# Patient Record
Sex: MALE | Race: WHITE | Employment: OTHER | URBAN - METROPOLITAN AREA
[De-identification: names, ages, dates, MRNs, and addresses within clinical notes are randomized per-mention and may not be internally consistent; named-entity substitution may affect disease eponyms.]

---

## 2017-01-06 DIAGNOSIS — I10 ESSENTIAL HYPERTENSION: ICD-10-CM

## 2017-01-06 RX ORDER — HYDRALAZINE HYDROCHLORIDE 25 MG/1
25 TABLET, FILM COATED ORAL 2 TIMES DAILY
Qty: 180 TABLET | Refills: 1 | Status: SHIPPED | OUTPATIENT
Start: 2017-01-06 | End: 2017-07-10 | Stop reason: SDUPTHER

## 2017-01-06 RX ORDER — AMLODIPINE BESYLATE 5 MG/1
5 TABLET ORAL DAILY
Qty: 90 TABLET | Refills: 1 | Status: SHIPPED | OUTPATIENT
Start: 2017-01-06 | End: 2017-07-10 | Stop reason: SDUPTHER

## 2017-03-02 LAB — DIABETIC RETINOPATHY: NORMAL

## 2017-03-06 ENCOUNTER — OFFICE VISIT (OUTPATIENT)
Dept: INTERNAL MEDICINE CLINIC | Age: 67
End: 2017-03-06

## 2017-03-06 VITALS
HEART RATE: 67 BPM | BODY MASS INDEX: 36.57 KG/M2 | OXYGEN SATURATION: 96 % | HEIGHT: 67 IN | SYSTOLIC BLOOD PRESSURE: 128 MMHG | DIASTOLIC BLOOD PRESSURE: 80 MMHG | RESPIRATION RATE: 14 BRPM | WEIGHT: 233 LBS

## 2017-03-06 DIAGNOSIS — K50.919 CROHN'S DISEASE WITH COMPLICATION, UNSPECIFIED GASTROINTESTINAL TRACT LOCATION (HCC): ICD-10-CM

## 2017-03-06 DIAGNOSIS — M54.12 RIGHT CERVICAL RADICULOPATHY: ICD-10-CM

## 2017-03-06 DIAGNOSIS — M54.50 CHRONIC MIDLINE LOW BACK PAIN WITHOUT SCIATICA: ICD-10-CM

## 2017-03-06 DIAGNOSIS — R80.9 CONTROLLED TYPE 2 DIABETES MELLITUS WITH MICROALBUMINURIA, WITHOUT LONG-TERM CURRENT USE OF INSULIN (HCC): Primary | ICD-10-CM

## 2017-03-06 DIAGNOSIS — E11.29 CONTROLLED TYPE 2 DIABETES MELLITUS WITH MICROALBUMINURIA, WITHOUT LONG-TERM CURRENT USE OF INSULIN (HCC): Primary | ICD-10-CM

## 2017-03-06 DIAGNOSIS — Z00.00 PREVENTATIVE HEALTH CARE: ICD-10-CM

## 2017-03-06 DIAGNOSIS — G89.29 CHRONIC MIDLINE LOW BACK PAIN WITHOUT SCIATICA: ICD-10-CM

## 2017-03-06 DIAGNOSIS — I10 ESSENTIAL HYPERTENSION: ICD-10-CM

## 2017-03-06 PROCEDURE — 99214 OFFICE O/P EST MOD 30 MIN: CPT | Performed by: INTERNAL MEDICINE

## 2017-03-06 RX ORDER — TRAMADOL HYDROCHLORIDE 50 MG/1
TABLET ORAL
Qty: 270 TABLET | Refills: 1 | Status: SHIPPED | OUTPATIENT
Start: 2017-03-06 | End: 2017-09-07 | Stop reason: SDUPTHER

## 2017-03-06 RX ORDER — CYCLOBENZAPRINE HCL 10 MG
10 TABLET ORAL 3 TIMES DAILY PRN
Qty: 90 TABLET | Refills: 0 | Status: SHIPPED | OUTPATIENT
Start: 2017-03-06 | End: 2017-06-29 | Stop reason: SDUPTHER

## 2017-03-06 RX ORDER — LISINOPRIL 40 MG/1
TABLET ORAL
Qty: 90 TABLET | Refills: 1 | Status: SHIPPED | OUTPATIENT
Start: 2017-03-06 | End: 2017-08-11 | Stop reason: SDUPTHER

## 2017-03-06 RX ORDER — HYDROCODONE BITARTRATE AND ACETAMINOPHEN 5; 325 MG/1; MG/1
1 TABLET ORAL 2 TIMES DAILY PRN
Qty: 60 TABLET | Refills: 0 | Status: SHIPPED | OUTPATIENT
Start: 2017-03-06 | End: 2017-06-06 | Stop reason: SDUPTHER

## 2017-03-06 RX ORDER — HYDROCODONE BITARTRATE AND ACETAMINOPHEN 5; 325 MG/1; MG/1
1 TABLET ORAL 2 TIMES DAILY
Qty: 60 TABLET | Refills: 0 | Status: SHIPPED | OUTPATIENT
Start: 2017-03-06 | End: 2017-06-06 | Stop reason: SDUPTHER

## 2017-03-16 DIAGNOSIS — E11.29 CONTROLLED TYPE 2 DIABETES MELLITUS WITH MICROALBUMINURIA, WITHOUT LONG-TERM CURRENT USE OF INSULIN (HCC): ICD-10-CM

## 2017-03-16 DIAGNOSIS — R80.9 CONTROLLED TYPE 2 DIABETES MELLITUS WITH MICROALBUMINURIA, WITHOUT LONG-TERM CURRENT USE OF INSULIN (HCC): ICD-10-CM

## 2017-03-16 DIAGNOSIS — K50.919 CROHN'S DISEASE WITH COMPLICATION, UNSPECIFIED GASTROINTESTINAL TRACT LOCATION (HCC): ICD-10-CM

## 2017-03-16 DIAGNOSIS — Z00.00 PREVENTATIVE HEALTH CARE: ICD-10-CM

## 2017-03-16 LAB
A/G RATIO: 1.6 (ref 1.1–2.2)
ALBUMIN SERPL-MCNC: 4.2 G/DL (ref 3.4–5)
ALP BLD-CCNC: 92 U/L (ref 40–129)
ALT SERPL-CCNC: 12 U/L (ref 10–40)
ANION GAP SERPL CALCULATED.3IONS-SCNC: 17 MMOL/L (ref 3–16)
AST SERPL-CCNC: 10 U/L (ref 15–37)
BILIRUB SERPL-MCNC: 0.3 MG/DL (ref 0–1)
BUN BLDV-MCNC: 14 MG/DL (ref 7–20)
CALCIUM SERPL-MCNC: 9.1 MG/DL (ref 8.3–10.6)
CHLORIDE BLD-SCNC: 100 MMOL/L (ref 99–110)
CHOLESTEROL, TOTAL: 227 MG/DL (ref 0–199)
CO2: 26 MMOL/L (ref 21–32)
CREAT SERPL-MCNC: 0.9 MG/DL (ref 0.8–1.3)
GFR AFRICAN AMERICAN: >60
GFR NON-AFRICAN AMERICAN: >60
GLOBULIN: 2.7 G/DL
GLUCOSE BLD-MCNC: 143 MG/DL (ref 70–99)
HDLC SERPL-MCNC: 39 MG/DL (ref 40–60)
HEPATITIS C ANTIBODY INTERPRETATION: NORMAL
LDL CHOLESTEROL CALCULATED: 143 MG/DL
POTASSIUM SERPL-SCNC: 3.8 MMOL/L (ref 3.5–5.1)
SEDIMENTATION RATE, ERYTHROCYTE: 28 MM/HR (ref 0–20)
SODIUM BLD-SCNC: 143 MMOL/L (ref 136–145)
TOTAL PROTEIN: 6.9 G/DL (ref 6.4–8.2)
TRIGL SERPL-MCNC: 224 MG/DL (ref 0–150)
VLDLC SERPL CALC-MCNC: 45 MG/DL

## 2017-03-17 LAB
ESTIMATED AVERAGE GLUCOSE: 157.1 MG/DL
HBA1C MFR BLD: 7.1 %

## 2017-03-31 DIAGNOSIS — I10 ESSENTIAL HYPERTENSION: ICD-10-CM

## 2017-03-31 RX ORDER — LABETALOL 100 MG/1
150 TABLET, FILM COATED ORAL 2 TIMES DAILY
Qty: 270 TABLET | Refills: 1 | Status: SHIPPED | OUTPATIENT
Start: 2017-03-31 | End: 2017-10-11 | Stop reason: SDUPTHER

## 2017-05-10 DIAGNOSIS — F41.9 ANXIETY: ICD-10-CM

## 2017-05-10 RX ORDER — ALPRAZOLAM 0.25 MG/1
TABLET ORAL
Qty: 30 TABLET | Refills: 0 | OUTPATIENT
Start: 2017-05-10

## 2017-05-10 RX ORDER — ALPRAZOLAM 0.25 MG/1
0.25 TABLET ORAL NIGHTLY PRN
Qty: 30 TABLET | Refills: 1 | Status: SHIPPED | OUTPATIENT
Start: 2017-05-10 | End: 2018-05-09 | Stop reason: SDUPTHER

## 2017-06-06 ENCOUNTER — OFFICE VISIT (OUTPATIENT)
Dept: INTERNAL MEDICINE CLINIC | Age: 67
End: 2017-06-06

## 2017-06-06 VITALS
HEIGHT: 67 IN | BODY MASS INDEX: 36.76 KG/M2 | DIASTOLIC BLOOD PRESSURE: 72 MMHG | WEIGHT: 234.2 LBS | SYSTOLIC BLOOD PRESSURE: 112 MMHG | HEART RATE: 63 BPM | OXYGEN SATURATION: 95 %

## 2017-06-06 DIAGNOSIS — M54.50 CHRONIC MIDLINE LOW BACK PAIN WITHOUT SCIATICA: ICD-10-CM

## 2017-06-06 DIAGNOSIS — G89.29 CHRONIC MIDLINE LOW BACK PAIN WITHOUT SCIATICA: ICD-10-CM

## 2017-06-06 DIAGNOSIS — M54.12 RIGHT CERVICAL RADICULOPATHY: ICD-10-CM

## 2017-06-06 DIAGNOSIS — I10 ESSENTIAL HYPERTENSION: ICD-10-CM

## 2017-06-06 DIAGNOSIS — K50.919 CROHN'S DISEASE WITH COMPLICATION, UNSPECIFIED GASTROINTESTINAL TRACT LOCATION (HCC): ICD-10-CM

## 2017-06-06 DIAGNOSIS — R80.9 CONTROLLED TYPE 2 DIABETES MELLITUS WITH MICROALBUMINURIA, WITHOUT LONG-TERM CURRENT USE OF INSULIN (HCC): Primary | ICD-10-CM

## 2017-06-06 DIAGNOSIS — E11.29 CONTROLLED TYPE 2 DIABETES MELLITUS WITH MICROALBUMINURIA, WITHOUT LONG-TERM CURRENT USE OF INSULIN (HCC): Primary | ICD-10-CM

## 2017-06-06 DIAGNOSIS — E34.9 TESTOSTERONE DEFICIENCY: ICD-10-CM

## 2017-06-06 PROCEDURE — G8427 DOCREV CUR MEDS BY ELIG CLIN: HCPCS | Performed by: INTERNAL MEDICINE

## 2017-06-06 PROCEDURE — 3017F COLORECTAL CA SCREEN DOC REV: CPT | Performed by: INTERNAL MEDICINE

## 2017-06-06 PROCEDURE — 1036F TOBACCO NON-USER: CPT | Performed by: INTERNAL MEDICINE

## 2017-06-06 PROCEDURE — G8417 CALC BMI ABV UP PARAM F/U: HCPCS | Performed by: INTERNAL MEDICINE

## 2017-06-06 PROCEDURE — 3045F PR MOST RECENT HEMOGLOBIN A1C LEVEL 7.0-9.0%: CPT | Performed by: INTERNAL MEDICINE

## 2017-06-06 PROCEDURE — 99214 OFFICE O/P EST MOD 30 MIN: CPT | Performed by: INTERNAL MEDICINE

## 2017-06-06 RX ORDER — TESTOSTERONE 16.2 MG/G
2 GEL TRANSDERMAL DAILY
Qty: 1 BOTTLE | Refills: 5 | Status: SHIPPED | OUTPATIENT
Start: 2017-06-06 | End: 2017-07-11 | Stop reason: SDUPTHER

## 2017-06-06 RX ORDER — HYDROCODONE BITARTRATE AND ACETAMINOPHEN 5; 325 MG/1; MG/1
1 TABLET ORAL 2 TIMES DAILY
Qty: 60 TABLET | Refills: 0 | Status: SHIPPED | OUTPATIENT
Start: 2017-06-06 | End: 2017-09-07 | Stop reason: SDUPTHER

## 2017-06-06 RX ORDER — HYDROCODONE BITARTRATE AND ACETAMINOPHEN 5; 325 MG/1; MG/1
1 TABLET ORAL 2 TIMES DAILY PRN
Qty: 60 TABLET | Refills: 0 | Status: SHIPPED | OUTPATIENT
Start: 2017-06-06 | End: 2017-12-07 | Stop reason: SDUPTHER

## 2017-06-08 DIAGNOSIS — E11.29 CONTROLLED TYPE 2 DIABETES MELLITUS WITH MICROALBUMINURIA, WITHOUT LONG-TERM CURRENT USE OF INSULIN (HCC): ICD-10-CM

## 2017-06-08 DIAGNOSIS — R80.9 CONTROLLED TYPE 2 DIABETES MELLITUS WITH MICROALBUMINURIA, WITHOUT LONG-TERM CURRENT USE OF INSULIN (HCC): ICD-10-CM

## 2017-06-08 LAB
A/G RATIO: 1.5 (ref 1.1–2.2)
ALBUMIN SERPL-MCNC: 4.1 G/DL (ref 3.4–5)
ALP BLD-CCNC: 97 U/L (ref 40–129)
ALT SERPL-CCNC: 13 U/L (ref 10–40)
ANION GAP SERPL CALCULATED.3IONS-SCNC: 19 MMOL/L (ref 3–16)
AST SERPL-CCNC: 10 U/L (ref 15–37)
BILIRUB SERPL-MCNC: 0.3 MG/DL (ref 0–1)
BUN BLDV-MCNC: 18 MG/DL (ref 7–20)
CALCIUM SERPL-MCNC: 9 MG/DL (ref 8.3–10.6)
CHLORIDE BLD-SCNC: 103 MMOL/L (ref 99–110)
CHOLESTEROL, TOTAL: 215 MG/DL (ref 0–199)
CO2: 24 MMOL/L (ref 21–32)
CREAT SERPL-MCNC: 0.9 MG/DL (ref 0.8–1.3)
CREATININE URINE: 101.4 MG/DL (ref 39–259)
GFR AFRICAN AMERICAN: >60
GFR NON-AFRICAN AMERICAN: >60
GLOBULIN: 2.7 G/DL
GLUCOSE BLD-MCNC: 145 MG/DL (ref 70–99)
HDLC SERPL-MCNC: 33 MG/DL (ref 40–60)
LDL CHOLESTEROL CALCULATED: 131 MG/DL
MICROALBUMIN UR-MCNC: 12.8 MG/DL
MICROALBUMIN/CREAT UR-RTO: 126.2 MG/G (ref 0–30)
POTASSIUM SERPL-SCNC: 3.8 MMOL/L (ref 3.5–5.1)
SODIUM BLD-SCNC: 146 MMOL/L (ref 136–145)
TOTAL PROTEIN: 6.8 G/DL (ref 6.4–8.2)
TRIGL SERPL-MCNC: 254 MG/DL (ref 0–150)
VLDLC SERPL CALC-MCNC: 51 MG/DL

## 2017-06-09 LAB
ESTIMATED AVERAGE GLUCOSE: 159.9 MG/DL
HBA1C MFR BLD: 7.2 %

## 2017-06-10 LAB
SEX HORMONE BINDING GLOBULIN: 31 NMOL/L (ref 11–80)
TESTOSTERONE FREE PERCENT: 1.8 % (ref 1.6–2.9)
TESTOSTERONE FREE, CALC: 40 PG/ML (ref 47–244)
TESTOSTERONE TOTAL-MALE: 219 NG/DL (ref 300–720)

## 2017-06-13 ENCOUNTER — TELEPHONE (OUTPATIENT)
Dept: INTERNAL MEDICINE CLINIC | Age: 67
End: 2017-06-13

## 2017-06-29 DIAGNOSIS — M54.50 CHRONIC MIDLINE LOW BACK PAIN WITHOUT SCIATICA: ICD-10-CM

## 2017-06-29 DIAGNOSIS — G89.29 CHRONIC MIDLINE LOW BACK PAIN WITHOUT SCIATICA: ICD-10-CM

## 2017-06-29 DIAGNOSIS — M54.12 RIGHT CERVICAL RADICULOPATHY: ICD-10-CM

## 2017-06-29 RX ORDER — CYCLOBENZAPRINE HCL 10 MG
10 TABLET ORAL 3 TIMES DAILY PRN
Qty: 90 TABLET | Refills: 0 | Status: SHIPPED | OUTPATIENT
Start: 2017-06-29 | End: 2017-10-02 | Stop reason: SDUPTHER

## 2017-07-10 DIAGNOSIS — I10 ESSENTIAL HYPERTENSION: ICD-10-CM

## 2017-07-10 RX ORDER — HYDRALAZINE HYDROCHLORIDE 25 MG/1
25 TABLET, FILM COATED ORAL 2 TIMES DAILY
Qty: 180 TABLET | Refills: 1 | Status: SHIPPED | OUTPATIENT
Start: 2017-07-10 | End: 2018-01-08 | Stop reason: SDUPTHER

## 2017-07-10 RX ORDER — AMLODIPINE BESYLATE 5 MG/1
5 TABLET ORAL DAILY
Qty: 90 TABLET | Refills: 1 | Status: SHIPPED | OUTPATIENT
Start: 2017-07-10 | End: 2018-01-08 | Stop reason: SDUPTHER

## 2017-07-11 DIAGNOSIS — E34.9 TESTOSTERONE DEFICIENCY: ICD-10-CM

## 2017-07-11 RX ORDER — TESTOSTERONE 16.2 MG/G
2 GEL TRANSDERMAL DAILY
Qty: 1 BOTTLE | Refills: 5 | Status: SHIPPED | OUTPATIENT
Start: 2017-07-11 | End: 2017-07-26

## 2017-07-26 ENCOUNTER — TELEPHONE (OUTPATIENT)
Dept: INTERNAL MEDICINE CLINIC | Age: 67
End: 2017-07-26

## 2017-07-26 DIAGNOSIS — E34.9 TESTOSTERONE DEFICIENCY: Primary | ICD-10-CM

## 2017-07-26 RX ORDER — TESTOSTERONE GEL, 1% 10 MG/G
50 GEL TRANSDERMAL DAILY
Qty: 30 PACKAGE | Refills: 5 | Status: SHIPPED | OUTPATIENT
Start: 2017-07-26 | End: 2017-11-15

## 2017-08-11 DIAGNOSIS — I10 ESSENTIAL HYPERTENSION: ICD-10-CM

## 2017-08-11 DIAGNOSIS — E11.29 CONTROLLED TYPE 2 DIABETES MELLITUS WITH MICROALBUMINURIA, WITHOUT LONG-TERM CURRENT USE OF INSULIN (HCC): ICD-10-CM

## 2017-08-11 DIAGNOSIS — R80.9 CONTROLLED TYPE 2 DIABETES MELLITUS WITH MICROALBUMINURIA, WITHOUT LONG-TERM CURRENT USE OF INSULIN (HCC): ICD-10-CM

## 2017-08-11 RX ORDER — POTASSIUM CHLORIDE 20 MEQ/1
TABLET, EXTENDED RELEASE ORAL
Qty: 90 TABLET | Refills: 1 | Status: SHIPPED | OUTPATIENT
Start: 2017-08-11 | End: 2019-02-07

## 2017-08-11 RX ORDER — LISINOPRIL 40 MG/1
TABLET ORAL
Qty: 30 TABLET | Refills: 0 | Status: SHIPPED | OUTPATIENT
Start: 2017-08-11 | End: 2017-09-07 | Stop reason: SDUPTHER

## 2017-08-29 ENCOUNTER — CARE COORDINATION (OUTPATIENT)
Dept: CARE COORDINATION | Age: 67
End: 2017-08-29

## 2017-09-07 ENCOUNTER — OFFICE VISIT (OUTPATIENT)
Dept: INTERNAL MEDICINE CLINIC | Age: 67
End: 2017-09-07

## 2017-09-07 VITALS
BODY MASS INDEX: 36.34 KG/M2 | DIASTOLIC BLOOD PRESSURE: 70 MMHG | RESPIRATION RATE: 16 BRPM | OXYGEN SATURATION: 96 % | SYSTOLIC BLOOD PRESSURE: 114 MMHG | HEART RATE: 74 BPM | WEIGHT: 232 LBS

## 2017-09-07 DIAGNOSIS — M54.50 CHRONIC MIDLINE LOW BACK PAIN WITHOUT SCIATICA: ICD-10-CM

## 2017-09-07 DIAGNOSIS — K50.919 CROHN'S DISEASE WITH COMPLICATION, UNSPECIFIED GASTROINTESTINAL TRACT LOCATION (HCC): ICD-10-CM

## 2017-09-07 DIAGNOSIS — E34.9 TESTOSTERONE DEFICIENCY: ICD-10-CM

## 2017-09-07 DIAGNOSIS — R80.9 CONTROLLED TYPE 2 DIABETES MELLITUS WITH MICROALBUMINURIA, WITHOUT LONG-TERM CURRENT USE OF INSULIN (HCC): ICD-10-CM

## 2017-09-07 DIAGNOSIS — E11.29 CONTROLLED TYPE 2 DIABETES MELLITUS WITH MICROALBUMINURIA, WITHOUT LONG-TERM CURRENT USE OF INSULIN (HCC): ICD-10-CM

## 2017-09-07 DIAGNOSIS — M54.12 RIGHT CERVICAL RADICULOPATHY: ICD-10-CM

## 2017-09-07 DIAGNOSIS — G89.29 CHRONIC MIDLINE LOW BACK PAIN WITHOUT SCIATICA: ICD-10-CM

## 2017-09-07 DIAGNOSIS — H35.54 PATTERN DYSTROPHY OF MACULA: ICD-10-CM

## 2017-09-07 DIAGNOSIS — Z00.00 ROUTINE GENERAL MEDICAL EXAMINATION AT A HEALTH CARE FACILITY: Primary | ICD-10-CM

## 2017-09-07 DIAGNOSIS — I10 ESSENTIAL HYPERTENSION: ICD-10-CM

## 2017-09-07 DIAGNOSIS — Z23 NEED FOR INFLUENZA VACCINATION: ICD-10-CM

## 2017-09-07 DIAGNOSIS — M17.0 PRIMARY OSTEOARTHRITIS OF BOTH KNEES: ICD-10-CM

## 2017-09-07 PROCEDURE — 36415 COLL VENOUS BLD VENIPUNCTURE: CPT | Performed by: INTERNAL MEDICINE

## 2017-09-07 PROCEDURE — 90662 IIV NO PRSV INCREASED AG IM: CPT | Performed by: INTERNAL MEDICINE

## 2017-09-07 PROCEDURE — G0402 INITIAL PREVENTIVE EXAM: HCPCS | Performed by: INTERNAL MEDICINE

## 2017-09-07 PROCEDURE — G0008 ADMIN INFLUENZA VIRUS VAC: HCPCS | Performed by: INTERNAL MEDICINE

## 2017-09-07 RX ORDER — TRAMADOL HYDROCHLORIDE 50 MG/1
TABLET ORAL
Qty: 270 TABLET | Refills: 1 | Status: SHIPPED | OUTPATIENT
Start: 2017-09-07 | End: 2017-12-07 | Stop reason: SDUPTHER

## 2017-09-07 RX ORDER — LISINOPRIL 40 MG/1
TABLET ORAL
Qty: 30 TABLET | Refills: 0 | Status: SHIPPED | OUTPATIENT
Start: 2017-09-07 | End: 2017-09-09 | Stop reason: SDUPTHER

## 2017-09-07 RX ORDER — HYDROCODONE BITARTRATE AND ACETAMINOPHEN 5; 325 MG/1; MG/1
1 TABLET ORAL 2 TIMES DAILY
Qty: 60 TABLET | Refills: 0 | Status: SHIPPED | OUTPATIENT
Start: 2017-09-07 | End: 2018-03-05 | Stop reason: SDUPTHER

## 2017-09-07 RX ORDER — HYDROCODONE BITARTRATE AND ACETAMINOPHEN 5; 325 MG/1; MG/1
1 TABLET ORAL 2 TIMES DAILY
Qty: 60 TABLET | Refills: 0 | Status: SHIPPED | OUTPATIENT
Start: 2017-09-07 | End: 2018-02-12 | Stop reason: SDUPTHER

## 2017-09-07 ASSESSMENT — LIFESTYLE VARIABLES: HOW OFTEN DO YOU HAVE A DRINK CONTAINING ALCOHOL: 0

## 2017-09-07 ASSESSMENT — PATIENT HEALTH QUESTIONNAIRE - PHQ9: SUM OF ALL RESPONSES TO PHQ QUESTIONS 1-9: 0

## 2017-09-07 ASSESSMENT — ANXIETY QUESTIONNAIRES: GAD7 TOTAL SCORE: 0

## 2017-09-09 DIAGNOSIS — E11.29 CONTROLLED TYPE 2 DIABETES MELLITUS WITH MICROALBUMINURIA, WITHOUT LONG-TERM CURRENT USE OF INSULIN (HCC): ICD-10-CM

## 2017-09-09 DIAGNOSIS — I10 ESSENTIAL HYPERTENSION: ICD-10-CM

## 2017-09-09 DIAGNOSIS — R80.9 CONTROLLED TYPE 2 DIABETES MELLITUS WITH MICROALBUMINURIA, WITHOUT LONG-TERM CURRENT USE OF INSULIN (HCC): ICD-10-CM

## 2017-09-11 ENCOUNTER — HOSPITAL ENCOUNTER (OUTPATIENT)
Dept: GENERAL RADIOLOGY | Age: 67
Discharge: HOME | End: 2017-09-11
Attending: INTERNAL MEDICINE | Admitting: INTERNAL MEDICINE

## 2017-09-11 RX ORDER — LISINOPRIL 40 MG/1
TABLET ORAL
Qty: 30 TABLET | Refills: 0 | Status: SHIPPED | OUTPATIENT
Start: 2017-09-11 | End: 2017-10-11 | Stop reason: SDUPTHER

## 2017-09-13 ENCOUNTER — OFFICE VISIT (OUTPATIENT)
Dept: ORTHOPEDIC SURGERY | Age: 67
End: 2017-09-13

## 2017-09-13 VITALS — BODY MASS INDEX: 36.41 KG/M2 | WEIGHT: 232 LBS | HEIGHT: 67 IN | RESPIRATION RATE: 16 BRPM

## 2017-09-13 DIAGNOSIS — T84.82XS ARTHROFIBROSIS OF TOTAL KNEE ARTHROPLASTY, SEQUELA: ICD-10-CM

## 2017-09-13 DIAGNOSIS — Z96.651 STATUS POST TOTAL RIGHT KNEE REPLACEMENT: Primary | ICD-10-CM

## 2017-09-13 PROBLEM — T84.82XA ARTHROFIBROSIS OF TOTAL KNEE ARTHROPLASTY (HCC): Status: ACTIVE | Noted: 2017-09-13

## 2017-09-13 PROCEDURE — G8417 CALC BMI ABV UP PARAM F/U: HCPCS | Performed by: ORTHOPAEDIC SURGERY

## 2017-09-13 PROCEDURE — G8428 CUR MEDS NOT DOCUMENT: HCPCS | Performed by: ORTHOPAEDIC SURGERY

## 2017-09-13 PROCEDURE — 3017F COLORECTAL CA SCREEN DOC REV: CPT | Performed by: ORTHOPAEDIC SURGERY

## 2017-09-13 PROCEDURE — 73560 X-RAY EXAM OF KNEE 1 OR 2: CPT | Performed by: ORTHOPAEDIC SURGERY

## 2017-09-13 PROCEDURE — 99204 OFFICE O/P NEW MOD 45 MIN: CPT | Performed by: ORTHOPAEDIC SURGERY

## 2017-09-13 PROCEDURE — 4040F PNEUMOC VAC/ADMIN/RCVD: CPT | Performed by: ORTHOPAEDIC SURGERY

## 2017-09-13 PROCEDURE — 1036F TOBACCO NON-USER: CPT | Performed by: ORTHOPAEDIC SURGERY

## 2017-09-13 ASSESSMENT — ENCOUNTER SYMPTOMS
NAUSEA: 1
DIARRHEA: 1
CONSTIPATION: 1
BLURRED VISION: 1
HEARTBURN: 1
ABDOMINAL PAIN: 1
SHORTNESS OF BREATH: 1

## 2017-09-14 LAB
A/G RATIO: 1.4 (CALC) (ref 0.8–2.6)
ALBUMIN SERPL-MCNC: 4.2 GM/DL (ref 3.5–5.2)
ALP BLD-CCNC: 93 U/L (ref 23–144)
ALT SERPL-CCNC: 14 U/L (ref 0–60)
AST SERPL-CCNC: 9 U/L (ref 0–46)
BASOPHILS ABSOLUTE: 0.1 K/MM3 (ref 0–0.3)
BASOPHILS RELATIVE PERCENT: 1.3 % (ref 0–2)
BILIRUB SERPL-MCNC: 0.3 MG/DL (ref 0–1.2)
BUN / CREAT RATIO: 15 (CALC) (ref 7–25)
BUN BLDV-MCNC: 15 MG/DL (ref 3–29)
CALCIUM SERPL-MCNC: 9.4 MG/DL (ref 8.5–10.5)
CHLORIDE BLD-SCNC: 101 MEQ/L (ref 96–110)
CHOLESTEROL, TOTAL: 212 MG/DL
CO2: 30 MEQ/L (ref 19–32)
COPY(IES) SENT TO:: NORMAL
CREAT SERPL-MCNC: 1 MG/DL
EOSINOPHILS ABSOLUTE: 0.3 K/MM3 (ref 0–0.6)
EOSINOPHILS RELATIVE PERCENT: 3.8 % (ref 0–7)
GFR SERPL CREATININE-BSD FRML MDRD: 79 ML/MIN/1.73M2
GLOBULIN: 3.1 GM/DL (CALC) (ref 1.9–3.6)
GLUCOSE BLD-MCNC: 155 MG/DL
HBA1C MFR BLD: 7 % TOTAL HGB
HCT VFR BLD CALC: 41.1 % (ref 41–50)
HDLC SERPL-MCNC: 35 MG/DL
HEMOGLOBIN: 13.5 G/DL (ref 13.8–17.2)
LDL CHOLESTEROL: 134 MG/DL (CALC)
LEUKOCYTES, UA: 6.8 K/MM3 (ref 3.8–10.8)
LYMPHOCYTES ABSOLUTE: 1.4 K/MM3 (ref 0.9–4.1)
LYMPHOCYTES RELATIVE PERCENT: 21.1 % (ref 18–47)
MCH RBC QN AUTO: 25.7 PG (ref 27–33)
MCHC RBC AUTO-ENTMCNC: 32.8 G/DL (ref 32–36)
MCV RBC AUTO: 78.4 FL (ref 80–100)
MONOCYTES ABSOLUTE: 0.4 K/MM3 (ref 0.2–1.1)
MONOCYTES RELATIVE PERCENT: 5.2 % (ref 0–14)
NEUTROPHILS ABSOLUTE: 4.7 K/MM3 (ref 1.5–7.8)
PDW BLD-RTO: 15.5 % (ref 9–15)
PLATELET # BLD: 243 K/MM3 (ref 130–400)
POTASSIUM SERPL-SCNC: 3.8 MEQ/L (ref 3.4–5.3)
RBC # BLD: 5.24 M/MM3 (ref 4.4–5.8)
SEGMENTED NEUTROPHILS RELATIVE PERCENT: 68.6 % (ref 40–75)
SODIUM BLD-SCNC: 145 MEQ/L (ref 135–148)
TOTAL PROTEIN: 7.3 GM/DL (ref 6–8.3)
TRIGL SERPL-MCNC: 215 MG/DL
VLDLC SERPL CALC-MCNC: 43 MG/DL (CALC) (ref 4–38)

## 2017-10-02 DIAGNOSIS — G89.29 CHRONIC MIDLINE LOW BACK PAIN WITHOUT SCIATICA: ICD-10-CM

## 2017-10-02 DIAGNOSIS — M54.50 CHRONIC MIDLINE LOW BACK PAIN WITHOUT SCIATICA: ICD-10-CM

## 2017-10-02 DIAGNOSIS — M54.12 RIGHT CERVICAL RADICULOPATHY: ICD-10-CM

## 2017-10-02 RX ORDER — CYCLOBENZAPRINE HCL 10 MG
10 TABLET ORAL 3 TIMES DAILY PRN
Qty: 90 TABLET | Refills: 0 | Status: SHIPPED | OUTPATIENT
Start: 2017-10-02 | End: 2018-01-02 | Stop reason: SDUPTHER

## 2017-11-15 ENCOUNTER — HOSPITAL ENCOUNTER (OUTPATIENT)
Dept: ULTRASOUND IMAGING | Age: 67
Discharge: HOME | End: 2017-11-15
Attending: INTERNAL MEDICINE | Admitting: INTERNAL MEDICINE

## 2017-11-15 ENCOUNTER — OFFICE VISIT (OUTPATIENT)
Dept: INTERNAL MEDICINE CLINIC | Age: 67
End: 2017-11-15

## 2017-11-15 VITALS
DIASTOLIC BLOOD PRESSURE: 72 MMHG | RESPIRATION RATE: 16 BRPM | OXYGEN SATURATION: 96 % | HEART RATE: 68 BPM | SYSTOLIC BLOOD PRESSURE: 126 MMHG

## 2017-11-15 DIAGNOSIS — J01.00 ACUTE NON-RECURRENT MAXILLARY SINUSITIS: Primary | ICD-10-CM

## 2017-11-15 DIAGNOSIS — N50.811 RIGHT TESTICULAR PAIN: ICD-10-CM

## 2017-11-15 DIAGNOSIS — I10 ESSENTIAL HYPERTENSION: ICD-10-CM

## 2017-11-15 PROCEDURE — 1123F ACP DISCUSS/DSCN MKR DOCD: CPT | Performed by: INTERNAL MEDICINE

## 2017-11-15 PROCEDURE — 3017F COLORECTAL CA SCREEN DOC REV: CPT | Performed by: INTERNAL MEDICINE

## 2017-11-15 PROCEDURE — G8427 DOCREV CUR MEDS BY ELIG CLIN: HCPCS | Performed by: INTERNAL MEDICINE

## 2017-11-15 PROCEDURE — G8417 CALC BMI ABV UP PARAM F/U: HCPCS | Performed by: INTERNAL MEDICINE

## 2017-11-15 PROCEDURE — G8484 FLU IMMUNIZE NO ADMIN: HCPCS | Performed by: INTERNAL MEDICINE

## 2017-11-15 PROCEDURE — 4040F PNEUMOC VAC/ADMIN/RCVD: CPT | Performed by: INTERNAL MEDICINE

## 2017-11-15 PROCEDURE — 1036F TOBACCO NON-USER: CPT | Performed by: INTERNAL MEDICINE

## 2017-11-15 PROCEDURE — 99213 OFFICE O/P EST LOW 20 MIN: CPT | Performed by: INTERNAL MEDICINE

## 2017-11-15 RX ORDER — CODEINE PHOSPHATE AND GUAIFENESIN 10; 100 MG/5ML; MG/5ML
5 SOLUTION ORAL 3 TIMES DAILY PRN
Qty: 150 ML | Refills: 0 | Status: SHIPPED | OUTPATIENT
Start: 2017-11-15 | End: 2017-12-07 | Stop reason: ALTCHOICE

## 2017-11-15 RX ORDER — PREDNISONE 1 MG/1
TABLET ORAL
Qty: 36 TABLET | Refills: 0 | Status: SHIPPED | OUTPATIENT
Start: 2017-11-15 | End: 2017-12-07 | Stop reason: ALTCHOICE

## 2017-11-15 RX ORDER — BENZONATATE 100 MG/1
100 CAPSULE ORAL 3 TIMES DAILY PRN
Qty: 30 CAPSULE | Refills: 0 | Status: SHIPPED | OUTPATIENT
Start: 2017-11-15 | End: 2017-11-25

## 2017-11-15 NOTE — PROGRESS NOTES
Call pt, ultrasound of testicles indicates a hydrocele which is a benign fluid filled sack, but would still keep f/u w urology to further evaluate since area is tender

## 2017-11-15 NOTE — PROGRESS NOTES
MG/5ML liquid; Take 5 mLs by mouth 3 times daily as needed for Cough . -     predniSONE (DELTASONE) 5 MG tablet; Take 8 pills, then 7,6,5,4,3,2,1    Essential hypertension- The current medical regimen is effective;  continue present plan and medications.       Right testicular pain- check u/s and set up for eval urology  -     Ultrasound scrotum and testicles  -     [facility]- Navya Villegas MD ([facility])

## 2017-12-04 DIAGNOSIS — I10 ESSENTIAL HYPERTENSION: ICD-10-CM

## 2017-12-04 RX ORDER — FUROSEMIDE 40 MG/1
40 TABLET ORAL DAILY
Qty: 90 TABLET | Refills: 1 | Status: SHIPPED | OUTPATIENT
Start: 2017-12-04 | End: 2018-07-16 | Stop reason: SDUPTHER

## 2017-12-07 ENCOUNTER — OFFICE VISIT (OUTPATIENT)
Dept: INTERNAL MEDICINE CLINIC | Age: 67
End: 2017-12-07

## 2017-12-07 VITALS
BODY MASS INDEX: 36.49 KG/M2 | SYSTOLIC BLOOD PRESSURE: 130 MMHG | HEART RATE: 71 BPM | WEIGHT: 233 LBS | DIASTOLIC BLOOD PRESSURE: 86 MMHG | RESPIRATION RATE: 16 BRPM | OXYGEN SATURATION: 96 %

## 2017-12-07 DIAGNOSIS — M54.50 CHRONIC MIDLINE LOW BACK PAIN WITHOUT SCIATICA: ICD-10-CM

## 2017-12-07 DIAGNOSIS — E11.29 CONTROLLED TYPE 2 DIABETES MELLITUS WITH MICROALBUMINURIA, WITHOUT LONG-TERM CURRENT USE OF INSULIN (HCC): ICD-10-CM

## 2017-12-07 DIAGNOSIS — R80.9 CONTROLLED TYPE 2 DIABETES MELLITUS WITH MICROALBUMINURIA, WITHOUT LONG-TERM CURRENT USE OF INSULIN (HCC): ICD-10-CM

## 2017-12-07 DIAGNOSIS — M54.12 RIGHT CERVICAL RADICULOPATHY: ICD-10-CM

## 2017-12-07 DIAGNOSIS — K65.4 MESENTERIC PANNICULITIS (HCC): Primary | ICD-10-CM

## 2017-12-07 DIAGNOSIS — G89.29 CHRONIC MIDLINE LOW BACK PAIN WITHOUT SCIATICA: ICD-10-CM

## 2017-12-07 PROCEDURE — 99214 OFFICE O/P EST MOD 30 MIN: CPT | Performed by: INTERNAL MEDICINE

## 2017-12-07 PROCEDURE — 1036F TOBACCO NON-USER: CPT | Performed by: INTERNAL MEDICINE

## 2017-12-07 PROCEDURE — G8484 FLU IMMUNIZE NO ADMIN: HCPCS | Performed by: INTERNAL MEDICINE

## 2017-12-07 PROCEDURE — 3045F PR MOST RECENT HEMOGLOBIN A1C LEVEL 7.0-9.0%: CPT | Performed by: INTERNAL MEDICINE

## 2017-12-07 PROCEDURE — 1123F ACP DISCUSS/DSCN MKR DOCD: CPT | Performed by: INTERNAL MEDICINE

## 2017-12-07 PROCEDURE — 4040F PNEUMOC VAC/ADMIN/RCVD: CPT | Performed by: INTERNAL MEDICINE

## 2017-12-07 PROCEDURE — G8417 CALC BMI ABV UP PARAM F/U: HCPCS | Performed by: INTERNAL MEDICINE

## 2017-12-07 PROCEDURE — 3017F COLORECTAL CA SCREEN DOC REV: CPT | Performed by: INTERNAL MEDICINE

## 2017-12-07 PROCEDURE — G8427 DOCREV CUR MEDS BY ELIG CLIN: HCPCS | Performed by: INTERNAL MEDICINE

## 2017-12-07 RX ORDER — CIPROFLOXACIN 500 MG/1
500 TABLET, FILM COATED ORAL 2 TIMES DAILY
Qty: 20 TABLET | Refills: 0 | Status: SHIPPED | OUTPATIENT
Start: 2017-12-07 | End: 2017-12-17

## 2017-12-07 RX ORDER — METRONIDAZOLE 500 MG/1
500 TABLET ORAL 3 TIMES DAILY
Qty: 30 TABLET | Refills: 0 | Status: SHIPPED | OUTPATIENT
Start: 2017-12-07 | End: 2017-12-17

## 2017-12-07 RX ORDER — PREDNISONE 1 MG/1
TABLET ORAL
Qty: 36 TABLET | Refills: 0 | Status: SHIPPED | OUTPATIENT
Start: 2017-12-07 | End: 2017-12-19 | Stop reason: CLARIF

## 2017-12-07 RX ORDER — TRAMADOL HYDROCHLORIDE 50 MG/1
TABLET ORAL
Qty: 270 TABLET | Refills: 1 | Status: SHIPPED | OUTPATIENT
Start: 2017-12-07 | End: 2018-03-05 | Stop reason: SDUPTHER

## 2017-12-07 RX ORDER — HYDROCODONE BITARTRATE AND ACETAMINOPHEN 5; 325 MG/1; MG/1
1 TABLET ORAL 2 TIMES DAILY PRN
Qty: 60 TABLET | Refills: 0 | Status: SHIPPED | OUTPATIENT
Start: 2017-12-07 | End: 2018-01-08 | Stop reason: SDUPTHER

## 2017-12-07 NOTE — PROGRESS NOTES
Jesse Medina  1950 12/07/17    SUBJECTIVE:    Onset the past 5d of diffuse abd pain and some rad to back, in umbilical region. Some nausea noted, no fever, chills. Bms nl. Denies emesis. STATES SX VERY SIMILAR TO PRIOR EPISODE OF MESENTERIC PANNICULITIS FR 3/2015, NOTED ON CT ABD. CHR LBP STABLE ON PAIN MEDS, Controlled substances monitoring: possible medication side effects, risk of tolerance and/or dependence, and alternative treatments discussed, no signs of potential drug abuse or diversion identified and OARRS report reviewed today- activity consistent with treatment plan. ALSO MEDS HELP CONTROL HIS CERVICAL RADICULOPATHY. Dm- DUE FOR F/U LAB,   Lab Results   Component Value Date    LABA1C 7.0 (H) 09/14/2017    LABA1C 7.2 06/08/2017    LABA1C 7.1 03/16/2017     Lab Results   Component Value Date    GLUF 142 (H) 03/18/2015    LABMICR 12.80 (H) 06/08/2017    LDLCALC 131 (H) 06/08/2017    CREATININE 1.0 09/14/2017         OBJECTIVE:    /86   Pulse 71   Resp 16   Wt 233 lb (105.7 kg)   SpO2 96%   BMI 36.49 kg/m²     Physical Exam   Constitutional: He appears well-developed and well-nourished. No distress. HENT:   Head: Normocephalic and atraumatic. Nose: Nose normal.   Mouth/Throat: Oropharynx is clear and moist. No oropharyngeal exudate. Eyes: Conjunctivae and EOM are normal. Pupils are equal, round, and reactive to light. Right eye exhibits no discharge. Left eye exhibits no discharge. No scleral icterus. Neck: Normal range of motion. Neck supple. No JVD present. No tracheal deviation present. No thyromegaly present. Cardiovascular: Normal rate, regular rhythm, normal heart sounds and intact distal pulses. Exam reveals no gallop and no friction rub. No murmur heard. Pulmonary/Chest: Effort normal and breath sounds normal. No respiratory distress. He has no wheezes. He has no rales. Abdominal: Soft. Bowel sounds are normal. He exhibits no distension and no mass.  There

## 2017-12-08 LAB
A/G RATIO: 1.6 (CALC) (ref 0.8–2.6)
ALBUMIN SERPL-MCNC: 4.2 GM/DL (ref 3.5–5.2)
ALP BLD-CCNC: 101 U/L (ref 23–144)
ALT SERPL-CCNC: 17 U/L (ref 0–60)
AST SERPL-CCNC: 12 U/L (ref 0–46)
BASOPHILS ABSOLUTE: 0.1 K/MM3 (ref 0–0.3)
BASOPHILS RELATIVE PERCENT: 1 % (ref 0–2)
BILIRUB SERPL-MCNC: 0.2 MG/DL (ref 0–1.2)
BUN / CREAT RATIO: 15 (CALC) (ref 7–25)
BUN BLDV-MCNC: 17 MG/DL (ref 3–29)
CALCIUM SERPL-MCNC: 9.3 MG/DL (ref 8.5–10.5)
CHLORIDE BLD-SCNC: 101 MEQ/L (ref 96–110)
CHOLESTEROL, TOTAL: 203 MG/DL
CO2: 28 MEQ/L (ref 19–32)
COPY(IES) SENT TO:: NORMAL
CREAT SERPL-MCNC: 1.1 MG/DL
EOSINOPHILS ABSOLUTE: 0.3 K/MM3 (ref 0–0.6)
EOSINOPHILS RELATIVE PERCENT: 3.6 % (ref 0–7)
GFR SERPL CREATININE-BSD FRML MDRD: 70 ML/MIN/1.73M2
GLOBULIN: 2.6 GM/DL (CALC) (ref 1.9–3.6)
GLUCOSE BLD-MCNC: 176 MG/DL
HBA1C MFR BLD: 6.8 % TOTAL HGB
HCT VFR BLD CALC: 42.1 % (ref 41–50)
HDLC SERPL-MCNC: 34 MG/DL
HEMOGLOBIN: 13.9 G/DL (ref 13.8–17.2)
LDL CHOLESTEROL: 123 MG/DL (CALC)
LEUKOCYTES, UA: 7.1 K/MM3 (ref 3.8–10.8)
LYMPHOCYTES ABSOLUTE: 1.3 K/MM3 (ref 0.9–4.1)
LYMPHOCYTES RELATIVE PERCENT: 17.9 % (ref 18–47)
MCH RBC QN AUTO: 25.6 PG (ref 27–33)
MCHC RBC AUTO-ENTMCNC: 33.1 G/DL (ref 32–36)
MCV RBC AUTO: 77.4 FL (ref 80–100)
MONOCYTES ABSOLUTE: 0.3 K/MM3 (ref 0.2–1.1)
MONOCYTES RELATIVE PERCENT: 4.3 % (ref 0–14)
NEUTROPHILS ABSOLUTE: 5.2 K/MM3 (ref 1.5–7.8)
PDW BLD-RTO: 15.2 % (ref 9–15)
PLATELET # BLD: 239 K/MM3 (ref 130–400)
POTASSIUM SERPL-SCNC: 3.8 MEQ/L (ref 3.4–5.3)
RBC # BLD: 5.44 M/MM3 (ref 4.4–5.8)
SEDIMENTATION RATE, ERYTHROCYTE: 26 MM/HR (ref 0–20)
SEGMENTED NEUTROPHILS RELATIVE PERCENT: 73.2 % (ref 40–75)
SODIUM BLD-SCNC: 141 MEQ/L (ref 135–148)
TOTAL PROTEIN: 6.8 GM/DL (ref 6–8.3)
TRIGL SERPL-MCNC: 228 MG/DL
VLDLC SERPL CALC-MCNC: 46 MG/DL (CALC) (ref 4–38)

## 2017-12-09 DIAGNOSIS — I10 ESSENTIAL HYPERTENSION: ICD-10-CM

## 2017-12-11 RX ORDER — LABETALOL 100 MG/1
TABLET, FILM COATED ORAL
Qty: 90 TABLET | Refills: 0 | Status: SHIPPED | OUTPATIENT
Start: 2017-12-11 | End: 2018-01-08 | Stop reason: SDUPTHER

## 2017-12-19 ENCOUNTER — OFFICE VISIT (OUTPATIENT)
Dept: INTERNAL MEDICINE CLINIC | Age: 67
End: 2017-12-19

## 2017-12-19 VITALS
HEART RATE: 73 BPM | RESPIRATION RATE: 16 BRPM | DIASTOLIC BLOOD PRESSURE: 80 MMHG | OXYGEN SATURATION: 96 % | SYSTOLIC BLOOD PRESSURE: 139 MMHG

## 2017-12-19 DIAGNOSIS — K65.4 MESENTERIC PANNICULITIS (HCC): Primary | ICD-10-CM

## 2017-12-19 PROCEDURE — G8417 CALC BMI ABV UP PARAM F/U: HCPCS | Performed by: INTERNAL MEDICINE

## 2017-12-19 PROCEDURE — G8484 FLU IMMUNIZE NO ADMIN: HCPCS | Performed by: INTERNAL MEDICINE

## 2017-12-19 PROCEDURE — 1036F TOBACCO NON-USER: CPT | Performed by: INTERNAL MEDICINE

## 2017-12-19 PROCEDURE — 4040F PNEUMOC VAC/ADMIN/RCVD: CPT | Performed by: INTERNAL MEDICINE

## 2017-12-19 PROCEDURE — 3017F COLORECTAL CA SCREEN DOC REV: CPT | Performed by: INTERNAL MEDICINE

## 2017-12-19 PROCEDURE — 99213 OFFICE O/P EST LOW 20 MIN: CPT | Performed by: INTERNAL MEDICINE

## 2017-12-19 PROCEDURE — 1123F ACP DISCUSS/DSCN MKR DOCD: CPT | Performed by: INTERNAL MEDICINE

## 2017-12-19 PROCEDURE — G8427 DOCREV CUR MEDS BY ELIG CLIN: HCPCS | Performed by: INTERNAL MEDICINE

## 2017-12-19 RX ORDER — AMOXICILLIN AND CLAVULANATE POTASSIUM 875; 125 MG/1; MG/1
1 TABLET, FILM COATED ORAL 2 TIMES DAILY WITH MEALS
Qty: 20 TABLET | Refills: 0 | Status: SHIPPED | OUTPATIENT
Start: 2017-12-19 | End: 2017-12-29

## 2017-12-26 ENCOUNTER — HOSPITAL ENCOUNTER (OUTPATIENT)
Dept: CT IMAGING | Age: 67
Discharge: HOME | End: 2017-12-26
Attending: INTERNAL MEDICINE | Admitting: INTERNAL MEDICINE

## 2017-12-26 DIAGNOSIS — K65.4 MESENTERIC PANNICULITIS (HCC): ICD-10-CM

## 2017-12-26 DIAGNOSIS — K65.4 SCLEROSING MESENTERITIS (HCC): ICD-10-CM

## 2017-12-26 RX ORDER — SODIUM CHLORIDE 0.9 % (FLUSH) 0.9 %
10 SYRINGE (ML) INJECTION
Status: COMPLETED | OUTPATIENT
Start: 2017-12-26 | End: 2017-12-26

## 2017-12-26 RX ADMIN — Medication 10 ML: at 11:57

## 2017-12-26 NOTE — PROGRESS NOTES
Call pt, CT APPEARS BENIGN, NO DEFINITE MESENTERIC PANNICULITIS NOTED, OR DIVERTICULITIS. IF PAIN IS IMPROVING, WE WILL CONT TO MONITOR. IF PAIN IS NO BETTER REC WE NEXT REFER TO GI, PREVIOUSLY W DR BURNETT.   MAY NEED TO REFER TO ALTERNATIVE GI IF STILL HAVING SX W/O IMPROVEMENT

## 2018-01-02 ENCOUNTER — OFFICE VISIT (OUTPATIENT)
Dept: INTERNAL MEDICINE CLINIC | Age: 68
End: 2018-01-02

## 2018-01-02 VITALS
DIASTOLIC BLOOD PRESSURE: 80 MMHG | OXYGEN SATURATION: 96 % | HEART RATE: 71 BPM | SYSTOLIC BLOOD PRESSURE: 122 MMHG | TEMPERATURE: 98 F | RESPIRATION RATE: 17 BRPM

## 2018-01-02 DIAGNOSIS — G89.29 CHRONIC MIDLINE LOW BACK PAIN WITHOUT SCIATICA: ICD-10-CM

## 2018-01-02 DIAGNOSIS — M54.12 RIGHT CERVICAL RADICULOPATHY: ICD-10-CM

## 2018-01-02 DIAGNOSIS — M54.50 CHRONIC MIDLINE LOW BACK PAIN WITHOUT SCIATICA: ICD-10-CM

## 2018-01-02 DIAGNOSIS — J11.1 INFLUENZA: Primary | ICD-10-CM

## 2018-01-02 LAB
INFLUENZA A ANTIBODY: NORMAL
INFLUENZA B ANTIBODY: NORMAL

## 2018-01-02 PROCEDURE — 99213 OFFICE O/P EST LOW 20 MIN: CPT | Performed by: INTERNAL MEDICINE

## 2018-01-02 PROCEDURE — 87804 INFLUENZA ASSAY W/OPTIC: CPT | Performed by: INTERNAL MEDICINE

## 2018-01-02 RX ORDER — AZITHROMYCIN 250 MG/1
TABLET, FILM COATED ORAL
Qty: 6 TABLET | Refills: 0 | Status: SHIPPED | OUTPATIENT
Start: 2018-01-02 | End: 2018-03-05 | Stop reason: ALTCHOICE

## 2018-01-02 RX ORDER — CYCLOBENZAPRINE HCL 10 MG
10 TABLET ORAL 3 TIMES DAILY PRN
Qty: 90 TABLET | Refills: 0 | Status: SHIPPED | OUTPATIENT
Start: 2018-01-02 | End: 2018-04-05 | Stop reason: SDUPTHER

## 2018-01-02 RX ORDER — PREDNISONE 1 MG/1
TABLET ORAL
Qty: 21 TABLET | Refills: 0 | Status: SHIPPED | OUTPATIENT
Start: 2018-01-02 | End: 2018-03-05 | Stop reason: ALTCHOICE

## 2018-01-02 NOTE — PROGRESS NOTES
Serena Rdz  1950 01/02/18    SUBJECTIVE:  3d hx of sudden onset ST and myalgias, headache, low gr fever, chills. Some pleurisy noted. Did have flu shot. Denies n/v/diarrhea. Chr LBP and neck pain stable w prn flexeril and rf due. abd pain is resolving, CT was neg. OBJECTIVE:    /80   Pulse 71   Temp 98 °F (36.7 °C)   Resp 17   SpO2 96%     Physical Exam   Constitutional: He appears well-developed and well-nourished. No distress. HENT:   Head: Normocephalic and atraumatic. Nose: Mucosal edema and rhinorrhea present. No nasal deformity. No epistaxis. Mouth/Throat: Oropharynx is clear and moist. No oropharyngeal exudate. Bilateral nasal congestion with clear discharge noted, no bilateral maxillary sinus tenderness   Eyes: Conjunctivae are normal. No scleral icterus. Neck: Neck supple. Cardiovascular: Normal rate, regular rhythm and normal heart sounds. No murmur heard. Pulmonary/Chest: Effort normal and breath sounds normal. No respiratory distress. He has no wheezes. He has no rales. Abdominal: Soft. Bowel sounds are normal. He exhibits no distension. There is no tenderness. Lymphadenopathy:     He has no cervical adenopathy. Neurological: He is alert. Psychiatric: He has a normal mood and affect. Vitals reviewed. ASSESSMENT:    1. Influenza    2. Right cervical radiculopathy    3. Chronic midline low back pain without sciatica        PLAN:    Radha Britton was seen today for cough and congestion. Diagnoses and all orders for this visit:    Influenza- rapid agn test neg but clinically suspected. rx empirically as below and fluids, rest.  Pt to call back one week if not improving.      -     azithromycin (ZITHROMAX Z-COLIN) 250 MG tablet; Take two tabs once then one tab daily till completed  -     predniSONE (DELTASONE) 5 MG tablet;  Take 6 tablets by mouth on day 1, 5 on day 2, 4 on day 3, 3 on day 4, 2 on day 5, 1 on day 6.  -     POCT Influenza A/B    Right

## 2018-01-08 DIAGNOSIS — I10 ESSENTIAL HYPERTENSION: ICD-10-CM

## 2018-01-08 DIAGNOSIS — M54.50 CHRONIC MIDLINE LOW BACK PAIN WITHOUT SCIATICA: ICD-10-CM

## 2018-01-08 DIAGNOSIS — M54.12 RIGHT CERVICAL RADICULOPATHY: ICD-10-CM

## 2018-01-08 DIAGNOSIS — G89.29 CHRONIC MIDLINE LOW BACK PAIN WITHOUT SCIATICA: ICD-10-CM

## 2018-01-08 RX ORDER — HYDROCODONE BITARTRATE AND ACETAMINOPHEN 5; 325 MG/1; MG/1
1 TABLET ORAL 2 TIMES DAILY PRN
Qty: 60 TABLET | Refills: 0 | Status: SHIPPED | OUTPATIENT
Start: 2018-01-08 | End: 2018-03-26 | Stop reason: SDUPTHER

## 2018-01-08 RX ORDER — AMLODIPINE BESYLATE 5 MG/1
5 TABLET ORAL DAILY
Qty: 90 TABLET | Refills: 1 | Status: SHIPPED | OUTPATIENT
Start: 2018-01-08 | End: 2018-07-16 | Stop reason: SDUPTHER

## 2018-01-08 RX ORDER — LABETALOL 100 MG/1
TABLET, FILM COATED ORAL
Qty: 270 TABLET | Refills: 1 | Status: SHIPPED | OUTPATIENT
Start: 2018-01-08 | End: 2018-07-20 | Stop reason: SDUPTHER

## 2018-01-08 RX ORDER — HYDRALAZINE HYDROCHLORIDE 25 MG/1
25 TABLET, FILM COATED ORAL 2 TIMES DAILY
Qty: 180 TABLET | Refills: 1 | Status: ON HOLD | OUTPATIENT
Start: 2018-01-08 | End: 2018-04-03

## 2018-02-12 DIAGNOSIS — G89.29 CHRONIC MIDLINE LOW BACK PAIN WITHOUT SCIATICA: ICD-10-CM

## 2018-02-12 DIAGNOSIS — M54.50 CHRONIC MIDLINE LOW BACK PAIN WITHOUT SCIATICA: ICD-10-CM

## 2018-02-12 DIAGNOSIS — M54.12 RIGHT CERVICAL RADICULOPATHY: ICD-10-CM

## 2018-02-12 RX ORDER — HYDROCODONE BITARTRATE AND ACETAMINOPHEN 5; 325 MG/1; MG/1
1 TABLET ORAL 2 TIMES DAILY
Qty: 60 TABLET | Refills: 0 | Status: SHIPPED | OUTPATIENT
Start: 2018-02-12 | End: 2018-03-26 | Stop reason: CLARIF

## 2018-03-05 ENCOUNTER — OFFICE VISIT (OUTPATIENT)
Dept: INTERNAL MEDICINE CLINIC | Age: 68
End: 2018-03-05

## 2018-03-05 VITALS
RESPIRATION RATE: 16 BRPM | DIASTOLIC BLOOD PRESSURE: 78 MMHG | SYSTOLIC BLOOD PRESSURE: 128 MMHG | WEIGHT: 234 LBS | OXYGEN SATURATION: 94 % | BODY MASS INDEX: 36.65 KG/M2 | HEART RATE: 61 BPM

## 2018-03-05 DIAGNOSIS — I10 ESSENTIAL HYPERTENSION: ICD-10-CM

## 2018-03-05 DIAGNOSIS — E11.29 CONTROLLED TYPE 2 DIABETES MELLITUS WITH MICROALBUMINURIA, WITHOUT LONG-TERM CURRENT USE OF INSULIN (HCC): Primary | ICD-10-CM

## 2018-03-05 DIAGNOSIS — R80.9 CONTROLLED TYPE 2 DIABETES MELLITUS WITH MICROALBUMINURIA, WITHOUT LONG-TERM CURRENT USE OF INSULIN (HCC): Primary | ICD-10-CM

## 2018-03-05 DIAGNOSIS — M54.12 RIGHT CERVICAL RADICULOPATHY: ICD-10-CM

## 2018-03-05 DIAGNOSIS — K50.919 CROHN'S DISEASE WITH COMPLICATION, UNSPECIFIED GASTROINTESTINAL TRACT LOCATION (HCC): ICD-10-CM

## 2018-03-05 DIAGNOSIS — G89.29 CHRONIC MIDLINE LOW BACK PAIN WITHOUT SCIATICA: ICD-10-CM

## 2018-03-05 DIAGNOSIS — M54.50 CHRONIC MIDLINE LOW BACK PAIN WITHOUT SCIATICA: ICD-10-CM

## 2018-03-05 PROCEDURE — 3046F HEMOGLOBIN A1C LEVEL >9.0%: CPT | Performed by: INTERNAL MEDICINE

## 2018-03-05 PROCEDURE — G8427 DOCREV CUR MEDS BY ELIG CLIN: HCPCS | Performed by: INTERNAL MEDICINE

## 2018-03-05 PROCEDURE — 1123F ACP DISCUSS/DSCN MKR DOCD: CPT | Performed by: INTERNAL MEDICINE

## 2018-03-05 PROCEDURE — G8484 FLU IMMUNIZE NO ADMIN: HCPCS | Performed by: INTERNAL MEDICINE

## 2018-03-05 PROCEDURE — 3017F COLORECTAL CA SCREEN DOC REV: CPT | Performed by: INTERNAL MEDICINE

## 2018-03-05 PROCEDURE — 99214 OFFICE O/P EST MOD 30 MIN: CPT | Performed by: INTERNAL MEDICINE

## 2018-03-05 PROCEDURE — 1036F TOBACCO NON-USER: CPT | Performed by: INTERNAL MEDICINE

## 2018-03-05 PROCEDURE — G8417 CALC BMI ABV UP PARAM F/U: HCPCS | Performed by: INTERNAL MEDICINE

## 2018-03-05 PROCEDURE — 4040F PNEUMOC VAC/ADMIN/RCVD: CPT | Performed by: INTERNAL MEDICINE

## 2018-03-05 RX ORDER — HYDROCODONE BITARTRATE AND ACETAMINOPHEN 5; 325 MG/1; MG/1
1 TABLET ORAL 2 TIMES DAILY
Qty: 60 TABLET | Refills: 0 | Status: CANCELLED | OUTPATIENT
Start: 2018-03-05

## 2018-03-05 RX ORDER — TRAMADOL HYDROCHLORIDE 50 MG/1
TABLET ORAL
Qty: 90 TABLET | Refills: 2 | Status: SHIPPED | OUTPATIENT
Start: 2018-03-05 | End: 2018-04-05

## 2018-03-05 RX ORDER — HYDROCODONE BITARTRATE AND ACETAMINOPHEN 5; 325 MG/1; MG/1
1 TABLET ORAL 2 TIMES DAILY
Qty: 60 TABLET | Refills: 0 | Status: SHIPPED | OUTPATIENT
Start: 2018-03-05 | End: 2018-04-05 | Stop reason: SDUPTHER

## 2018-03-05 NOTE — PROGRESS NOTES
Gastroenterology- Roxie Valencia MD  -     CBC Auto Differential; Future    Other orders  -     Cancel: HYDROcodone-acetaminophen (NORCO) 5-325 MG per tablet; Take 1 tablet by mouth 2 times daily.

## 2018-03-07 ENCOUNTER — TELEPHONE (OUTPATIENT)
Dept: GASTROENTEROLOGY | Age: 68
End: 2018-03-07

## 2018-03-15 ENCOUNTER — OFFICE VISIT (OUTPATIENT)
Dept: GASTROENTEROLOGY | Age: 68
End: 2018-03-15

## 2018-03-15 VITALS
HEART RATE: 71 BPM | OXYGEN SATURATION: 97 % | DIASTOLIC BLOOD PRESSURE: 64 MMHG | BODY MASS INDEX: 35.77 KG/M2 | SYSTOLIC BLOOD PRESSURE: 130 MMHG | WEIGHT: 236 LBS | HEIGHT: 68 IN

## 2018-03-15 DIAGNOSIS — Z13.820 SCREENING FOR OSTEOPOROSIS: Primary | ICD-10-CM

## 2018-03-15 DIAGNOSIS — K50.919 CROHN'S DISEASE WITH COMPLICATION, UNSPECIFIED GASTROINTESTINAL TRACT LOCATION (HCC): ICD-10-CM

## 2018-03-15 PROCEDURE — 99215 OFFICE O/P EST HI 40 MIN: CPT | Performed by: INTERNAL MEDICINE

## 2018-03-15 NOTE — PROGRESS NOTES
in the normal:. The CT scan of the 2017 was normal.         PMH:    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    BPH (benign prostatic hypertrophy)     Crohn's regional enteritis (HCC)     Dr Rachelle Arreguin DM II (diabetes mellitus, type II), controlled ([facility] [address]     diet controlled    Eye pain     GERD (gastroesophageal reflux disease)     HTN (hypertension)    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([facility] [address]     March 2015- responded to pred taper and cipro/flagyl    Neck pain     Dr Tee Rowell has evaluated    Osteoarthritis     Osteoarthritis of hands, bilateral     Pattern dystrophy of macula     Dr Juarez Mo    Proteinuria     Rash     Right cervical radiculopathy     Right knee pain     Dr Agnes Hunt- tib plateau fx.  S/P colonoscopy 06/23/2016    Dr Kerry Ugarte, polyp, when recheck-- 2 yrs    Testosterone deficiency     on andrgel    Vertigo        PSH:    Past Surgical History:   Procedure Laterality Date    CATARACT REMOVAL WITH IMPLANT Left 03/02/2017    Dr Felix Moseley Right 03/16/2017    Dr Martha Crews  2005    Dr Rosa Jackson  8/2011    Dr Jailyn Christie Right 03/16/2017    cataract    KNEE ARTHROSCOPY  89    right    KNEE SURGERY Right 1/16/2013    Dr Agnes STREETER knee tib plateau fx and medial meniscectomy    LAMINECTOMY  75 and 83    TONSILLECTOMY  70    TOTAL KNEE ARTHROPLASTY Right 6/12/13    Dr Agnes STREETER        Medications:    Current Outpatient Prescriptions   Medication Sig Dispense Refill    traMADol (ULTRAM) 50 MG tablet Two in the am and 1 in the pm as needed for pain. . 90 tablet 2    HYDROcodone-acetaminophen (NORCO) 5-325 MG per tablet Take 1 tablet by mouth 2 times daily for 30 days. Diarrhea at 500mg daily    Nsaids      W PROTEINURIA       Social History:    Social History     Social History    Marital status:      Spouse name: N/A    Number of children: N/A    Years of education: N/A     Occupational History    PA      [employer]     disabled      2015     Social History Main Topics    Smoking status: Former Smoker     Packs/day: 3.00     Years: 30.00     Types: Cigarettes     Quit date: 1/1/1995    Smokeless tobacco: Never Used    Alcohol use No    Drug use: No    Sexual activity: Not on file     Other Topics Concern    Not on file     Social History Narrative    No narrative on file       Family History:        Problem Relation Age of Onset    Elevated Lipids Mother     High Blood Pressure Mother     Other Father      blind         Review of Systems:  Constitutional: No weight loss, no fevers. Eyes: No problems with vision. ENT: No nose or sinus problems, no oral problems, no throat problems or hoarseness. Cardiovascular: No chest pain, no leg pain with walking, no palpitations, no ankle swelling. Respiratory: No shortness of breath, no persistent cough, no wheezing. Endocrine: No increased thirst, no increased urination. Gastrointestinal: No heartburn, no dysphagia, no abdominal pain, no loss of appetite, no nausea or vomiting, no diarrhea, no constipation, no melena, no hematochezia, no sceleral icterus or jaundice. Skin: No rashes. Musculoskeletal: No trouble walking or standing, no joint pain, no muscle pain. Allergy/Immune System: No allergies, no frequent infections. Neurological: No memory difficulties, no temporary blindness, no difficulty speaking, no headaches, no numbness. Psychiatric: No depression, no suicidal ideation, no auditory hallucinations. Hematological/Lymphatic: No lymphadenopathy, no frequent nose bleeds, no easy bruising.   Genitourinary: No penile/vaginal discharge, no pain with urination, no trouble starting urinary stream, no a[address]sey [address] [facility] [address]  0ffice: [phone]  Fax: [phone]

## 2018-03-15 NOTE — LETTER
? Start to drink the prep medicine mixture. Drink one, 8-ounce cup of the mixture every 10 to 15 minutes until you finish half the mixture. It is better to drink each cup quickly rather than taking small sips  ? Drink half the mixture this evening. Place the other half of the mixture in the refrigerator. You will need to drink the rest of the mixture in the morning. ? Continue to drink other clear liquids through the evening  Morning of the procedure    ? Six hours before your procedure, drink the rest of the Miralax mixture as before. You may need to set your alarm to get up to finish the prep medication if you have an early appointment time  ? Drink two 8-ounce cups of clear liquids after you finish the prep medication  ? You can drink clear liquids up to 4 hours prior to the procedure  ? Take your medicines for blood pressure, heart issues, seizures or pain with a sip of water up to 2 hours before the procedure      If you are vomiting up your prep medicine, have not had a bowel movement, or your bowels are not clear, please call our office at [phone]. If you call after normal business hours, please leave us a detailed voicemail so we can call you as soon as we get back into the office.

## 2018-03-20 ENCOUNTER — TELEPHONE (OUTPATIENT)
Dept: GASTROENTEROLOGY | Age: 68
End: 2018-03-20

## 2018-03-20 ENCOUNTER — HOSPITAL ENCOUNTER (OUTPATIENT)
Dept: WOMENS IMAGING | Age: 68
Discharge: HOME | End: 2018-03-20
Attending: INTERNAL MEDICINE | Admitting: INTERNAL MEDICINE

## 2018-03-20 DIAGNOSIS — Z13.820 SCREENING FOR OSTEOPOROSIS: ICD-10-CM

## 2018-03-20 DIAGNOSIS — K50.919 CROHN'S DISEASE WITH COMPLICATION, UNSPECIFIED GASTROINTESTINAL TRACT LOCATION (HCC): Primary | ICD-10-CM

## 2018-03-20 DIAGNOSIS — K50.919 CROHN'S DISEASE WITH COMPLICATION, UNSPECIFIED GASTROINTESTINAL TRACT LOCATION (HCC): ICD-10-CM

## 2018-03-20 LAB
ALBUMIN SERPL-MCNC: 4.2 GM/DL (ref 3.4–5)
ALBUMIN SERPL-MCNC: 4.2 GM/DL (ref 3.4–5)
ALP BLD-CCNC: 102 IU/L (ref 40–129)
ALP BLD-CCNC: 102 IU/L (ref 40–129)
ALT SERPL-CCNC: 13 U/L (ref 10–40)
ALT SERPL-CCNC: 13 U/L (ref 10–40)
ANION GAP SERPL CALCULATED.3IONS-SCNC: 14 MMOL/L (ref 4–16)
AST SERPL-CCNC: 11 IU/L (ref 15–37)
AST SERPL-CCNC: 11 IU/L (ref 15–37)
BASOPHILS ABSOLUTE: 0.1 K/CU MM
BASOPHILS RELATIVE PERCENT: 1 % (ref 0–1)
BILIRUB SERPL-MCNC: 0.3 MG/DL (ref 0–1)
BILIRUB SERPL-MCNC: 0.3 MG/DL (ref 0–1)
BILIRUBIN DIRECT: 0.2 MG/DL (ref 0–0.3)
BILIRUBIN, INDIRECT: 0.1 MG/DL (ref 0–0.7)
BUN BLDV-MCNC: 14 MG/DL (ref 6–23)
CALCIUM SERPL-MCNC: 9.3 MG/DL (ref 8.3–10.6)
CHLORIDE BLD-SCNC: 100 MMOL/L (ref 99–110)
CO2: 29 MMOL/L (ref 21–32)
CREAT SERPL-MCNC: 1.2 MG/DL (ref 0.9–1.3)
DIFFERENTIAL TYPE: ABNORMAL
EOSINOPHILS ABSOLUTE: 0.3 K/CU MM
EOSINOPHILS RELATIVE PERCENT: 3.7 % (ref 0–3)
FERRITIN: 100 NG/ML (ref 30–400)
FOLATE: >20 NG/ML (ref 3.1–17.5)
GFR AFRICAN AMERICAN: >60 ML/MIN/1.73M2
GFR NON-AFRICAN AMERICAN: >60 ML/MIN/1.73M2
GLUCOSE FASTING: 195 MG/DL (ref 70–99)
HAV IGM SER IA-ACNC: NON REACTIVE
HBV SURFACE AB TITR SER: <3.5 {TITER}
HCT VFR BLD CALC: 41.4 % (ref 42–52)
HEMOGLOBIN: 13.6 GM/DL (ref 13.5–18)
HEPATITIS B SURFACE ANTIGEN: NON REACTIVE
HEPATITIS C ANTIBODY: NON REACTIVE
HIGH SENSITIVE C-REACTIVE PROTEIN: 17.1 MG/L
IGA: 276 MG/DL (ref 69–382)
IGG,SERUM: 881 MG/DL (ref 723–1685)
IGM,SERUM: 98 MG/DL (ref 62–277)
IMMATURE NEUTROPHIL %: 0.9 % (ref 0–0.43)
IRON: 50 UG/DL (ref 59–158)
LYMPHOCYTES ABSOLUTE: 1.3 K/CU MM
LYMPHOCYTES RELATIVE PERCENT: 19.3 % (ref 24–44)
MCH RBC QN AUTO: 25.8 PG (ref 27–31)
MCHC RBC AUTO-ENTMCNC: 32.9 % (ref 32–36)
MCV RBC AUTO: 78.6 FL (ref 78–100)
MONOCYTES ABSOLUTE: 0.3 K/CU MM
MONOCYTES RELATIVE PERCENT: 4.1 % (ref 0–4)
NUCLEATED RBC %: 0 %
PCT TRANSFERRIN: 17 % (ref 10–44)
PDW BLD-RTO: 14.5 % (ref 11.7–14.9)
PLATELET # BLD: 240 K/CU MM (ref 140–440)
PMV BLD AUTO: 9.5 FL (ref 7.5–11.1)
POTASSIUM SERPL-SCNC: 3.5 MMOL/L (ref 3.5–5.1)
RBC # BLD: 5.27 M/CU MM (ref 4.6–6.2)
SEGMENTED NEUTROPHILS ABSOLUTE COUNT: 4.8 K/CU MM
SEGMENTED NEUTROPHILS RELATIVE PERCENT: 71 % (ref 36–66)
SODIUM BLD-SCNC: 143 MMOL/L (ref 135–145)
TOTAL IMMATURE NEUTOROPHIL: 0.06 K/CU MM
TOTAL IRON BINDING CAPACITY: 302 UG/DL (ref 250–450)
TOTAL NUCLEATED RBC: 0 K/CU MM
TOTAL PROTEIN: 7 GM/DL (ref 6.4–8.2)
TOTAL PROTEIN: 7 GM/DL (ref 6.4–8.2)
UNSATURATED IRON BINDING CAPACITY: 252 UG/DL (ref 110–370)
VITAMIN B-12: 369.8 PG/ML (ref 211–911)
VITAMIN D 25-HYDROXY: 15.51 NG/ML
WBC # BLD: 6.8 K/CU MM (ref 4–10.5)

## 2018-03-21 LAB
HAV AB SERPL IA-ACNC: NEGATIVE
HEPATITIS B CORE TOTAL ANTIBODY: NEGATIVE

## 2018-03-22 LAB
HISTOPLASMA ANTIGEN URINE INTERP: NOT DETECTED
HISTOPLASMA ANTIGEN URINE: NOT DETECTED
TRANSGLUTAMINASE IGA: 0

## 2018-03-23 LAB
NIL (NEGATIVE) SPOT CONTROL: NORMAL
PANEL A SPOT COUNT: 0
PANEL B SPOT COUNT: 0
POSITIVE CONTROL SPOT COUNT: NORMAL
TB CELL IMMUNE MEASURE: NEGATIVE

## 2018-03-29 ENCOUNTER — TELEPHONE (OUTPATIENT)
Dept: GASTROENTEROLOGY | Age: 68
End: 2018-03-29

## 2018-04-02 ENCOUNTER — HOSPITAL ENCOUNTER (OUTPATIENT)
Dept: SURGERY | Age: 68
Discharge: HOME | End: 2018-04-02
Attending: INTERNAL MEDICINE | Admitting: INTERNAL MEDICINE

## 2018-04-02 VITALS
WEIGHT: 228 LBS | HEIGHT: 68 IN | OXYGEN SATURATION: 95 % | BODY MASS INDEX: 34.56 KG/M2 | DIASTOLIC BLOOD PRESSURE: 78 MMHG | TEMPERATURE: 97 F | SYSTOLIC BLOOD PRESSURE: 176 MMHG | HEART RATE: 80 BPM | RESPIRATION RATE: 16 BRPM

## 2018-04-02 PROBLEM — R07.89 ATYPICAL CHEST PAIN: Status: ACTIVE | Noted: 2018-04-02

## 2018-04-02 PROBLEM — I16.0 HYPERTENSIVE URGENCY: Status: ACTIVE | Noted: 2018-04-02

## 2018-04-02 LAB — GLUCOSE BLD-MCNC: 133 MG/DL (ref 70–99)

## 2018-04-02 RX ORDER — SODIUM CHLORIDE, SODIUM LACTATE, POTASSIUM CHLORIDE, CALCIUM CHLORIDE 600; 310; 30; 20 MG/100ML; MG/100ML; MG/100ML; MG/100ML
INJECTION, SOLUTION INTRAVENOUS CONTINUOUS
Status: DISCONTINUED | OUTPATIENT
Start: 2018-04-02 | End: 2018-04-03 | Stop reason: HOSPADM

## 2018-04-02 RX ORDER — HYDRALAZINE HYDROCHLORIDE 20 MG/ML
5 INJECTION INTRAMUSCULAR; INTRAVENOUS EVERY 5 MIN PRN
Status: DISCONTINUED | OUTPATIENT
Start: 2018-04-02 | End: 2018-04-03 | Stop reason: HOSPADM

## 2018-04-02 RX ORDER — LABETALOL HYDROCHLORIDE 5 MG/ML
5 INJECTION, SOLUTION INTRAVENOUS EVERY 5 MIN PRN
Status: DISCONTINUED | OUTPATIENT
Start: 2018-04-02 | End: 2018-04-03 | Stop reason: HOSPADM

## 2018-04-02 RX ADMIN — HYDRALAZINE HYDROCHLORIDE 10 MG: 20 INJECTION INTRAMUSCULAR; INTRAVENOUS at 12:07

## 2018-04-02 RX ADMIN — HYDRALAZINE HYDROCHLORIDE 10 MG: 20 INJECTION INTRAMUSCULAR; INTRAVENOUS at 12:02

## 2018-04-02 ASSESSMENT — PAIN - FUNCTIONAL ASSESSMENT: PAIN_FUNCTIONAL_ASSESSMENT: 0-10

## 2018-04-04 ENCOUNTER — TELEPHONE (OUTPATIENT)
Dept: GASTROENTEROLOGY | Age: 68
End: 2018-04-04

## 2018-04-04 ENCOUNTER — CARE COORDINATION (OUTPATIENT)
Dept: CASE MANAGEMENT | Age: 68
End: 2018-04-04

## 2018-04-04 DIAGNOSIS — R07.89 ATYPICAL CHEST PAIN: Primary | ICD-10-CM

## 2018-04-05 ENCOUNTER — OFFICE VISIT (OUTPATIENT)
Dept: INTERNAL MEDICINE CLINIC | Age: 68
End: 2018-04-05

## 2018-04-05 VITALS
OXYGEN SATURATION: 97 % | SYSTOLIC BLOOD PRESSURE: 138 MMHG | HEART RATE: 64 BPM | RESPIRATION RATE: 16 BRPM | BODY MASS INDEX: 36.85 KG/M2 | HEIGHT: 67 IN | WEIGHT: 234.8 LBS | DIASTOLIC BLOOD PRESSURE: 80 MMHG

## 2018-04-05 DIAGNOSIS — M54.50 CHRONIC MIDLINE LOW BACK PAIN WITHOUT SCIATICA: ICD-10-CM

## 2018-04-05 DIAGNOSIS — I16.0 HYPERTENSIVE URGENCY: ICD-10-CM

## 2018-04-05 DIAGNOSIS — M54.12 RIGHT CERVICAL RADICULOPATHY: ICD-10-CM

## 2018-04-05 DIAGNOSIS — R07.89 ATYPICAL CHEST PAIN: Primary | ICD-10-CM

## 2018-04-05 DIAGNOSIS — G89.29 CHRONIC MIDLINE LOW BACK PAIN WITHOUT SCIATICA: ICD-10-CM

## 2018-04-05 DIAGNOSIS — K50.919 CROHN'S DISEASE WITH COMPLICATION, UNSPECIFIED GASTROINTESTINAL TRACT LOCATION (HCC): ICD-10-CM

## 2018-04-05 PROCEDURE — 99495 TRANSJ CARE MGMT MOD F2F 14D: CPT | Performed by: INTERNAL MEDICINE

## 2018-04-05 RX ORDER — HYDROCODONE BITARTRATE AND ACETAMINOPHEN 5; 325 MG/1; MG/1
1 TABLET ORAL 2 TIMES DAILY
Qty: 60 TABLET | Refills: 0 | Status: SHIPPED | OUTPATIENT
Start: 2018-04-05 | End: 2018-05-09 | Stop reason: SDUPTHER

## 2018-04-05 RX ORDER — CYCLOBENZAPRINE HCL 10 MG
10 TABLET ORAL 3 TIMES DAILY PRN
Qty: 90 TABLET | Refills: 0 | Status: SHIPPED | OUTPATIENT
Start: 2018-04-05 | End: 2018-05-22

## 2018-04-10 ENCOUNTER — CARE COORDINATION (OUTPATIENT)
Dept: CASE MANAGEMENT | Age: 68
End: 2018-04-10

## 2018-04-16 ENCOUNTER — CARE COORDINATION (OUTPATIENT)
Dept: CASE MANAGEMENT | Age: 68
End: 2018-04-16

## 2018-04-17 ENCOUNTER — OFFICE VISIT (OUTPATIENT)
Dept: INTERNAL MEDICINE CLINIC | Age: 68
End: 2018-04-17

## 2018-04-17 VITALS
SYSTOLIC BLOOD PRESSURE: 143 MMHG | HEART RATE: 83 BPM | RESPIRATION RATE: 17 BRPM | OXYGEN SATURATION: 95 % | DIASTOLIC BLOOD PRESSURE: 80 MMHG

## 2018-04-17 DIAGNOSIS — I16.0 HYPERTENSIVE URGENCY: Primary | ICD-10-CM

## 2018-04-17 DIAGNOSIS — R80.9 CONTROLLED TYPE 2 DIABETES MELLITUS WITH MICROALBUMINURIA, WITHOUT LONG-TERM CURRENT USE OF INSULIN (HCC): ICD-10-CM

## 2018-04-17 DIAGNOSIS — E11.29 CONTROLLED TYPE 2 DIABETES MELLITUS WITH MICROALBUMINURIA, WITHOUT LONG-TERM CURRENT USE OF INSULIN (HCC): ICD-10-CM

## 2018-04-17 DIAGNOSIS — H35.62 RETINAL HEMORRHAGE, LEFT: ICD-10-CM

## 2018-04-17 DIAGNOSIS — I10 ESSENTIAL HYPERTENSION: ICD-10-CM

## 2018-04-17 LAB
CREATININE URINE: 129.8 MG/DL (ref 39–259)
MICROALBUMIN UR-MCNC: 6.3 MG/DL
MICROALBUMIN/CREAT UR-RTO: 48.5 MG/G (ref 0–30)

## 2018-04-17 PROCEDURE — 1123F ACP DISCUSS/DSCN MKR DOCD: CPT | Performed by: INTERNAL MEDICINE

## 2018-04-17 PROCEDURE — 1036F TOBACCO NON-USER: CPT | Performed by: INTERNAL MEDICINE

## 2018-04-17 PROCEDURE — 99214 OFFICE O/P EST MOD 30 MIN: CPT | Performed by: INTERNAL MEDICINE

## 2018-04-17 PROCEDURE — G8417 CALC BMI ABV UP PARAM F/U: HCPCS | Performed by: INTERNAL MEDICINE

## 2018-04-17 PROCEDURE — 3017F COLORECTAL CA SCREEN DOC REV: CPT | Performed by: INTERNAL MEDICINE

## 2018-04-17 PROCEDURE — 3046F HEMOGLOBIN A1C LEVEL >9.0%: CPT | Performed by: INTERNAL MEDICINE

## 2018-04-17 PROCEDURE — 4040F PNEUMOC VAC/ADMIN/RCVD: CPT | Performed by: INTERNAL MEDICINE

## 2018-04-17 PROCEDURE — 2022F DILAT RTA XM EVC RTNOPTHY: CPT | Performed by: INTERNAL MEDICINE

## 2018-04-17 PROCEDURE — G8427 DOCREV CUR MEDS BY ELIG CLIN: HCPCS | Performed by: INTERNAL MEDICINE

## 2018-04-17 RX ORDER — HYDRALAZINE HYDROCHLORIDE 50 MG/1
50 TABLET, FILM COATED ORAL 3 TIMES DAILY
Qty: 270 TABLET | Refills: 1
Start: 2018-04-17 | End: 2018-05-09 | Stop reason: SDUPTHER

## 2018-04-19 ENCOUNTER — CARE COORDINATION (OUTPATIENT)
Dept: CASE MANAGEMENT | Age: 68
End: 2018-04-19

## 2018-04-23 DIAGNOSIS — I10 ESSENTIAL HYPERTENSION: Primary | ICD-10-CM

## 2018-04-23 RX ORDER — SPIRONOLACTONE 50 MG/1
50 TABLET, FILM COATED ORAL DAILY
Qty: 30 TABLET | Refills: 3
Start: 2018-04-23 | End: 2018-07-20 | Stop reason: SDUPTHER

## 2018-04-30 ENCOUNTER — HOSPITAL ENCOUNTER (OUTPATIENT)
Dept: ULTRASOUND IMAGING | Age: 68
Discharge: HOME | End: 2018-04-30
Attending: INTERNAL MEDICINE | Admitting: INTERNAL MEDICINE

## 2018-04-30 DIAGNOSIS — I10 ESSENTIAL HYPERTENSION, MALIGNANT: ICD-10-CM

## 2018-05-09 ENCOUNTER — OFFICE VISIT (OUTPATIENT)
Dept: INTERNAL MEDICINE CLINIC | Age: 68
End: 2018-05-09

## 2018-05-09 VITALS
OXYGEN SATURATION: 96 % | SYSTOLIC BLOOD PRESSURE: 132 MMHG | BODY MASS INDEX: 36.18 KG/M2 | WEIGHT: 231 LBS | DIASTOLIC BLOOD PRESSURE: 74 MMHG | RESPIRATION RATE: 16 BRPM | HEART RATE: 76 BPM

## 2018-05-09 DIAGNOSIS — R51.9 ACUTE NONINTRACTABLE HEADACHE, UNSPECIFIED HEADACHE TYPE: Primary | ICD-10-CM

## 2018-05-09 DIAGNOSIS — F41.9 ANXIETY: ICD-10-CM

## 2018-05-09 DIAGNOSIS — I10 ESSENTIAL HYPERTENSION: ICD-10-CM

## 2018-05-09 DIAGNOSIS — M54.50 CHRONIC MIDLINE LOW BACK PAIN WITHOUT SCIATICA: ICD-10-CM

## 2018-05-09 DIAGNOSIS — G89.29 CHRONIC MIDLINE LOW BACK PAIN WITHOUT SCIATICA: ICD-10-CM

## 2018-05-09 DIAGNOSIS — M54.12 RIGHT CERVICAL RADICULOPATHY: ICD-10-CM

## 2018-05-09 PROBLEM — R07.89 ATYPICAL CHEST PAIN: Status: RESOLVED | Noted: 2018-04-02 | Resolved: 2018-05-09

## 2018-05-09 PROCEDURE — 1123F ACP DISCUSS/DSCN MKR DOCD: CPT | Performed by: INTERNAL MEDICINE

## 2018-05-09 PROCEDURE — 3017F COLORECTAL CA SCREEN DOC REV: CPT | Performed by: INTERNAL MEDICINE

## 2018-05-09 PROCEDURE — 1036F TOBACCO NON-USER: CPT | Performed by: INTERNAL MEDICINE

## 2018-05-09 PROCEDURE — G8417 CALC BMI ABV UP PARAM F/U: HCPCS | Performed by: INTERNAL MEDICINE

## 2018-05-09 PROCEDURE — 99213 OFFICE O/P EST LOW 20 MIN: CPT | Performed by: INTERNAL MEDICINE

## 2018-05-09 PROCEDURE — G8427 DOCREV CUR MEDS BY ELIG CLIN: HCPCS | Performed by: INTERNAL MEDICINE

## 2018-05-09 PROCEDURE — 4040F PNEUMOC VAC/ADMIN/RCVD: CPT | Performed by: INTERNAL MEDICINE

## 2018-05-09 RX ORDER — HYDROCODONE BITARTRATE AND ACETAMINOPHEN 5; 325 MG/1; MG/1
1 TABLET ORAL 2 TIMES DAILY
Qty: 60 TABLET | Refills: 0 | Status: SHIPPED | OUTPATIENT
Start: 2018-05-13 | End: 2018-05-22 | Stop reason: SDUPTHER

## 2018-05-09 RX ORDER — BUTALBITAL, ACETAMINOPHEN AND CAFFEINE 50; 325; 40 MG/1; MG/1; MG/1
1 TABLET ORAL 3 TIMES DAILY PRN
Qty: 30 TABLET | Refills: 1 | Status: SHIPPED | OUTPATIENT
Start: 2018-05-09 | End: 2018-07-05 | Stop reason: SDUPTHER

## 2018-05-09 RX ORDER — CYCLOBENZAPRINE HCL 10 MG
10 TABLET ORAL 3 TIMES DAILY PRN
Qty: 90 TABLET | Refills: 0 | Status: CANCELLED | OUTPATIENT
Start: 2018-05-09

## 2018-05-09 RX ORDER — HYDRALAZINE HYDROCHLORIDE 25 MG/1
75 TABLET, FILM COATED ORAL 3 TIMES DAILY
Qty: 270 TABLET | Refills: 1
Start: 2018-05-09 | End: 2018-05-22 | Stop reason: SDUPTHER

## 2018-05-09 RX ORDER — HYDROCODONE BITARTRATE AND ACETAMINOPHEN 5; 325 MG/1; MG/1
1 TABLET ORAL 2 TIMES DAILY
Qty: 60 TABLET | Refills: 0 | Status: CANCELLED | OUTPATIENT
Start: 2018-05-09 | End: 2018-06-08

## 2018-05-09 RX ORDER — ALPRAZOLAM 0.25 MG/1
0.25 TABLET ORAL NIGHTLY PRN
Qty: 30 TABLET | Refills: 1 | Status: SHIPPED | OUTPATIENT
Start: 2018-05-09 | End: 2018-07-20 | Stop reason: SDUPTHER

## 2018-05-11 LAB — DIABETIC RETINOPATHY: NORMAL

## 2018-05-14 DIAGNOSIS — E11.29 CONTROLLED TYPE 2 DIABETES MELLITUS WITH MICROALBUMINURIA, WITHOUT LONG-TERM CURRENT USE OF INSULIN (HCC): ICD-10-CM

## 2018-05-14 DIAGNOSIS — R80.9 CONTROLLED TYPE 2 DIABETES MELLITUS WITH MICROALBUMINURIA, WITHOUT LONG-TERM CURRENT USE OF INSULIN (HCC): ICD-10-CM

## 2018-05-14 DIAGNOSIS — I10 ESSENTIAL HYPERTENSION: ICD-10-CM

## 2018-05-14 RX ORDER — LISINOPRIL 40 MG/1
TABLET ORAL
Qty: 30 TABLET | Refills: 2 | Status: SHIPPED | OUTPATIENT
Start: 2018-05-14 | End: 2018-08-10 | Stop reason: SDUPTHER

## 2018-05-22 ENCOUNTER — OFFICE VISIT (OUTPATIENT)
Dept: INTERNAL MEDICINE CLINIC | Age: 68
End: 2018-05-22

## 2018-05-22 VITALS
WEIGHT: 232 LBS | BODY MASS INDEX: 36.34 KG/M2 | OXYGEN SATURATION: 97 % | SYSTOLIC BLOOD PRESSURE: 138 MMHG | DIASTOLIC BLOOD PRESSURE: 60 MMHG | RESPIRATION RATE: 16 BRPM | HEART RATE: 76 BPM

## 2018-05-22 DIAGNOSIS — R80.9 CONTROLLED TYPE 2 DIABETES MELLITUS WITH MICROALBUMINURIA, WITHOUT LONG-TERM CURRENT USE OF INSULIN (HCC): ICD-10-CM

## 2018-05-22 DIAGNOSIS — M54.12 RIGHT CERVICAL RADICULOPATHY: ICD-10-CM

## 2018-05-22 DIAGNOSIS — E11.29 CONTROLLED TYPE 2 DIABETES MELLITUS WITH MICROALBUMINURIA, WITHOUT LONG-TERM CURRENT USE OF INSULIN (HCC): ICD-10-CM

## 2018-05-22 DIAGNOSIS — M54.50 CHRONIC MIDLINE LOW BACK PAIN WITHOUT SCIATICA: ICD-10-CM

## 2018-05-22 DIAGNOSIS — M17.0 PRIMARY OSTEOARTHRITIS OF BOTH KNEES: ICD-10-CM

## 2018-05-22 DIAGNOSIS — G89.29 CHRONIC MIDLINE LOW BACK PAIN WITHOUT SCIATICA: ICD-10-CM

## 2018-05-22 DIAGNOSIS — I10 ESSENTIAL HYPERTENSION: Primary | ICD-10-CM

## 2018-05-22 PROBLEM — I16.0 HYPERTENSIVE URGENCY: Status: RESOLVED | Noted: 2018-04-02 | Resolved: 2018-05-22

## 2018-05-22 PROCEDURE — 1123F ACP DISCUSS/DSCN MKR DOCD: CPT | Performed by: INTERNAL MEDICINE

## 2018-05-22 PROCEDURE — G8417 CALC BMI ABV UP PARAM F/U: HCPCS | Performed by: INTERNAL MEDICINE

## 2018-05-22 PROCEDURE — G8427 DOCREV CUR MEDS BY ELIG CLIN: HCPCS | Performed by: INTERNAL MEDICINE

## 2018-05-22 PROCEDURE — 2022F DILAT RTA XM EVC RTNOPTHY: CPT | Performed by: INTERNAL MEDICINE

## 2018-05-22 PROCEDURE — 4040F PNEUMOC VAC/ADMIN/RCVD: CPT | Performed by: INTERNAL MEDICINE

## 2018-05-22 PROCEDURE — 99213 OFFICE O/P EST LOW 20 MIN: CPT | Performed by: INTERNAL MEDICINE

## 2018-05-22 PROCEDURE — 1036F TOBACCO NON-USER: CPT | Performed by: INTERNAL MEDICINE

## 2018-05-22 PROCEDURE — 3046F HEMOGLOBIN A1C LEVEL >9.0%: CPT | Performed by: INTERNAL MEDICINE

## 2018-05-22 PROCEDURE — 3017F COLORECTAL CA SCREEN DOC REV: CPT | Performed by: INTERNAL MEDICINE

## 2018-05-22 RX ORDER — TIZANIDINE 4 MG/1
4 TABLET ORAL 2 TIMES DAILY
Qty: 60 TABLET | Refills: 2 | Status: SHIPPED | OUTPATIENT
Start: 2018-05-22 | End: 2019-02-07

## 2018-05-22 RX ORDER — HYDROCODONE BITARTRATE AND ACETAMINOPHEN 5; 325 MG/1; MG/1
1 TABLET ORAL 2 TIMES DAILY
Qty: 60 TABLET | Refills: 0 | Status: SHIPPED | OUTPATIENT
Start: 2018-06-13 | End: 2018-07-16 | Stop reason: SDUPTHER

## 2018-05-22 RX ORDER — HYDRALAZINE HYDROCHLORIDE 100 MG/1
100 TABLET, FILM COATED ORAL 3 TIMES DAILY
Qty: 90 TABLET | Refills: 3 | Status: SHIPPED | OUTPATIENT
Start: 2018-05-22 | End: 2018-06-01 | Stop reason: SDUPTHER

## 2018-05-22 RX ORDER — ISOSORBIDE MONONITRATE 30 MG/1
30 TABLET, EXTENDED RELEASE ORAL DAILY
COMMUNITY
Start: 2018-05-19 | End: 2019-02-07

## 2018-06-01 DIAGNOSIS — I10 ESSENTIAL HYPERTENSION: ICD-10-CM

## 2018-06-01 RX ORDER — HYDRALAZINE HYDROCHLORIDE 50 MG/1
50 TABLET, FILM COATED ORAL 3 TIMES DAILY
Qty: 90 TABLET | Refills: 3
Start: 2018-06-01 | End: 2019-01-08

## 2018-06-12 DIAGNOSIS — G89.29 CHRONIC MIDLINE LOW BACK PAIN WITHOUT SCIATICA: Primary | ICD-10-CM

## 2018-06-12 DIAGNOSIS — M54.50 CHRONIC MIDLINE LOW BACK PAIN WITHOUT SCIATICA: Primary | ICD-10-CM

## 2018-06-12 RX ORDER — TRAMADOL HYDROCHLORIDE 50 MG/1
TABLET ORAL
Qty: 90 TABLET | Refills: 0 | Status: SHIPPED | OUTPATIENT
Start: 2018-06-12 | End: 2018-07-16 | Stop reason: SDUPTHER

## 2018-06-19 ENCOUNTER — OFFICE VISIT (OUTPATIENT)
Dept: INTERNAL MEDICINE CLINIC | Age: 68
End: 2018-06-19

## 2018-06-19 VITALS
WEIGHT: 230 LBS | RESPIRATION RATE: 15 BRPM | BODY MASS INDEX: 36.02 KG/M2 | DIASTOLIC BLOOD PRESSURE: 64 MMHG | HEART RATE: 61 BPM | OXYGEN SATURATION: 96 % | SYSTOLIC BLOOD PRESSURE: 110 MMHG

## 2018-06-19 DIAGNOSIS — E11.21 CONTROLLED TYPE 2 DIABETES MELLITUS WITH DIABETIC NEPHROPATHY, WITHOUT LONG-TERM CURRENT USE OF INSULIN (HCC): Primary | ICD-10-CM

## 2018-06-19 DIAGNOSIS — G89.29 CHRONIC MIDLINE LOW BACK PAIN WITHOUT SCIATICA: ICD-10-CM

## 2018-06-19 DIAGNOSIS — I10 ESSENTIAL HYPERTENSION: ICD-10-CM

## 2018-06-19 DIAGNOSIS — M54.50 CHRONIC MIDLINE LOW BACK PAIN WITHOUT SCIATICA: ICD-10-CM

## 2018-06-19 DIAGNOSIS — E78.2 MIXED HYPERLIPIDEMIA: ICD-10-CM

## 2018-06-19 PROCEDURE — 3046F HEMOGLOBIN A1C LEVEL >9.0%: CPT | Performed by: INTERNAL MEDICINE

## 2018-06-19 PROCEDURE — G8417 CALC BMI ABV UP PARAM F/U: HCPCS | Performed by: INTERNAL MEDICINE

## 2018-06-19 PROCEDURE — 2022F DILAT RTA XM EVC RTNOPTHY: CPT | Performed by: INTERNAL MEDICINE

## 2018-06-19 PROCEDURE — 1123F ACP DISCUSS/DSCN MKR DOCD: CPT | Performed by: INTERNAL MEDICINE

## 2018-06-19 PROCEDURE — 99214 OFFICE O/P EST MOD 30 MIN: CPT | Performed by: INTERNAL MEDICINE

## 2018-06-19 PROCEDURE — 1036F TOBACCO NON-USER: CPT | Performed by: INTERNAL MEDICINE

## 2018-06-19 PROCEDURE — 4040F PNEUMOC VAC/ADMIN/RCVD: CPT | Performed by: INTERNAL MEDICINE

## 2018-06-19 PROCEDURE — G8427 DOCREV CUR MEDS BY ELIG CLIN: HCPCS | Performed by: INTERNAL MEDICINE

## 2018-06-19 PROCEDURE — 3017F COLORECTAL CA SCREEN DOC REV: CPT | Performed by: INTERNAL MEDICINE

## 2018-06-20 LAB
A/G RATIO: 1.6 (CALC) (ref 0.8–2.6)
ALBUMIN SERPL-MCNC: 4.1 GM/DL (ref 3.5–5.2)
ALP BLD-CCNC: 88 U/L (ref 23–144)
ALT SERPL-CCNC: 10 U/L (ref 0–60)
AST SERPL-CCNC: 9 U/L (ref 0–46)
BILIRUB SERPL-MCNC: 0.3 MG/DL (ref 0–1.2)
BUN / CREAT RATIO: 14 (CALC) (ref 7–25)
BUN BLDV-MCNC: 19 MG/DL (ref 3–29)
CALCIUM SERPL-MCNC: 9.7 MG/DL (ref 8.5–10.5)
CHLORIDE BLD-SCNC: 97 MEQ/L (ref 96–110)
CHOLESTEROL, TOTAL: 224 MG/DL
CO2: 29 MEQ/L (ref 19–32)
COPY(IES) SENT TO:: NORMAL
CREAT SERPL-MCNC: 1.4 MG/DL
GFR SERPL CREATININE-BSD FRML MDRD: 52 ML/MIN/1.73M2
GLOBULIN: 2.6 GM/DL (CALC) (ref 1.9–3.6)
GLUCOSE BLD-MCNC: 200 MG/DL
HBA1C MFR BLD: 7.2 % TOTAL HGB
HDLC SERPL-MCNC: 32 MG/DL
LDL CHOLESTEROL: 136 MG/DL (CALC)
POTASSIUM SERPL-SCNC: 4.2 MEQ/L (ref 3.4–5.3)
SODIUM BLD-SCNC: 140 MEQ/L (ref 135–148)
TOTAL PROTEIN: 6.7 GM/DL (ref 6–8.3)
TRIGL SERPL-MCNC: 279 MG/DL
VLDLC SERPL CALC-MCNC: 56 MG/DL (CALC) (ref 4–38)

## 2018-06-21 ENCOUNTER — HOSPITAL ENCOUNTER (OUTPATIENT)
Dept: GENERAL RADIOLOGY | Age: 68
Discharge: HOME | End: 2018-06-21
Attending: INTERNAL MEDICINE | Admitting: INTERNAL MEDICINE

## 2018-06-21 DIAGNOSIS — M54.6 THORACIC BACK PAIN, UNSPECIFIED BACK PAIN LATERALITY, UNSPECIFIED CHRONICITY: ICD-10-CM

## 2018-06-21 DIAGNOSIS — M54.50 CHRONIC MIDLINE LOW BACK PAIN WITHOUT SCIATICA: ICD-10-CM

## 2018-06-21 DIAGNOSIS — M54.2 NECK PAIN: ICD-10-CM

## 2018-06-21 DIAGNOSIS — M54.2 NECK PAIN: Primary | ICD-10-CM

## 2018-06-21 DIAGNOSIS — G89.29 CHRONIC MIDLINE LOW BACK PAIN WITHOUT SCIATICA: ICD-10-CM

## 2018-06-27 ENCOUNTER — HOSPITAL ENCOUNTER (OUTPATIENT)
Dept: PHYSICAL THERAPY | Age: 68
Discharge: HOME | End: 2018-06-30
Attending: INTERNAL MEDICINE | Admitting: INTERNAL MEDICINE

## 2018-06-27 ASSESSMENT — PAIN DESCRIPTION - ORIENTATION: ORIENTATION: RIGHT;LEFT

## 2018-06-27 ASSESSMENT — PAIN DESCRIPTION - FREQUENCY: FREQUENCY: CONTINUOUS

## 2018-06-27 ASSESSMENT — PAIN DESCRIPTION - PAIN TYPE: TYPE: CHRONIC PAIN

## 2018-06-27 ASSESSMENT — PAIN DESCRIPTION - LOCATION: LOCATION: BACK;LEG

## 2018-06-27 ASSESSMENT — PAIN SCALES - GENERAL: PAINLEVEL_OUTOF10: 7

## 2018-06-27 ASSESSMENT — PAIN DESCRIPTION - PROGRESSION: CLINICAL_PROGRESSION: GRADUALLY WORSENING

## 2018-06-27 NOTE — FLOWSHEET NOTE
Physical Therapy Daily Treatment Note  Date:  2018    Patient Name:  Lila Purdy    :  1950  MRN: [id number]   Restrictions/Precautions: HTN  Diagnosis: chronic midline LBP w/o sciatica  Treatment Diagnosis: chronic LBP; generalized weakness  PT Insurance Information: [employer]  Referring Practitioner: Dr Rhina Sun  Next Physician Visit:     POC Signed: pending  POC Date Range: 18 - 18   Progress Note Due:  10 Rxs  Visit# / total visits:  1/10                    Summary of Eval:   Pt reports initial injury goes back to  when he slipped and fell on ice. Lumbar lami L4-L5 '73; '83 second procedure same level from re-rupture. Pt reporting aggravation of progressive B LBP and muscle spasms past 9 months. States moran have always been more bothersome, but pain did not ease this spring. Intermittent burning pain down R leg to foot. Pain limits walking to short distances, yard work to 10 min, standing to 10', and sitting less than 30 min. States he has a weight machine at home but has not used consistently for > 6 months. X-rays at [facility]. 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 Initial Pain level:  7/10   Subjective: Any changes to Ambulatory Summary Sheet? Any major status changes? Goals:  Short term goals  Time Frame for Short term goals: 5 weeks  Short term goal 1: Pt will be independent with a written HEP and continue at discharge to improve functional activity level  Short term goal 2: Pt independently able to demonstrate improved sitting and standing posture and ambulation pattern  Short term goal 3: Oswestry score improved to <20/50 indicating functional improvement        Skilled PT activities: Date  18 Date Date Date   Outcome measure  Oswestry   29/50        Nu-Step       Proper posture in sitting, standing, and ambulating   Explained, demonstrated and return demonstrated with verbal and tactile cues. Will review. scap squeezes  10x10\"       posterior shoulder rolls  x10        Hook-lying trunk rotation 5x10\" ea        ppt 10x10                 Progress core, scapular, B hip strength, flexibility and endurance                                                       HEP: Patient instructed and issued in HEP. Any questions answered and clarified, patient demonstrated understanding. Supervision/Cues:  Pt received education on their current pathology and how their condition affects functional activities. Pt understood discussion and questions were answered. Pt understands their activity limitations and understands rational for treatment progression. Objective   findings: Per eval      Response to intervention: Mod fatigue without aggravation of pain symptoms      Overall change since Evaluation:       Plan for Next Session: Progress as outlined above        Intervention/modality used today:  [x] Therapeutic Exercise  [] Modalities:  [] Therapeutic Activity     [] Ultrasound  [] Elec  Stim  [] Gait Training      [] Cervical Traction [] Lumbar Traction  [x] Neuromuscular Re-education    [] Cold/hotpack [] Iontophoresis   [x] Instruction in HEP      [] Vasopneumatic     [] Manual Therapy               [] Self care home management                    (    ) Dry needling    Post Tx Pain Ratin/10    Plan:  (Fequency/duration/# of visits)  2x/week x 4-5 weeks for up to 10 Rxs  [] Continue per plan of care [] Alter current plan   [x] Plan of care initiated [] Hold pending MD visit [] Discharge    Time In: 10:10  Time Out: 11:13  Timed Code Treatment Minutes:  30'  Total Treatment Minutes:  61'    Electronically signed by:  Lacie Felder.  [address]   2018, 11:49 AM

## 2018-06-27 NOTE — PROGRESS NOTES
Outpatient Physical Therapy        [x] Phone: [phone]   Fax: [phone]   Pediatric Therapy          [] Phone: [phone]   Fax: [phone]  Pediatric [facility]          [] Phone: [phone]   Fax: [phone]      To: Referring Practitioner: Dr Evelia Resendiz    From: Shanna Hooper, PT     Patient: Sonali Amaya       : 1950  Diagnosis: chronic midline LBP w/o sciatica   Treatment Diagnosis: chronic LBP; generalized weakness   Date: 2018    Physical Therapy Certification/Re-Certification Form  Dear Dr. Finesse Bonner,   The following patient has been evaluated for physical therapy services and for therapy to continue, Please review the attached evaluation and/or summary of the patient's plan of care, and verify that you agree therapy should continue by signing the attached document and sending it back to our office. Assessment:  Pt is a 73 yo male who presents with chronic, progressive LBP which impacts on all ADL's and functional activity level;patient's goal is to manage his pain and improve his strength and endurance to increase his functional activity level ;patient reports that back and intermittent R leg pain limits activities including standing, walking, yard work, exercising, community activities, and sleep; PT to address patient's goals, impairments and activity limitations with skilled interventions checked in plan of care;patient's level of function prior to onset of 9 months was intermittently painful but able to walk community distances; did not observe any barriers to learning during PT eval; learning preferences include demonstration, practice, and handouts; patient expressed understanding of HEP; patient appears to be motivated to participate in an active PT program and to be compliant with HEP expectations;patient assisted in developing treatment plan and goals; no DME is currently being used.           Planned Services:  [x] Therapeutic Exercise    [] Aquatics:  [x]
1: Refer to the Daily Flowsheet                      Assessment   Conditions Requiring Skilled Therapeutic Intervention  Body structures, Functions, Activity limitations: Decreased functional mobility ; Decreased ROM; Decreased strength;Decreased endurance  Assessment: Pt is a 75 yo male who presents with chronic, progressive LBP which impacts on all ADL's and functional activity level;patient's goal is to manage his pain and improve his strength and endurance to increase his functional activity level ;patient reports that back and intermittent R leg pain limits activities including standing, walking, yard work, exercising, community activities, and sleep; PT to address patient's goals, impairments and activity limitations with skilled interventions checked in plan of care;patient's level of function prior to onset of 9 months was intermittently painful but able to walk community distances; did not observe any barriers to learning during PT eval; learning preferences include demonstration, practice, and handouts; patient expressed understanding of HEP; patient appears to be motivated to participate in an active PT program and to be compliant with HEP expectations;patient assisted in developing treatment plan and goals; no DME is currently being used.       Current functional level (based on )   :  Treatment Diagnosis: chronic LBP; generalized weakness  Prognosis: Fair  Decision Making: Medium Complexity  History: HTN, Crohn's, DM II, OA, GERD, R TKA, lumbar and cervical fusions  Exam: as above  Clinical Presentation: evolving  Patient Education: Role of PT; anatomy; importance of posture; importance of compliance with appointments and written HEP  Barriers to Learning: none noted  REQUIRES PT FOLLOW UP: Yes  Activity Tolerance  Activity Tolerance: Patient limited by fatigue;Patient limited by pain         Plan   Plan  Times per week: 2  Plan weeks: 5  Current Treatment Recommendations: Strengthening, ROM, Endurance

## 2018-07-01 ENCOUNTER — HOSPITAL ENCOUNTER (OUTPATIENT)
Dept: OTHER | Age: 68
Discharge: HOME | End: 2018-07-31
Attending: INTERNAL MEDICINE | Admitting: INTERNAL MEDICINE

## 2018-07-05 DIAGNOSIS — R51.9 ACUTE NONINTRACTABLE HEADACHE, UNSPECIFIED HEADACHE TYPE: ICD-10-CM

## 2018-07-05 RX ORDER — BUTALBITAL, ACETAMINOPHEN AND CAFFEINE 50; 325; 40 MG/1; MG/1; MG/1
1 TABLET ORAL 3 TIMES DAILY PRN
Qty: 30 TABLET | Refills: 1 | Status: SHIPPED | OUTPATIENT
Start: 2018-07-05 | End: 2018-09-13 | Stop reason: SDUPTHER

## 2018-07-16 DIAGNOSIS — G89.29 CHRONIC MIDLINE LOW BACK PAIN WITHOUT SCIATICA: ICD-10-CM

## 2018-07-16 DIAGNOSIS — M54.50 CHRONIC MIDLINE LOW BACK PAIN WITHOUT SCIATICA: ICD-10-CM

## 2018-07-16 DIAGNOSIS — I10 ESSENTIAL HYPERTENSION: ICD-10-CM

## 2018-07-16 DIAGNOSIS — M54.12 RIGHT CERVICAL RADICULOPATHY: ICD-10-CM

## 2018-07-16 RX ORDER — TRAMADOL HYDROCHLORIDE 50 MG/1
TABLET ORAL
Qty: 90 TABLET | Refills: 0 | Status: SHIPPED | OUTPATIENT
Start: 2018-07-16 | End: 2018-08-15 | Stop reason: SDUPTHER

## 2018-07-16 RX ORDER — AMLODIPINE BESYLATE 5 MG/1
5 TABLET ORAL DAILY
Qty: 90 TABLET | Refills: 1 | Status: SHIPPED | OUTPATIENT
Start: 2018-07-16 | End: 2018-07-16 | Stop reason: SDUPTHER

## 2018-07-16 RX ORDER — FUROSEMIDE 40 MG/1
40 TABLET ORAL DAILY
Qty: 90 TABLET | Refills: 1 | Status: SHIPPED | OUTPATIENT
Start: 2018-07-16 | End: 2018-07-16 | Stop reason: SDUPTHER

## 2018-07-16 RX ORDER — HYDROCODONE BITARTRATE AND ACETAMINOPHEN 5; 325 MG/1; MG/1
1 TABLET ORAL 2 TIMES DAILY
Qty: 60 TABLET | Refills: 0 | Status: SHIPPED | OUTPATIENT
Start: 2018-07-16 | End: 2018-08-15 | Stop reason: SDUPTHER

## 2018-07-16 RX ORDER — FUROSEMIDE 40 MG/1
40 TABLET ORAL DAILY
Qty: 90 TABLET | Refills: 1 | Status: SHIPPED | OUTPATIENT
Start: 2018-07-16 | End: 2019-08-07 | Stop reason: SDUPTHER

## 2018-07-16 RX ORDER — AMLODIPINE BESYLATE 5 MG/1
5 TABLET ORAL DAILY
Qty: 90 TABLET | Refills: 1 | Status: SHIPPED | OUTPATIENT
Start: 2018-07-16 | End: 2019-01-08 | Stop reason: SDUPTHER

## 2018-07-20 ENCOUNTER — OFFICE VISIT (OUTPATIENT)
Dept: INTERNAL MEDICINE CLINIC | Age: 68
End: 2018-07-20

## 2018-07-20 VITALS
DIASTOLIC BLOOD PRESSURE: 78 MMHG | SYSTOLIC BLOOD PRESSURE: 134 MMHG | BODY MASS INDEX: 36.34 KG/M2 | RESPIRATION RATE: 18 BRPM | OXYGEN SATURATION: 98 % | HEART RATE: 54 BPM | WEIGHT: 232 LBS

## 2018-07-20 DIAGNOSIS — K50.919 CROHN'S DISEASE WITH COMPLICATION, UNSPECIFIED GASTROINTESTINAL TRACT LOCATION (HCC): ICD-10-CM

## 2018-07-20 DIAGNOSIS — F41.9 ANXIETY: ICD-10-CM

## 2018-07-20 DIAGNOSIS — I10 ESSENTIAL HYPERTENSION: Primary | ICD-10-CM

## 2018-07-20 PROCEDURE — 1036F TOBACCO NON-USER: CPT | Performed by: INTERNAL MEDICINE

## 2018-07-20 PROCEDURE — 1123F ACP DISCUSS/DSCN MKR DOCD: CPT | Performed by: INTERNAL MEDICINE

## 2018-07-20 PROCEDURE — 99213 OFFICE O/P EST LOW 20 MIN: CPT | Performed by: INTERNAL MEDICINE

## 2018-07-20 PROCEDURE — G8417 CALC BMI ABV UP PARAM F/U: HCPCS | Performed by: INTERNAL MEDICINE

## 2018-07-20 PROCEDURE — 3017F COLORECTAL CA SCREEN DOC REV: CPT | Performed by: INTERNAL MEDICINE

## 2018-07-20 PROCEDURE — 1101F PT FALLS ASSESS-DOCD LE1/YR: CPT | Performed by: INTERNAL MEDICINE

## 2018-07-20 PROCEDURE — 4040F PNEUMOC VAC/ADMIN/RCVD: CPT | Performed by: INTERNAL MEDICINE

## 2018-07-20 PROCEDURE — G8427 DOCREV CUR MEDS BY ELIG CLIN: HCPCS | Performed by: INTERNAL MEDICINE

## 2018-07-20 RX ORDER — SPIRONOLACTONE 50 MG/1
50 TABLET, FILM COATED ORAL 2 TIMES DAILY
Qty: 180 TABLET | Refills: 1
Start: 2018-07-20 | End: 2019-01-08 | Stop reason: ALTCHOICE

## 2018-07-20 RX ORDER — ALPRAZOLAM 0.25 MG/1
0.25 TABLET ORAL NIGHTLY PRN
Qty: 30 TABLET | Refills: 1 | Status: SHIPPED | OUTPATIENT
Start: 2018-07-20 | End: 2018-08-19

## 2018-07-20 RX ORDER — ONDANSETRON 4 MG/1
4 TABLET, FILM COATED ORAL EVERY 8 HOURS PRN
Qty: 40 TABLET | Refills: 1 | Status: SHIPPED | OUTPATIENT
Start: 2018-07-20 | End: 2021-02-15 | Stop reason: SDUPTHER

## 2018-07-20 RX ORDER — LABETALOL 100 MG/1
100 TABLET, FILM COATED ORAL 2 TIMES DAILY
Qty: 180 TABLET | Refills: 1 | Status: SHIPPED | OUTPATIENT
Start: 2018-07-20 | End: 2019-01-24 | Stop reason: SDUPTHER

## 2018-07-20 NOTE — PROGRESS NOTES
Subjective:      Patient ID: Anne Marie Colon is a 79 y.o. male. HPI    HTN- bp still labile and can be high to 200 in am, but later in the day 130s and HR on labetalol now ~50-60 on 100mg bid. Denies sx headache or cp. Crohn's stable w periodic nausea, using zofran prn. DM- sugars stable and a1c last mo 7.2. Lab Results   Component Value Date    LABA1C 7.2 (H) 06/20/2018    LABA1C 6.8 (H) 12/07/2017    LABA1C 7.0 (H) 09/14/2017     Lab Results   Component Value Date    GLUF 195 (H) 03/20/2018    LABMICR 6.30 (H) 04/17/2018    LDLCALC 131 (H) 06/08/2017    CREATININE 1.4 06/20/2018     Anxiety stable w xanax prn, Controlled substances monitoring: possible medication side effects, risk of tolerance and/or dependence, and alternative treatments discussed, no signs of potential drug abuse or diversion identified and OARRS report reviewed today- activity consistent with treatment plan. Review of Systems    Objective:   Physical Exam   Constitutional: He appears well-developed and well-nourished. Neck: Neck supple. Cardiovascular: Normal rate, regular rhythm and normal heart sounds. Exam reveals no gallop and no friction rub. No murmur heard. Pulmonary/Chest: Effort normal and breath sounds normal. No respiratory distress. He has no wheezes. He has no rales. Abdominal: Soft. Bowel sounds are normal. He exhibits no distension. There is no tenderness. Musculoskeletal: He exhibits no edema. Neurological: He is alert. Psychiatric: He has a normal mood and affect. Vitals reviewed. Assessment:      1. Essential hypertension    2. Crohn's disease with complication, unspecified gastrointestinal tract location ([address]    3. Anxiety            Plan:      Carly Parsons was seen today for 1 month follow-up. Diagnoses and all orders for this visit:    Essential hypertension - SL LABILE BUT OVERALL BP CONTROL IMPROVED, CONT FU DR RAMOS. -     labetalol (NORMODYNE) 100 MG tablet;  Take 1 tablet by mouth 2 times daily  -     spironolactone (ALDACTONE) 50 MG tablet; Take 1 tablet by mouth 2 times daily    Crohn's disease with complication, unspecified gastrointestinal tract location ([facility] [address]- CURRENTLY CLINICALLY IN REMISSION, CONT PRN ZOFRAN  -     ondansetron (ZOFRAN) 4 MG tablet; Take 1 tablet by mouth every 8 hours as needed for Nausea    Anxiety - Mood stable on current regimen w/o any significant manifestations of severe depression or anxiety noted at this time. Cont current meds. -     ALPRAZolam (XANAX) 0.25 MG tablet; Take 1 tablet by mouth nightly as needed for Anxiety for up to 30 days. Ranjana Rodrigues

## 2018-08-01 ENCOUNTER — HOSPITAL ENCOUNTER (OUTPATIENT)
Dept: OTHER | Age: 68
Discharge: HOME | End: 2018-08-31
Attending: INTERNAL MEDICINE | Admitting: INTERNAL MEDICINE

## 2018-08-10 DIAGNOSIS — I10 ESSENTIAL HYPERTENSION: ICD-10-CM

## 2018-08-10 DIAGNOSIS — E11.29 CONTROLLED TYPE 2 DIABETES MELLITUS WITH MICROALBUMINURIA, WITHOUT LONG-TERM CURRENT USE OF INSULIN (HCC): ICD-10-CM

## 2018-08-10 DIAGNOSIS — R80.9 CONTROLLED TYPE 2 DIABETES MELLITUS WITH MICROALBUMINURIA, WITHOUT LONG-TERM CURRENT USE OF INSULIN (HCC): ICD-10-CM

## 2018-08-10 RX ORDER — LISINOPRIL 40 MG/1
TABLET ORAL
Qty: 30 TABLET | Refills: 1 | Status: SHIPPED | OUTPATIENT
Start: 2018-08-10 | End: 2018-10-08 | Stop reason: SDUPTHER

## 2018-08-15 DIAGNOSIS — G89.29 CHRONIC MIDLINE LOW BACK PAIN WITHOUT SCIATICA: ICD-10-CM

## 2018-08-15 DIAGNOSIS — M54.12 RIGHT CERVICAL RADICULOPATHY: ICD-10-CM

## 2018-08-15 DIAGNOSIS — M54.50 CHRONIC MIDLINE LOW BACK PAIN WITHOUT SCIATICA: ICD-10-CM

## 2018-08-15 RX ORDER — HYDROCODONE BITARTRATE AND ACETAMINOPHEN 5; 325 MG/1; MG/1
1 TABLET ORAL 2 TIMES DAILY
Qty: 60 TABLET | Refills: 0 | Status: SHIPPED | OUTPATIENT
Start: 2018-08-15 | End: 2018-10-08 | Stop reason: SDUPTHER

## 2018-08-15 RX ORDER — TRAMADOL HYDROCHLORIDE 50 MG/1
TABLET ORAL
Qty: 90 TABLET | Refills: 0 | Status: SHIPPED | OUTPATIENT
Start: 2018-08-15 | End: 2019-01-08 | Stop reason: SDUPTHER

## 2018-09-11 ENCOUNTER — OFFICE VISIT (OUTPATIENT)
Dept: INTERNAL MEDICINE CLINIC | Age: 68
End: 2018-09-11

## 2018-09-11 ENCOUNTER — HOSPITAL ENCOUNTER (OUTPATIENT)
Dept: GENERAL RADIOLOGY | Age: 68
Discharge: HOME | End: 2018-09-11
Attending: INTERNAL MEDICINE | Admitting: INTERNAL MEDICINE

## 2018-09-11 VITALS
RESPIRATION RATE: 18 BRPM | OXYGEN SATURATION: 98 % | DIASTOLIC BLOOD PRESSURE: 76 MMHG | HEART RATE: 72 BPM | SYSTOLIC BLOOD PRESSURE: 132 MMHG

## 2018-09-11 DIAGNOSIS — M79.671 INFLAMMATORY PAIN OF RIGHT HEEL: ICD-10-CM

## 2018-09-11 DIAGNOSIS — M79.671 INFLAMMATORY PAIN OF RIGHT HEEL: Primary | ICD-10-CM

## 2018-09-11 DIAGNOSIS — Z23 NEED FOR IMMUNIZATION AGAINST INFLUENZA: ICD-10-CM

## 2018-09-11 PROCEDURE — G8417 CALC BMI ABV UP PARAM F/U: HCPCS | Performed by: INTERNAL MEDICINE

## 2018-09-11 PROCEDURE — 4040F PNEUMOC VAC/ADMIN/RCVD: CPT | Performed by: INTERNAL MEDICINE

## 2018-09-11 PROCEDURE — 1123F ACP DISCUSS/DSCN MKR DOCD: CPT | Performed by: INTERNAL MEDICINE

## 2018-09-11 PROCEDURE — 90662 IIV NO PRSV INCREASED AG IM: CPT | Performed by: INTERNAL MEDICINE

## 2018-09-11 PROCEDURE — 3288F FALL RISK ASSESSMENT DOCD: CPT | Performed by: INTERNAL MEDICINE

## 2018-09-11 PROCEDURE — 99213 OFFICE O/P EST LOW 20 MIN: CPT | Performed by: INTERNAL MEDICINE

## 2018-09-11 PROCEDURE — 1036F TOBACCO NON-USER: CPT | Performed by: INTERNAL MEDICINE

## 2018-09-11 PROCEDURE — 3017F COLORECTAL CA SCREEN DOC REV: CPT | Performed by: INTERNAL MEDICINE

## 2018-09-11 PROCEDURE — G8510 SCR DEP NEG, NO PLAN REQD: HCPCS | Performed by: INTERNAL MEDICINE

## 2018-09-11 PROCEDURE — G0008 ADMIN INFLUENZA VIRUS VAC: HCPCS | Performed by: INTERNAL MEDICINE

## 2018-09-11 PROCEDURE — 1101F PT FALLS ASSESS-DOCD LE1/YR: CPT | Performed by: INTERNAL MEDICINE

## 2018-09-11 PROCEDURE — G8427 DOCREV CUR MEDS BY ELIG CLIN: HCPCS | Performed by: INTERNAL MEDICINE

## 2018-09-11 ASSESSMENT — PATIENT HEALTH QUESTIONNAIRE - PHQ9
SUM OF ALL RESPONSES TO PHQ QUESTIONS 1-9: 0
2. FEELING DOWN, DEPRESSED OR HOPELESS: 0
1. LITTLE INTEREST OR PLEASURE IN DOING THINGS: 0
SUM OF ALL RESPONSES TO PHQ9 QUESTIONS 1 & 2: 0
SUM OF ALL RESPONSES TO PHQ QUESTIONS 1-9: 0

## 2018-09-13 DIAGNOSIS — G89.29 CHRONIC MIDLINE LOW BACK PAIN WITHOUT SCIATICA: ICD-10-CM

## 2018-09-13 DIAGNOSIS — M54.12 RIGHT CERVICAL RADICULOPATHY: ICD-10-CM

## 2018-09-13 DIAGNOSIS — R51.9 ACUTE NONINTRACTABLE HEADACHE, UNSPECIFIED HEADACHE TYPE: ICD-10-CM

## 2018-09-13 DIAGNOSIS — M54.50 CHRONIC MIDLINE LOW BACK PAIN WITHOUT SCIATICA: ICD-10-CM

## 2018-09-13 RX ORDER — TRAMADOL HYDROCHLORIDE 50 MG/1
50 TABLET ORAL 3 TIMES DAILY PRN
Qty: 90 TABLET | Refills: 0 | Status: SHIPPED | OUTPATIENT
Start: 2018-09-13 | End: 2018-09-13 | Stop reason: SDUPTHER

## 2018-09-13 RX ORDER — TRAMADOL HYDROCHLORIDE 50 MG/1
TABLET ORAL
Qty: 90 TABLET | Refills: 0 | OUTPATIENT
Start: 2018-09-13 | End: 2018-10-13

## 2018-09-13 RX ORDER — HYDROCODONE BITARTRATE AND ACETAMINOPHEN 5; 325 MG/1; MG/1
1 TABLET ORAL 2 TIMES DAILY
Qty: 60 TABLET | Refills: 0 | OUTPATIENT
Start: 2018-09-13 | End: 2018-10-13

## 2018-09-13 RX ORDER — BUTALBITAL, ACETAMINOPHEN AND CAFFEINE 50; 325; 40 MG/1; MG/1; MG/1
1 TABLET ORAL 3 TIMES DAILY PRN
Qty: 30 TABLET | Refills: 1 | Status: SHIPPED | OUTPATIENT
Start: 2018-09-13 | End: 2019-04-18

## 2018-09-13 RX ORDER — HYDROCODONE BITARTRATE AND ACETAMINOPHEN 5; 325 MG/1; MG/1
1 TABLET ORAL 2 TIMES DAILY
Qty: 60 TABLET | Refills: 0 | Status: SHIPPED | OUTPATIENT
Start: 2018-09-13 | End: 2018-10-08 | Stop reason: SDUPTHER

## 2018-09-13 RX ORDER — TRAMADOL HYDROCHLORIDE 50 MG/1
50 TABLET ORAL 3 TIMES DAILY PRN
Qty: 90 TABLET | Refills: 0 | Status: SHIPPED | OUTPATIENT
Start: 2018-09-13 | End: 2018-10-08 | Stop reason: SDUPTHER

## 2018-09-14 ENCOUNTER — TELEPHONE (OUTPATIENT)
Dept: INTERNAL MEDICINE CLINIC | Age: 68
End: 2018-09-14

## 2018-09-14 NOTE — TELEPHONE ENCOUNTER
I checked with Dr. Louann Brown. They do not have any availability today. I called Dr. Barbra Rios office they do not have any openings today at any locations. I called Dr. Anca Ng office and their office closed today at noon. Please advise.

## 2018-09-14 NOTE — TELEPHONE ENCOUNTER
THIS IS LIKELY FLARE OF A CHRONIC TENDINITIS AND WE CANNOT GET IN TO PODIATRY TODAY. FOR NOW ALSO REC OTC TOPICAL LIDOCAINE 4% UP TO 4X/DAY TO GET HIM THR THE WEEKEND. PLS SEE IF DR GARCIA OR SYDNEE CAN GET HIM IN NEXT WEEK.

## 2018-09-14 NOTE — TELEPHONE ENCOUNTER
I spoke with patient. At this time he declines podiatry eval. He states he has been able to move around more this afternoon. He does have a cushion insert that he placed in his shoe. If pain does not subside over the weekend patient will call Monday morning for us to get him in to see podiatry.

## 2018-10-08 ENCOUNTER — OFFICE VISIT (OUTPATIENT)
Dept: INTERNAL MEDICINE CLINIC | Age: 68
End: 2018-10-08
Payer: MEDICARE

## 2018-10-08 VITALS
SYSTOLIC BLOOD PRESSURE: 130 MMHG | HEART RATE: 72 BPM | OXYGEN SATURATION: 97 % | DIASTOLIC BLOOD PRESSURE: 76 MMHG | BODY MASS INDEX: 35.4 KG/M2 | WEIGHT: 226 LBS | RESPIRATION RATE: 16 BRPM

## 2018-10-08 DIAGNOSIS — M54.12 RIGHT CERVICAL RADICULOPATHY: ICD-10-CM

## 2018-10-08 DIAGNOSIS — M54.50 CHRONIC MIDLINE LOW BACK PAIN WITHOUT SCIATICA: ICD-10-CM

## 2018-10-08 DIAGNOSIS — I10 ESSENTIAL HYPERTENSION: ICD-10-CM

## 2018-10-08 DIAGNOSIS — Z23 NEED FOR SHINGLES VACCINE: ICD-10-CM

## 2018-10-08 DIAGNOSIS — E11.29 CONTROLLED TYPE 2 DIABETES MELLITUS WITH MICROALBUMINURIA, WITHOUT LONG-TERM CURRENT USE OF INSULIN (HCC): Primary | ICD-10-CM

## 2018-10-08 DIAGNOSIS — R80.9 CONTROLLED TYPE 2 DIABETES MELLITUS WITH MICROALBUMINURIA, WITHOUT LONG-TERM CURRENT USE OF INSULIN (HCC): Primary | ICD-10-CM

## 2018-10-08 DIAGNOSIS — G89.29 CHRONIC MIDLINE LOW BACK PAIN WITHOUT SCIATICA: ICD-10-CM

## 2018-10-08 PROCEDURE — G8482 FLU IMMUNIZE ORDER/ADMIN: HCPCS | Performed by: INTERNAL MEDICINE

## 2018-10-08 PROCEDURE — 1101F PT FALLS ASSESS-DOCD LE1/YR: CPT | Performed by: INTERNAL MEDICINE

## 2018-10-08 PROCEDURE — 4040F PNEUMOC VAC/ADMIN/RCVD: CPT | Performed by: INTERNAL MEDICINE

## 2018-10-08 PROCEDURE — 3045F PR MOST RECENT HEMOGLOBIN A1C LEVEL 7.0-9.0%: CPT | Performed by: INTERNAL MEDICINE

## 2018-10-08 PROCEDURE — G8417 CALC BMI ABV UP PARAM F/U: HCPCS | Performed by: INTERNAL MEDICINE

## 2018-10-08 PROCEDURE — 1036F TOBACCO NON-USER: CPT | Performed by: INTERNAL MEDICINE

## 2018-10-08 PROCEDURE — G8427 DOCREV CUR MEDS BY ELIG CLIN: HCPCS | Performed by: INTERNAL MEDICINE

## 2018-10-08 PROCEDURE — 1123F ACP DISCUSS/DSCN MKR DOCD: CPT | Performed by: INTERNAL MEDICINE

## 2018-10-08 PROCEDURE — 99214 OFFICE O/P EST MOD 30 MIN: CPT | Performed by: INTERNAL MEDICINE

## 2018-10-08 PROCEDURE — 2022F DILAT RTA XM EVC RTNOPTHY: CPT | Performed by: INTERNAL MEDICINE

## 2018-10-08 PROCEDURE — 3017F COLORECTAL CA SCREEN DOC REV: CPT | Performed by: INTERNAL MEDICINE

## 2018-10-08 RX ORDER — HYDROCODONE BITARTRATE AND ACETAMINOPHEN 5; 325 MG/1; MG/1
1 TABLET ORAL 2 TIMES DAILY
Qty: 60 TABLET | Refills: 0 | Status: SHIPPED | OUTPATIENT
Start: 2018-10-15 | End: 2019-01-08 | Stop reason: SDUPTHER

## 2018-10-08 RX ORDER — TRAMADOL HYDROCHLORIDE 50 MG/1
50 TABLET ORAL 3 TIMES DAILY PRN
Qty: 90 TABLET | Refills: 2 | Status: SHIPPED | OUTPATIENT
Start: 2018-10-08 | End: 2019-03-15 | Stop reason: SDUPTHER

## 2018-10-08 RX ORDER — LISINOPRIL 40 MG/1
TABLET ORAL
Qty: 90 TABLET | Refills: 1 | Status: SHIPPED | OUTPATIENT
Start: 2018-10-08 | End: 2019-03-26 | Stop reason: SDUPTHER

## 2018-10-08 RX ORDER — HYDROCODONE BITARTRATE AND ACETAMINOPHEN 5; 325 MG/1; MG/1
1 TABLET ORAL 2 TIMES DAILY
Qty: 60 TABLET | Refills: 0 | Status: SHIPPED | OUTPATIENT
Start: 2018-12-14 | End: 2019-01-08 | Stop reason: SDUPTHER

## 2018-10-08 RX ORDER — TRAMADOL HYDROCHLORIDE 50 MG/1
50 TABLET ORAL 3 TIMES DAILY PRN
Qty: 90 TABLET | Refills: 0 | Status: CANCELLED | OUTPATIENT
Start: 2018-10-08 | End: 2018-11-07

## 2018-10-08 RX ORDER — HYDROCODONE BITARTRATE AND ACETAMINOPHEN 5; 325 MG/1; MG/1
1 TABLET ORAL 2 TIMES DAILY
Qty: 60 TABLET | Refills: 0 | Status: CANCELLED | OUTPATIENT
Start: 2018-10-08 | End: 2018-11-07

## 2018-10-08 RX ORDER — HYDROCODONE BITARTRATE AND ACETAMINOPHEN 5; 325 MG/1; MG/1
1 TABLET ORAL 2 TIMES DAILY
Qty: 60 TABLET | Refills: 0 | Status: SHIPPED | OUTPATIENT
Start: 2018-11-14 | End: 2019-02-07

## 2018-10-08 NOTE — PROGRESS NOTES
Lana Farias  1950  10/08/18    SUBJECTIVE:  Got a motorcycle on anniversary end of July, been riding. Did take a class. DM- sl higher a1c last time but been working on diet. Sugars at home ~130-150. Lab Results   Component Value Date    LABA1C 7.2 (H) 06/20/2018    LABA1C 6.8 (H) 12/07/2017    LABA1C 7.0 (H) 09/14/2017     Lab Results   Component Value Date    GLUF 195 (H) 03/20/2018    LABMICR 6.30 (H) 04/17/2018    LDLCALC 131 (H) 06/08/2017    CREATININE 1.4 06/20/2018     Hypertension: Stable. Denies CP, SOB, cough, visual changes, dizziness, palpitations or HA. SEEMS THAT STARTING SPIRONOLACTONE HAS ALSO HELPED W BP, STARTED BY DR RACHEL CUNNINGHAM, on norco, Controlled substances monitoring: possible medication side effects, risk of tolerance and/or dependence, and alternative treatments discussed, no signs of potential drug abuse or diversion identified and OARRS report reviewed today- activity consistent with treatment plan. Last fill was 9/15. OBJECTIVE:    /76   Pulse 72   Resp 16   Wt 226 lb (102.5 kg)   SpO2 97%   BMI 35.40 kg/m²     Physical Exam   Constitutional: He appears well-developed and well-nourished. No distress. HENT:   Head: Normocephalic and atraumatic. Nose: Nose normal.   Mouth/Throat: Oropharynx is clear and moist. No oropharyngeal exudate. Eyes: Pupils are equal, round, and reactive to light. Conjunctivae and EOM are normal. Right eye exhibits no discharge. Left eye exhibits no discharge. No scleral icterus. Neck: Normal range of motion. Neck supple. No tracheal deviation present. No thyromegaly present. Cardiovascular: Normal rate, regular rhythm, normal heart sounds and intact distal pulses. Exam reveals no gallop and no friction rub. No murmur heard. Pulmonary/Chest: Effort normal and breath sounds normal. No respiratory distress. He has no wheezes. He has no rales. Abdominal: Soft.  Bowel sounds are normal. He exhibits no distension and no

## 2018-10-09 DIAGNOSIS — I10 ESSENTIAL HYPERTENSION: ICD-10-CM

## 2018-10-09 DIAGNOSIS — E11.29 CONTROLLED TYPE 2 DIABETES MELLITUS WITH MICROALBUMINURIA, WITHOUT LONG-TERM CURRENT USE OF INSULIN (HCC): ICD-10-CM

## 2018-10-09 DIAGNOSIS — R80.9 CONTROLLED TYPE 2 DIABETES MELLITUS WITH MICROALBUMINURIA, WITHOUT LONG-TERM CURRENT USE OF INSULIN (HCC): ICD-10-CM

## 2018-10-09 LAB
A/G RATIO: 1.6 (ref 1.1–2.2)
ALBUMIN SERPL-MCNC: 4.2 G/DL (ref 3.4–5)
ALP BLD-CCNC: 86 U/L (ref 40–129)
ALT SERPL-CCNC: 10 U/L (ref 10–40)
ANION GAP SERPL CALCULATED.3IONS-SCNC: 14 MMOL/L (ref 3–16)
AST SERPL-CCNC: 8 U/L (ref 15–37)
BASOPHILS ABSOLUTE: 0.1 K/UL (ref 0–0.2)
BASOPHILS RELATIVE PERCENT: 1.5 %
BILIRUB SERPL-MCNC: 0.3 MG/DL (ref 0–1)
BUN BLDV-MCNC: 16 MG/DL (ref 7–20)
CALCIUM SERPL-MCNC: 9.5 MG/DL (ref 8.3–10.6)
CHLORIDE BLD-SCNC: 102 MMOL/L (ref 99–110)
CHOLESTEROL, TOTAL: 199 MG/DL (ref 0–199)
CO2: 25 MMOL/L (ref 21–32)
CREAT SERPL-MCNC: 1.3 MG/DL (ref 0.8–1.3)
EOSINOPHILS ABSOLUTE: 0.3 K/UL (ref 0–0.6)
EOSINOPHILS RELATIVE PERCENT: 4.1 %
GFR AFRICAN AMERICAN: >60
GFR NON-AFRICAN AMERICAN: 55
GLOBULIN: 2.6 G/DL
GLUCOSE BLD-MCNC: 135 MG/DL (ref 70–99)
HCT VFR BLD CALC: 38.4 % (ref 40.5–52.5)
HDLC SERPL-MCNC: 33 MG/DL (ref 40–60)
HEMOGLOBIN: 12.9 G/DL (ref 13.5–17.5)
LDL CHOLESTEROL CALCULATED: 125 MG/DL
LYMPHOCYTES ABSOLUTE: 1.2 K/UL (ref 1–5.1)
LYMPHOCYTES RELATIVE PERCENT: 18.2 %
MCH RBC QN AUTO: 27.9 PG (ref 26–34)
MCHC RBC AUTO-ENTMCNC: 33.5 G/DL (ref 31–36)
MCV RBC AUTO: 83.4 FL (ref 80–100)
MONOCYTES ABSOLUTE: 0.3 K/UL (ref 0–1.3)
MONOCYTES RELATIVE PERCENT: 4.7 %
NEUTROPHILS ABSOLUTE: 4.8 K/UL (ref 1.7–7.7)
NEUTROPHILS RELATIVE PERCENT: 71.5 %
PDW BLD-RTO: 13.7 % (ref 12.4–15.4)
PLATELET # BLD: 249 K/UL (ref 135–450)
PMV BLD AUTO: 7.6 FL (ref 5–10.5)
POTASSIUM SERPL-SCNC: 4.6 MMOL/L (ref 3.5–5.1)
RBC # BLD: 4.6 M/UL (ref 4.2–5.9)
SODIUM BLD-SCNC: 141 MMOL/L (ref 136–145)
TOTAL PROTEIN: 6.8 G/DL (ref 6.4–8.2)
TRIGL SERPL-MCNC: 203 MG/DL (ref 0–150)
VLDLC SERPL CALC-MCNC: 41 MG/DL
WBC # BLD: 6.7 K/UL (ref 4–11)

## 2018-10-10 LAB
ESTIMATED AVERAGE GLUCOSE: 148.5 MG/DL
HBA1C MFR BLD: 6.8 %

## 2018-10-25 DIAGNOSIS — M54.50 CHRONIC MIDLINE LOW BACK PAIN WITHOUT SCIATICA: ICD-10-CM

## 2018-10-25 DIAGNOSIS — G89.29 CHRONIC MIDLINE LOW BACK PAIN WITHOUT SCIATICA: ICD-10-CM

## 2018-10-25 DIAGNOSIS — M54.12 RIGHT CERVICAL RADICULOPATHY: ICD-10-CM

## 2018-10-25 RX ORDER — CYCLOBENZAPRINE HCL 10 MG
TABLET ORAL
Qty: 90 TABLET | Refills: 0 | Status: SHIPPED | OUTPATIENT
Start: 2018-10-25 | End: 2019-01-08 | Stop reason: SDUPTHER

## 2018-12-14 ENCOUNTER — OFFICE VISIT (OUTPATIENT)
Dept: INTERNAL MEDICINE CLINIC | Age: 68
End: 2018-12-14
Payer: MEDICARE

## 2018-12-14 VITALS
OXYGEN SATURATION: 97 % | BODY MASS INDEX: 34.77 KG/M2 | HEART RATE: 64 BPM | RESPIRATION RATE: 16 BRPM | DIASTOLIC BLOOD PRESSURE: 72 MMHG | SYSTOLIC BLOOD PRESSURE: 136 MMHG | WEIGHT: 222 LBS

## 2018-12-14 DIAGNOSIS — N62 GYNECOMASTIA: ICD-10-CM

## 2018-12-14 DIAGNOSIS — K50.919 CROHN'S DISEASE WITH COMPLICATION, UNSPECIFIED GASTROINTESTINAL TRACT LOCATION (HCC): Primary | ICD-10-CM

## 2018-12-14 DIAGNOSIS — K50.919 CROHN'S DISEASE WITH COMPLICATION, UNSPECIFIED GASTROINTESTINAL TRACT LOCATION (HCC): ICD-10-CM

## 2018-12-14 DIAGNOSIS — N40.1 BENIGN PROSTATIC HYPERPLASIA WITH LOWER URINARY TRACT SYMPTOMS, SYMPTOM DETAILS UNSPECIFIED: ICD-10-CM

## 2018-12-14 LAB
HCT VFR BLD CALC: 39.5 % (ref 40.5–52.5)
HEMOGLOBIN: 13.3 G/DL (ref 13.5–17.5)
MCH RBC QN AUTO: 27.2 PG (ref 26–34)
MCHC RBC AUTO-ENTMCNC: 33.6 G/DL (ref 31–36)
MCV RBC AUTO: 81 FL (ref 80–100)
PDW BLD-RTO: 14.4 % (ref 12.4–15.4)
PLATELET # BLD: 255 K/UL (ref 135–450)
PMV BLD AUTO: 8 FL (ref 5–10.5)
RBC # BLD: 4.87 M/UL (ref 4.2–5.9)
SEDIMENTATION RATE, ERYTHROCYTE: 44 MM/HR (ref 0–20)
WBC # BLD: 7.5 K/UL (ref 4–11)

## 2018-12-14 PROCEDURE — 1101F PT FALLS ASSESS-DOCD LE1/YR: CPT | Performed by: INTERNAL MEDICINE

## 2018-12-14 PROCEDURE — 1036F TOBACCO NON-USER: CPT | Performed by: INTERNAL MEDICINE

## 2018-12-14 PROCEDURE — G8417 CALC BMI ABV UP PARAM F/U: HCPCS | Performed by: INTERNAL MEDICINE

## 2018-12-14 PROCEDURE — 4040F PNEUMOC VAC/ADMIN/RCVD: CPT | Performed by: INTERNAL MEDICINE

## 2018-12-14 PROCEDURE — G8482 FLU IMMUNIZE ORDER/ADMIN: HCPCS | Performed by: INTERNAL MEDICINE

## 2018-12-14 PROCEDURE — 99214 OFFICE O/P EST MOD 30 MIN: CPT | Performed by: INTERNAL MEDICINE

## 2018-12-14 PROCEDURE — 3017F COLORECTAL CA SCREEN DOC REV: CPT | Performed by: INTERNAL MEDICINE

## 2018-12-14 PROCEDURE — G8427 DOCREV CUR MEDS BY ELIG CLIN: HCPCS | Performed by: INTERNAL MEDICINE

## 2018-12-14 PROCEDURE — 1123F ACP DISCUSS/DSCN MKR DOCD: CPT | Performed by: INTERNAL MEDICINE

## 2018-12-14 RX ORDER — PREDNISONE 1 MG/1
TABLET ORAL
Qty: 36 TABLET | Refills: 0 | Status: SHIPPED | OUTPATIENT
Start: 2018-12-14 | End: 2019-01-08 | Stop reason: ALTCHOICE

## 2018-12-15 LAB
A/G RATIO: 1.6 (ref 1.1–2.2)
ALBUMIN SERPL-MCNC: 4.3 G/DL (ref 3.4–5)
ALP BLD-CCNC: 85 U/L (ref 40–129)
ALT SERPL-CCNC: 9 U/L (ref 10–40)
ANION GAP SERPL CALCULATED.3IONS-SCNC: 14 MMOL/L (ref 3–16)
AST SERPL-CCNC: 7 U/L (ref 15–37)
BILIRUB SERPL-MCNC: <0.2 MG/DL (ref 0–1)
BUN BLDV-MCNC: 19 MG/DL (ref 7–20)
C-REACTIVE PROTEIN: 14.5 MG/L (ref 0–5.1)
CALCIUM SERPL-MCNC: 9.6 MG/DL (ref 8.3–10.6)
CHLORIDE BLD-SCNC: 100 MMOL/L (ref 99–110)
CO2: 26 MMOL/L (ref 21–32)
CREAT SERPL-MCNC: 1.5 MG/DL (ref 0.8–1.3)
GFR AFRICAN AMERICAN: 57
GFR NON-AFRICAN AMERICAN: 47
GLOBULIN: 2.7 G/DL
GLUCOSE BLD-MCNC: 89 MG/DL (ref 70–99)
POTASSIUM SERPL-SCNC: 4.7 MMOL/L (ref 3.5–5.1)
PROSTATE SPECIFIC ANTIGEN: 0.47 NG/ML (ref 0–4)
SODIUM BLD-SCNC: 140 MMOL/L (ref 136–145)
TOTAL PROTEIN: 7 G/DL (ref 6.4–8.2)

## 2018-12-20 ENCOUNTER — OFFICE VISIT (OUTPATIENT)
Dept: SURGERY | Age: 68
End: 2018-12-20
Payer: MEDICARE

## 2018-12-20 ENCOUNTER — TELEPHONE (OUTPATIENT)
Dept: SURGERY | Age: 68
End: 2018-12-20

## 2018-12-20 VITALS
DIASTOLIC BLOOD PRESSURE: 78 MMHG | OXYGEN SATURATION: 99 % | WEIGHT: 221 LBS | SYSTOLIC BLOOD PRESSURE: 140 MMHG | BODY MASS INDEX: 33.49 KG/M2 | HEIGHT: 68 IN | HEART RATE: 82 BPM

## 2018-12-20 DIAGNOSIS — N63.20 MASSES OF BOTH BREASTS: Primary | ICD-10-CM

## 2018-12-20 DIAGNOSIS — N63.10 MASSES OF BOTH BREASTS: Primary | ICD-10-CM

## 2018-12-20 DIAGNOSIS — N62 HYPERTROPHY OF BREAST: ICD-10-CM

## 2018-12-20 PROCEDURE — 4040F PNEUMOC VAC/ADMIN/RCVD: CPT | Performed by: SURGERY

## 2018-12-20 PROCEDURE — 1123F ACP DISCUSS/DSCN MKR DOCD: CPT | Performed by: SURGERY

## 2018-12-20 PROCEDURE — 3017F COLORECTAL CA SCREEN DOC REV: CPT | Performed by: SURGERY

## 2018-12-20 PROCEDURE — 1101F PT FALLS ASSESS-DOCD LE1/YR: CPT | Performed by: SURGERY

## 2018-12-20 PROCEDURE — 1036F TOBACCO NON-USER: CPT | Performed by: SURGERY

## 2018-12-20 PROCEDURE — G8482 FLU IMMUNIZE ORDER/ADMIN: HCPCS | Performed by: SURGERY

## 2018-12-20 PROCEDURE — G8417 CALC BMI ABV UP PARAM F/U: HCPCS | Performed by: SURGERY

## 2018-12-20 PROCEDURE — 99203 OFFICE O/P NEW LOW 30 MIN: CPT | Performed by: SURGERY

## 2018-12-20 PROCEDURE — G8427 DOCREV CUR MEDS BY ELIG CLIN: HCPCS | Performed by: SURGERY

## 2018-12-20 RX ORDER — TRAMADOL HYDROCHLORIDE 50 MG/1
TABLET ORAL
COMMUNITY
Start: 2018-12-14 | End: 2019-04-05

## 2018-12-27 ENCOUNTER — HOSPITAL ENCOUNTER (OUTPATIENT)
Dept: ULTRASOUND IMAGING | Age: 68
Discharge: HOME | End: 2018-12-27
Payer: MEDICARE

## 2018-12-27 ENCOUNTER — HOSPITAL ENCOUNTER (OUTPATIENT)
Dept: WOMENS IMAGING | Age: 68
Discharge: HOME | End: 2018-12-27
Payer: MEDICARE

## 2018-12-27 DIAGNOSIS — N62 HYPERTROPHY OF BREAST: ICD-10-CM

## 2018-12-27 DIAGNOSIS — R92.8 ABNORMAL MAMMOGRAM: ICD-10-CM

## 2018-12-27 DIAGNOSIS — N63.20 MASSES OF BOTH BREASTS: ICD-10-CM

## 2018-12-27 DIAGNOSIS — N63.10 MASSES OF BOTH BREASTS: ICD-10-CM

## 2018-12-27 PROCEDURE — 77066 DX MAMMO INCL CAD BI: CPT

## 2018-12-27 PROCEDURE — 76642 ULTRASOUND BREAST LIMITED: CPT

## 2019-01-07 ENCOUNTER — HOSPITAL ENCOUNTER (OUTPATIENT)
Dept: GENERAL RADIOLOGY | Age: 69
Discharge: HOME | End: 2019-01-07
Payer: MEDICARE

## 2019-01-07 DIAGNOSIS — R10.12 ABDOMINAL PAIN, LEFT UPPER QUADRANT: ICD-10-CM

## 2019-01-07 DIAGNOSIS — R11.0 NAUSEA: ICD-10-CM

## 2019-01-07 PROCEDURE — 74245 FL UGI W SMALL BOWEL: CPT

## 2019-01-08 ENCOUNTER — OFFICE VISIT (OUTPATIENT)
Dept: INTERNAL MEDICINE CLINIC | Age: 69
End: 2019-01-08
Payer: MEDICARE

## 2019-01-08 VITALS
SYSTOLIC BLOOD PRESSURE: 124 MMHG | WEIGHT: 221 LBS | DIASTOLIC BLOOD PRESSURE: 68 MMHG | RESPIRATION RATE: 16 BRPM | BODY MASS INDEX: 33.6 KG/M2 | HEART RATE: 60 BPM | OXYGEN SATURATION: 97 %

## 2019-01-08 DIAGNOSIS — K50.919 CROHN'S DISEASE WITH COMPLICATION, UNSPECIFIED GASTROINTESTINAL TRACT LOCATION (HCC): ICD-10-CM

## 2019-01-08 DIAGNOSIS — I10 ESSENTIAL HYPERTENSION: Primary | ICD-10-CM

## 2019-01-08 DIAGNOSIS — M54.12 RIGHT CERVICAL RADICULOPATHY: ICD-10-CM

## 2019-01-08 DIAGNOSIS — N62 GYNECOMASTIA, MALE: ICD-10-CM

## 2019-01-08 DIAGNOSIS — M54.50 CHRONIC MIDLINE LOW BACK PAIN WITHOUT SCIATICA: ICD-10-CM

## 2019-01-08 DIAGNOSIS — G89.29 CHRONIC MIDLINE LOW BACK PAIN WITHOUT SCIATICA: ICD-10-CM

## 2019-01-08 PROCEDURE — 4040F PNEUMOC VAC/ADMIN/RCVD: CPT | Performed by: INTERNAL MEDICINE

## 2019-01-08 PROCEDURE — G8417 CALC BMI ABV UP PARAM F/U: HCPCS | Performed by: INTERNAL MEDICINE

## 2019-01-08 PROCEDURE — G8427 DOCREV CUR MEDS BY ELIG CLIN: HCPCS | Performed by: INTERNAL MEDICINE

## 2019-01-08 PROCEDURE — 3017F COLORECTAL CA SCREEN DOC REV: CPT | Performed by: INTERNAL MEDICINE

## 2019-01-08 PROCEDURE — 1036F TOBACCO NON-USER: CPT | Performed by: INTERNAL MEDICINE

## 2019-01-08 PROCEDURE — 1123F ACP DISCUSS/DSCN MKR DOCD: CPT | Performed by: INTERNAL MEDICINE

## 2019-01-08 PROCEDURE — 1101F PT FALLS ASSESS-DOCD LE1/YR: CPT | Performed by: INTERNAL MEDICINE

## 2019-01-08 PROCEDURE — 99214 OFFICE O/P EST MOD 30 MIN: CPT | Performed by: INTERNAL MEDICINE

## 2019-01-08 PROCEDURE — G8482 FLU IMMUNIZE ORDER/ADMIN: HCPCS | Performed by: INTERNAL MEDICINE

## 2019-01-08 RX ORDER — HYDROCODONE BITARTRATE AND ACETAMINOPHEN 5; 325 MG/1; MG/1
1 TABLET ORAL 2 TIMES DAILY PRN
Qty: 60 TABLET | Refills: 0 | Status: SHIPPED | OUTPATIENT
Start: 2019-03-09 | End: 2019-04-05 | Stop reason: SDUPTHER

## 2019-01-08 RX ORDER — AMLODIPINE BESYLATE 5 MG/1
5 TABLET ORAL DAILY
Qty: 90 TABLET | Refills: 1 | Status: SHIPPED | OUTPATIENT
Start: 2019-01-08 | End: 2019-07-23 | Stop reason: SDUPTHER

## 2019-01-08 RX ORDER — HYDROCODONE BITARTRATE AND ACETAMINOPHEN 5; 325 MG/1; MG/1
1 TABLET ORAL 2 TIMES DAILY
Qty: 60 TABLET | Refills: 0 | Status: SHIPPED | OUTPATIENT
Start: 2019-01-08 | End: 2019-02-07

## 2019-01-08 RX ORDER — CYCLOBENZAPRINE HCL 10 MG
TABLET ORAL
Qty: 90 TABLET | Refills: 1 | Status: SHIPPED | OUTPATIENT
Start: 2019-01-08 | End: 2019-08-07 | Stop reason: SDUPTHER

## 2019-01-08 RX ORDER — SPIRONOLACTONE 50 MG/1
50 TABLET, FILM COATED ORAL 2 TIMES DAILY
COMMUNITY
End: 2019-04-04

## 2019-01-08 RX ORDER — TRAMADOL HYDROCHLORIDE 50 MG/1
50 TABLET ORAL 3 TIMES DAILY
Qty: 90 TABLET | Refills: 1 | Status: SHIPPED | OUTPATIENT
Start: 2019-01-08 | End: 2019-02-07

## 2019-01-08 RX ORDER — HYDROCODONE BITARTRATE AND ACETAMINOPHEN 5; 325 MG/1; MG/1
1 TABLET ORAL 2 TIMES DAILY
Qty: 60 TABLET | Refills: 0 | Status: SHIPPED | OUTPATIENT
Start: 2019-02-07 | End: 2019-02-07

## 2019-01-10 ENCOUNTER — OFFICE VISIT (OUTPATIENT)
Dept: SURGERY | Age: 69
End: 2019-01-10
Payer: MEDICARE

## 2019-01-10 ENCOUNTER — TELEPHONE (OUTPATIENT)
Dept: SURGERY | Age: 69
End: 2019-01-10

## 2019-01-10 VITALS — HEART RATE: 62 BPM | SYSTOLIC BLOOD PRESSURE: 125 MMHG | DIASTOLIC BLOOD PRESSURE: 58 MMHG | RESPIRATION RATE: 14 BRPM

## 2019-01-10 DIAGNOSIS — N63.10 MASSES OF BOTH BREASTS: Primary | ICD-10-CM

## 2019-01-10 DIAGNOSIS — N63.10 BREAST MASS, RIGHT: Primary | ICD-10-CM

## 2019-01-10 DIAGNOSIS — R92.2 INCONCLUSIVE MAMMOGRAM: ICD-10-CM

## 2019-01-10 DIAGNOSIS — N63.20 MASSES OF BOTH BREASTS: Primary | ICD-10-CM

## 2019-01-10 PROCEDURE — G8482 FLU IMMUNIZE ORDER/ADMIN: HCPCS | Performed by: SURGERY

## 2019-01-10 PROCEDURE — 4040F PNEUMOC VAC/ADMIN/RCVD: CPT | Performed by: SURGERY

## 2019-01-10 PROCEDURE — 1036F TOBACCO NON-USER: CPT | Performed by: SURGERY

## 2019-01-10 PROCEDURE — 99212 OFFICE O/P EST SF 10 MIN: CPT | Performed by: SURGERY

## 2019-01-10 PROCEDURE — G8427 DOCREV CUR MEDS BY ELIG CLIN: HCPCS | Performed by: SURGERY

## 2019-01-10 PROCEDURE — G8417 CALC BMI ABV UP PARAM F/U: HCPCS | Performed by: SURGERY

## 2019-01-10 PROCEDURE — 3017F COLORECTAL CA SCREEN DOC REV: CPT | Performed by: SURGERY

## 2019-01-10 PROCEDURE — 1101F PT FALLS ASSESS-DOCD LE1/YR: CPT | Performed by: SURGERY

## 2019-01-10 PROCEDURE — 1123F ACP DISCUSS/DSCN MKR DOCD: CPT | Performed by: SURGERY

## 2019-01-14 ENCOUNTER — HOSPITAL ENCOUNTER (OUTPATIENT)
Dept: CT IMAGING | Age: 69
Discharge: HOME | End: 2019-01-14
Payer: MEDICARE

## 2019-01-14 DIAGNOSIS — R10.12 ABDOMINAL PAIN, LEFT UPPER QUADRANT: ICD-10-CM

## 2019-01-14 DIAGNOSIS — R11.0 NAUSEA: ICD-10-CM

## 2019-01-14 PROCEDURE — 6360000004 HC RX CONTRAST MEDICATION: Performed by: SPECIALIST

## 2019-01-14 PROCEDURE — 74176 CT ABD & PELVIS W/O CONTRAST: CPT

## 2019-01-14 RX ADMIN — IOHEXOL 50 ML: 240 INJECTION, SOLUTION INTRATHECAL; INTRAVASCULAR; INTRAVENOUS; ORAL at 10:39

## 2019-01-24 DIAGNOSIS — I10 ESSENTIAL HYPERTENSION: ICD-10-CM

## 2019-01-24 RX ORDER — LABETALOL 100 MG/1
100 TABLET, FILM COATED ORAL 2 TIMES DAILY
Qty: 180 TABLET | Refills: 1 | Status: SHIPPED | OUTPATIENT
Start: 2019-01-24 | End: 2019-04-05

## 2019-02-07 ENCOUNTER — OFFICE VISIT (OUTPATIENT)
Dept: INTERNAL MEDICINE CLINIC | Age: 69
End: 2019-02-07
Payer: MEDICARE

## 2019-02-07 VITALS
DIASTOLIC BLOOD PRESSURE: 80 MMHG | SYSTOLIC BLOOD PRESSURE: 130 MMHG | BODY MASS INDEX: 33.24 KG/M2 | RESPIRATION RATE: 16 BRPM | HEART RATE: 67 BPM | WEIGHT: 218.6 LBS | OXYGEN SATURATION: 97 %

## 2019-02-07 DIAGNOSIS — I10 ESSENTIAL HYPERTENSION: ICD-10-CM

## 2019-02-07 DIAGNOSIS — K50.919 CROHN'S DISEASE WITH COMPLICATION, UNSPECIFIED GASTROINTESTINAL TRACT LOCATION (HCC): ICD-10-CM

## 2019-02-07 DIAGNOSIS — R51.9 TEMPORAL HEADACHE: Primary | ICD-10-CM

## 2019-02-07 DIAGNOSIS — E11.21 CONTROLLED TYPE 2 DIABETES MELLITUS WITH DIABETIC NEPHROPATHY, WITHOUT LONG-TERM CURRENT USE OF INSULIN (HCC): ICD-10-CM

## 2019-02-07 DIAGNOSIS — R51.9 TEMPORAL HEADACHE: ICD-10-CM

## 2019-02-07 LAB
A/G RATIO: 1.7 (ref 1.1–2.2)
ALBUMIN SERPL-MCNC: 4.5 G/DL (ref 3.4–5)
ALP BLD-CCNC: 91 U/L (ref 40–129)
ALT SERPL-CCNC: 8 U/L (ref 10–40)
ANION GAP SERPL CALCULATED.3IONS-SCNC: 13 MMOL/L (ref 3–16)
AST SERPL-CCNC: 8 U/L (ref 15–37)
BASOPHILS ABSOLUTE: 0.1 K/UL (ref 0–0.2)
BASOPHILS RELATIVE PERCENT: 1.3 %
BILIRUB SERPL-MCNC: <0.2 MG/DL (ref 0–1)
BUN BLDV-MCNC: 20 MG/DL (ref 7–20)
CALCIUM SERPL-MCNC: 9.7 MG/DL (ref 8.3–10.6)
CHLORIDE BLD-SCNC: 102 MMOL/L (ref 99–110)
CHOLESTEROL, TOTAL: 207 MG/DL (ref 0–199)
CO2: 25 MMOL/L (ref 21–32)
CREAT SERPL-MCNC: 1.6 MG/DL (ref 0.8–1.3)
EOSINOPHILS ABSOLUTE: 0.2 K/UL (ref 0–0.6)
EOSINOPHILS RELATIVE PERCENT: 2.6 %
GFR AFRICAN AMERICAN: 52
GFR NON-AFRICAN AMERICAN: 43
GLOBULIN: 2.6 G/DL
GLUCOSE BLD-MCNC: 102 MG/DL (ref 70–99)
HCT VFR BLD CALC: 40 % (ref 40.5–52.5)
HDLC SERPL-MCNC: 31 MG/DL (ref 40–60)
HEMOGLOBIN: 13.8 G/DL (ref 13.5–17.5)
LDL CHOLESTEROL CALCULATED: 122 MG/DL
LYMPHOCYTES ABSOLUTE: 1.9 K/UL (ref 1–5.1)
LYMPHOCYTES RELATIVE PERCENT: 22 %
MCH RBC QN AUTO: 27.4 PG (ref 26–34)
MCHC RBC AUTO-ENTMCNC: 34.4 G/DL (ref 31–36)
MCV RBC AUTO: 79.8 FL (ref 80–100)
MONOCYTES ABSOLUTE: 0.4 K/UL (ref 0–1.3)
MONOCYTES RELATIVE PERCENT: 4.8 %
NEUTROPHILS ABSOLUTE: 6 K/UL (ref 1.7–7.7)
NEUTROPHILS RELATIVE PERCENT: 69.3 %
PDW BLD-RTO: 15 % (ref 12.4–15.4)
PLATELET # BLD: 260 K/UL (ref 135–450)
PMV BLD AUTO: 8 FL (ref 5–10.5)
POTASSIUM SERPL-SCNC: 5.1 MMOL/L (ref 3.5–5.1)
RBC # BLD: 5.02 M/UL (ref 4.2–5.9)
SODIUM BLD-SCNC: 140 MMOL/L (ref 136–145)
TOTAL PROTEIN: 7.1 G/DL (ref 6.4–8.2)
TRIGL SERPL-MCNC: 271 MG/DL (ref 0–150)
VLDLC SERPL CALC-MCNC: 54 MG/DL
WBC # BLD: 8.6 K/UL (ref 4–11)

## 2019-02-07 PROCEDURE — 1123F ACP DISCUSS/DSCN MKR DOCD: CPT | Performed by: INTERNAL MEDICINE

## 2019-02-07 PROCEDURE — 2022F DILAT RTA XM EVC RTNOPTHY: CPT | Performed by: INTERNAL MEDICINE

## 2019-02-07 PROCEDURE — 99214 OFFICE O/P EST MOD 30 MIN: CPT | Performed by: INTERNAL MEDICINE

## 2019-02-07 PROCEDURE — G8427 DOCREV CUR MEDS BY ELIG CLIN: HCPCS | Performed by: INTERNAL MEDICINE

## 2019-02-07 PROCEDURE — 4040F PNEUMOC VAC/ADMIN/RCVD: CPT | Performed by: INTERNAL MEDICINE

## 2019-02-07 PROCEDURE — 1101F PT FALLS ASSESS-DOCD LE1/YR: CPT | Performed by: INTERNAL MEDICINE

## 2019-02-07 PROCEDURE — G8510 SCR DEP NEG, NO PLAN REQD: HCPCS | Performed by: INTERNAL MEDICINE

## 2019-02-07 PROCEDURE — G8417 CALC BMI ABV UP PARAM F/U: HCPCS | Performed by: INTERNAL MEDICINE

## 2019-02-07 PROCEDURE — G8482 FLU IMMUNIZE ORDER/ADMIN: HCPCS | Performed by: INTERNAL MEDICINE

## 2019-02-07 PROCEDURE — 3046F HEMOGLOBIN A1C LEVEL >9.0%: CPT | Performed by: INTERNAL MEDICINE

## 2019-02-07 PROCEDURE — 1036F TOBACCO NON-USER: CPT | Performed by: INTERNAL MEDICINE

## 2019-02-07 PROCEDURE — 3017F COLORECTAL CA SCREEN DOC REV: CPT | Performed by: INTERNAL MEDICINE

## 2019-02-07 RX ORDER — CARBAMAZEPINE 200 MG/1
200 TABLET ORAL 2 TIMES DAILY
Qty: 60 TABLET | Refills: 3 | Status: SHIPPED | OUTPATIENT
Start: 2019-02-07 | End: 2019-02-14

## 2019-02-07 RX ORDER — PREDNISONE 1 MG/1
TABLET ORAL
Qty: 36 TABLET | Refills: 0 | Status: SHIPPED | OUTPATIENT
Start: 2019-02-07 | End: 2019-02-14

## 2019-02-07 ASSESSMENT — PATIENT HEALTH QUESTIONNAIRE - PHQ9
1. LITTLE INTEREST OR PLEASURE IN DOING THINGS: 0
SUM OF ALL RESPONSES TO PHQ QUESTIONS 1-9: 0
SUM OF ALL RESPONSES TO PHQ9 QUESTIONS 1 & 2: 0
2. FEELING DOWN, DEPRESSED OR HOPELESS: 0
SUM OF ALL RESPONSES TO PHQ QUESTIONS 1-9: 0

## 2019-02-08 LAB
ESTIMATED AVERAGE GLUCOSE: 145.6 MG/DL
HBA1C MFR BLD: 6.7 %
SEDIMENTATION RATE, ERYTHROCYTE: 47 MM/HR (ref 0–20)

## 2019-02-14 ENCOUNTER — OFFICE VISIT (OUTPATIENT)
Dept: INTERNAL MEDICINE CLINIC | Age: 69
End: 2019-02-14
Payer: MEDICARE

## 2019-02-14 VITALS
BODY MASS INDEX: 33.91 KG/M2 | HEART RATE: 65 BPM | OXYGEN SATURATION: 97 % | RESPIRATION RATE: 16 BRPM | WEIGHT: 223 LBS | SYSTOLIC BLOOD PRESSURE: 136 MMHG | DIASTOLIC BLOOD PRESSURE: 72 MMHG

## 2019-02-14 DIAGNOSIS — R51.9 ACUTE NONINTRACTABLE HEADACHE, UNSPECIFIED HEADACHE TYPE: Primary | ICD-10-CM

## 2019-02-14 DIAGNOSIS — K50.919 CROHN'S DISEASE WITH COMPLICATION, UNSPECIFIED GASTROINTESTINAL TRACT LOCATION (HCC): ICD-10-CM

## 2019-02-14 DIAGNOSIS — R70.0 ELEVATED SED RATE: ICD-10-CM

## 2019-02-14 PROCEDURE — G8427 DOCREV CUR MEDS BY ELIG CLIN: HCPCS | Performed by: INTERNAL MEDICINE

## 2019-02-14 PROCEDURE — 3017F COLORECTAL CA SCREEN DOC REV: CPT | Performed by: INTERNAL MEDICINE

## 2019-02-14 PROCEDURE — 1036F TOBACCO NON-USER: CPT | Performed by: INTERNAL MEDICINE

## 2019-02-14 PROCEDURE — G8417 CALC BMI ABV UP PARAM F/U: HCPCS | Performed by: INTERNAL MEDICINE

## 2019-02-14 PROCEDURE — 1123F ACP DISCUSS/DSCN MKR DOCD: CPT | Performed by: INTERNAL MEDICINE

## 2019-02-14 PROCEDURE — 4040F PNEUMOC VAC/ADMIN/RCVD: CPT | Performed by: INTERNAL MEDICINE

## 2019-02-14 PROCEDURE — 1101F PT FALLS ASSESS-DOCD LE1/YR: CPT | Performed by: INTERNAL MEDICINE

## 2019-02-14 PROCEDURE — 99213 OFFICE O/P EST LOW 20 MIN: CPT | Performed by: INTERNAL MEDICINE

## 2019-02-14 PROCEDURE — G8482 FLU IMMUNIZE ORDER/ADMIN: HCPCS | Performed by: INTERNAL MEDICINE

## 2019-03-15 DIAGNOSIS — G89.29 CHRONIC MIDLINE LOW BACK PAIN WITHOUT SCIATICA: ICD-10-CM

## 2019-03-15 DIAGNOSIS — M54.50 CHRONIC MIDLINE LOW BACK PAIN WITHOUT SCIATICA: ICD-10-CM

## 2019-03-15 RX ORDER — TRAMADOL HYDROCHLORIDE 50 MG/1
50 TABLET ORAL 3 TIMES DAILY PRN
Qty: 90 TABLET | Refills: 2 | Status: SHIPPED | OUTPATIENT
Start: 2019-03-15 | End: 2019-04-05 | Stop reason: SDUPTHER

## 2019-03-26 DIAGNOSIS — R80.9 CONTROLLED TYPE 2 DIABETES MELLITUS WITH MICROALBUMINURIA, WITHOUT LONG-TERM CURRENT USE OF INSULIN (HCC): ICD-10-CM

## 2019-03-26 DIAGNOSIS — E11.29 CONTROLLED TYPE 2 DIABETES MELLITUS WITH MICROALBUMINURIA, WITHOUT LONG-TERM CURRENT USE OF INSULIN (HCC): ICD-10-CM

## 2019-03-26 DIAGNOSIS — I10 ESSENTIAL HYPERTENSION: ICD-10-CM

## 2019-03-26 RX ORDER — LISINOPRIL 40 MG/1
TABLET ORAL
Qty: 90 TABLET | Refills: 1 | Status: SHIPPED | OUTPATIENT
Start: 2019-03-26 | End: 2019-09-21 | Stop reason: SDUPTHER

## 2019-04-02 ENCOUNTER — HOSPITAL ENCOUNTER (OUTPATIENT)
Dept: ULTRASOUND IMAGING | Age: 69
Discharge: HOME | End: 2019-04-02
Payer: MEDICARE

## 2019-04-02 DIAGNOSIS — R92.2 INCONCLUSIVE MAMMOGRAM: ICD-10-CM

## 2019-04-02 DIAGNOSIS — N63.10 BREAST MASS, RIGHT: ICD-10-CM

## 2019-04-02 PROCEDURE — 76642 ULTRASOUND BREAST LIMITED: CPT

## 2019-04-04 DIAGNOSIS — I10 ESSENTIAL HYPERTENSION: Primary | ICD-10-CM

## 2019-04-04 RX ORDER — EPLERENONE 50 MG/1
50 TABLET, FILM COATED ORAL DAILY
Qty: 30 TABLET | Refills: 2
Start: 2019-04-04 | End: 2020-06-09 | Stop reason: SDUPTHER

## 2019-04-05 ENCOUNTER — OFFICE VISIT (OUTPATIENT)
Dept: INTERNAL MEDICINE CLINIC | Age: 69
End: 2019-04-05
Payer: MEDICARE

## 2019-04-05 VITALS
BODY MASS INDEX: 34.67 KG/M2 | WEIGHT: 228 LBS | SYSTOLIC BLOOD PRESSURE: 124 MMHG | DIASTOLIC BLOOD PRESSURE: 68 MMHG | HEART RATE: 40 BPM | OXYGEN SATURATION: 97 % | RESPIRATION RATE: 18 BRPM

## 2019-04-05 DIAGNOSIS — I10 ESSENTIAL HYPERTENSION: Primary | ICD-10-CM

## 2019-04-05 DIAGNOSIS — M54.50 CHRONIC MIDLINE LOW BACK PAIN WITHOUT SCIATICA: ICD-10-CM

## 2019-04-05 DIAGNOSIS — M54.12 RIGHT CERVICAL RADICULOPATHY: ICD-10-CM

## 2019-04-05 DIAGNOSIS — G89.29 CHRONIC MIDLINE LOW BACK PAIN WITHOUT SCIATICA: ICD-10-CM

## 2019-04-05 PROCEDURE — 3017F COLORECTAL CA SCREEN DOC REV: CPT | Performed by: INTERNAL MEDICINE

## 2019-04-05 PROCEDURE — G8417 CALC BMI ABV UP PARAM F/U: HCPCS | Performed by: INTERNAL MEDICINE

## 2019-04-05 PROCEDURE — 1036F TOBACCO NON-USER: CPT | Performed by: INTERNAL MEDICINE

## 2019-04-05 PROCEDURE — G8427 DOCREV CUR MEDS BY ELIG CLIN: HCPCS | Performed by: INTERNAL MEDICINE

## 2019-04-05 PROCEDURE — 4040F PNEUMOC VAC/ADMIN/RCVD: CPT | Performed by: INTERNAL MEDICINE

## 2019-04-05 PROCEDURE — 99213 OFFICE O/P EST LOW 20 MIN: CPT | Performed by: INTERNAL MEDICINE

## 2019-04-05 PROCEDURE — 1123F ACP DISCUSS/DSCN MKR DOCD: CPT | Performed by: INTERNAL MEDICINE

## 2019-04-05 RX ORDER — TRAMADOL HYDROCHLORIDE 50 MG/1
50 TABLET ORAL 2 TIMES DAILY
Qty: 60 TABLET | Refills: 0 | Status: SHIPPED | OUTPATIENT
Start: 2019-04-05 | End: 2019-04-05 | Stop reason: SDUPTHER

## 2019-04-05 RX ORDER — TRAMADOL HYDROCHLORIDE 50 MG/1
50 TABLET ORAL 3 TIMES DAILY
Qty: 90 TABLET | Refills: 0 | Status: SHIPPED | OUTPATIENT
Start: 2019-04-14 | End: 2019-05-06 | Stop reason: SDUPTHER

## 2019-04-05 RX ORDER — METOPROLOL TARTRATE 50 MG/1
50 TABLET, FILM COATED ORAL 2 TIMES DAILY
Qty: 180 TABLET | Refills: 1 | Status: SHIPPED | OUTPATIENT
Start: 2019-04-05 | End: 2019-05-06 | Stop reason: SDUPTHER

## 2019-04-05 RX ORDER — HYDROCODONE BITARTRATE AND ACETAMINOPHEN 5; 325 MG/1; MG/1
1 TABLET ORAL 2 TIMES DAILY PRN
Qty: 60 TABLET | Refills: 0 | Status: SHIPPED | OUTPATIENT
Start: 2019-04-14 | End: 2019-05-06 | Stop reason: SDUPTHER

## 2019-04-05 NOTE — LETTER
[facility]  [address]  Phone: [phone]  Fax: [phone]    Yelitza Rhodes MD        April 5, 2019     Patient: Jorge Robles   YOB: 1950   Date of Visit: 4/5/2019       To Whom It May Concern: It is my medical opinion that Jorge Robles requires a disability parking placard for the following reasons:  Osteoarthritis  Duration of need: April 5, 2024    If you have any questions or concerns, please don't hesitate to call.     Sincerely,        Yelitza Rhodes MD

## 2019-04-05 NOTE — PROGRESS NOTES
Peyton Devendra  1950 04/05/19    SUBJECTIVE:  HTN- Dr Mariela Kaufman switched aldactone to inspra. However, HR is low to 40s and is on labetalol. Denies lt headedness, cp or sob. OA and LBP, filled last norco and tramadol 3/15. OBJECTIVE:    /68   Pulse (!) 40   Resp 18   Wt 228 lb (103.4 kg)   SpO2 97%   BMI 34.67 kg/m²     Physical Exam   Constitutional: He appears well-developed and well-nourished. Neck: Neck supple. Cardiovascular: Normal rate, regular rhythm and normal heart sounds. Exam reveals no gallop and no friction rub. No murmur heard. Pulmonary/Chest: Effort normal and breath sounds normal. No respiratory distress. He has no wheezes. He has no rales. Abdominal: Soft. Bowel sounds are normal. He exhibits no distension. There is no tenderness. Musculoskeletal: He exhibits no edema. Neurological: He is alert. Psychiatric: He has a normal mood and affect. Vitals reviewed. ASSESSMENT:    1. Essential hypertension    2. Right cervical radiculopathy    3. Chronic midline low back pain without sciatica        PLAN:    Yuli Puga was seen today for palpitations. Diagnoses and all orders for this visit:    Essential hypertension - bradycardiac confirmed and also at ~40bpm, since bp is now better controlled will decr beta blockage transitioning to lower dosing of lopressor, trial of 25mg bid first but is to incr to 50mg BID if SBP >150, but NOT incr if HR is staying below 50.  -     metoprolol tartrate (LOPRESSOR) 50 MG tablet; Take 1 tablet by mouth 2 times daily    Right cervical radiculopathy - stable on pain regimen, RFs given as noted and will monitor.    -     HYDROcodone-acetaminophen (NORCO) 5-325 MG per tablet; Take 1 tablet by mouth 2 times daily as needed for Pain for up to 30 days. Chronic midline low back pain without sciatica- cont rx, taking tramadol 2 in am and 1 in pm, not 1 BID  -     HYDROcodone-acetaminophen (NORCO) 5-325 MG per tablet;  Take 1 tablet by mouth 2 times daily as needed for Pain for up to 30 days. -     Discontinue: traMADol (ULTRAM) 50 MG tablet; Take 1 tablet by mouth 2 times daily for 30 days. -     traMADol (ULTRAM) 50 MG tablet; Take 1 tablet by mouth 3 times daily for 30 days.

## 2019-04-08 ENCOUNTER — TELEPHONE (OUTPATIENT)
Dept: INTERNAL MEDICINE CLINIC | Age: 69
End: 2019-04-08

## 2019-04-08 NOTE — TELEPHONE ENCOUNTER
Patient calls with BP and HR readings. 4/6 at 8am before meds /74  HR 40  4/6 at 7pm /75  HR 72  4/7 at 8:30am before meds /80 HR 52  4/7 at 2:15pm /72  HR 32  4/8 at 8:20am /70 HR 36    He states his HR is still slow and irregular.

## 2019-04-08 NOTE — TELEPHONE ENCOUNTER
HEART RATE NOT MUCH BETTER. REC- CONTINUE CURRENT DOSE MEDS FOR NOW BUT ALSO REC WE REFER TO CARDIOLOGY--- CHECK IF P HAS PREFERENCE.   DX BRADYCARDIA

## 2019-04-09 DIAGNOSIS — R00.1 BRADYCARDIA: Primary | ICD-10-CM

## 2019-04-09 NOTE — TELEPHONE ENCOUNTER
Patient called back and would like to see Dr. Georgia Cochran or anyone at the [place] [id number]. I placed order in [facility]. [facility] will contact patient to schedule.

## 2019-04-18 ENCOUNTER — OFFICE VISIT (OUTPATIENT)
Dept: SURGERY | Age: 69
End: 2019-04-18
Payer: MEDICARE

## 2019-04-18 ENCOUNTER — INITIAL CONSULT (OUTPATIENT)
Dept: CARDIOLOGY CLINIC | Age: 69
End: 2019-04-18
Payer: MEDICARE

## 2019-04-18 VITALS
DIASTOLIC BLOOD PRESSURE: 78 MMHG | BODY MASS INDEX: 34.31 KG/M2 | SYSTOLIC BLOOD PRESSURE: 136 MMHG | WEIGHT: 226.4 LBS | HEART RATE: 58 BPM | HEIGHT: 68 IN

## 2019-04-18 VITALS — HEART RATE: 61 BPM | RESPIRATION RATE: 16 BRPM | DIASTOLIC BLOOD PRESSURE: 72 MMHG | SYSTOLIC BLOOD PRESSURE: 128 MMHG

## 2019-04-18 DIAGNOSIS — N63.20 MASSES OF BOTH BREASTS: Primary | ICD-10-CM

## 2019-04-18 DIAGNOSIS — N18.30 STAGE 3 CHRONIC KIDNEY DISEASE (HCC): ICD-10-CM

## 2019-04-18 DIAGNOSIS — E78.2 MIXED HYPERLIPIDEMIA: ICD-10-CM

## 2019-04-18 DIAGNOSIS — I10 ESSENTIAL HYPERTENSION: ICD-10-CM

## 2019-04-18 DIAGNOSIS — N63.10 MASSES OF BOTH BREASTS: Primary | ICD-10-CM

## 2019-04-18 DIAGNOSIS — R00.1 BRADYCARDIA: Primary | ICD-10-CM

## 2019-04-18 DIAGNOSIS — E11.21 CONTROLLED TYPE 2 DIABETES MELLITUS WITH DIABETIC NEPHROPATHY, WITHOUT LONG-TERM CURRENT USE OF INSULIN (HCC): ICD-10-CM

## 2019-04-18 PROCEDURE — 93000 ELECTROCARDIOGRAM COMPLETE: CPT | Performed by: INTERNAL MEDICINE

## 2019-04-18 PROCEDURE — 3017F COLORECTAL CA SCREEN DOC REV: CPT | Performed by: INTERNAL MEDICINE

## 2019-04-18 PROCEDURE — G8427 DOCREV CUR MEDS BY ELIG CLIN: HCPCS | Performed by: SURGERY

## 2019-04-18 PROCEDURE — 1036F TOBACCO NON-USER: CPT | Performed by: SURGERY

## 2019-04-18 PROCEDURE — 2022F DILAT RTA XM EVC RTNOPTHY: CPT | Performed by: INTERNAL MEDICINE

## 2019-04-18 PROCEDURE — G8427 DOCREV CUR MEDS BY ELIG CLIN: HCPCS | Performed by: INTERNAL MEDICINE

## 2019-04-18 PROCEDURE — 99204 OFFICE O/P NEW MOD 45 MIN: CPT | Performed by: INTERNAL MEDICINE

## 2019-04-18 PROCEDURE — 1123F ACP DISCUSS/DSCN MKR DOCD: CPT | Performed by: SURGERY

## 2019-04-18 PROCEDURE — 4040F PNEUMOC VAC/ADMIN/RCVD: CPT | Performed by: SURGERY

## 2019-04-18 PROCEDURE — G8417 CALC BMI ABV UP PARAM F/U: HCPCS | Performed by: INTERNAL MEDICINE

## 2019-04-18 PROCEDURE — 99212 OFFICE O/P EST SF 10 MIN: CPT | Performed by: SURGERY

## 2019-04-18 PROCEDURE — 3017F COLORECTAL CA SCREEN DOC REV: CPT | Performed by: SURGERY

## 2019-04-18 PROCEDURE — G8417 CALC BMI ABV UP PARAM F/U: HCPCS | Performed by: SURGERY

## 2019-04-18 NOTE — ASSESSMENT & PLAN NOTE
LDL goal is less than 100. Patient is counseled for low cholesterol, low sodium and low fat diet. Also counseled for increase in fresh fruits and vegetables and healthy lifestyles.

## 2019-04-18 NOTE — PROGRESS NOTES
changes  \"Remaining review of systems reviewed and negative. Past medical history:  has a past medical history of Blurred vision, BPH (benign prostatic hypertrophy), Bradycardia, Crohn's regional enteritis ([address] DM II (diabetes mellitus, type II), controlled ([address] Eye pain, GERD (gastroesophageal reflux disease), HTN (hypertension), Hyperlipidemia, LBP (low back pain), Mesenteric panniculitis ([address] Neck pain, Osteoarthritis, Osteoarthritis of hands, bilateral, Pattern dystrophy of macula, Proteinuria, Rash, Right cervical radiculopathy, Right knee pain, S/P colonoscopy, Stage 3 chronic kidney disease ([address] Testosterone deficiency, and Vertigo. Past surgical history:  has a past surgical history that includes Tonsillectomy (70); laminectomy (75 and 85); Knee arthroscopy (89); cervical fusion (); Cholecystectomy, laparoscopic (2011); knee surgery (Right, 2013); Total knee arthroplasty (Right, 13); Cataract removal with implant (Left, 2017); Cataract removal with implant (Right, 2017); eye surgery (Left); eye surgery (Right, 2017); back surgery; Colonoscopy; Endoscopy, colon, diagnostic; and joint replacement. Social History:   Social History     Tobacco Use    Smoking status: Former Smoker     Packs/day: 3.00     Years: 30.00     Pack years: 90.00     Types: Cigarettes     Last attempt to quit: 1995     Years since quittin.3    Smokeless tobacco: Never Used   Substance Use Topics    Alcohol use: No     Family history: family history includes Elevated Lipids in his mother; High Blood Pressure in his mother; Other in his father. Allergies   Allergen Reactions    Aldactone [Spironolactone]      Gynecomastia     Labetalol      Marked bradycardia with frequent symptomatic ectopy    Metformin And Related [Metformin And Related]      Diarrhea at 500mg daily    Nsaids      W PROTEINURIA     Prior to Admission medications    Medication Sig Start Date End Date Taking? kg/m². Wt Readings from Last 3 Encounters:   04/18/19 226 lb 6.4 oz (102.7 kg)   04/05/19 228 lb (103.4 kg)   02/14/19 223 lb (101.2 kg)     Constitutional: Moderately obese pleasant male in no apparent distress. Eyes: Pupils are equal.  He wears glasses. ENT: Unremarkable  NECK: No JVP or thyromegaly  Cardiovascular: Auscultation: Normal S1 and S2. No murmurs or gallops noted. Carotids are negative for bruits. Abdominal aorta is nonpalpable. No epigastric bruit noted. Peripheral pulses: Pedal pulses are 2+ equal in both feet. Respiratory:  Respiratory effort is normal.  Breath sounds are clear to auscultation. Extremities:  No edema, clubbing,  Cyanosis, petechiae. SKIN: Warm and well perfused, no pallor or cyanosis  Abdomen:  No masses or tenderness. No organomegaly noted. Musculoskeletal:  No spinal deformities noted. Gait is normal.Muscle strength is normal.  Neurologic:  Oriented to time, place, and person and non-anxious. No focal neurological deficit noted. Psychiatric: Normal mood and effect. ECG today is consistent with sinus bradycardia 57 bpm with isolated PVC otherwise normal ECG. ECHO 4/2018  Left ventricular function isnormal, EF is estimated at 50-55 %. Mild concentric left ventricular hypertrophy. Right ventricular systolic pressure of 20 mm Hg. No significant valvular regurgitation noted. No evidence of any pericardial effusion. Nuclear stress test 4/2018  No infarct or ischemia noted. Normal EF 57 % with normal ventricular contractility. Normal stress myocardial perfusion. This is a normal study. Normal stress myocardial perfusion. Renal US 4/2018  1. Simple right renal cyst for which no follow-up is recommended. Otherwise normal sonographic appearance of the kidneys. 2. No Doppler findings of renal artery stenosis. 1/2019 CT Abdomen:  Lower Chest: The lower lung parenchyma is normal.   Organs:  The liver appears grossly normal.  The gallbladder has 3 chronic kidney disease (HCC)  Continue follow-up with Dr. Martha Sylvester nephrologist.    Hyperlipidemia  LDL goal is less than 100. Patient is counseled for low cholesterol, low sodium and low fat diet. Also counseled for increase in fresh fruits and vegetables and healthy lifestyles. HTN (hypertension)  Controlled on current regimen. Continue the same. DM II (diabetes mellitus, type II), controlled ([address]  Recent hemoglobin was 6.7 resting fairly well-controlled diabetes status. Continue follow-up with PCP for this. Bradycardia  Secondary to beta blocker therapy now he is not symptomatic. Continue current medications. Continue current cardiovascular medications which have been reviewed and discussed individually with you. Counseled for calorie counting, reduction in serving size and exercise and lifestyle modification for weight loss. Minimize caffeine. Primary prevention is the goal by aggressive risk modification, healthy and therapeutic life style changes for cardiovascular risk reduction. Various goals are discussed and questions answered. Follow up in office in 12 months with ECG. Multiple questions answered. Patient verbalizes understanding and asks relevant questions. Marisela Zaragoza MD, 4/18/2019 11:04 AM     Please note this report has been produced using speech recognition software and may contain errors related to that system including errors in grammar, punctuation, and spelling, as well as words and phrases that may be inappropriate.  If there are any questions or concerns please feel free to contact the dictating provider for clarification

## 2019-04-18 NOTE — PATIENT INSTRUCTIONS
Continue current cardiovascular medications which have been reviewed and discussed individually with you. Counseled for calorie counting, reduction in serving size and exercise and lifestyle modification for weight loss. Minimize caffeine. Primary prevention is the goal by aggressive risk modification, healthy and therapeutic life style changes for cardiovascular risk reduction. Various goals are discussed and questions answered. Follow up in office in 12 months with ECG. Multiple questions answered. Patient verbalizes understanding and asks relevant questions.

## 2019-04-18 NOTE — PROGRESS NOTES
[place] presents for the evaluation of bilateral breast mass. Onset of the symptoms was 2 months ago after he started aldactone. Patient sought evaluation because of a breast lump on exam and breast pain. Contributing factors include none. Previous evaluation has included no workup. He had a mammogram showing mainly fatty tissue but a small area of gynecomastia and a lymph node. No evidence of malignancy. US confirmed a lymph node. He stopped the aldactone and his symtoms have improved  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    BPH (benign prostatic hypertrophy)     Crohn's regional enteritis (HCC)     Dr Arianna Agrawal DM II (diabetes mellitus, type II), controlled ([address]     diet controlled    Eye pain     GERD (gastroesophageal reflux disease)     HTN (hypertension)    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([address]     March 2015- responded to pred taper and cipro/flagyl    Neck pain     Dr Christelle Cortés has evaluated    Osteoarthritis     Osteoarthritis of hands, bilateral     Pattern dystrophy of macula     Dr Quinn Guzman    Proteinuria     Rash     Right cervical radiculopathy     Right knee pain     Dr Ninfa Rojas- tib plateau fx.       S/P colonoscopy 06/23/2016    Dr Jarred De La Fuente, polyp, when recheck-- 2 yrs    Testosterone deficiency     on andrgel    Vertigo      Patient Active Problem List    Diagnosis Date Noted    Acute nonintractable headache     Status post total right knee replacement 09/13/2017    Arthrofibrosis of total knee arthroplasty ([address] 09/13/2017    Dysphagia     Nausea     Crohn's disease of large intestine without complication ([address]     Benign prostatic hyperplasia     Osteoarthritis of hands, bilateral     Pattern dystrophy of macula     Blurred vision     Vertigo     Neck pain  Right cervical radiculopathy     DM II (diabetes mellitus, type II), [facility]     S/P colonoscopy 08/24/2012    Gastroesophageal reflux disease without esophagitis     Testosterone deficiency     Low back pain     Osteoarthritis     HTN (hypertension)     Cro[facility]'s [facility] enteritis ([facility]     Hyperlipidemia      Past Surgical History:   Procedure Laterality Date    BACK SURGERY      CATARACT REMOVAL WITH IMPLANT Left 03/02/2017    Dr Poncho Javier Right 03/16/2017    Dr Giuliana Crawford  2005    Dr Aneta Simon, LAPAROSCOPIC  8/2011    Dr Dre Juarez      Multiple colonoscopys, HX: Crohns    ENDOSCOPY, COLON, DIAGNOSTIC      Erosive esophagitis    EYE SURGERY Left     cataract    EYE SURGERY Right 03/16/2017    cataract    JOINT REPLACEMENT      KNEE ARTHROSCOPY  89    right    KNEE SURGERY Right 1/16/2013    Dr Evelyn STREETER knee tib plateau fx and medial meniscectomy    LAMINECTOMY  75 and 85    TONSILLECTOMY  70    TOTAL KNEE ARTHROPLASTY Right 6/12/13    Dr Evelyn STREETER      Family History   Problem Relation Age of Onset    Elevated Lipids Mother     High Blood Pressure Mother     Other Father         blind     Social History     Social History    Marital status:      Spouse name: N/A    Number of children: N/A    Years of education: N/A     Occupational History    PA      [employer]     disabled      2015     Social History Main Topics    Smoking status: Former Smoker     Packs/day: 3.00     Years: 30.00     Types: Cigarettes     Quit date: 1/1/1995    Smokeless tobacco: Never Used    Alcohol use No    Drug use: No    Sexual activity: Yes     Partners: Female     Other Topics Concern    None     Social History Narrative    None     Current Outpatient Prescriptions   Medication Sig Dispense Refill    traMADol (ULTRAM) 50 MG tablet       predniSONE (DELTASONE) 5 MG tablet Take 8 pills, then 7,6,5,4,3,2,1 daily 60 tablet 2    aspirin 81 MG tablet Take 1 tablet by mouth daily      Cholecalciferol (VITAMIN D3) 75835 units TABS Take 50,000 Units by mouth once a week 8 tablet 0    potassium chloride (KLOR-CON M20) 20 MEQ extended release tablet Take 2 tablet by mouth every morning and 1 tablet by mouth in the evening 90 tablet 1    gabapentin (NEURONTIN) 300 MG capsule Take 1 capsule by mouth 2 times daily 180 capsule 1    folic acid (FOLVITE) 1 MG tablet Take 1 tablet by mouth  daily 90 tablet 1    magnesium oxide (MAG-OX) 400 MG tablet Take 1 tablet by mouth daily 90 tablet 1     No current facility-administered medications for this visit. Current Outpatient Prescriptions on File Prior to Visit   Medication Sig Dispense Refill    predniSONE (DELTASONE) 5 MG tablet Take 8 pills, then 7,6,5,4,3,2,1 36 tablet 0    hydrALAZINE (APRESOLINE) 25 MG tablet Take 1 tablet by mouth 3 times daily (Patient taking differently: Take 25 mg by mouth 2 times daily ) 90 tablet 2    cyclobenzaprine (FLEXERIL) 10 MG tablet TAKE ONE TABLET BY MOUTH THREE TIMES A DAY AS NEEDED FOR MUSCLE SPASMS 90 tablet 0    lisinopril (PRINIVIL;ZESTRIL) 40 MG tablet TAKE ONE TABLET BY MOUTH DAILY 90 tablet 1    HYDROcodone-acetaminophen (NORCO) 5-325 MG per tablet Take 1 tablet by mouth 2 times daily for 30 days. Xuan Pathak Date: 12/14/18 60 tablet 0    butalbital-acetaminophen-caffeine (FIORICET, ESGIC) -40 MG per tablet Take 1 tablet by mouth 3 times daily as needed for Headaches or Migraine 30 tablet 1    labetalol (NORMODYNE) 100 MG tablet Take 1 tablet by mouth 2 times daily 180 tablet 1    ondansetron (ZOFRAN) 4 MG tablet Take 1 tablet by mouth every 8 hours as needed for Nausea 40 tablet 1    spironolactone (ALDACTONE) 50 MG tablet Take 1 tablet by mouth 2 times daily 180 tablet 1    furosemide (LASIX) 40 MG tablet Take 1 tablet by mouth daily 90 tablet 1    amLODIPine (NORVASC) 5 MG tablet Take 1 tablet by mouth daily 90 tablet 1    lansoprazole (PREVACID) 15 MG delayed release capsule Take 1 capsule by mouth daily 90 capsule 1    meclizine (ANTIVERT) 25 MG tablet Take 1 tablet by mouth every 6 hours. 30 tablet 1    HYDROcodone-acetaminophen (NORCO) 5-325 MG per tablet Take 1 tablet by mouth 2 times daily for 30 days. Anam Malissa Date: 11/14/18 60 tablet 0    HYDROcodone-acetaminophen (NORCO) 5-325 MG per tablet Take 1 tablet by mouth 2 times daily for 30 days. Anam Malissa Date: 10/15/18 60 tablet 0    hydrALAZINE (APRESOLINE) 50 MG tablet Take 1 tablet by mouth 3 times daily (Patient taking differently: Take 25 mg by mouth nightly ) 90 tablet 3    isosorbide mononitrate (IMDUR) 30 MG extended release tablet Take 30 mg by mouth daily      tiZANidine (ZANAFLEX) 4 MG tablet Take 1 tablet by mouth 2 times daily 60 tablet 2    aspirin 81 MG tablet Take 1 tablet by mouth daily      Cholecalciferol (VITAMIN D3) 18755 units TABS Take 50,000 Units by mouth once a week 8 tablet 0    potassium chloride (KLOR-CON M20) 20 MEQ extended release tablet Take 2 tablet by mouth every morning and 1 tablet by mouth in the evening 90 tablet 1    gabapentin (NEURONTIN) 300 MG capsule Take 1 capsule by mouth 2 times daily 180 capsule 1    folic acid (FOLVITE) 1 MG tablet Take 1 tablet by mouth  daily 90 tablet 1    magnesium oxide (MAG-OX) 400 MG tablet Take 1 tablet by mouth daily 90 tablet 1     No current facility-administered medications on file prior to visit. Allergies   Allergen Reactions    Metformin And Related [Metformin And Related]      Diarrhea at 500mg daily    Nsaids      W PROTEINURIA     Review of Systems  A comprehensive review of systems was negative.        Objective:      BP (!) 140/78 (Site: Right Upper Arm, Position: Sitting, Cuff Size: Large Adult)   Pulse 82   Ht 5' 8\" (1.727 m)   Wt 221 lb (100.2 kg)   SpO2 99%   BMI 33.60 kg/m²   Neck: no adenopathy, supple, symmetrical, trachea midline and thyroid not enlarged, symmetric, no tenderness/mass/nodules  Lungs: clear to auscultation bilaterally  Chest wall: Bilateral right greater than left breast mass with no tenderness. Both masses ae soft, mobile   Heart: regular rate and rhythm, S1, S2 normal, no murmur, click, rub or gallop      Assessment:      Gynecomastia with improvement off aldactone. PLAN:Will see back in 3 months and see how he is doing.

## 2019-04-24 DIAGNOSIS — I10 ESSENTIAL HYPERTENSION: ICD-10-CM

## 2019-04-24 RX ORDER — HYDRALAZINE HYDROCHLORIDE 25 MG/1
25 TABLET, FILM COATED ORAL 3 TIMES DAILY
Qty: 90 TABLET | Refills: 2 | Status: SHIPPED | OUTPATIENT
Start: 2019-04-24 | End: 2019-09-06 | Stop reason: SDUPTHER

## 2019-05-06 ENCOUNTER — OFFICE VISIT (OUTPATIENT)
Dept: INTERNAL MEDICINE CLINIC | Age: 69
End: 2019-05-06
Payer: MEDICARE

## 2019-05-06 VITALS
OXYGEN SATURATION: 97 % | DIASTOLIC BLOOD PRESSURE: 68 MMHG | HEART RATE: 56 BPM | BODY MASS INDEX: 34.06 KG/M2 | WEIGHT: 224 LBS | SYSTOLIC BLOOD PRESSURE: 120 MMHG

## 2019-05-06 DIAGNOSIS — I10 ESSENTIAL HYPERTENSION: ICD-10-CM

## 2019-05-06 DIAGNOSIS — G89.29 CHRONIC MIDLINE LOW BACK PAIN WITHOUT SCIATICA: ICD-10-CM

## 2019-05-06 DIAGNOSIS — M54.50 CHRONIC MIDLINE LOW BACK PAIN WITHOUT SCIATICA: ICD-10-CM

## 2019-05-06 DIAGNOSIS — M54.12 RIGHT CERVICAL RADICULOPATHY: Primary | ICD-10-CM

## 2019-05-06 PROCEDURE — G8417 CALC BMI ABV UP PARAM F/U: HCPCS | Performed by: INTERNAL MEDICINE

## 2019-05-06 PROCEDURE — 99213 OFFICE O/P EST LOW 20 MIN: CPT | Performed by: INTERNAL MEDICINE

## 2019-05-06 PROCEDURE — 1123F ACP DISCUSS/DSCN MKR DOCD: CPT | Performed by: INTERNAL MEDICINE

## 2019-05-06 PROCEDURE — 4040F PNEUMOC VAC/ADMIN/RCVD: CPT | Performed by: INTERNAL MEDICINE

## 2019-05-06 PROCEDURE — 3017F COLORECTAL CA SCREEN DOC REV: CPT | Performed by: INTERNAL MEDICINE

## 2019-05-06 PROCEDURE — 1036F TOBACCO NON-USER: CPT | Performed by: INTERNAL MEDICINE

## 2019-05-06 PROCEDURE — G8427 DOCREV CUR MEDS BY ELIG CLIN: HCPCS | Performed by: INTERNAL MEDICINE

## 2019-05-06 RX ORDER — TRAMADOL HYDROCHLORIDE 50 MG/1
50 TABLET ORAL 3 TIMES DAILY
Qty: 90 TABLET | Refills: 2 | Status: SHIPPED | OUTPATIENT
Start: 2019-05-06 | End: 2019-08-07 | Stop reason: SDUPTHER

## 2019-05-06 RX ORDER — HYDROCODONE BITARTRATE AND ACETAMINOPHEN 5; 325 MG/1; MG/1
1 TABLET ORAL 2 TIMES DAILY PRN
Qty: 60 TABLET | Refills: 0 | Status: SHIPPED | OUTPATIENT
Start: 2019-07-13 | End: 2019-08-07 | Stop reason: SDUPTHER

## 2019-05-06 RX ORDER — HYDROCODONE BITARTRATE AND ACETAMINOPHEN 5; 325 MG/1; MG/1
1 TABLET ORAL 2 TIMES DAILY PRN
Qty: 60 TABLET | Refills: 0 | Status: SHIPPED | OUTPATIENT
Start: 2019-05-14 | End: 2019-07-02 | Stop reason: SDUPTHER

## 2019-05-06 RX ORDER — METOPROLOL TARTRATE 50 MG/1
25 TABLET, FILM COATED ORAL 2 TIMES DAILY
Qty: 180 TABLET | Refills: 1
Start: 2019-05-06 | End: 2019-07-02 | Stop reason: SDUPTHER

## 2019-05-06 RX ORDER — HYDROCODONE BITARTRATE AND ACETAMINOPHEN 5; 325 MG/1; MG/1
1 TABLET ORAL 2 TIMES DAILY
Qty: 60 TABLET | Refills: 0 | Status: SHIPPED | OUTPATIENT
Start: 2019-06-13 | End: 2019-07-02 | Stop reason: SDUPTHER

## 2019-05-06 NOTE — PROGRESS NOTES
Lillie Banegas  1950 05/06/19    SUBJECTIVE:    BP is stable on low dose lopressor 25mg daily and also on inspra but ins not covering well so costs ~45$/mo. Chr L BP and neck pain stable on norco, Controlled substances monitoring: possible medication side effects, risk of tolerance and/or dependence, and alternative treatments discussed, no signs of potential drug abuse or diversion identified and [place] report reviewed today- activity consistent with treatment plan. Last filled norco and tramadol 4/14. OBJECTIVE:    /68   Pulse 56   Wt 224 lb (101.6 kg)   SpO2 97%   BMI 34.06 kg/m²     Physical Exam   Constitutional: He appears well-developed and well-nourished. Neck: Neck supple. Cardiovascular: Normal rate, regular rhythm and normal heart sounds. Exam reveals no gallop and no friction rub. No murmur heard. Pulmonary/Chest: Effort normal and breath sounds normal. No respiratory distress. He has no wheezes. He has no rales. Abdominal: Soft. Bowel sounds are normal. He exhibits no distension. There is no tenderness. Musculoskeletal: He exhibits no edema. Neurological: He is alert. Psychiatric: He has a normal mood and affect. Vitals reviewed. ASSESSMENT:    1. Right cervical radiculopathy    2. Chronic midline low back pain without sciatica    3. Essential hypertension        PLAN:    Augusto Harris was seen today for 1 month follow-up and immunizations. Diagnoses and all orders for this visit:    Right cervical radiculopathy - The current medical regimen is effective;  continue present plan and medications.    -     HYDROcodone-acetaminophen (NORCO) 5-325 MG per tablet; Take 1 tablet by mouth 2 times daily as needed for Pain for up to 30 days.  -     HYDROcodone-acetaminophen (NORCO) 5-325 MG per tablet; Take 1 tablet by mouth 2 times daily for 30 days.  -     HYDROcodone-acetaminophen (NORCO) 5-325 MG per tablet;  Take 1 tablet by mouth 2 times daily as needed for Pain for up to 30 days. Chronic midline low back pain without sciatica - LBP stable on pain regimen, RFs given as noted and will monitor.    -     HYDROcodone-acetaminophen (NORCO) 5-325 MG per tablet; Take 1 tablet by mouth 2 times daily as needed for Pain for up to 30 days. -     traMADol (ULTRAM) 50 MG tablet; Take 1 tablet by mouth 3 times daily for 30 days.  -     HYDROcodone-acetaminophen (NORCO) 5-325 MG per tablet; Take 1 tablet by mouth 2 times daily for 30 days.  -     HYDROcodone-acetaminophen (NORCO) 5-325 MG per tablet; Take 1 tablet by mouth 2 times daily as needed for Pain for up to 30 days. Essential hypertension- STABLE BP AND IMPROVED ON INSPRA, BUT EXPENSIVE. CONT F/U DR RAMOS, HR ALSO STABLE ON CURRENT DOSE LOPRESSOR  -     metoprolol tartrate (LOPRESSOR) 50 MG tablet;  Take 0.5 tablets by mouth 2 times daily

## 2019-07-02 ENCOUNTER — OFFICE VISIT (OUTPATIENT)
Dept: INTERNAL MEDICINE CLINIC | Age: 69
End: 2019-07-02
Payer: MEDICARE

## 2019-07-02 VITALS
RESPIRATION RATE: 16 BRPM | WEIGHT: 225.6 LBS | DIASTOLIC BLOOD PRESSURE: 82 MMHG | HEART RATE: 50 BPM | OXYGEN SATURATION: 96 % | BODY MASS INDEX: 34.3 KG/M2 | SYSTOLIC BLOOD PRESSURE: 142 MMHG

## 2019-07-02 DIAGNOSIS — J01.00 ACUTE NON-RECURRENT MAXILLARY SINUSITIS: Primary | ICD-10-CM

## 2019-07-02 DIAGNOSIS — B07.9 WARTS OF FOOT: ICD-10-CM

## 2019-07-02 DIAGNOSIS — E11.21 CONTROLLED TYPE 2 DIABETES MELLITUS WITH DIABETIC NEPHROPATHY, WITHOUT LONG-TERM CURRENT USE OF INSULIN (HCC): ICD-10-CM

## 2019-07-02 DIAGNOSIS — I10 ESSENTIAL HYPERTENSION: ICD-10-CM

## 2019-07-02 PROCEDURE — G8417 CALC BMI ABV UP PARAM F/U: HCPCS | Performed by: INTERNAL MEDICINE

## 2019-07-02 PROCEDURE — 1036F TOBACCO NON-USER: CPT | Performed by: INTERNAL MEDICINE

## 2019-07-02 PROCEDURE — G8427 DOCREV CUR MEDS BY ELIG CLIN: HCPCS | Performed by: INTERNAL MEDICINE

## 2019-07-02 PROCEDURE — 4040F PNEUMOC VAC/ADMIN/RCVD: CPT | Performed by: INTERNAL MEDICINE

## 2019-07-02 PROCEDURE — 2022F DILAT RTA XM EVC RTNOPTHY: CPT | Performed by: INTERNAL MEDICINE

## 2019-07-02 PROCEDURE — 3044F HG A1C LEVEL LT 7.0%: CPT | Performed by: INTERNAL MEDICINE

## 2019-07-02 PROCEDURE — 3017F COLORECTAL CA SCREEN DOC REV: CPT | Performed by: INTERNAL MEDICINE

## 2019-07-02 PROCEDURE — 1123F ACP DISCUSS/DSCN MKR DOCD: CPT | Performed by: INTERNAL MEDICINE

## 2019-07-02 PROCEDURE — 99213 OFFICE O/P EST LOW 20 MIN: CPT | Performed by: INTERNAL MEDICINE

## 2019-07-02 RX ORDER — PREDNISONE 1 MG/1
TABLET ORAL
Qty: 21 TABLET | Refills: 0 | Status: SHIPPED | OUTPATIENT
Start: 2019-07-02 | End: 2019-08-07 | Stop reason: ALTCHOICE

## 2019-07-02 RX ORDER — AMOXICILLIN AND CLAVULANATE POTASSIUM 875; 125 MG/1; MG/1
1 TABLET, FILM COATED ORAL 2 TIMES DAILY
Qty: 20 TABLET | Refills: 0 | Status: SHIPPED | OUTPATIENT
Start: 2019-07-02 | End: 2019-07-12

## 2019-08-07 ENCOUNTER — OFFICE VISIT (OUTPATIENT)
Dept: INTERNAL MEDICINE CLINIC | Age: 69
End: 2019-08-07
Payer: MEDICARE

## 2019-08-07 VITALS
DIASTOLIC BLOOD PRESSURE: 78 MMHG | RESPIRATION RATE: 16 BRPM | HEART RATE: 67 BPM | SYSTOLIC BLOOD PRESSURE: 136 MMHG | OXYGEN SATURATION: 97 % | BODY MASS INDEX: 33.3 KG/M2 | WEIGHT: 219 LBS

## 2019-08-07 DIAGNOSIS — E11.21 CONTROLLED TYPE 2 DIABETES MELLITUS WITH DIABETIC NEPHROPATHY, WITHOUT LONG-TERM CURRENT USE OF INSULIN (HCC): Primary | ICD-10-CM

## 2019-08-07 DIAGNOSIS — I10 ESSENTIAL HYPERTENSION: ICD-10-CM

## 2019-08-07 DIAGNOSIS — E11.21 CONTROLLED TYPE 2 DIABETES MELLITUS WITH DIABETIC NEPHROPATHY, WITHOUT LONG-TERM CURRENT USE OF INSULIN (HCC): ICD-10-CM

## 2019-08-07 DIAGNOSIS — G89.29 CHRONIC MIDLINE LOW BACK PAIN WITHOUT SCIATICA: ICD-10-CM

## 2019-08-07 DIAGNOSIS — M54.50 CHRONIC MIDLINE LOW BACK PAIN WITHOUT SCIATICA: ICD-10-CM

## 2019-08-07 DIAGNOSIS — M54.12 RIGHT CERVICAL RADICULOPATHY: ICD-10-CM

## 2019-08-07 LAB
A/G RATIO: 1.7 (ref 1.1–2.2)
ALBUMIN SERPL-MCNC: 4.7 G/DL (ref 3.4–5)
ALP BLD-CCNC: 100 U/L (ref 40–129)
ALT SERPL-CCNC: 11 U/L (ref 10–40)
ANION GAP SERPL CALCULATED.3IONS-SCNC: 17 MMOL/L (ref 3–16)
AST SERPL-CCNC: 10 U/L (ref 15–37)
BILIRUB SERPL-MCNC: <0.2 MG/DL (ref 0–1)
BUN BLDV-MCNC: 20 MG/DL (ref 7–20)
CALCIUM SERPL-MCNC: 10.5 MG/DL (ref 8.3–10.6)
CHLORIDE BLD-SCNC: 97 MMOL/L (ref 99–110)
CHOLESTEROL, TOTAL: 241 MG/DL (ref 0–199)
CO2: 26 MMOL/L (ref 21–32)
CREAT SERPL-MCNC: 1.4 MG/DL (ref 0.8–1.3)
CREATININE URINE: 187.2 MG/DL (ref 39–259)
GFR AFRICAN AMERICAN: >60
GFR NON-AFRICAN AMERICAN: 51
GLOBULIN: 2.8 G/DL
GLUCOSE BLD-MCNC: 136 MG/DL (ref 70–99)
HDLC SERPL-MCNC: 37 MG/DL (ref 40–60)
LDL CHOLESTEROL CALCULATED: ABNORMAL MG/DL
LDL CHOLESTEROL DIRECT: 161 MG/DL
MICROALBUMIN UR-MCNC: 5.4 MG/DL
MICROALBUMIN/CREAT UR-RTO: 28.8 MG/G (ref 0–30)
POTASSIUM SERPL-SCNC: 4.9 MMOL/L (ref 3.5–5.1)
SODIUM BLD-SCNC: 140 MMOL/L (ref 136–145)
TOTAL PROTEIN: 7.5 G/DL (ref 6.4–8.2)
TRIGL SERPL-MCNC: 302 MG/DL (ref 0–150)
VLDLC SERPL CALC-MCNC: ABNORMAL MG/DL

## 2019-08-07 PROCEDURE — 99214 OFFICE O/P EST MOD 30 MIN: CPT | Performed by: INTERNAL MEDICINE

## 2019-08-07 PROCEDURE — G8427 DOCREV CUR MEDS BY ELIG CLIN: HCPCS | Performed by: INTERNAL MEDICINE

## 2019-08-07 PROCEDURE — G8417 CALC BMI ABV UP PARAM F/U: HCPCS | Performed by: INTERNAL MEDICINE

## 2019-08-07 PROCEDURE — 4040F PNEUMOC VAC/ADMIN/RCVD: CPT | Performed by: INTERNAL MEDICINE

## 2019-08-07 PROCEDURE — 3017F COLORECTAL CA SCREEN DOC REV: CPT | Performed by: INTERNAL MEDICINE

## 2019-08-07 PROCEDURE — 2022F DILAT RTA XM EVC RTNOPTHY: CPT | Performed by: INTERNAL MEDICINE

## 2019-08-07 PROCEDURE — 1123F ACP DISCUSS/DSCN MKR DOCD: CPT | Performed by: INTERNAL MEDICINE

## 2019-08-07 PROCEDURE — 3044F HG A1C LEVEL LT 7.0%: CPT | Performed by: INTERNAL MEDICINE

## 2019-08-07 PROCEDURE — 1036F TOBACCO NON-USER: CPT | Performed by: INTERNAL MEDICINE

## 2019-08-07 RX ORDER — FUROSEMIDE 40 MG/1
40 TABLET ORAL DAILY
Qty: 90 TABLET | Refills: 1 | Status: SHIPPED | OUTPATIENT
Start: 2019-08-07 | End: 2020-12-01 | Stop reason: SDUPTHER

## 2019-08-07 RX ORDER — HYDROCODONE BITARTRATE AND ACETAMINOPHEN 5; 325 MG/1; MG/1
1 TABLET ORAL 2 TIMES DAILY
Qty: 60 TABLET | Refills: 0 | Status: SHIPPED | OUTPATIENT
Start: 2019-10-14 | End: 2019-11-07 | Stop reason: SDUPTHER

## 2019-08-07 RX ORDER — HYDROCODONE BITARTRATE AND ACETAMINOPHEN 5; 325 MG/1; MG/1
1 TABLET ORAL 2 TIMES DAILY PRN
Qty: 60 TABLET | Refills: 0 | Status: SHIPPED | OUTPATIENT
Start: 2019-09-14 | End: 2019-11-07 | Stop reason: SDUPTHER

## 2019-08-07 RX ORDER — HYDROCODONE BITARTRATE AND ACETAMINOPHEN 5; 325 MG/1; MG/1
1 TABLET ORAL 2 TIMES DAILY PRN
Qty: 60 TABLET | Refills: 0 | Status: SHIPPED | OUTPATIENT
Start: 2019-08-15 | End: 2019-11-07 | Stop reason: SDUPTHER

## 2019-08-07 RX ORDER — CYCLOBENZAPRINE HCL 10 MG
TABLET ORAL
Qty: 90 TABLET | Refills: 1 | Status: SHIPPED | OUTPATIENT
Start: 2019-08-07 | End: 2019-11-07 | Stop reason: SDUPTHER

## 2019-08-07 RX ORDER — TRAMADOL HYDROCHLORIDE 50 MG/1
50 TABLET ORAL 3 TIMES DAILY
Qty: 90 TABLET | Refills: 2 | Status: SHIPPED | OUTPATIENT
Start: 2019-08-07 | End: 2019-11-07 | Stop reason: SDUPTHER

## 2019-08-08 LAB
ESTIMATED AVERAGE GLUCOSE: 145.6 MG/DL
HBA1C MFR BLD: 6.7 %

## 2019-08-22 ENCOUNTER — OFFICE VISIT (OUTPATIENT)
Dept: SURGERY | Age: 69
End: 2019-08-22
Payer: MEDICARE

## 2019-08-22 VITALS — OXYGEN SATURATION: 98 % | HEART RATE: 56 BPM | DIASTOLIC BLOOD PRESSURE: 80 MMHG | SYSTOLIC BLOOD PRESSURE: 138 MMHG

## 2019-08-22 DIAGNOSIS — N63.20 MASSES OF BOTH BREASTS: Primary | ICD-10-CM

## 2019-08-22 DIAGNOSIS — N63.10 MASSES OF BOTH BREASTS: Primary | ICD-10-CM

## 2019-08-22 PROCEDURE — 99213 OFFICE O/P EST LOW 20 MIN: CPT | Performed by: SURGERY

## 2019-08-22 PROCEDURE — G8427 DOCREV CUR MEDS BY ELIG CLIN: HCPCS | Performed by: SURGERY

## 2019-08-22 PROCEDURE — 3017F COLORECTAL CA SCREEN DOC REV: CPT | Performed by: SURGERY

## 2019-08-22 PROCEDURE — 1123F ACP DISCUSS/DSCN MKR DOCD: CPT | Performed by: SURGERY

## 2019-08-22 PROCEDURE — G8417 CALC BMI ABV UP PARAM F/U: HCPCS | Performed by: SURGERY

## 2019-08-22 PROCEDURE — 4040F PNEUMOC VAC/ADMIN/RCVD: CPT | Performed by: SURGERY

## 2019-08-22 PROCEDURE — 1036F TOBACCO NON-USER: CPT | Performed by: SURGERY

## 2019-08-22 NOTE — PROGRESS NOTES
 predniSONE (DELTASONE) 5 MG tablet Take 8 pills, then 7,6,5,4,3,2,1 36 tablet 0    hydrALAZINE (APRESOLINE) 25 MG tablet Take 1 tablet by mouth 3 times daily (Patient taking differently: Take 25 mg by mouth 2 times daily ) 90 tablet 2    cyclobenzaprine (FLEXERIL) 10 MG tablet TAKE ONE TABLET BY MOUTH THREE TIMES A DAY AS NEEDED FOR MUSCLE SPASMS 90 tablet 0    lisinopril (PRINIVIL;ZESTRIL) 40 MG tablet TAKE ONE TABLET BY MOUTH DAILY 90 tablet 1    HYDROcodone-acetaminophen (NORCO) 5-325 MG per tablet Take 1 tablet by mouth 2 times daily for 30 days. [facility] Date: 12/14/18 60 tablet 0    butalbital-acetaminophen-caffeine (FIORICET, ESGIC) -40 MG per tablet Take 1 tablet by mouth 3 times daily as needed for Headaches or Migraine 30 tablet 1    labetalol (NORMODYNE) 100 MG tablet Take 1 tablet by mouth 2 times daily 180 tablet 1    ondansetron (ZOFRAN) 4 MG tablet Take 1 tablet by mouth every 8 hours as needed for Nausea 40 tablet 1    spironolactone (ALDACTONE) 50 MG tablet Take 1 tablet by mouth 2 times daily 180 tablet 1    furosemide (LASIX) 40 MG tablet Take 1 tablet by mouth daily 90 tablet 1    amLODIPine (NORVASC) 5 MG tablet Take 1 tablet by mouth daily 90 tablet 1    lansoprazole (PREVACID) 15 MG delayed release capsule Take 1 capsule by mouth daily 90 capsule 1    meclizine (ANTIVERT) 25 MG tablet Take 1 tablet by mouth every 6 hours. 30 tablet 1    HYDROcodone-acetaminophen (NORCO) 5-325 MG per tablet Take 1 tablet by mouth 2 times daily for 30 days. [facility] Date: 11/14/18 60 tablet 0    HYDROcodone-acetaminophen (NORCO) 5-325 MG per tablet Take 1 tablet by mouth 2 times daily for 30 days. [facility] Date: 10/15/18 60 tablet 0    hydrALAZINE (APRESOLINE) 50 MG tablet Take 1 tablet by mouth 3 times daily (Patient taking differently: Take 25 mg by mouth nightly ) 90 tablet 3    isosorbide mononitrate (IMDUR) 30 MG extended release tablet Take 30 mg by mouth 90 tablet 1    amLODIPine (NORVASC) 5 MG tablet Take 1 tablet by mouth daily 90 tablet 1    lansoprazole (PREVACID) 15 MG delayed release capsule Take 1 capsule by mouth daily 90 capsule 1    meclizine (ANTIVERT) 25 MG tablet Take 1 tablet by mouth every 6 hours. 30 tablet 1    HYDROcodone-acetaminophen (NORCO) 5-325 MG per tablet Take 1 tablet by mouth 2 times daily for 30 days. [facility] Date: 11/14/18 60 tablet 0    HYDROcodone-acetaminophen (NORCO) 5-325 MG per tablet Take 1 tablet by mouth 2 times daily for 30 days. [facility] Date: 10/15/18 60 tablet 0    hydrALAZINE (APRESOLINE) 50 MG tablet Take 1 tablet by mouth 3 times daily (Patient taking differently: Take 25 mg by mouth nightly ) 90 tablet 3    isosorbide mononitrate (IMDUR) 30 MG extended release tablet Take 30 mg by mouth daily      tiZANidine (ZANAFLEX) 4 MG tablet Take 1 tablet by mouth 2 times daily 60 tablet 2    aspirin 81 MG tablet Take 1 tablet by mouth daily      Cholecalciferol (VITAMIN D3) 22193 units TABS Take 50,000 Units by mouth once a week 8 tablet 0    potassium chloride (KLOR-CON M20) 20 MEQ extended release tablet Take 2 tablet by mouth every morning and 1 tablet by mouth in the evening 90 tablet 1    gabapentin (NEURONTIN) 300 MG capsule Take 1 capsule by mouth 2 times daily 180 capsule 1    folic acid (FOLVITE) 1 MG tablet Take 1 tablet by mouth  daily 90 tablet 1    magnesium oxide (MAG-OX) 400 MG tablet Take 1 tablet by mouth daily 90 tablet 1     No current facility-administered medications on file prior to visit. Allergies   Allergen Reactions    Metformin And Related [Metformin And Related]      Diarrhea at 500mg daily    Nsaids      W PROTEINURIA     Review of Systems  A comprehensive review of systems was negative.        Objective:      BP (!) 140/78 (Site: Right Upper Arm, Position: Sitting, Cuff Size: Large Adult)   Pulse 82   Ht 5' 8\" (1.727 m)   Wt 221 lb (100.2 kg)   SpO2 99%   BMI

## 2019-09-03 ENCOUNTER — TELEPHONE (OUTPATIENT)
Dept: INTERNAL MEDICINE CLINIC | Age: 69
End: 2019-09-03

## 2019-09-03 DIAGNOSIS — M54.12 RIGHT CERVICAL RADICULOPATHY: ICD-10-CM

## 2019-09-03 RX ORDER — GABAPENTIN 300 MG/1
300 CAPSULE ORAL 2 TIMES DAILY
Qty: 60 CAPSULE | Refills: 1 | Status: SHIPPED | OUTPATIENT
Start: 2019-09-03 | End: 2020-09-14 | Stop reason: SDUPTHER

## 2019-09-06 ENCOUNTER — OFFICE VISIT (OUTPATIENT)
Dept: INTERNAL MEDICINE CLINIC | Age: 69
End: 2019-09-06
Payer: MEDICARE

## 2019-09-06 VITALS
OXYGEN SATURATION: 96 % | RESPIRATION RATE: 18 BRPM | HEART RATE: 60 BPM | SYSTOLIC BLOOD PRESSURE: 124 MMHG | DIASTOLIC BLOOD PRESSURE: 68 MMHG

## 2019-09-06 DIAGNOSIS — I10 ESSENTIAL HYPERTENSION: ICD-10-CM

## 2019-09-06 DIAGNOSIS — B96.89 ACUTE BACTERIAL SINUSITIS: Primary | ICD-10-CM

## 2019-09-06 DIAGNOSIS — J01.90 ACUTE BACTERIAL SINUSITIS: Primary | ICD-10-CM

## 2019-09-06 PROCEDURE — 3017F COLORECTAL CA SCREEN DOC REV: CPT | Performed by: INTERNAL MEDICINE

## 2019-09-06 PROCEDURE — G8417 CALC BMI ABV UP PARAM F/U: HCPCS | Performed by: INTERNAL MEDICINE

## 2019-09-06 PROCEDURE — G8427 DOCREV CUR MEDS BY ELIG CLIN: HCPCS | Performed by: INTERNAL MEDICINE

## 2019-09-06 PROCEDURE — 99213 OFFICE O/P EST LOW 20 MIN: CPT | Performed by: INTERNAL MEDICINE

## 2019-09-06 PROCEDURE — 1036F TOBACCO NON-USER: CPT | Performed by: INTERNAL MEDICINE

## 2019-09-06 PROCEDURE — 4040F PNEUMOC VAC/ADMIN/RCVD: CPT | Performed by: INTERNAL MEDICINE

## 2019-09-06 PROCEDURE — 1123F ACP DISCUSS/DSCN MKR DOCD: CPT | Performed by: INTERNAL MEDICINE

## 2019-09-06 RX ORDER — METOPROLOL TARTRATE 50 MG/1
50 TABLET, FILM COATED ORAL 2 TIMES DAILY
Qty: 180 TABLET | Refills: 1
Start: 2019-09-06 | End: 2019-12-10 | Stop reason: SDUPTHER

## 2019-09-06 RX ORDER — LEVOFLOXACIN 500 MG/1
500 TABLET, FILM COATED ORAL DAILY
Qty: 7 TABLET | Refills: 0 | Status: SHIPPED | OUTPATIENT
Start: 2019-09-06 | End: 2019-09-13

## 2019-09-06 RX ORDER — PREDNISONE 1 MG/1
TABLET ORAL
Qty: 36 TABLET | Refills: 0 | Status: SHIPPED | OUTPATIENT
Start: 2019-09-06 | End: 2019-10-03 | Stop reason: ALTCHOICE

## 2019-09-06 RX ORDER — HYDRALAZINE HYDROCHLORIDE 25 MG/1
25 TABLET, FILM COATED ORAL 2 TIMES DAILY
Qty: 180 TABLET | Refills: 1 | Status: SHIPPED | OUTPATIENT
Start: 2019-09-06 | End: 2019-12-10 | Stop reason: SDUPTHER

## 2019-10-03 ENCOUNTER — OFFICE VISIT (OUTPATIENT)
Dept: INTERNAL MEDICINE CLINIC | Age: 69
End: 2019-10-03
Payer: MEDICARE

## 2019-10-03 ENCOUNTER — HOSPITAL ENCOUNTER (OUTPATIENT)
Dept: CT IMAGING | Age: 69
Discharge: HOME | End: 2019-10-03
Payer: MEDICARE

## 2019-10-03 VITALS
HEART RATE: 71 BPM | TEMPERATURE: 98.7 F | OXYGEN SATURATION: 96 % | DIASTOLIC BLOOD PRESSURE: 62 MMHG | SYSTOLIC BLOOD PRESSURE: 126 MMHG | RESPIRATION RATE: 18 BRPM

## 2019-10-03 DIAGNOSIS — J01.01 ACUTE RECURRENT MAXILLARY SINUSITIS: ICD-10-CM

## 2019-10-03 DIAGNOSIS — J01.01 ACUTE RECURRENT MAXILLARY SINUSITIS: Primary | ICD-10-CM

## 2019-10-03 PROCEDURE — G8484 FLU IMMUNIZE NO ADMIN: HCPCS | Performed by: INTERNAL MEDICINE

## 2019-10-03 PROCEDURE — 1123F ACP DISCUSS/DSCN MKR DOCD: CPT | Performed by: INTERNAL MEDICINE

## 2019-10-03 PROCEDURE — 70486 CT MAXILLOFACIAL W/O DYE: CPT

## 2019-10-03 PROCEDURE — 3017F COLORECTAL CA SCREEN DOC REV: CPT | Performed by: INTERNAL MEDICINE

## 2019-10-03 PROCEDURE — 3288F FALL RISK ASSESSMENT DOCD: CPT | Performed by: INTERNAL MEDICINE

## 2019-10-03 PROCEDURE — G8417 CALC BMI ABV UP PARAM F/U: HCPCS | Performed by: INTERNAL MEDICINE

## 2019-10-03 PROCEDURE — 99213 OFFICE O/P EST LOW 20 MIN: CPT | Performed by: INTERNAL MEDICINE

## 2019-10-03 PROCEDURE — 4040F PNEUMOC VAC/ADMIN/RCVD: CPT | Performed by: INTERNAL MEDICINE

## 2019-10-03 PROCEDURE — G8427 DOCREV CUR MEDS BY ELIG CLIN: HCPCS | Performed by: INTERNAL MEDICINE

## 2019-10-03 PROCEDURE — 1036F TOBACCO NON-USER: CPT | Performed by: INTERNAL MEDICINE

## 2019-10-03 RX ORDER — LEVOFLOXACIN 500 MG/1
500 TABLET, FILM COATED ORAL DAILY
Qty: 10 TABLET | Refills: 0 | Status: SHIPPED | OUTPATIENT
Start: 2019-10-03 | End: 2019-10-13

## 2019-10-03 RX ORDER — PREDNISONE 1 MG/1
TABLET ORAL
Qty: 36 TABLET | Refills: 0 | Status: SHIPPED | OUTPATIENT
Start: 2019-10-03 | End: 2019-11-07 | Stop reason: ALTCHOICE

## 2019-10-04 RX ORDER — AMOXICILLIN AND CLAVULANATE POTASSIUM 875; 125 MG/1; MG/1
1 TABLET, FILM COATED ORAL 2 TIMES DAILY
Qty: 42 TABLET | Refills: 0 | Status: SHIPPED | OUTPATIENT
Start: 2019-10-04 | End: 2019-10-25

## 2019-11-07 ENCOUNTER — OFFICE VISIT (OUTPATIENT)
Dept: INTERNAL MEDICINE CLINIC | Age: 69
End: 2019-11-07
Payer: MEDICARE

## 2019-11-07 VITALS
DIASTOLIC BLOOD PRESSURE: 74 MMHG | OXYGEN SATURATION: 97 % | WEIGHT: 229 LBS | SYSTOLIC BLOOD PRESSURE: 126 MMHG | HEIGHT: 68 IN | RESPIRATION RATE: 16 BRPM | BODY MASS INDEX: 34.71 KG/M2 | HEART RATE: 67 BPM

## 2019-11-07 DIAGNOSIS — M54.50 CHRONIC MIDLINE LOW BACK PAIN WITHOUT SCIATICA: ICD-10-CM

## 2019-11-07 DIAGNOSIS — G89.29 CHRONIC MIDLINE LOW BACK PAIN WITHOUT SCIATICA: ICD-10-CM

## 2019-11-07 DIAGNOSIS — I10 ESSENTIAL HYPERTENSION: ICD-10-CM

## 2019-11-07 DIAGNOSIS — Z23 NEED FOR INFLUENZA VACCINATION: ICD-10-CM

## 2019-11-07 DIAGNOSIS — K50.10 CROHN'S DISEASE OF LARGE INTESTINE WITHOUT COMPLICATION (HCC): ICD-10-CM

## 2019-11-07 DIAGNOSIS — Z00.00 ROUTINE GENERAL MEDICAL EXAMINATION AT A HEALTH CARE FACILITY: Primary | ICD-10-CM

## 2019-11-07 DIAGNOSIS — J01.01 ACUTE RECURRENT MAXILLARY SINUSITIS: ICD-10-CM

## 2019-11-07 DIAGNOSIS — M54.12 RIGHT CERVICAL RADICULOPATHY: ICD-10-CM

## 2019-11-07 DIAGNOSIS — E11.21 CONTROLLED TYPE 2 DIABETES MELLITUS WITH DIABETIC NEPHROPATHY, WITHOUT LONG-TERM CURRENT USE OF INSULIN (HCC): ICD-10-CM

## 2019-11-07 PROCEDURE — 3017F COLORECTAL CA SCREEN DOC REV: CPT | Performed by: INTERNAL MEDICINE

## 2019-11-07 PROCEDURE — G8482 FLU IMMUNIZE ORDER/ADMIN: HCPCS | Performed by: INTERNAL MEDICINE

## 2019-11-07 PROCEDURE — 3044F HG A1C LEVEL LT 7.0%: CPT | Performed by: INTERNAL MEDICINE

## 2019-11-07 PROCEDURE — G0439 PPPS, SUBSEQ VISIT: HCPCS | Performed by: INTERNAL MEDICINE

## 2019-11-07 PROCEDURE — 4040F PNEUMOC VAC/ADMIN/RCVD: CPT | Performed by: INTERNAL MEDICINE

## 2019-11-07 PROCEDURE — G0008 ADMIN INFLUENZA VIRUS VAC: HCPCS | Performed by: INTERNAL MEDICINE

## 2019-11-07 PROCEDURE — 90662 IIV NO PRSV INCREASED AG IM: CPT | Performed by: INTERNAL MEDICINE

## 2019-11-07 PROCEDURE — 1123F ACP DISCUSS/DSCN MKR DOCD: CPT | Performed by: INTERNAL MEDICINE

## 2019-11-07 RX ORDER — HYDROCODONE BITARTRATE AND ACETAMINOPHEN 5; 325 MG/1; MG/1
1 TABLET ORAL 2 TIMES DAILY PRN
Qty: 60 TABLET | Refills: 0 | Status: SHIPPED | OUTPATIENT
Start: 2019-11-13 | End: 2020-02-04 | Stop reason: SDUPTHER

## 2019-11-07 RX ORDER — TRAMADOL HYDROCHLORIDE 50 MG/1
50 TABLET ORAL 3 TIMES DAILY
Qty: 90 TABLET | Refills: 2 | Status: SHIPPED | OUTPATIENT
Start: 2019-11-07 | End: 2020-02-04 | Stop reason: SDUPTHER

## 2019-11-07 RX ORDER — CYCLOBENZAPRINE HCL 10 MG
TABLET ORAL
Qty: 90 TABLET | Refills: 1 | Status: SHIPPED | OUTPATIENT
Start: 2019-11-07 | End: 2020-05-06 | Stop reason: SDUPTHER

## 2019-11-07 RX ORDER — HYDROCODONE BITARTRATE AND ACETAMINOPHEN 5; 325 MG/1; MG/1
1 TABLET ORAL 2 TIMES DAILY PRN
Qty: 60 TABLET | Refills: 0 | Status: SHIPPED | OUTPATIENT
Start: 2020-01-12 | End: 2020-02-04 | Stop reason: SDUPTHER

## 2019-11-07 RX ORDER — HYDROCODONE BITARTRATE AND ACETAMINOPHEN 5; 325 MG/1; MG/1
1 TABLET ORAL 2 TIMES DAILY
Qty: 60 TABLET | Refills: 0 | Status: SHIPPED | OUTPATIENT
Start: 2019-12-13 | End: 2020-02-04 | Stop reason: SDUPTHER

## 2019-11-07 ASSESSMENT — PATIENT HEALTH QUESTIONNAIRE - PHQ9
SUM OF ALL RESPONSES TO PHQ QUESTIONS 1-9: 0
SUM OF ALL RESPONSES TO PHQ QUESTIONS 1-9: 0

## 2019-11-07 ASSESSMENT — LIFESTYLE VARIABLES: HOW OFTEN DO YOU HAVE A DRINK CONTAINING ALCOHOL: 0

## 2019-11-12 DIAGNOSIS — I10 ESSENTIAL HYPERTENSION: ICD-10-CM

## 2019-11-12 DIAGNOSIS — E11.21 CONTROLLED TYPE 2 DIABETES MELLITUS WITH DIABETIC NEPHROPATHY, WITHOUT LONG-TERM CURRENT USE OF INSULIN (HCC): ICD-10-CM

## 2019-11-12 LAB
A/G RATIO: 2.4 (ref 1.1–2.2)
ALBUMIN SERPL-MCNC: 4.7 G/DL (ref 3.4–5)
ALP BLD-CCNC: 88 U/L (ref 40–129)
ALT SERPL-CCNC: 9 U/L (ref 10–40)
ANION GAP SERPL CALCULATED.3IONS-SCNC: 15 MMOL/L (ref 3–16)
AST SERPL-CCNC: 9 U/L (ref 15–37)
BASOPHILS ABSOLUTE: 0 K/UL (ref 0–0.2)
BASOPHILS RELATIVE PERCENT: 0.4 %
BILIRUB SERPL-MCNC: <0.2 MG/DL (ref 0–1)
BUN BLDV-MCNC: 26 MG/DL (ref 7–20)
CALCIUM SERPL-MCNC: 9.6 MG/DL (ref 8.3–10.6)
CHLORIDE BLD-SCNC: 102 MMOL/L (ref 99–110)
CHOLESTEROL, TOTAL: 214 MG/DL (ref 0–199)
CO2: 25 MMOL/L (ref 21–32)
CREAT SERPL-MCNC: 1.4 MG/DL (ref 0.8–1.3)
EOSINOPHILS ABSOLUTE: 0.3 K/UL (ref 0–0.6)
EOSINOPHILS RELATIVE PERCENT: 3.3 %
GFR AFRICAN AMERICAN: >60
GFR NON-AFRICAN AMERICAN: 50
GLOBULIN: 2 G/DL
GLUCOSE BLD-MCNC: 155 MG/DL (ref 70–99)
HCT VFR BLD CALC: 41.3 % (ref 40.5–52.5)
HDLC SERPL-MCNC: 32 MG/DL (ref 40–60)
HEMOGLOBIN: 13.7 G/DL (ref 13.5–17.5)
LDL CHOLESTEROL CALCULATED: 124 MG/DL
LYMPHOCYTES ABSOLUTE: 2.5 K/UL (ref 1–5.1)
LYMPHOCYTES RELATIVE PERCENT: 27.7 %
MCH RBC QN AUTO: 27.3 PG (ref 26–34)
MCHC RBC AUTO-ENTMCNC: 33.2 G/DL (ref 31–36)
MCV RBC AUTO: 82.2 FL (ref 80–100)
MONOCYTES ABSOLUTE: 0.5 K/UL (ref 0–1.3)
MONOCYTES RELATIVE PERCENT: 5.8 %
NEUTROPHILS ABSOLUTE: 5.6 K/UL (ref 1.7–7.7)
NEUTROPHILS RELATIVE PERCENT: 62.8 %
PDW BLD-RTO: 14.6 % (ref 12.4–15.4)
PLATELET # BLD: 274 K/UL (ref 135–450)
PMV BLD AUTO: 8.2 FL (ref 5–10.5)
POTASSIUM SERPL-SCNC: 5.2 MMOL/L (ref 3.5–5.1)
RBC # BLD: 5.03 M/UL (ref 4.2–5.9)
SODIUM BLD-SCNC: 142 MMOL/L (ref 136–145)
TOTAL PROTEIN: 6.7 G/DL (ref 6.4–8.2)
TRIGL SERPL-MCNC: 290 MG/DL (ref 0–150)
VLDLC SERPL CALC-MCNC: 58 MG/DL
WBC # BLD: 8.9 K/UL (ref 4–11)

## 2019-11-12 PROCEDURE — 36415 COLL VENOUS BLD VENIPUNCTURE: CPT | Performed by: INTERNAL MEDICINE

## 2019-11-13 ENCOUNTER — HOSPITAL ENCOUNTER (OUTPATIENT)
Age: 69
Setting detail: SPECIMEN
Discharge: HOME | End: 2019-11-13
Payer: MEDICARE

## 2019-11-13 LAB
ESTIMATED AVERAGE GLUCOSE: 162.8 MG/DL
HBA1C MFR BLD: 7.3 %

## 2019-11-13 PROCEDURE — 87186 SC STD MICRODIL/AGAR DIL: CPT

## 2019-11-13 PROCEDURE — 87071 CULTURE AEROBIC QUANT OTHER: CPT

## 2019-11-13 PROCEDURE — 87205 SMEAR GRAM STAIN: CPT

## 2019-11-13 PROCEDURE — 87073 CULTURE BACTERIA ANAEROBIC: CPT

## 2019-11-16 LAB
CULTURE: ABNORMAL
CULTURE: ABNORMAL
Lab: ABNORMAL
SPECIMEN: ABNORMAL

## 2019-12-10 DIAGNOSIS — I10 ESSENTIAL HYPERTENSION: ICD-10-CM

## 2019-12-10 RX ORDER — HYDRALAZINE HYDROCHLORIDE 25 MG/1
25 TABLET, FILM COATED ORAL 2 TIMES DAILY
Qty: 180 TABLET | Refills: 1 | Status: SHIPPED | OUTPATIENT
Start: 2019-12-10 | End: 2020-06-09 | Stop reason: SDUPTHER

## 2019-12-10 RX ORDER — METOPROLOL TARTRATE 50 MG/1
50 TABLET, FILM COATED ORAL 2 TIMES DAILY
Qty: 180 TABLET | Refills: 1 | Status: SHIPPED | OUTPATIENT
Start: 2019-12-10 | End: 2020-06-09 | Stop reason: SDUPTHER

## 2020-01-13 LAB
CREATININE, URINE: 318.3
MICROALBUMIN/CREAT 24H UR: 5480 MG/G{CREAT}
MICROALBUMIN/CREAT UR-RTO: 17

## 2020-01-30 RX ORDER — AMLODIPINE BESYLATE 5 MG/1
TABLET ORAL
Qty: 90 TABLET | Refills: 0 | Status: SHIPPED | OUTPATIENT
Start: 2020-01-30 | End: 2020-04-29 | Stop reason: SDUPTHER

## 2020-02-04 ENCOUNTER — OFFICE VISIT (OUTPATIENT)
Dept: INTERNAL MEDICINE CLINIC | Age: 70
End: 2020-02-04
Payer: MEDICARE

## 2020-02-04 VITALS
HEIGHT: 68 IN | BODY MASS INDEX: 33.98 KG/M2 | SYSTOLIC BLOOD PRESSURE: 132 MMHG | WEIGHT: 224.2 LBS | OXYGEN SATURATION: 98 % | DIASTOLIC BLOOD PRESSURE: 64 MMHG | HEART RATE: 79 BPM

## 2020-02-04 PROCEDURE — 1036F TOBACCO NON-USER: CPT | Performed by: INTERNAL MEDICINE

## 2020-02-04 PROCEDURE — G8482 FLU IMMUNIZE ORDER/ADMIN: HCPCS | Performed by: INTERNAL MEDICINE

## 2020-02-04 PROCEDURE — 99214 OFFICE O/P EST MOD 30 MIN: CPT | Performed by: INTERNAL MEDICINE

## 2020-02-04 PROCEDURE — 3017F COLORECTAL CA SCREEN DOC REV: CPT | Performed by: INTERNAL MEDICINE

## 2020-02-04 PROCEDURE — 1123F ACP DISCUSS/DSCN MKR DOCD: CPT | Performed by: INTERNAL MEDICINE

## 2020-02-04 PROCEDURE — 3051F HG A1C>EQUAL 7.0%<8.0%: CPT | Performed by: INTERNAL MEDICINE

## 2020-02-04 PROCEDURE — 3046F HEMOGLOBIN A1C LEVEL >9.0%: CPT | Performed by: INTERNAL MEDICINE

## 2020-02-04 PROCEDURE — G8427 DOCREV CUR MEDS BY ELIG CLIN: HCPCS | Performed by: INTERNAL MEDICINE

## 2020-02-04 PROCEDURE — 4040F PNEUMOC VAC/ADMIN/RCVD: CPT | Performed by: INTERNAL MEDICINE

## 2020-02-04 PROCEDURE — G8417 CALC BMI ABV UP PARAM F/U: HCPCS | Performed by: INTERNAL MEDICINE

## 2020-02-04 PROCEDURE — G8510 SCR DEP NEG, NO PLAN REQD: HCPCS | Performed by: INTERNAL MEDICINE

## 2020-02-04 PROCEDURE — 2022F DILAT RTA XM EVC RTNOPTHY: CPT | Performed by: INTERNAL MEDICINE

## 2020-02-04 RX ORDER — CHOLECALCIFEROL (VITAMIN D3) 1250 MCG
CAPSULE ORAL
COMMUNITY
End: 2020-06-03

## 2020-02-04 RX ORDER — HYDROCODONE BITARTRATE AND ACETAMINOPHEN 5; 325 MG/1; MG/1
1 TABLET ORAL 2 TIMES DAILY
Qty: 60 TABLET | Refills: 0 | Status: SHIPPED | OUTPATIENT
Start: 2020-04-11 | End: 2020-05-06 | Stop reason: SDUPTHER

## 2020-02-04 RX ORDER — TRAMADOL HYDROCHLORIDE 50 MG/1
50 TABLET ORAL 3 TIMES DAILY
Qty: 90 TABLET | Refills: 2 | Status: SHIPPED | OUTPATIENT
Start: 2020-02-11 | End: 2020-05-06 | Stop reason: SDUPTHER

## 2020-02-04 RX ORDER — HYDROCODONE BITARTRATE AND ACETAMINOPHEN 5; 325 MG/1; MG/1
1 TABLET ORAL 2 TIMES DAILY PRN
Qty: 60 TABLET | Refills: 0 | Status: SHIPPED | OUTPATIENT
Start: 2020-03-12 | End: 2020-05-06 | Stop reason: SDUPTHER

## 2020-02-04 RX ORDER — HYDROCODONE BITARTRATE AND ACETAMINOPHEN 5; 325 MG/1; MG/1
1 TABLET ORAL 2 TIMES DAILY PRN
Qty: 60 TABLET | Refills: 0 | Status: SHIPPED | OUTPATIENT
Start: 2020-02-11 | End: 2020-05-06 | Stop reason: SDUPTHER

## 2020-02-04 ASSESSMENT — PATIENT HEALTH QUESTIONNAIRE - PHQ9
SUM OF ALL RESPONSES TO PHQ QUESTIONS 1-9: 0
SUM OF ALL RESPONSES TO PHQ QUESTIONS 1-9: 0
2. FEELING DOWN, DEPRESSED OR HOPELESS: 0
SUM OF ALL RESPONSES TO PHQ9 QUESTIONS 1 & 2: 0
1. LITTLE INTEREST OR PLEASURE IN DOING THINGS: 0

## 2020-02-04 NOTE — PROGRESS NOTES
Tina Bass  1950 02/04/20    SUBJECTIVE:  Has had bilat sinus dz dx'd by Dr Orestes Tellez and surgery is planned end of March    CKD and HTN- bp stable and seeing Dr Azam Le, also on Vit D inj for replacement then f/u lab. Recent creat 1.5, stable    Hypertension: Stable. Denies CP, SOB, cough, visual changes, dizziness, palpitations or HA. DM- sugars stable on diet. Lab Results   Component Value Date    LABA1C 7.3 11/12/2019    LABA1C 6.7 08/07/2019    LABA1C 6.7 02/07/2019     Lab Results   Component Value Date    GLUF 195 (H) 03/20/2018    LABMICR 5.40 (H) 08/07/2019    LDLCALC 124 (H) 11/12/2019    CREATININE 1.4 (H) 11/12/2019     Crohns, seeing Dr Corazon Buckner and no recent flares. OA and chr LBP, due for rf pain meds, Controlled substances monitoring: possible medication side effects, risk of tolerance and/or dependence, and alternative treatments discussed, no signs of potential drug abuse or diversion identified and OARRS report reviewed today- activity consistent with treatment plan. OBJECTIVE:    /64 (Site: Left Upper Arm, Position: Sitting, Cuff Size: Medium Adult)   Pulse 79   Ht 5' 7.99\" (1.727 m)   Wt 224 lb 3.2 oz (101.7 kg)   SpO2 98%   BMI 34.10 kg/m²     Physical Exam  Vitals signs reviewed. Constitutional:       General: He is not in acute distress. Appearance: He is well-developed. HENT:      Head: Normocephalic and atraumatic. Nose: Nose normal.      Mouth/Throat:      Mouth: Mucous membranes are moist.      Pharynx: Oropharynx is clear. No oropharyngeal exudate. Eyes:      General: No scleral icterus. Right eye: No discharge. Left eye: No discharge. Conjunctiva/sclera: Conjunctivae normal.      Pupils: Pupils are equal, round, and reactive to light. Neck:      Musculoskeletal: Normal range of motion and neck supple. Thyroid: No thyromegaly. Vascular: No JVD. Trachea: No tracheal deviation.    Cardiovascular:      Rate and

## 2020-02-20 ENCOUNTER — OFFICE VISIT (OUTPATIENT)
Dept: SURGERY | Age: 70
End: 2020-02-20
Payer: MEDICARE

## 2020-02-20 VITALS
WEIGHT: 222 LBS | BODY MASS INDEX: 33.76 KG/M2 | SYSTOLIC BLOOD PRESSURE: 149 MMHG | OXYGEN SATURATION: 99 % | DIASTOLIC BLOOD PRESSURE: 73 MMHG | HEART RATE: 59 BPM

## 2020-02-20 PROCEDURE — 1036F TOBACCO NON-USER: CPT | Performed by: NURSE PRACTITIONER

## 2020-02-20 PROCEDURE — G8427 DOCREV CUR MEDS BY ELIG CLIN: HCPCS | Performed by: NURSE PRACTITIONER

## 2020-02-20 PROCEDURE — G8482 FLU IMMUNIZE ORDER/ADMIN: HCPCS | Performed by: NURSE PRACTITIONER

## 2020-02-20 PROCEDURE — 99213 OFFICE O/P EST LOW 20 MIN: CPT | Performed by: NURSE PRACTITIONER

## 2020-02-20 PROCEDURE — G8417 CALC BMI ABV UP PARAM F/U: HCPCS | Performed by: NURSE PRACTITIONER

## 2020-02-20 PROCEDURE — 3017F COLORECTAL CA SCREEN DOC REV: CPT | Performed by: NURSE PRACTITIONER

## 2020-02-20 PROCEDURE — 1123F ACP DISCUSS/DSCN MKR DOCD: CPT | Performed by: NURSE PRACTITIONER

## 2020-02-20 PROCEDURE — 4040F PNEUMOC VAC/ADMIN/RCVD: CPT | Performed by: NURSE PRACTITIONER

## 2020-03-06 ENCOUNTER — TELEPHONE (OUTPATIENT)
Dept: CARDIOLOGY CLINIC | Age: 70
End: 2020-03-06

## 2020-03-30 ENCOUNTER — TELEPHONE (OUTPATIENT)
Dept: INTERNAL MEDICINE CLINIC | Age: 70
End: 2020-03-30

## 2020-03-30 RX ORDER — BUTALBITAL, ACETAMINOPHEN AND CAFFEINE 50; 325; 40 MG/1; MG/1; MG/1
1 TABLET ORAL 3 TIMES DAILY PRN
Qty: 30 TABLET | Refills: 0 | Status: SHIPPED | OUTPATIENT
Start: 2020-03-30 | End: 2020-04-29 | Stop reason: SDUPTHER

## 2020-04-29 RX ORDER — AMLODIPINE BESYLATE 5 MG/1
5 TABLET ORAL DAILY
Qty: 90 TABLET | Refills: 1 | Status: SHIPPED | OUTPATIENT
Start: 2020-04-29 | End: 2020-10-28 | Stop reason: SDUPTHER

## 2020-04-29 RX ORDER — BUTALBITAL, ACETAMINOPHEN AND CAFFEINE 50; 325; 40 MG/1; MG/1; MG/1
1 TABLET ORAL 3 TIMES DAILY PRN
Qty: 30 TABLET | Refills: 1 | Status: SHIPPED | OUTPATIENT
Start: 2020-04-29

## 2020-05-06 ENCOUNTER — OFFICE VISIT (OUTPATIENT)
Dept: INTERNAL MEDICINE CLINIC | Age: 70
End: 2020-05-06
Payer: MEDICARE

## 2020-05-06 VITALS
SYSTOLIC BLOOD PRESSURE: 136 MMHG | OXYGEN SATURATION: 97 % | DIASTOLIC BLOOD PRESSURE: 78 MMHG | BODY MASS INDEX: 35.13 KG/M2 | WEIGHT: 231 LBS | HEART RATE: 64 BPM | RESPIRATION RATE: 18 BRPM

## 2020-05-06 PROCEDURE — 4040F PNEUMOC VAC/ADMIN/RCVD: CPT | Performed by: INTERNAL MEDICINE

## 2020-05-06 PROCEDURE — G8427 DOCREV CUR MEDS BY ELIG CLIN: HCPCS | Performed by: INTERNAL MEDICINE

## 2020-05-06 PROCEDURE — G8417 CALC BMI ABV UP PARAM F/U: HCPCS | Performed by: INTERNAL MEDICINE

## 2020-05-06 PROCEDURE — 1123F ACP DISCUSS/DSCN MKR DOCD: CPT | Performed by: INTERNAL MEDICINE

## 2020-05-06 PROCEDURE — 3017F COLORECTAL CA SCREEN DOC REV: CPT | Performed by: INTERNAL MEDICINE

## 2020-05-06 PROCEDURE — 2022F DILAT RTA XM EVC RTNOPTHY: CPT | Performed by: INTERNAL MEDICINE

## 2020-05-06 PROCEDURE — 99214 OFFICE O/P EST MOD 30 MIN: CPT | Performed by: INTERNAL MEDICINE

## 2020-05-06 PROCEDURE — 3046F HEMOGLOBIN A1C LEVEL >9.0%: CPT | Performed by: INTERNAL MEDICINE

## 2020-05-06 PROCEDURE — 1036F TOBACCO NON-USER: CPT | Performed by: INTERNAL MEDICINE

## 2020-05-06 RX ORDER — HYDROCODONE BITARTRATE AND ACETAMINOPHEN 5; 325 MG/1; MG/1
1 TABLET ORAL 2 TIMES DAILY PRN
Qty: 60 TABLET | Refills: 0 | Status: SHIPPED | OUTPATIENT
Start: 2020-06-10 | End: 2020-06-03

## 2020-05-06 RX ORDER — TRAMADOL HYDROCHLORIDE 50 MG/1
50 TABLET ORAL 3 TIMES DAILY
Qty: 90 TABLET | Refills: 2 | Status: SHIPPED | OUTPATIENT
Start: 2020-05-11 | End: 2020-08-04 | Stop reason: SDUPTHER

## 2020-05-06 RX ORDER — GLUCOSAMINE HCL/CHONDROITIN SU 500-400 MG
CAPSULE ORAL
Qty: 200 STRIP | Refills: 3 | Status: SHIPPED | OUTPATIENT
Start: 2020-05-06

## 2020-05-06 RX ORDER — HYDROCODONE BITARTRATE AND ACETAMINOPHEN 5; 325 MG/1; MG/1
1 TABLET ORAL 2 TIMES DAILY PRN
Qty: 60 TABLET | Refills: 0 | Status: SHIPPED | OUTPATIENT
Start: 2020-07-10 | End: 2020-06-03

## 2020-05-06 RX ORDER — PHENOL 1.4 %
AEROSOL, SPRAY (ML) MUCOUS MEMBRANE NIGHTLY
COMMUNITY
End: 2021-02-26

## 2020-05-06 RX ORDER — HYDROCODONE BITARTRATE AND ACETAMINOPHEN 5; 325 MG/1; MG/1
1 TABLET ORAL 2 TIMES DAILY
Qty: 60 TABLET | Refills: 0 | Status: SHIPPED | OUTPATIENT
Start: 2020-05-11 | End: 2020-08-04 | Stop reason: SDUPTHER

## 2020-05-06 RX ORDER — CYCLOBENZAPRINE HCL 10 MG
TABLET ORAL
Qty: 90 TABLET | Refills: 3 | Status: SHIPPED | OUTPATIENT
Start: 2020-05-06 | End: 2021-02-18 | Stop reason: SDUPTHER

## 2020-05-06 RX ORDER — LANCETS 30 GAUGE
1 EACH MISCELLANEOUS 2 TIMES DAILY
Qty: 200 EACH | Refills: 1 | Status: SHIPPED | OUTPATIENT
Start: 2020-05-06

## 2020-05-06 NOTE — PROGRESS NOTES
Breath sounds: Normal breath sounds. No wheezing or rales. Abdominal:      General: Bowel sounds are normal. There is no distension. Palpations: Abdomen is soft. There is no mass. Tenderness: There is no abdominal tenderness. There is no guarding or rebound. Musculoskeletal:         General: No tenderness. Lymphadenopathy:      Cervical: No cervical adenopathy. Skin:     General: Skin is warm and dry. Comments: FOOT EXAM  Visual inspection:  Deformity/amputation: absent  Skin lesions/pre-ulcerative calluses: absent  Edema: right- negative, left- negative    Sensory exam:  Monofilament sensation: normal  (minimum of 5 random plantar locations tested, avoiding callused areas - > 1 area with absence of sensation is + for neuropathy)      Pulses: normal       Neurological:      General: No focal deficit present. Mental Status: He is alert and oriented to person, place, and time. Psychiatric:         Mood and Affect: Mood normal.         ASSESSMENT:    1. Controlled type 2 diabetes mellitus with diabetic nephropathy, without long-term current use of insulin ([address]    2. Chronic maxillary sinusitis    3. Chronic midline low back pain without sciatica    4. Right cervical radiculopathy    5. Essential hypertension        PLAN:    Hilda Perez was seen today for hypertension, insomnia and numbness. Diagnoses and all orders for this visit:    Controlled type 2 diabetes mellitus with diabetic nephropathy, without long-term current use of insulin ([address]- DM relatively well controlled and will continue current regimen. Screening reviewed. See orders.  - HE WILL TRY INT FASTING TO SEE IF HELPS FURTHER W DM CONTROL AND WT LOSS  -     Lancets MISC; 1 each by Does not apply route 2 times daily  -     blood glucose monitor strips; Test two times a day & as needed for symptoms of irregular blood glucose. -     Comprehensive Metabolic Panel; Future  -     Lipid Panel;  Future  -      DIABETES FOOT EXAM;

## 2020-06-03 ENCOUNTER — TELEMEDICINE (OUTPATIENT)
Dept: CARDIOLOGY CLINIC | Age: 70
End: 2020-06-03
Payer: MEDICARE

## 2020-06-03 VITALS
SYSTOLIC BLOOD PRESSURE: 136 MMHG | HEIGHT: 68 IN | WEIGHT: 226 LBS | BODY MASS INDEX: 34.25 KG/M2 | DIASTOLIC BLOOD PRESSURE: 70 MMHG | HEART RATE: 60 BPM

## 2020-06-03 PROCEDURE — 1036F TOBACCO NON-USER: CPT | Performed by: INTERNAL MEDICINE

## 2020-06-03 PROCEDURE — 1123F ACP DISCUSS/DSCN MKR DOCD: CPT | Performed by: INTERNAL MEDICINE

## 2020-06-03 PROCEDURE — 3017F COLORECTAL CA SCREEN DOC REV: CPT | Performed by: INTERNAL MEDICINE

## 2020-06-03 PROCEDURE — G8417 CALC BMI ABV UP PARAM F/U: HCPCS | Performed by: INTERNAL MEDICINE

## 2020-06-03 PROCEDURE — 4040F PNEUMOC VAC/ADMIN/RCVD: CPT | Performed by: INTERNAL MEDICINE

## 2020-06-03 PROCEDURE — G8427 DOCREV CUR MEDS BY ELIG CLIN: HCPCS | Performed by: INTERNAL MEDICINE

## 2020-06-03 PROCEDURE — 2022F DILAT RTA XM EVC RTNOPTHY: CPT | Performed by: INTERNAL MEDICINE

## 2020-06-03 PROCEDURE — 3046F HEMOGLOBIN A1C LEVEL >9.0%: CPT | Performed by: INTERNAL MEDICINE

## 2020-06-03 PROCEDURE — 99214 OFFICE O/P EST MOD 30 MIN: CPT | Performed by: INTERNAL MEDICINE

## 2020-06-03 NOTE — PROGRESS NOTES
well-nourished [] No apparent distress      [] Abnormal-   Mental status  [x] Alert and awake  [x] Oriented to person/place/time [x]Able to follow commands      Eyes:  EOM    [x]  Normal  [] Abnormal-  Sclera  []  Normal  [] Abnormal -         Discharge []  None visible  [] Abnormal -    HENT:   [x] Normocephalic, atraumatic. [] Abnormal   [] Mouth/Throat: Mucous membranes are moist.     External Ears [x] Normal  [] Abnormal-     Pulmonary/Chest: [x] Respiratory effort normal.  [x] No visualized signs of difficulty breathing or respiratory distress        [] Abnormal-      Neurological:        [x] No Facial Asymmetry (Cranial nerve 7 motor function) (limited exam to video visit)          [] No gaze palsy        [] Abnormal-         Skin:        [x] No significant exanthematous lesions or discoloration noted on facial skin         [] Abnormal-            Psychiatric:       [x] Normal Affect [] No Hallucinations        [] Abnormal-   Other pertinent observable physical exam findings-     Due to this being a TeleHealth encounter, evaluation of the following organ systems is limited: Vitals/Constitutional/EENT/Resp/CV/GI//MS/Neuro/Skin/Heme-Lymph-Imm. Lab Results   Component Value Date    WBC 8.9 11/12/2019    HGB 13.7 11/12/2019    HCT 41.3 11/12/2019     11/12/2019     Lab Results   Component Value Date    CHOL 214 (H) 11/12/2019    TRIG 290 (H) 11/12/2019    HDL 32 (L) 11/12/2019    LDLCALC 124 (H) 11/12/2019    LDLDIRECT 161 (H) 08/07/2019     Lab Results   Component Value Date    BUN 26 11/12/2019    CREATININE 1.4 11/12/2019     11/12/2019    K 5.2 11/12/2019     No results found for: INR  Lab Results   Component Value Date    LABA1C 7.3 11/12/2019     ASSESSMENT/PLAN:    Stage 3 chronic kidney disease (HCC)  Creatinine has improved compared to one year ago. Follow up with nephrology. HTN (hypertension)  Well-controlled on current medications. Continue the same.     Hyperlipidemia  LDL has

## 2020-06-09 RX ORDER — EPLERENONE 50 MG/1
50 TABLET, FILM COATED ORAL DAILY
Qty: 30 TABLET | Refills: 2 | Status: SHIPPED | OUTPATIENT
Start: 2020-06-09 | End: 2020-12-01 | Stop reason: SDUPTHER

## 2020-06-09 RX ORDER — LISINOPRIL 40 MG/1
40 TABLET ORAL DAILY
Qty: 90 TABLET | Refills: 1 | Status: SHIPPED | OUTPATIENT
Start: 2020-06-09 | End: 2021-03-09

## 2020-06-09 RX ORDER — METOPROLOL TARTRATE 50 MG/1
50 TABLET, FILM COATED ORAL 2 TIMES DAILY
Qty: 180 TABLET | Refills: 1 | Status: SHIPPED | OUTPATIENT
Start: 2020-06-09 | End: 2020-12-03

## 2020-06-09 RX ORDER — HYDRALAZINE HYDROCHLORIDE 25 MG/1
25 TABLET, FILM COATED ORAL 2 TIMES DAILY
Qty: 180 TABLET | Refills: 1 | Status: SHIPPED | OUTPATIENT
Start: 2020-06-09 | End: 2020-12-03

## 2020-06-25 LAB
ALBUMIN SERPL-MCNC: 4.2 G/DL
ALP BLD-CCNC: 93 U/L
ALT SERPL-CCNC: 13 U/L
ANION GAP SERPL CALCULATED.3IONS-SCNC: 1.7 MMOL/L
AST SERPL-CCNC: 11 U/L
BILIRUB SERPL-MCNC: 0.2 MG/DL (ref 0.1–1.4)
BUN BLDV-MCNC: 20 MG/DL
CALCIUM SERPL-MCNC: 9.4 MG/DL
CHLORIDE BLD-SCNC: 105 MMOL/L
CHOLESTEROL, TOTAL: 194 MG/DL
CHOLESTEROL/HDL RATIO: ABNORMAL
CO2: 22 MMOL/L
CREAT SERPL-MCNC: 1.51 MG/DL
GFR CALCULATED: 47
GLUCOSE BLD-MCNC: 133 MG/DL
HDLC SERPL-MCNC: 34 MG/DL (ref 35–70)
HEMOGLOBIN: 14.3 G/DL (ref 13.5–17.5)
LDL CHOLESTEROL CALCULATED: 110 MG/DL (ref 0–160)
MAGNESIUM: 1.9 MG/DL
POTASSIUM SERPL-SCNC: 4.5 MMOL/L
SODIUM BLD-SCNC: 141 MMOL/L
TOTAL PROTEIN: 6.7
TRIGL SERPL-MCNC: 252 MG/DL
VLDLC SERPL CALC-MCNC: 50 MG/DL

## 2020-06-26 ENCOUNTER — TELEPHONE (OUTPATIENT)
Dept: CARDIOLOGY CLINIC | Age: 70
End: 2020-06-26

## 2020-08-04 ENCOUNTER — OFFICE VISIT (OUTPATIENT)
Dept: INTERNAL MEDICINE CLINIC | Age: 70
End: 2020-08-04
Payer: MEDICARE

## 2020-08-04 VITALS
HEART RATE: 60 BPM | SYSTOLIC BLOOD PRESSURE: 130 MMHG | BODY MASS INDEX: 34.36 KG/M2 | DIASTOLIC BLOOD PRESSURE: 76 MMHG | WEIGHT: 226 LBS | RESPIRATION RATE: 16 BRPM | OXYGEN SATURATION: 97 %

## 2020-08-04 PROCEDURE — G8427 DOCREV CUR MEDS BY ELIG CLIN: HCPCS | Performed by: INTERNAL MEDICINE

## 2020-08-04 PROCEDURE — 3017F COLORECTAL CA SCREEN DOC REV: CPT | Performed by: INTERNAL MEDICINE

## 2020-08-04 PROCEDURE — 3046F HEMOGLOBIN A1C LEVEL >9.0%: CPT | Performed by: INTERNAL MEDICINE

## 2020-08-04 PROCEDURE — 2022F DILAT RTA XM EVC RTNOPTHY: CPT | Performed by: INTERNAL MEDICINE

## 2020-08-04 PROCEDURE — G8417 CALC BMI ABV UP PARAM F/U: HCPCS | Performed by: INTERNAL MEDICINE

## 2020-08-04 PROCEDURE — 4040F PNEUMOC VAC/ADMIN/RCVD: CPT | Performed by: INTERNAL MEDICINE

## 2020-08-04 PROCEDURE — 1036F TOBACCO NON-USER: CPT | Performed by: INTERNAL MEDICINE

## 2020-08-04 PROCEDURE — 99214 OFFICE O/P EST MOD 30 MIN: CPT | Performed by: INTERNAL MEDICINE

## 2020-08-04 PROCEDURE — 1123F ACP DISCUSS/DSCN MKR DOCD: CPT | Performed by: INTERNAL MEDICINE

## 2020-08-04 RX ORDER — HYDROCODONE BITARTRATE AND ACETAMINOPHEN 5; 325 MG/1; MG/1
1 TABLET ORAL 2 TIMES DAILY
Qty: 60 TABLET | Refills: 0 | Status: SHIPPED | OUTPATIENT
Start: 2020-10-08 | End: 2020-11-09 | Stop reason: SDUPTHER

## 2020-08-04 RX ORDER — HYDROCODONE BITARTRATE AND ACETAMINOPHEN 5; 325 MG/1; MG/1
1 TABLET ORAL 2 TIMES DAILY PRN
Qty: 60 TABLET | Refills: 0 | Status: SHIPPED | OUTPATIENT
Start: 2020-08-09 | End: 2020-11-09 | Stop reason: SDUPTHER

## 2020-08-04 RX ORDER — HYDROCODONE BITARTRATE AND ACETAMINOPHEN 5; 325 MG/1; MG/1
1 TABLET ORAL 2 TIMES DAILY PRN
Qty: 60 TABLET | Refills: 0 | Status: SHIPPED | OUTPATIENT
Start: 2020-09-08 | End: 2020-10-08

## 2020-08-04 RX ORDER — TRAMADOL HYDROCHLORIDE 50 MG/1
50 TABLET ORAL 3 TIMES DAILY
Qty: 90 TABLET | Refills: 2 | Status: SHIPPED | OUTPATIENT
Start: 2020-08-09 | End: 2020-11-09 | Stop reason: SDUPTHER

## 2020-08-04 NOTE — PROGRESS NOTES
Vannesa Strauss  1950 08/04/20    SUBJECTIVE:    Chr sinusitis, needed surgery w Dr Ferreira Holding apparently for fungal infection too, end of June. Still some bloody clots draining but is improving overall. Chr LBP, stable on pain regimen, Controlled substances monitoring: possible medication side effects, risk of tolerance and/or dependence, and alternative treatments discussed, no signs of potential drug abuse or diversion identified and OARRS report reviewed today- activity consistent with treatment plan. Hypertension: Stable. Denies CP, SOB, cough, visual changes, dizziness, palpitations or HA. DM- sugar ~150, due for f/u lab. Lab Results   Component Value Date    LABA1C 7.3 11/12/2019    LABA1C 6.7 08/07/2019    LABA1C 6.7 02/07/2019     Lab Results   Component Value Date    GLUF 195 (H) 03/20/2018    LABMICR 5.40 (H) 08/07/2019    LDLCALC 110 06/24/2020    CREATININE 1.51 06/24/2020           OBJECTIVE:    /76   Pulse 60   Resp 16   Wt 226 lb (102.5 kg)   SpO2 97%   BMI 34.36 kg/m²     Physical Exam  Vitals signs reviewed. Constitutional:       General: He is not in acute distress. Appearance: He is well-developed. HENT:      Head: Normocephalic and atraumatic. Nose: Nose normal.      Mouth/Throat:      Mouth: Mucous membranes are moist.      Pharynx: Oropharynx is clear. No oropharyngeal exudate. Eyes:      General: No scleral icterus. Right eye: No discharge. Left eye: No discharge. Conjunctiva/sclera: Conjunctivae normal.      Pupils: Pupils are equal, round, and reactive to light. Neck:      Musculoskeletal: Normal range of motion and neck supple. Thyroid: No thyromegaly. Vascular: No JVD. Trachea: No tracheal deviation. Cardiovascular:      Rate and Rhythm: Normal rate and regular rhythm. Heart sounds: Normal heart sounds. No murmur. No friction rub. No gallop.     Pulmonary:      Effort: Pulmonary effort is normal. No respiratory distress. Breath sounds: Normal breath sounds. No wheezing or rales. Abdominal:      General: Bowel sounds are normal. There is no distension. Palpations: Abdomen is soft. There is no mass. Tenderness: There is no abdominal tenderness. There is no guarding or rebound. Musculoskeletal:         General: No tenderness. Lymphadenopathy:      Cervical: No cervical adenopathy. Skin:     General: Skin is warm and dry. Comments: FOOT EXAM  Visual inspection:  Deformity/amputation: absent  Skin lesions/pre-ulcerative calluses: absent  Edema: right- negative, left- negative    Sensory exam:  Monofilament sensation: normal  (minimum of 5 random plantar locations tested, avoiding callused areas - > 1 area with absence of sensation is + for neuropathy)      Pulses: normal       Neurological:      Mental Status: He is alert. Psychiatric:         Mood and Affect: Mood normal.         ASSESSMENT:    1. Controlled type 2 diabetes mellitus with diabetic nephropathy, without long-term current use of insulin ([address]    2. Chronic midline low back pain without sciatica    3. Right cervical radiculopathy    4. Essential hypertension    5. Chronic pansinusitis        PLAN:    Mandeep Pryor was seen today for diabetes, hypertension and fall. Diagnoses and all orders for this visit:    Controlled type 2 diabetes mellitus with diabetic nephropathy, without long-term current use of insulin ([address] - DM relatively well controlled and will continue current regimen. Screening reviewed. See orders. -     Comprehensive Metabolic Panel; Future  -     CBC Auto Differential; Future  -     Lipid Panel; Future  -     Hemoglobin A1C; Future  -      DIABETES FOOT EXAM; Future    Chronic midline low back pain without sciatica - LBP stable on pain regimen, RFs given as noted and will monitor.    -     traMADol (ULTRAM) 50 MG tablet;  Take 1 tablet by mouth 3 times daily for 30 days.  -     HYDROcodone-acetaminophen (NORCO) 5-325 MG per tablet; Take 1 tablet by mouth 2 times daily for 30 days.  -     HYDROcodone-acetaminophen (NORCO) 5-325 MG per tablet; Take 1 tablet by mouth 2 times daily as needed for Pain for up to 30 days.  -     HYDROcodone-acetaminophen (NORCO) 5-325 MG per tablet; Take 1 tablet by mouth 2 times daily as needed for Pain for up to 30 days. Right cervical radiculopathy- The current medical regimen is effective;  continue present plan and medications.    -     HYDROcodone-acetaminophen (NORCO) 5-325 MG per tablet; Take 1 tablet by mouth 2 times daily for 30 days.  -     HYDROcodone-acetaminophen (NORCO) 5-325 MG per tablet; Take 1 tablet by mouth 2 times daily as needed for Pain for up to 30 days.  -     HYDROcodone-acetaminophen (NORCO) 5-325 MG per tablet; Take 1 tablet by mouth 2 times daily as needed for Pain for up to 30 days. Essential hypertension - Blood pressure stable and will continue current regimen. Will plan periodic monitoring of renal function, electrolytes, lipid profile.      -     CBC Auto Differential; Future    Chronic pansinusitis- IMPROVING POSTOP, CONT F/U DR Caren Berrios AND CONT SINUS REGIMEN- IS ON FLONASE AND SINUS RINSE

## 2020-08-20 ENCOUNTER — OFFICE VISIT (OUTPATIENT)
Dept: SURGERY | Age: 70
End: 2020-08-20
Payer: MEDICARE

## 2020-08-20 VITALS
HEIGHT: 68 IN | SYSTOLIC BLOOD PRESSURE: 148 MMHG | OXYGEN SATURATION: 98 % | DIASTOLIC BLOOD PRESSURE: 80 MMHG | WEIGHT: 227.8 LBS | TEMPERATURE: 96.4 F | HEART RATE: 67 BPM | BODY MASS INDEX: 34.53 KG/M2

## 2020-08-20 PROCEDURE — 4040F PNEUMOC VAC/ADMIN/RCVD: CPT | Performed by: SURGERY

## 2020-08-20 PROCEDURE — G8427 DOCREV CUR MEDS BY ELIG CLIN: HCPCS | Performed by: SURGERY

## 2020-08-20 PROCEDURE — 99212 OFFICE O/P EST SF 10 MIN: CPT | Performed by: SURGERY

## 2020-08-20 PROCEDURE — 1123F ACP DISCUSS/DSCN MKR DOCD: CPT | Performed by: SURGERY

## 2020-08-20 PROCEDURE — 1036F TOBACCO NON-USER: CPT | Performed by: SURGERY

## 2020-08-20 PROCEDURE — 3017F COLORECTAL CA SCREEN DOC REV: CPT | Performed by: SURGERY

## 2020-08-20 PROCEDURE — G8417 CALC BMI ABV UP PARAM F/U: HCPCS | Performed by: SURGERY

## 2020-08-20 NOTE — PROGRESS NOTES
Joseph Maher presents for the evaluation of bilateral breast masses. Onset of the symptoms was 18 months ago. Patient sought evaluation because of a breast lump on exam. This started after he was placed on aldactone. They stopped the aldactone and the symptoms have resolved. Previous evaluation has included ultrasound (unremarkable). 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    BPH (benign prostatic hypertrophy)     Bradycardia 4/18/2019    Hr 30-40 with skipped beats on BP monitor on Labetalol    Crohn's regional enteritis (HCC)     Dr Lois Perez DM II (diabetes mellitus, type II), controlled ([address]     diet controlled    Eye pain     GERD (gastroesophageal reflux disease)     HTN (hypertension)     also comanaged w Dr Hunter Hinojosa. 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[facility])     March 2015- responded to pred taper and cipro/flagyl    Neck pain     Dr Vince Art has evaluated    Osteoarthritis     Osteoarthritis of hands, bilateral     Pattern dystrophy of macula     Dr Supa Monroe    Proteinuria     Rash     Right cervical radiculopathy     Right knee pain     Dr Rosa Tam- tib plateau fx.  S/P colonoscopy 01/17/2019    Dr Orville Shetty-- ?recheck when?     Stage 3 chronic kidney disease ([address] 4/18/2019    Dr Flory Chambers Testosterone deficiency     on andrgel    Vertigo      Patient Active Problem List    Diagnosis Date Noted    Bradycardia 04/18/2019    Stage 3 chronic kidney disease ([address] 04/18/2019    Acute nonintractable headache     Status post total right knee replacement 09/13/2017    Arthrofibrosis of total knee arthroplasty ([address] 09/13/2017    Dysphagia     Nausea     Crohn's disease of large intestine without complication ([address]     Benign prostatic hyperplasia     Osteoarthritis of hands, bilateral  Pattern dystrophy of macula     Blurred vision     Vertigo     Neck pain     Right cervical radiculopathy     DM II (diabetes mellitus, type II), [facility]     S/P colonoscopy 08/24/2012    Gastroesophageal reflux disease without esophagitis     Testosterone deficiency     Low back pain     Osteoarthritis     HTN (hypertension)     Crohn's regional enteritis ([facility]     Hyperlipidemia      Past Surgical History:   Procedure Laterality Date    BACK SURGERY      CATARACT REMOVAL WITH IMPLANT Left 03/02/2017    Dr Nora Aguero Right 03/16/2017    Dr Ramesh Osorio  2005    Dr Radha Espino, LAPAROSCOPIC  8/2011    Dr Edith Lai      Multiple colonoscopys, HX: Crohns    ENDOSCOPY, COLON, DIAGNOSTIC      Erosive esophagitis    EYE SURGERY Left     cataract    EYE SURGERY Right 03/16/2017    cataract    JOINT REPLACEMENT      KNEE ARTHROSCOPY  89    right    KNEE SURGERY Right 1/16/2013    Dr Sohail STREETER knee tib plateau fx and medial meniscectomy    LAMINECTOMY  75 and 85    SINUS SURGERY  06/29/2020    Dr Ferreira Holding for chr sinusitis and fungal infection, per pt    TONSILLECTOMY  70    TOTAL KNEE ARTHROPLASTY Right 6/12/13    Dr Sohail STREETER      Family History   Problem Relation Age of Onset    Elevated Lipids Mother     High Blood Pressure Mother     Other Father         blind     Social History     Socioeconomic History    Marital status:      Spouse name: None    Number of children: None    Years of education: None    Highest education level: None   Occupational History    Occupation: PA     Comment: [place]     Occupation: disabled     Comment: 2015   Social Needs    Financial resource strain: None    Food insecurity     Worry: None     Inability: None    Transportation needs     Medical: None     Non-medical: None   Tobacco Use    Smoking status: Former Smoker     Packs/day: 3.00     Years: 30.00 Pack years: 90.00     Types: Cigarettes     Last attempt to quit: 1995     Years since quittin.6    Smokeless tobacco: Never Used   Substance and Sexual Activity    Alcohol use: No    Drug use: No    Sexual activity: Yes     Partners: Female   Lifestyle    Physical activity     Days per week: None     Minutes per session: None    Stress: None   Relationships    Social connections     Talks on phone: None     Gets together: None     Attends [employer] service: None     Active member of club or organization: None     Attends meetings of clubs or organizations: None     Relationship status: None    Intimate partner violence     Fear of current or ex partner: None     Emotionally abused: None     Physically abused: None     Forced sexual activity: None   Other Topics Concern    None   Social History Narrative    None     Current Outpatient Medications   Medication Sig Dispense Refill    traMADol (ULTRAM) 50 MG tablet Take 1 tablet by mouth 3 times daily for 30 days. 90 tablet 2    [START ON 10/8/2020] HYDROcodone-acetaminophen (NORCO) 5-325 MG per tablet Take 1 tablet by mouth 2 times daily for 30 days. 60 tablet 0    [START ON 2020] HYDROcodone-acetaminophen (NORCO) 5-325 MG per tablet Take 1 tablet by mouth 2 times daily as needed for Pain for up to 30 days. 60 tablet 0    HYDROcodone-acetaminophen (NORCO) 5-325 MG per tablet Take 1 tablet by mouth 2 times daily as needed for Pain for up to 30 days.  60 tablet 0    eplerenone (INSPRA) 50 MG tablet Take 1 tablet by mouth daily 30 tablet 2    metoprolol tartrate (LOPRESSOR) 50 MG tablet Take 1 tablet by mouth 2 times daily 180 tablet 1    hydrALAZINE (APRESOLINE) 25 MG tablet Take 1 tablet by mouth 2 times daily 180 tablet 1    lisinopril (PRINIVIL;ZESTRIL) 40 MG tablet Take 1 tablet by mouth daily 90 tablet 1    cyclobenzaprine (FLEXERIL) 10 MG tablet TAKE ONE TABLET BY MOUTH THREE TIMES A DAY AS NEEDED FOR MUSCLE SPASMS 90 tablet 3    Lancets MISC 1 each by Does not apply route 2 times daily 200 each 1    blood glucose monitor strips Test two times a day & as needed for symptoms of irregular blood glucose. 200 strip 3    Melatonin 10 MG TABS Take by mouth nightly      butalbital-acetaminophen-caffeine (FIORICET, ESGIC) -40 MG per tablet Take 1 tablet by mouth 3 times daily as needed for Headaches or Migraine 30 tablet 1    amLODIPine (NORVASC) 5 MG tablet Take 1 tablet by mouth daily 90 tablet 1    furosemide (LASIX) 40 MG tablet Take 1 tablet by mouth daily 90 tablet 1    ondansetron (ZOFRAN) 4 MG tablet Take 1 tablet by mouth every 8 hours as needed for Nausea 40 tablet 1    lansoprazole (PREVACID) 15 MG delayed release capsule Take 1 capsule by mouth daily 90 capsule 1    meclizine (ANTIVERT) 25 MG tablet Take 1 tablet by mouth every 6 hours. 30 tablet 1    gabapentin (NEURONTIN) 300 MG capsule Take 1 capsule by mouth 2 times daily for 30 days. 60 capsule 1     No current facility-administered medications for this visit. Current Outpatient Medications on File Prior to Visit   Medication Sig Dispense Refill    traMADol (ULTRAM) 50 MG tablet Take 1 tablet by mouth 3 times daily for 30 days. 90 tablet 2    [START ON 10/8/2020] HYDROcodone-acetaminophen (NORCO) 5-325 MG per tablet Take 1 tablet by mouth 2 times daily for 30 days. 60 tablet 0    [START ON 9/8/2020] HYDROcodone-acetaminophen (NORCO) 5-325 MG per tablet Take 1 tablet by mouth 2 times daily as needed for Pain for up to 30 days. 60 tablet 0    HYDROcodone-acetaminophen (NORCO) 5-325 MG per tablet Take 1 tablet by mouth 2 times daily as needed for Pain for up to 30 days.  60 tablet 0    eplerenone (INSPRA) 50 MG tablet Take 1 tablet by mouth daily 30 tablet 2    metoprolol tartrate (LOPRESSOR) 50 MG tablet Take 1 tablet by mouth 2 times daily 180 tablet 1    hydrALAZINE (APRESOLINE) 25 MG tablet Take 1 tablet by mouth 2 times daily 180 tablet 1    lisinopril (PRINIVIL;ZESTRIL) 40 MG tablet Take 1 tablet by mouth daily 90 tablet 1    cyclobenzaprine (FLEXERIL) 10 MG tablet TAKE ONE TABLET BY MOUTH THREE TIMES A DAY AS NEEDED FOR MUSCLE SPASMS 90 tablet 3    Lancets MISC 1 each by Does not apply route 2 times daily 200 each 1    blood glucose monitor strips Test two times a day & as needed for symptoms of irregular blood glucose. 200 strip 3    Melatonin 10 MG TABS Take by mouth nightly      butalbital-acetaminophen-caffeine (FIORICET, ESGIC) -40 MG per tablet Take 1 tablet by mouth 3 times daily as needed for Headaches or Migraine 30 tablet 1    amLODIPine (NORVASC) 5 MG tablet Take 1 tablet by mouth daily 90 tablet 1    furosemide (LASIX) 40 MG tablet Take 1 tablet by mouth daily 90 tablet 1    ondansetron (ZOFRAN) 4 MG tablet Take 1 tablet by mouth every 8 hours as needed for Nausea 40 tablet 1    lansoprazole (PREVACID) 15 MG delayed release capsule Take 1 capsule by mouth daily 90 capsule 1    meclizine (ANTIVERT) 25 MG tablet Take 1 tablet by mouth every 6 hours. 30 tablet 1    gabapentin (NEURONTIN) 300 MG capsule Take 1 capsule by mouth 2 times daily for 30 days. 60 capsule 1     No current facility-administered medications on file prior to visit. Allergies   Allergen Reactions    Aldactone [Spironolactone]      Gynecomastia     Bactrim [Sulfamethoxazole-Trimethoprim]      Nephrology contraindicates-- can cause hyperkalemia    Labetalol      Marked bradycardia with frequent symptomatic ectopy    Metformin And Related [Metformin And Related]      Diarrhea at 500mg daily    Nsaids      W PROTEINURIA     Review of Systems  Pertinent items are noted in HPI.        Objective:      BP (!) 148/80 (Site: Left Upper Arm, Position: Sitting, Cuff Size: Large Adult)   Pulse 67   Temp 96.4 °F (35.8 °C) (Infrared)   Ht 5' 8\" (1.727 m)   Wt 227 lb 12.8 oz (103.3 kg)   SpO2 98%   BMI 34.64 kg/m²     General Appearance:    Alert, cooperative, no distress, appears stated age                       Neck:   Supple, symmetrical, trachea midline, no adenopathy;               Lungs:     Clear to auscultation bilaterally, respirations unlabored   Chest wall:    No tenderness or deformity. Male breast masses are resolved. Heart:    Regular rate and rhythm, S1 and S2 normal, no murmur, rub   or gallop   Abdomen:     Soft, non-tender, bowel sounds active all four quadrants,     no masses, no organomegaly           Extremities:   Extremities normal, atraumatic, no cyanosis or edema   Pulses:   2+ and symmetric all extremities   Skin:   Skin color, texture, turgor normal, no rashes or lesions   Lymph nodes:   Cervical, supraclavicular, and axillary nodes normal             Assessment:      Patient is diagnosed with resolved gynecomastia. .      Plan:      Follow-up in prn.

## 2020-09-08 ENCOUNTER — OFFICE VISIT (OUTPATIENT)
Dept: INTERNAL MEDICINE CLINIC | Age: 70
End: 2020-09-08
Payer: MEDICARE

## 2020-09-08 VITALS
WEIGHT: 224 LBS | HEART RATE: 60 BPM | BODY MASS INDEX: 34.06 KG/M2 | RESPIRATION RATE: 18 BRPM | OXYGEN SATURATION: 98 % | DIASTOLIC BLOOD PRESSURE: 84 MMHG | SYSTOLIC BLOOD PRESSURE: 140 MMHG

## 2020-09-08 PROCEDURE — G8427 DOCREV CUR MEDS BY ELIG CLIN: HCPCS | Performed by: INTERNAL MEDICINE

## 2020-09-08 PROCEDURE — 3017F COLORECTAL CA SCREEN DOC REV: CPT | Performed by: INTERNAL MEDICINE

## 2020-09-08 PROCEDURE — 2022F DILAT RTA XM EVC RTNOPTHY: CPT | Performed by: INTERNAL MEDICINE

## 2020-09-08 PROCEDURE — G8417 CALC BMI ABV UP PARAM F/U: HCPCS | Performed by: INTERNAL MEDICINE

## 2020-09-08 PROCEDURE — 1036F TOBACCO NON-USER: CPT | Performed by: INTERNAL MEDICINE

## 2020-09-08 PROCEDURE — 99213 OFFICE O/P EST LOW 20 MIN: CPT | Performed by: INTERNAL MEDICINE

## 2020-09-08 PROCEDURE — 3046F HEMOGLOBIN A1C LEVEL >9.0%: CPT | Performed by: INTERNAL MEDICINE

## 2020-09-08 PROCEDURE — 1123F ACP DISCUSS/DSCN MKR DOCD: CPT | Performed by: INTERNAL MEDICINE

## 2020-09-08 PROCEDURE — 4040F PNEUMOC VAC/ADMIN/RCVD: CPT | Performed by: INTERNAL MEDICINE

## 2020-09-08 RX ORDER — PREDNISONE 1 MG/1
TABLET ORAL
Qty: 21 TABLET | Refills: 0 | Status: SHIPPED | OUTPATIENT
Start: 2020-09-08 | End: 2020-10-06 | Stop reason: ALTCHOICE

## 2020-09-08 NOTE — PROGRESS NOTES
with diabetic nephropathy, without long-term current use of insulin ([address]- last a1c sl high but acceptable, should srinivasa short course of pred ok

## 2020-09-14 RX ORDER — GABAPENTIN 300 MG/1
300 CAPSULE ORAL 2 TIMES DAILY
Qty: 60 CAPSULE | Refills: 1 | Status: SHIPPED | OUTPATIENT
Start: 2020-09-14 | End: 2021-02-04 | Stop reason: SDUPTHER

## 2020-10-06 ENCOUNTER — HOSPITAL ENCOUNTER (OUTPATIENT)
Age: 70
Discharge: HOME | End: 2020-10-06
Payer: MEDICARE

## 2020-10-06 ENCOUNTER — HOSPITAL ENCOUNTER (OUTPATIENT)
Dept: GENERAL RADIOLOGY | Age: 70
Discharge: HOME | End: 2020-10-06
Payer: MEDICARE

## 2020-10-06 ENCOUNTER — OFFICE VISIT (OUTPATIENT)
Dept: INTERNAL MEDICINE CLINIC | Age: 70
End: 2020-10-06
Payer: MEDICARE

## 2020-10-06 VITALS
RESPIRATION RATE: 16 BRPM | SYSTOLIC BLOOD PRESSURE: 136 MMHG | HEART RATE: 70 BPM | DIASTOLIC BLOOD PRESSURE: 84 MMHG | OXYGEN SATURATION: 97 %

## 2020-10-06 PROCEDURE — 3017F COLORECTAL CA SCREEN DOC REV: CPT | Performed by: INTERNAL MEDICINE

## 2020-10-06 PROCEDURE — 73560 X-RAY EXAM OF KNEE 1 OR 2: CPT

## 2020-10-06 PROCEDURE — 99213 OFFICE O/P EST LOW 20 MIN: CPT | Performed by: INTERNAL MEDICINE

## 2020-10-06 PROCEDURE — G8417 CALC BMI ABV UP PARAM F/U: HCPCS | Performed by: INTERNAL MEDICINE

## 2020-10-06 PROCEDURE — 1036F TOBACCO NON-USER: CPT | Performed by: INTERNAL MEDICINE

## 2020-10-06 PROCEDURE — 4040F PNEUMOC VAC/ADMIN/RCVD: CPT | Performed by: INTERNAL MEDICINE

## 2020-10-06 PROCEDURE — 3046F HEMOGLOBIN A1C LEVEL >9.0%: CPT | Performed by: INTERNAL MEDICINE

## 2020-10-06 PROCEDURE — G8427 DOCREV CUR MEDS BY ELIG CLIN: HCPCS | Performed by: INTERNAL MEDICINE

## 2020-10-06 PROCEDURE — 90694 VACC AIIV4 NO PRSRV 0.5ML IM: CPT | Performed by: INTERNAL MEDICINE

## 2020-10-06 PROCEDURE — G0008 ADMIN INFLUENZA VIRUS VAC: HCPCS | Performed by: INTERNAL MEDICINE

## 2020-10-06 PROCEDURE — 72100 X-RAY EXAM L-S SPINE 2/3 VWS: CPT

## 2020-10-06 PROCEDURE — G8484 FLU IMMUNIZE NO ADMIN: HCPCS | Performed by: INTERNAL MEDICINE

## 2020-10-06 PROCEDURE — 1123F ACP DISCUSS/DSCN MKR DOCD: CPT | Performed by: INTERNAL MEDICINE

## 2020-10-06 PROCEDURE — 2022F DILAT RTA XM EVC RTNOPTHY: CPT | Performed by: INTERNAL MEDICINE

## 2020-10-06 NOTE — PROGRESS NOTES
Romulo Sensor  1950  10/06/20    SUBJECTIVE:  Persistent severe L knee pain. Some assoc LBP too, has known lumbar DDD. Only minimal improvement w the pred taper. Hypertension: Stable. Denies CP, SOB, cough, visual changes, dizziness, palpitations or HA. DM- last a1c sl high, due for lab prior to next appt. Lab Results   Component Value Date    LABA1C 7.3 11/12/2019    LABA1C 6.7 08/07/2019    LABA1C 6.7 02/07/2019     Lab Results   Component Value Date    GLUF 195 (H) 03/20/2018    LABMICR 5.40 (H) 08/07/2019    LDLCALC 110 06/24/2020    CREATININE 1.51 06/24/2020             OBJECTIVE:    /84   Pulse 70   Resp 16   SpO2 97%     Physical Exam  Vitals signs reviewed. Constitutional:       Appearance: He is well-developed. Neck:      Musculoskeletal: Neck supple. Cardiovascular:      Rate and Rhythm: Normal rate and regular rhythm. Heart sounds: Normal heart sounds. No murmur. No friction rub. No gallop. Pulmonary:      Effort: Pulmonary effort is normal. No respiratory distress. Breath sounds: Normal breath sounds. No wheezing or rales. Abdominal:      General: Bowel sounds are normal. There is no distension. Palpations: Abdomen is soft. Tenderness: There is no abdominal tenderness. Musculoskeletal:         General: Tenderness (l KNEE, STIFF W ROM AND PAIN W BOTH VARUS/VALGUS STRESS) present. Neurological:      Mental Status: He is alert. ASSESSMENT:    1. Chronic pain of left knee    2. Controlled type 2 diabetes mellitus with diabetic nephropathy, without long-term current use of insulin ([address]    3. Chronic midline low back pain with left-sided sciatica    4. Need for influenza vaccination        PLAN:    Adam Dickson was seen today for leg pain and knee pain. Diagnoses and all orders for this visit:    Chronic pain of left knee- SUSPECT PROGRESSION OF UNDERLYING SEVERE DJD.   Hailey Sims, REFER Sondra Teran MD, Orthopedic Surgery, [place]  -     XR KNEE LEFT (1-2 VIEWS); Future    Controlled type 2 diabetes mellitus with diabetic nephropathy, without long-term current use of insulin (HCC) - The current medical regimen is effective;  continue present plan and medications. Chronic midline low back pain with left-sided sciatica- ALSO RECHECK XRAY MONITORING HIS LUMBAR DDD  -     XR LUMBAR SPINE (2-3 VIEWS);  Future    Need for influenza vaccination  -     INFLUENZA, QUADV, ADJUVANTED, 65 YRS =, IM, PF, PREFILL SYR, 0.5ML (FLUAD)

## 2020-10-06 NOTE — RESULT ENCOUNTER NOTE
Call pt, no fractures noted knee or back, but has lumbar arthritis and also some fluid noted patellar region.   Await input after consultation w Dr Matt Kunz

## 2020-10-08 ENCOUNTER — OFFICE VISIT (OUTPATIENT)
Dept: ORTHOPEDIC SURGERY | Age: 70
End: 2020-10-08
Payer: MEDICARE

## 2020-10-08 VITALS
OXYGEN SATURATION: 95 % | BODY MASS INDEX: 33.95 KG/M2 | RESPIRATION RATE: 15 BRPM | HEIGHT: 68 IN | WEIGHT: 224 LBS | HEART RATE: 79 BPM

## 2020-10-08 PROCEDURE — 99203 OFFICE O/P NEW LOW 30 MIN: CPT | Performed by: ORTHOPAEDIC SURGERY

## 2020-10-08 PROCEDURE — 3017F COLORECTAL CA SCREEN DOC REV: CPT | Performed by: ORTHOPAEDIC SURGERY

## 2020-10-08 PROCEDURE — 4040F PNEUMOC VAC/ADMIN/RCVD: CPT | Performed by: ORTHOPAEDIC SURGERY

## 2020-10-08 PROCEDURE — G8484 FLU IMMUNIZE NO ADMIN: HCPCS | Performed by: ORTHOPAEDIC SURGERY

## 2020-10-08 PROCEDURE — G8427 DOCREV CUR MEDS BY ELIG CLIN: HCPCS | Performed by: ORTHOPAEDIC SURGERY

## 2020-10-08 PROCEDURE — 1123F ACP DISCUSS/DSCN MKR DOCD: CPT | Performed by: ORTHOPAEDIC SURGERY

## 2020-10-08 PROCEDURE — G8417 CALC BMI ABV UP PARAM F/U: HCPCS | Performed by: ORTHOPAEDIC SURGERY

## 2020-10-08 PROCEDURE — 1036F TOBACCO NON-USER: CPT | Performed by: ORTHOPAEDIC SURGERY

## 2020-10-08 ASSESSMENT — ENCOUNTER SYMPTOMS
VOMITING: 0
COLOR CHANGE: 0
BACK PAIN: 1
CHEST TIGHTNESS: 0
EYE PAIN: 0
SHORTNESS OF BREATH: 0
WHEEZING: 0
EYE REDNESS: 0

## 2020-10-08 NOTE — PATIENT INSTRUCTIONS
Continue weight-bearing as tolerated. Continue range of motion exercises as instructed. Ice and elevate as needed. Tylenol or Motrin for pain.   Continue to use cane for comfort   MRI of the left knee ordered today  central scheduling will call you to schedule the [phone]  Call our office once MRI is completed to schedule a follow up visit  Referral to orthopedic for spine

## 2020-10-08 NOTE — PROGRESS NOTES
Pt is here today for his first evaluation on the left knee. Pt states on set on of pain about 3 weeks ago. Pt states that he has no injury to the left knee. He noticed increased pain in the left knee after he sat in a chair for to long. He states he is having pain posterior aspect of the knee. He is taking tramadol and norco for his lower back but it is not helping with the knee pain. Pt states he is also having a pain shooting down into his leg. Pt states he has had the pain shooting down his leg in the past. Pt states that he had to start using his cane because his knee wants to give out on him.       X-ray of the left knee completed on 10/6/20     No evidence for fracture no evidence for malalignment there is a small    suprapatellar bursal effusion

## 2020-10-08 NOTE — PROGRESS NOTES
10/8/2020   Chief Complaint   Patient presents with    Knee Pain     Left knee         History of Present Illness:                             Jenny Maravilla is a 78 y.o. male who presents today for evaluation of his left knee pain and feelings of instability. He had an injury about 3 weeks ago when he tried to get up from a seated position in a chair. He has noticed increasing pain from his back and radicular shooting pain down the posterior aspect of his calf. Additionally has noticed numbness in his great toe. He understands that he is having increased radicular pain in his left lower extremity from degenerative issues in his lumbar spine. He has previously had 2 lumbar spine surgeries many years ago over 20 years. He has had to use a cane for ambulation because of difficulty with walking because he feels that his knee is very painful with weightbearing activities and feels like it may want to buckle and give out on him. The pain is most significant on the medial aspect of his knee. He has a history of previous right total knee replacement for posttraumatic arthritis after tibial plateau fracture. Pt is here today for his first evaluation on the left knee. Pt states on set on of pain about 3 weeks ago. He noticed increased pain in the left knee after he sat in a chair for to long. He states he is having pain posterior aspect of the knee. He is taking tramadol and norco for his lower back but it is not helping with the knee pain. Pt states he is also having a pain shooting down into his leg.  Pt states he has had the pain shooting down his leg in the past. Pt states that he had to start using his cane because his knee wants to give out on him.        X-ray of the left knee completed on 10/6/20      No evidence for fracture no evidence for malalignment there is a small    suprapatellar bursal effusion               Medical History  Patient's medications, allergies, past medical, surgical, social and family histories were reviewed and updated as appropriate. 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    BPH (benign prostatic hypertrophy)     Bradycardia 4/18/2019    Hr 30-40 with skipped beats on BP monitor on Labetalol    Crohn's regional enteritis (HCC)     Dr Lorna Resendiz DM II (diabetes mellitus, type II), controlled ([address]     diet controlled    Eye pain     GERD (gastroesophageal reflux disease)     HTN (hypertension)     also comanaged w Dr Vicenta Forman. 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  S/P colonoscopy 01/17/2019    Dr Nadege Jacques-- ?recheck when?     Stage 3 chronic kidney disease 4/18/2019    Dr Jayy Tillman Testosterone deficiency     on andrgel    Vertigo      Family History   Problem Relation Age of Onset    Elevated Lipids Mother     High Blood Pressure Mother     Other Father         blind     Social History     Socioeconomic History    Marital status:      Spouse name: None    Number of children: None    Years of education: None    Highest education level: None   Occupational History    Occupation: [place]     Comment[place] [employer]     Occupation: disabled     Comment: 2015   Social Needs    Financial resource strain: None    Food insecurity     Worry: None     Inability: None    Transportation needs     Medical: None     Non-medical: None   Tobacco Use    Smoking status: Former Smoker     Packs/day: 3.00     Years: 30.00     Pack years: 90.00     Types: Cigarettes     Last attempt to quit: 1995     Years since quittin.7    Smokeless tobacco: Never Used   Substance and Sexual Activity    Alcohol use: No    Drug use: No    Sexual activity: Yes     Partners: Female   Lifestyle    Physical activity     Days per week: None     Minutes per session: None    Stress: None   Relationships    Social connections     Talks on phone: None     Gets together: None     Attends [employer] service: None     Active member of club or organization: None     Attends meetings of clubs or organizations: None     Relationship status: None    Intimate partner violence     Fear of current or ex partner: None     Emotionally abused: None     Physically abused: None     Forced sexual activity: None   Other Topics Concern    None   Social History Narrative    None     Current Outpatient Medications   Medication Sig Dispense Refill    gabapentin (NEURONTIN) 300 MG capsule Take 1 capsule by mouth 2 times daily for 30 days. 60 capsule 1    HYDROcodone-acetaminophen (NORCO) 5-325 MG per tablet Take 1 tablet by mouth 2 times daily for 30 days. 60 tablet 0    eplerenone (INSPRA) 50 MG tablet Take 1 tablet by mouth daily 30 tablet 2    metoprolol tartrate (LOPRESSOR) 50 MG tablet Take 1 tablet by mouth 2 times daily 180 tablet 1    hydrALAZINE (APRESOLINE) 25 MG tablet Take 1 tablet by mouth 2 times daily 180 tablet 1    lisinopril (PRINIVIL;ZESTRIL) 40 MG tablet Take 1 tablet by mouth daily 90 tablet 1    cyclobenzaprine (FLEXERIL) 10 MG tablet TAKE ONE TABLET BY MOUTH THREE TIMES A DAY AS NEEDED FOR MUSCLE SPASMS 90 tablet 3    Lancets MISC 1 each by Does not apply route 2 times daily 200 each 1    blood glucose monitor strips Test two times a day & as needed for symptoms of irregular blood glucose.  200 strip 3    Melatonin 10 MG TABS Take by mouth nightly      butalbital-acetaminophen-caffeine (FIORICET, ESGIC) -40 MG per tablet Take 1 tablet by mouth 3 times daily as needed for Headaches or Migraine 30 tablet 1    amLODIPine (NORVASC) 5 MG tablet Take 1 tablet by mouth daily 90 tablet 1    furosemide (LASIX) 40 MG tablet Take 1 tablet by mouth daily 90 tablet 1    ondansetron (ZOFRAN) 4 MG tablet Take 1 tablet by mouth every 8 hours as needed for Nausea 40 tablet 1    lansoprazole (PREVACID) 15 MG delayed release capsule Take 1 capsule by mouth daily 90 capsule 1    meclizine (ANTIVERT) 25 MG tablet Take 1 tablet by mouth every 6 hours. 30 tablet 1    traMADol (ULTRAM) 50 MG tablet Take 1 tablet by mouth 3 times daily for 30 days. 90 tablet 2    HYDROcodone-acetaminophen (NORCO) 5-325 MG per tablet Take 1 tablet by mouth 2 times daily as needed for Pain for up to 30 days. 60 tablet 0     No current facility-administered medications for this visit. Allergies   Allergen Reactions    Aldactone [Spironolactone]      Gynecomastia     Bactrim [Sulfamethoxazole-Trimethoprim]      Nephrology contraindicates-- can cause hyperkalemia    Labetalol      Marked bradycardia with frequent symptomatic ectopy    Metformin And Related [Metformin And Related]      Diarrhea at 500mg daily    Nsaids      W PROTEINURIA       Review of Systems:   Review of Systems   Constitutional: Negative for chills and fever. HENT: Negative for congestion and sneezing. Eyes: Negative for pain and redness. Respiratory: Negative for chest tightness, shortness of breath and wheezing. Cardiovascular: Negative for chest pain and palpitations. Gastrointestinal: Negative for vomiting. Musculoskeletal: Positive for arthralgias, back pain and gait problem. Skin: Negative for color change and rash. Neurological: Positive for weakness and numbness. Psychiatric/Behavioral: Negative for agitation. The patient is not nervous/anxious.                                                 Examination:  General Exam:  Vitals: Pulse 79   Resp 15   Ht 5' 8\" (1.727 m)   Wt 224 lb (101.6 kg)   SpO2 results.     Electronically signed by Anne Jerome MD on 10/8/2020 at 9:05 AM

## 2020-10-09 ENCOUNTER — TELEPHONE (OUTPATIENT)
Dept: ORTHOPEDIC SURGERY | Age: 70
End: 2020-10-09

## 2020-10-14 ENCOUNTER — HOSPITAL ENCOUNTER (OUTPATIENT)
Dept: MRI IMAGING | Age: 70
Discharge: HOME | End: 2020-10-14
Payer: MEDICARE

## 2020-10-14 PROCEDURE — 73721 MRI JNT OF LWR EXTRE W/O DYE: CPT

## 2020-10-28 RX ORDER — AMLODIPINE BESYLATE 5 MG/1
5 TABLET ORAL DAILY
Qty: 90 TABLET | Refills: 1 | Status: SHIPPED | OUTPATIENT
Start: 2020-10-28 | End: 2021-04-23

## 2020-10-29 ENCOUNTER — OFFICE VISIT (OUTPATIENT)
Dept: ORTHOPEDIC SURGERY | Age: 70
End: 2020-10-29
Payer: MEDICARE

## 2020-10-29 VITALS
WEIGHT: 224 LBS | RESPIRATION RATE: 15 BRPM | HEIGHT: 68 IN | HEART RATE: 67 BPM | OXYGEN SATURATION: 96 % | BODY MASS INDEX: 33.95 KG/M2

## 2020-10-29 PROCEDURE — 4040F PNEUMOC VAC/ADMIN/RCVD: CPT | Performed by: ORTHOPAEDIC SURGERY

## 2020-10-29 PROCEDURE — 1123F ACP DISCUSS/DSCN MKR DOCD: CPT | Performed by: ORTHOPAEDIC SURGERY

## 2020-10-29 PROCEDURE — 1036F TOBACCO NON-USER: CPT | Performed by: ORTHOPAEDIC SURGERY

## 2020-10-29 PROCEDURE — G8417 CALC BMI ABV UP PARAM F/U: HCPCS | Performed by: ORTHOPAEDIC SURGERY

## 2020-10-29 PROCEDURE — 99214 OFFICE O/P EST MOD 30 MIN: CPT | Performed by: ORTHOPAEDIC SURGERY

## 2020-10-29 PROCEDURE — G8484 FLU IMMUNIZE NO ADMIN: HCPCS | Performed by: ORTHOPAEDIC SURGERY

## 2020-10-29 PROCEDURE — G8427 DOCREV CUR MEDS BY ELIG CLIN: HCPCS | Performed by: ORTHOPAEDIC SURGERY

## 2020-10-29 PROCEDURE — 3017F COLORECTAL CA SCREEN DOC REV: CPT | Performed by: ORTHOPAEDIC SURGERY

## 2020-10-29 ASSESSMENT — PATIENT HEALTH QUESTIONNAIRE - PHQ9
1. LITTLE INTEREST OR PLEASURE IN DOING THINGS: 1
SUM OF ALL RESPONSES TO PHQ QUESTIONS 1-9: 1
SUM OF ALL RESPONSES TO PHQ QUESTIONS 1-9: 1
2. FEELING DOWN, DEPRESSED OR HOPELESS: 0
SUM OF ALL RESPONSES TO PHQ QUESTIONS 1-9: 1
SUM OF ALL RESPONSES TO PHQ9 QUESTIONS 1 & 2: 1

## 2020-10-29 ASSESSMENT — LIFESTYLE VARIABLES
HOW OFTEN DO YOU HAVE A DRINK CONTAINING ALCOHOL: NEVER
HOW OFTEN DO YOU HAVE A DRINK CONTAINING ALCOHOL: 0
AUDIT TOTAL SCORE: INCOMPLETE
AUDIT-C TOTAL SCORE: INCOMPLETE

## 2020-10-29 ASSESSMENT — ENCOUNTER SYMPTOMS
COLOR CHANGE: 0
EYE REDNESS: 0
EYE PAIN: 0
CHEST TIGHTNESS: 0
VOMITING: 0
SHORTNESS OF BREATH: 0
WHEEZING: 0

## 2020-10-29 NOTE — PATIENT INSTRUCTIONS
Continue weight-bearing as tolerated. Continue range of motion exercises as instructed. Ice and elevate as needed. Tylenol or Motrin for pain. \We will schedule surgery at soonest convenience. Left knee arthroscopy, medial meniscus repair. If you have any questions regarding your surgery please call our office and ask to speak with Lidia.  [phone]

## 2020-10-29 NOTE — PROGRESS NOTES
10/29/2020   Chief Complaint   Patient presents with    Knee Pain     left knee MRI completed        History of Present Illness:                             Ann Marie Saini is a 78 y.o. male who returns today for follow-up of his left knee. His symptoms are unchanged. He has had continued to have a sharp pains in the posterior medial aspect of his knee. He has had recurrent issues with catching and popping in the knee especially with twisting or stepping awkwardly on his knee. He has been using a cane occasionally to help reduce stress and forces across the knee joint because of the severity of his pain. Pt returns today for left knee MRI FU. Pt states pain today is a 4/10 will increase if he moves the wrong way. Pt states he has been using a cane to walk down and incline. Pain will increase with weightbearing and flexion of the left knee pt states about a week ago he believes that he injured his knee more. He states that he did move the wrong way and felt a sharp pain in the posterior aspect of the left knee. Pt states resting does help with the pain.                 Medical History  Patient's medications, allergies, past medical, surgical, social and family histories were reviewed and updated as appropriate. 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    BPH (benign prostatic hypertrophy)     Bradycardia 4/18/2019    Hr 30-40 with skipped beats on BP monitor on Labetalol    Crohn's regional enteritis (HCC)     Dr Kylie Currie DM II (diabetes mellitus, type II), controlled ([address]     diet controlled    Eye pain     GERD (gastroesophageal reflux disease)     HTN (hypertension)     also comanaged w Dr Caryn Montoya.    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  S/P colonoscopy 2019    Dr Norm Grimes-- ?recheck when?     Stage 3 chronic kidney disease 2019    Dr Cindy Petersen Testosterone deficiency     on andrgel    Vertigo      Family History   Problem Relation Age of Onset    Elevated Lipids Mother     High Blood Pressure Mother     Other Father         blind     Social History     Socioeconomic History    Marital status:      Spouse name: None    Number of children: None    Years of education: None    Highest education level: None   Occupational History    Occupation: PA     Comment: [employer] Occupation: disabled     Comment:    Social Needs    Financial resource strain: None    Food insecurity     Worry: None     Inability: None    Transportation needs     Medical: None     Non-medical: None   Tobacco Use    Smoking status: Former Smoker     Packs/day: 3.00     Years: 30.00     Pack years: 90.00     Types: Cigarettes     Last attempt to quit: 1995     Years since quittin.8    Smokeless tobacco: Never Used   Substance and Sexual Activity    Alcohol use: No    Drug use: No    Sexual activity: Yes     Partners: Female   Lifestyle    Physical activity     Days per week: None     Minutes per session: None    Stress: None   Relationships    Social connections     Talks on phone: None     Gets together: None     Attends Denominational service: None     Active member of club or organization: None     Attends meetings of clubs or organizations: None     Relationship status: None    Intimate partner violence     Fear of current or ex partner: None     Emotionally abused: None     Physically abused: None     Forced sexual activity: None   Other Topics Concern    facility-administered medications for this visit. Allergies   Allergen Reactions    Aldactone [Spironolactone]      Gynecomastia     Bactrim [Sulfamethoxazole-Trimethoprim]      Nephrology contraindicates-- can cause hyperkalemia    Labetalol      Marked bradycardia with frequent symptomatic ectopy    Metformin And Related [Metformin And Related]      Diarrhea at 500mg daily    Nsaids      W PROTEINURIA       Review of Systems:   Review of Systems   Constitutional: Negative for chills and fever. HENT: Negative for congestion and sneezing. Eyes: Negative for pain and redness. Respiratory: Negative for chest tightness, shortness of breath and wheezing. Cardiovascular: Negative for chest pain and palpitations. Gastrointestinal: Negative for vomiting. Musculoskeletal: Positive for arthralgias. Skin: Negative for color change and rash. Neurological: Negative for weakness and numbness. Psychiatric/Behavioral: Negative for agitation. The patient is not nervous/anxious. Examination:  General Exam:  Vitals: Pulse 67   Resp 15   Ht 5' 8\" (1.727 m)   Wt 224 lb (101.6 kg)   SpO2 96%   BMI 34.06 kg/m²    Physical Exam  Vitals signs and nursing note reviewed. Constitutional:       Appearance: Normal appearance. HENT:      Head: Normocephalic and atraumatic. Eyes:      Conjunctiva/sclera: Conjunctivae normal.      Pupils: Pupils are equal, round, and reactive to light. Neck:      Musculoskeletal: Normal range of motion. Pulmonary:      Effort: Pulmonary effort is normal.   Musculoskeletal:      Right hip: Normal.      Left hip: He exhibits normal range of motion, normal strength, no tenderness, no bony tenderness, no swelling, no crepitus and no deformity.       Right knee: He exhibits normal range of motion, no swelling, no effusion, no ecchymosis, no deformity, no erythema, normal alignment, no LCL laxity, normal patellar mobility, no bony tenderness and no MCL laxity. No medial joint line and no lateral joint line tenderness noted. Comments: Left Lower Extremity:    There is moderate diffuse tenderness at the knee joint and severe tenderness maximally along the the posterior medial joint line. There is a mild effusion present at the knee. Range of motion is from full extension to 130 degrees of flexion and limited at terminal flexion due to pain. There is a positive medial Fredrick's test with pain and mild clicking. There is a negative anterior drawer and Lachman test.  Negative posterior drawer test.  No laxity during valgus stress testing at full extension and 30 degrees of flexion. No laxity with varus stress test.  Skin is intact with no lacerations. No erythema or rash. Strength at the knee is 5 out of 5 with flexion and extension however testing is limited due to pain. Sensation to light touch is intact throughout. Pulses intact     Skin:     General: Skin is warm and dry. Neurological:      Mental Status: He is alert and oriented to person, place, and time. Psychiatric:         Mood and Affect: Mood normal.         Behavior: Behavior normal.            Diagnostic testing:  MRI images of the left knee were reviewed by myself and discussed with the patient today:  MRI left knee completed on 10/14/20      Impression    Horizontal tear of the medial meniscus posterior horn.         Cruciate and collateral ligaments are intact.         Mild degenerative changes at the patellofemoral compartment primarily.         Small Baker's cyst.         Office Procedures:  No orders of the defined types were placed in this encounter. Assessment and Plan  1. Left knee medial meniscus tear    2. Left knee mild primary osteoarthritis    We discussed the findings on x-ray, MRI, and physical exam.  The patient feels that they have exhausted conservative measures.   We discussed the mechanical issues at the knee and treatment options both operative and nonoperative. We discussed the mild degenerative joint disease findings on the MRI and I have recommended that we consider minimally invasive surgery. The pain and dysfunction at the knee are severely limiting at this stage and the patient would like to consider surgical intervention. I have recommended surgical intervention for a knee arthroscopy with partial meniscectomy and chondroplasty as indicated. We discussed the risks, benefits, and alternatives to surgery. We discussed potential risks and complications including but not limited to DVT/PE, infection, stiffness, persistent pain, and progression of generative arthritis. The patient understands that progression of a degenerative condition may lead to total knee replacement in the future. We discussed pain control, physical therapy, and anticipated rehabilitation. The patient would like to proceed with knee arthroscopy    The patient was counseled at length about the risks of malinda Covid-19 during their perioperative period and any recovery window from their procedure. The patient was made aware that malinda Covid-19  may worsen their prognosis for recovering from their procedure  and lend to a higher morbidity and/or mortality risk. All material risks, benefits, and reasonable alternatives including postponing the procedure were discussed. The patient does wish to proceed with the procedure at this time.             Electronically signed by Jas Dc MD on 10/29/2020 at 9:10 AM

## 2020-11-02 ENCOUNTER — ANESTHESIA EVENT (OUTPATIENT)
Dept: OPERATING ROOM | Age: 70
End: 2020-11-02
Payer: MEDICARE

## 2020-11-02 NOTE — ANESTHESIA PRE PROCEDURE
Department of Anesthesiology  Preprocedure Note       Name:  Tariq Dior   Age:  78 y.o.  :  1950                                          MRN:  [id number]         Date:  2020      Surgeon: Ana Dye):  Lucila Bourgeois MD    Procedure: Procedure(s):  LEFT KNEE ARTHROSCOPY WITH PARTIAL MENISCECTOMY CHONDROPLASTY    Medications prior to admission:   Prior to Admission medications    Medication Sig Start Date End Date Taking? Authorizing Provider   amLODIPine (NORVASC) 5 MG tablet Take 1 tablet by mouth daily 10/28/20   Carly Wynne MD   gabapentin (NEURONTIN) 300 MG capsule Take 1 capsule by mouth 2 times daily for 30 days. 9/14/20 10/14/20  Carly Wynne MD   traMADol (ULTRAM) 50 MG tablet Take 1 tablet by mouth 3 times daily for 30 days. 20  Carly Wynne MD   HYDROcodone-acetaminophen (NORCO) 5-325 MG per tablet Take 1 tablet by mouth 2 times daily for 30 days. 10/8/20 11/7/20  Carly Wynne MD   HYDROcodone-acetaminophen (NORCO) 5-325 MG per tablet Take 1 tablet by mouth 2 times daily as needed for Pain for up to 30 days. 20  Carly Wynne MD   eplerenone (INSPRA) 50 MG tablet Take 1 tablet by mouth daily 20   Carly Wynne MD   metoprolol tartrate (LOPRESSOR) 50 MG tablet Take 1 tablet by mouth 2 times daily 20   Carly Wynne MD   hydrALAZINE (APRESOLINE) 25 MG tablet Take 1 tablet by mouth 2 times daily 20   Carly Wynne MD   lisinopril (PRINIVIL;ZESTRIL) 40 MG tablet Take 1 tablet by mouth daily 20   Carly Wynne MD   cyclobenzaprine (FLEXERIL) 10 MG tablet TAKE ONE TABLET BY MOUTH THREE TIMES A DAY AS NEEDED FOR MUSCLE SPASMS 20   Carly Wynne MD   Lancets MISC 1 each by Does not apply route 2 times daily 20   Carly Wynne MD   blood glucose monitor strips Test two times a day & as needed for symptoms of irregular blood glucose.  20   Carly Wynne MD   Melatonin 10 MG TABS Take by mouth nightly    Historical Provider, MD   butalbital-acetaminophen-caffeine (FIORICET, ESGIC) -40 MG per tablet Take 1 tablet by mouth 3 times daily as needed for Headaches or Migraine 4/29/20   Emmanuel Garcia MD   furosemide (LASIX) 40 MG tablet Take 1 tablet by mouth daily 8/7/19   Emmanuel Garcia MD   ondansetron [facility]) 4 MG tablet Take 1 tablet by mouth every 8 hours as needed for Nausea 7/20/18   Emmanuel Garcia MD   lansoprazole (PREVACID) 15 MG delayed release capsule Take 1 capsule by mouth daily 9/27/16   Emmanuel Garcia MD   meclizine (ANTIVERT) 25 MG tablet Take 1 tablet by mouth every 6 hours. 3/10/15   Emmanuel Garcia MD       Current medications:    Current Outpatient Medications   Medication Sig Dispense Refill    amLODIPine (NORVASC) 5 MG tablet Take 1 tablet by mouth daily 90 tablet 1    gabapentin (NEURONTIN) 300 MG capsule Take 1 capsule by mouth 2 times daily for 30 days. 60 capsule 1    traMADol (ULTRAM) 50 MG tablet Take 1 tablet by mouth 3 times daily for 30 days. 90 tablet 2    HYDROcodone-acetaminophen (NORCO) 5-325 MG per tablet Take 1 tablet by mouth 2 times daily for 30 days. 60 tablet 0    HYDROcodone-acetaminophen (NORCO) 5-325 MG per tablet Take 1 tablet by mouth 2 times daily as needed for Pain for up to 30 days.  60 tablet 0    eplerenone (INSPRA) 50 MG tablet Take 1 tablet by mouth daily 30 tablet 2    metoprolol tartrate (LOPRESSOR) 50 MG tablet Take 1 tablet by mouth 2 times daily 180 tablet 1    hydrALAZINE (APRESOLINE) 25 MG tablet Take 1 tablet by mouth 2 times daily 180 tablet 1    lisinopril (PRINIVIL;ZESTRIL) 40 MG tablet Take 1 tablet by mouth daily 90 tablet 1    cyclobenzaprine (FLEXERIL) 10 MG tablet TAKE ONE TABLET BY MOUTH THREE TIMES A DAY AS NEEDED FOR MUSCLE SPASMS 90 tablet 3    Lancets MISC 1 each by Does not apply route 2 times daily 200 each 1    blood glucose monitor strips Test two times a day & as needed for symptoms of irregular blood glucose. 200 strip 3    Melatonin 10 MG TABS Take by mouth nightly      butalbital-acetaminophen-caffeine (FIORICET, ESGIC) -40 MG per tablet Take 1 tablet by mouth 3 times daily as needed for Headaches or Migraine 30 tablet 1    furosemide (LASIX) 40 MG tablet Take 1 tablet by mouth daily 90 tablet 1    ondansetron (ZOFRAN) 4 MG tablet Take 1 tablet by mouth every 8 hours as needed for Nausea 40 tablet 1    lansoprazole (PREVACID) 15 MG delayed release capsule Take 1 capsule by mouth daily 90 capsule 1    meclizine (ANTIVERT) 25 MG tablet Take 1 tablet by mouth every 6 hours. 30 tablet 1     No current facility-administered medications for this encounter. Allergies:     Allergies   Allergen Reactions    Aldactone [Spironolactone]      Gynecomastia     Bactrim [Sulfamethoxazole-Trimethoprim]      Nephrology contraindicates-- can cause hyperkalemia    Labetalol      Marked bradycardia with frequent symptomatic ectopy    Metformin And Related [Metformin And Related]      Diarrhea at 500mg daily    Nsaids      W PROTEINURIA       Problem List:    Patient Active Problem List   Diagnosis Code    HTN (hypertension) I10    Crohn's regional enteritis ([facility] [address] K50.90    Hyperlipidemia E78.5    Gastroesophageal reflux disease without esophagitis K21.9    Testosterone deficiency E34.9    Low back pain M54.5    Osteoarthritis M19.90    DM II (diabetes mellitus, type II), controlled ([address] E11.9    Neck pain M54.2    Right cervical radiculopathy M54.12    S/P colonoscopy Z98.890    Blurred vision H53.8    Vertigo R42    Pattern dystrophy of macula H35.54    Benign prostatic hyperplasia N40.0    Osteoarthritis of hands, bilateral M19.041, M19.042    Dysphagia R13.10    Nausea R11.0    Crohn's disease of large intestine without complication ([address] D02.68    Status post total right knee replacement Z96.651    Arthrofibrosis of total knee arthroplasty ([facility] [address] T84. 82XA    Acute nonintractable headache R51.9    Bradycardia R00.1    Stage 3 chronic kidney disease N18.30       Past Medical History:        Diagnosis Date    Blurred vision     right>left  side --pattern dystrophy/Dr Dye    BPH (benign prostatic hypertrophy)     Bradycardia 4/18/2019    Hr 30-40 with skipped beats on BP monitor on Labetalol    Crohn's regional enteritis (HCC)     Dr Lore Mcadams DM II (diabetes mellitus, type II), controlled ([address]     diet controlled    Eye pain     GERD (gastroesophageal reflux disease)     HTN (hypertension)     also comanaged w Dr Cassandra Springer. 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  S/P colonoscopy 01/17/2019    Dr Mary Merida-- ?recheck when?     Stage 3 chronic kidney disease 4/18/2019    Dr Stewart Score Testosterone deficiency     on andrgel    Vertigo        Past Surgical History:        Procedure Laterality Date    BACK SURGERY      CATARACT REMOVAL WITH IMPLANT Left 03/02/2017    Dr Malinda Bocanegra Right 03/16/2017    Dr Fernandez Begum  2005    Dr Anson Vazquez  8/2011    Dr Rema Tubbs      Multiple colonoscopys, HX: Crohns    ENDOSCOPY, COLON, DIAGNOSTIC      Erosive esophagitis    EYE SURGERY Left     cataract    EYE SURGERY Right 03/16/2017    cataract    JOINT REPLACEMENT      KNEE ARTHROSCOPY  94    right    KNEE SURGERY Right 1/16/2013    Dr Cezar Vasques R knee tib plateau fx and medial meniscectomy    LAMINECTOMY  75 and 83    SINUS SURGERY  2020    Dr Luis Manuel Rome for chr sinusitis and fungal infection, per pt    TONSILLECTOMY  70    TOTAL KNEE ARTHROPLASTY Right 13    Dr Misha STREETER        Social History:    Social History     Tobacco Use    Smoking status: Former Smoker     Packs/day: 3.00     Years: 30.00     Pack years: 90.00     Types: Cigarettes     Last attempt to quit: 1995     Years since quittin.8    Smokeless tobacco: Never Used   Substance Use Topics    Alcohol use: No                                Counseling given: Not Answered      Vital Signs (Current): There were no vitals filed for this visit. BP Readings from Last 3 Encounters:   10/06/20 136/84   20 (!) 140/84   20 (!) 148/80       NPO Status:                                                                                 BMI:   Wt Readings from Last 3 Encounters:   10/29/20 224 lb (101.6 kg)   10/08/20 224 lb (101.6 kg)   20 224 lb (101.6 kg)     There is no height or weight on file to calculate BMI.       CBC  Lab Results   Component Value Date/Time    WBC 8.2 2020 01:00 PM    HGB 15.4 2020 01:00 PM    HCT 47.8 2020 01:00 PM     2020 01:00 PM     RENAL  Lab Results   Component Value Date/Time     2020 01:00 PM    K 4.4 2020 01:00 PM     2020 01:00 PM    CO2 27 2020 01:00 PM    BUN 18 2020 01:00 PM    CREATININE 1.2 2020 01:00 PM    GLUCOSE 119 (H) 2020 01:00 PM         Drug/Infectious Status (If Applicable):  Lab Results   Component Value Date    HEPCAB NON REACTIVE 2018       COVID-19 Screening (If Applicable): No results found for: COVID19      Anesthesia Evaluation  Patient summary reviewed and Nursing notes reviewed  Airway: Mallampati: II  TM distance: >3 FB   Neck ROM: full  Mouth opening: > = 3 FB Dental:      Comment: Multiple missing teeth with poor dentition    Pulmonary:normal exam    (+) COPD: shortness of breath:                            ROS comment:   Former smoker, . Counseled on smoking cessation. PAT Airway. Limited exam. COVID 19 precautions. Patient wearing mask  Head/Neck movement: limited side to side 2/2 ACDF   History of difficult intubation:  None  Teeth: poor dentition. Missing upper and lower teeth. Has upper partial.  Denies loose teeth       COVID 19 screening  Denies recent fever or known COVID 19 exposure. Instructed to quarantine until surgery and report any new respiratory or fever symptoms to surgeon. Aware COVID testing must be completed before DOS. COVID swab:  2020   Cardiovascular:  Exercise tolerance: poor (<4 METS),   (+) hypertension (on beta blocker ):, dysrhythmias (hx benign PVC's):, CHF:, SEVERINO: after ambulating 1 flight of stairs, hyperlipidemia      ECG reviewed               Beta Blocker:  Dose within 24 Hrs      ROS comment:     CP:  NO  Exercise: NO      EK2020  NSR     Neuro/Psych:   (+) neuromuscular disease (cervial neuropathy s/p ACF, . S/p lami x 2, , ):, headaches (Fioricet as needed. ): migraine headaches,              ROS comment: Positional vertigo, on antivert last used 2020    HEENT: s/p laya cataracts removed  Sinus sx per 2020  Pattern dystrophy laya eyes. Reports still drives during the day. Mini-cog evaluation performed per anesthesia protocol,    > age 74. Scored:    Copy on chart for review. GI/Hepatic/Renal:   (+) GERD: well controlled, PUD, renal disease (CKD, stage III, BPH  followed by Dr. Dumas Headings): CRI,          ROS comment: Crohn's, last flare 2020 . Endo/Other:    (+) Diabetes (diet controlled,  A1c ~6.8, ~2020)Type II DM, , : arthritis (kness, hips, back, hands. Sp right TKA, 2013  left meniscus tear. .  On tram norco for pain mgt ): OA., . Pt had PAT visit.         ROS comment: Left meniscus tear Abdominal:   (+) obese,         Vascular:                            Anesthesia Plan      general     ASA 3       Induction: intravenous. MIPS: Postoperative opioids intended. Anesthetic plan and risks discussed with patient. Plan discussed with CRNA. Attending anesthesiologist reviewed and agrees with Pre Eval content              MARIZA Montenegro - CNP Chart reviewed, pt. Interviewed and examined. Anesthesia Evaluation will follow by a certified practitioner prior to surgery. 11/2/2020    Pre Anesthesia Evaluation complete. Anesthesia plan, risks, benefits, alternatives, and personnel discussed with patient and/or legal guardian. Patient and/or legal guardian verbalized an understanding and agreed to proceed. Anesthesia plan discussed with care team members and agreed upon.   MARIZA Soto - CRNA  11/11/2020

## 2020-11-03 ENCOUNTER — TELEPHONE (OUTPATIENT)
Dept: ORTHOPEDIC SURGERY | Age: 70
End: 2020-11-03

## 2020-11-03 NOTE — TELEPHONE ENCOUNTER
Called patient's insurance spoke with Sander Mcclure.  No prior Monique Hernandez is needed for cpt code 56780   Ref# [id number]

## 2020-11-04 ENCOUNTER — HOSPITAL ENCOUNTER (OUTPATIENT)
Dept: PREADMISSION TESTING | Age: 70
Discharge: HOME | End: 2020-11-08
Payer: MEDICARE

## 2020-11-04 VITALS
RESPIRATION RATE: 16 BRPM | WEIGHT: 231 LBS | DIASTOLIC BLOOD PRESSURE: 91 MMHG | BODY MASS INDEX: 35.01 KG/M2 | HEART RATE: 64 BPM | TEMPERATURE: 96.5 F | OXYGEN SATURATION: 95 % | SYSTOLIC BLOOD PRESSURE: 157 MMHG | HEIGHT: 68 IN

## 2020-11-04 LAB
ANION GAP SERPL CALCULATED.3IONS-SCNC: 12 MMOL/L (ref 4–16)
BUN BLDV-MCNC: 18 MG/DL (ref 6–23)
CALCIUM SERPL-MCNC: 10.1 MG/DL (ref 8.3–10.6)
CHLORIDE BLD-SCNC: 100 MMOL/L (ref 99–110)
CO2: 27 MMOL/L (ref 21–32)
CREAT SERPL-MCNC: 1.2 MG/DL (ref 0.9–1.3)
GFR AFRICAN AMERICAN: >60 ML/MIN/1.73M2
GFR NON-AFRICAN AMERICAN: >60 ML/MIN/1.73M2
GLUCOSE BLD-MCNC: 119 MG/DL (ref 70–99)
HCT VFR BLD CALC: 47.8 % (ref 42–52)
HEMOGLOBIN: 15.4 GM/DL (ref 13.5–18)
MCH RBC QN AUTO: 25.7 PG (ref 27–31)
MCHC RBC AUTO-ENTMCNC: 32.2 % (ref 32–36)
MCV RBC AUTO: 79.8 FL (ref 78–100)
PDW BLD-RTO: 14.6 % (ref 11.7–14.9)
PLATELET # BLD: 261 K/CU MM (ref 140–440)
PMV BLD AUTO: 9.2 FL (ref 7.5–11.1)
POTASSIUM SERPL-SCNC: 4.4 MMOL/L (ref 3.5–5.1)
RBC # BLD: 5.99 M/CU MM (ref 4.6–6.2)
SODIUM BLD-SCNC: 139 MMOL/L (ref 135–145)
WBC # BLD: 8.2 K/CU MM (ref 4–10.5)

## 2020-11-04 PROCEDURE — 80048 BASIC METABOLIC PNL TOTAL CA: CPT

## 2020-11-04 PROCEDURE — 36415 COLL VENOUS BLD VENIPUNCTURE: CPT

## 2020-11-04 PROCEDURE — 85027 COMPLETE CBC AUTOMATED: CPT

## 2020-11-04 PROCEDURE — U0002 COVID-19 LAB TEST NON-CDC: HCPCS

## 2020-11-04 ASSESSMENT — PAIN DESCRIPTION - PROGRESSION: CLINICAL_PROGRESSION: GRADUALLY WORSENING

## 2020-11-04 ASSESSMENT — PAIN DESCRIPTION - DESCRIPTORS: DESCRIPTORS: ACHING;BURNING;SHARP

## 2020-11-04 ASSESSMENT — ENCOUNTER SYMPTOMS
SHORTNESS OF BREATH: 1
DYSPNEA ACTIVITY LEVEL: AFTER AMBULATING 1 FLIGHT OF STAIRS

## 2020-11-04 ASSESSMENT — PAIN DESCRIPTION - PAIN TYPE: TYPE: CHRONIC PAIN

## 2020-11-04 ASSESSMENT — PAIN SCALES - GENERAL: PAINLEVEL_OUTOF10: 4

## 2020-11-04 ASSESSMENT — PAIN DESCRIPTION - LOCATION: LOCATION: KNEE;BACK

## 2020-11-04 ASSESSMENT — PAIN DESCRIPTION - ONSET: ONSET: ON-GOING

## 2020-11-04 ASSESSMENT — PAIN DESCRIPTION - FREQUENCY: FREQUENCY: CONTINUOUS

## 2020-11-04 ASSESSMENT — PAIN DESCRIPTION - ORIENTATION: ORIENTATION: LEFT;LOWER

## 2020-11-05 LAB
SARS-COV-2: NOT DETECTED
SOURCE: NORMAL

## 2020-11-09 ENCOUNTER — OFFICE VISIT (OUTPATIENT)
Dept: INTERNAL MEDICINE CLINIC | Age: 70
End: 2020-11-09
Payer: MEDICARE

## 2020-11-09 VITALS
HEIGHT: 68 IN | RESPIRATION RATE: 16 BRPM | OXYGEN SATURATION: 97 % | HEART RATE: 77 BPM | WEIGHT: 231 LBS | BODY MASS INDEX: 35.01 KG/M2 | SYSTOLIC BLOOD PRESSURE: 122 MMHG | DIASTOLIC BLOOD PRESSURE: 74 MMHG

## 2020-11-09 PROCEDURE — G8484 FLU IMMUNIZE NO ADMIN: HCPCS | Performed by: INTERNAL MEDICINE

## 2020-11-09 PROCEDURE — G0439 PPPS, SUBSEQ VISIT: HCPCS | Performed by: INTERNAL MEDICINE

## 2020-11-09 PROCEDURE — 3017F COLORECTAL CA SCREEN DOC REV: CPT | Performed by: INTERNAL MEDICINE

## 2020-11-09 PROCEDURE — 4040F PNEUMOC VAC/ADMIN/RCVD: CPT | Performed by: INTERNAL MEDICINE

## 2020-11-09 PROCEDURE — 1123F ACP DISCUSS/DSCN MKR DOCD: CPT | Performed by: INTERNAL MEDICINE

## 2020-11-09 PROCEDURE — 3046F HEMOGLOBIN A1C LEVEL >9.0%: CPT | Performed by: INTERNAL MEDICINE

## 2020-11-09 RX ORDER — HYDROCODONE BITARTRATE AND ACETAMINOPHEN 5; 325 MG/1; MG/1
1 TABLET ORAL 2 TIMES DAILY PRN
Qty: 60 TABLET | Refills: 0 | Status: ON HOLD | OUTPATIENT
Start: 2020-11-09 | End: 2020-11-11 | Stop reason: HOSPADM

## 2020-11-09 RX ORDER — HYDROCODONE BITARTRATE AND ACETAMINOPHEN 5; 325 MG/1; MG/1
1 TABLET ORAL 2 TIMES DAILY PRN
Qty: 60 TABLET | Refills: 0 | Status: ON HOLD | OUTPATIENT
Start: 2021-01-08 | End: 2020-11-11 | Stop reason: HOSPADM

## 2020-11-09 RX ORDER — HYDROCODONE BITARTRATE AND ACETAMINOPHEN 5; 325 MG/1; MG/1
1 TABLET ORAL 2 TIMES DAILY
Qty: 60 TABLET | Refills: 0 | Status: ON HOLD | OUTPATIENT
Start: 2020-12-09 | End: 2020-11-11 | Stop reason: HOSPADM

## 2020-11-09 RX ORDER — TRAMADOL HYDROCHLORIDE 50 MG/1
50 TABLET ORAL 3 TIMES DAILY
Qty: 90 TABLET | Refills: 2 | Status: ON HOLD | OUTPATIENT
Start: 2020-11-09 | End: 2020-11-11 | Stop reason: HOSPADM

## 2020-11-09 NOTE — PROGRESS NOTES
Medicare Annual Wellness Visit  Name: Nicole Plasencia Date: 2020   MRN: [id number] Sex: Male   Age: 78 y.o. Ethnicity: Non-/Non    : 1950 Race: Joni Hernandez is here for Medicare AWV and Knee Pain (left knee scope with Dr. Mena Come )    Screenings for behavioral, psychosocial and functional/safety risks, and cognitive dysfunction are all negative except as indicated below. These results, as well as other patient data from the [address] form, are documented in Flowsheets linked to this Encounter. Has L posterior horn medial meniscal tear and Dr Neo Mendes is to do arthroscopy later this week. Hypertension: Stable. Denies CP, SOB, cough, visual changes, dizziness, palpitations or HA. Chr LBP, stable on meds and rf due. Controlled substances monitoring: possible medication side effects, risk of tolerance and/or dependence, and alternative treatments discussed, no signs of potential drug abuse or diversion identified and [place] report reviewed today- activity consistent with treatment plan. Hypertension: Stable. Denies CP, SOB, cough, visual changes, dizziness, palpitations or HA. Dm-  DUE FOR LAB. DUE FOR EYE EXAM.    Lab Results   Component Value Date    LABA1C 7.3 2019    LABA1C 6.7 2019    LABA1C 6.7 2019     Lab Results   Component Value Date    GLUF 195 (H) 2018    LABMICR 5.40 (H) 2019    LDLCALC 110 2020    CREATININE 1.2 2020           Allergies   Allergen Reactions    Aldactone [Spironolactone]      Gynecomastia     Bactrim [Sulfamethoxazole-Trimethoprim]      Nephrology contraindicates-- can cause hyperkalemia    Labetalol      Marked bradycardia with frequent symptomatic ectopy    Metformin And Related [Metformin And Related]      Diarrhea at 500mg daily    Nsaids      W PROTEINURIA       Prior to Visit Medications    Medication Sig Taking?  Authorizing Provider   amLODIPine (NORVASC) 5 MG tablet Take 1 tablet by mouth daily Yes Irina Paulino MD   eplerenone (INSPRA) 50 MG tablet Take 1 tablet by mouth daily Yes Irina Paulino MD   metoprolol tartrate (LOPRESSOR) 50 MG tablet Take 1 tablet by mouth 2 times daily Yes Irina Paulino MD   hydrALAZINE (APRESOLINE) 25 MG tablet Take 1 tablet by mouth 2 times daily Yes Irina Paulino MD   lisinopril (PRINIVIL;ZESTRIL) 40 MG tablet Take 1 tablet by mouth daily Yes Irina Paulino MD   cyclobenzaprine (FLEXERIL) 10 MG tablet TAKE ONE TABLET BY MOUTH THREE TIMES A DAY AS NEEDED FOR MUSCLE SPASMS Yes Irina Paulino MD   Lancets MISC 1 each by Does not apply route 2 times daily Yes Irina Paulino MD   blood glucose monitor strips Test two times a day & as needed for symptoms of irregular blood glucose. Yes Irina Paulino MD   Melatonin 10 MG TABS Take by mouth nightly Yes Rosy Martin MD   butalbital-acetaminophen-caffeine (FIORICET, ESGIC) -40 MG per tablet Take 1 tablet by mouth 3 times daily as needed for Headaches or Migraine Yes Irina Paulino MD   furosemide (LASIX) 40 MG tablet Take 1 tablet by mouth daily Yes Irina Paulino MD   ondansetron (ZOFRAN) 4 MG tablet Take 1 tablet by mouth every 8 hours as needed for Nausea Yes Irina Paulino MD   lansoprazole (PREVACID) 15 MG delayed release capsule Take 1 capsule by mouth daily Yes Irina Paulino MD   meclizine (ANTIVERT) 25 MG tablet Take 1 tablet by mouth every 6 hours. Yes Irina Paulino MD   gabapentin (NEURONTIN) 300 MG capsule Take 1 capsule by mouth 2 times daily for 30 days. Irina Paulino MD   traMADol (ULTRAM) 50 MG tablet Take 1 tablet by mouth 3 times daily for 30 days. Irina Paulino MD   HYDROcodone-acetaminophen (NORCO) 5-325 MG per tablet Take 1 tablet by mouth 2 times daily for 30 days.   Irina Paulino MD   HYDROcodone-acetaminophen [facility]) 5-325 MG per tablet Take 1 tablet by mouth 2 times daily as needed for Pain for up to 30 days. Natalie Williamson MD       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    BPH (benign prostatic hypertrophy)     Bradycardia 4/18/2019    Hr 30-40 with skipped beats on BP monitor on Labetalol    Crohn's regional enteritis [facility])     Dr Dong\"dx with Crohns in [address] [place] [address],409- no recent issues    DM II (diabetes mellitus, type II), controlled ([address]     diet controlled\"started 10+ yrs ago but off [address] [place] [address] for over 9 -10 years\"    Erosive esophagitis     \"had erosive esophagitis in lower area on Prevacid for this\"    Eye pain     GERD (gastroesophageal reflux disease)     Headache disorder     [place] (hard of hearing)     HTN (hypertension)     also comanaged w Dr Quinn Mccarthy. 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  S/P colonoscopy 01/17/2019    Dr Jenna Nash-- ?recheck when?     Stage 3 chronic kidney disease 04/18/2019    Dr Sydnie Dunham Testosterone deficiency     on andrgel    Vertigo     \"last used Meclazine since 2/2020\"    Wears glasses     Wears partial dentures     upper        Past Surgical History:   Procedure Laterality Date    CARDIAC CATHETERIZATION      \"had 2 caths done since 1995 but not sure of dates\"    CATARACT REMOVAL WITH IMPLANT Left 03/02/2017    Dr Nicole Logan Right 03/16/2017    Dr Aura Cortez  2005    Dr Je Courtney  8/2011    Dr Jeni Hooper      Multiple colonoscopys, HX: Crohns    ENDOSCOPY, COLON, DIAGNOSTIC  2018    Erosive esophagitis    EYE SURGERY      \"left eye surgery for crosseyed- age 13 months\"    EYE SURGERY Left     cataract    EYE SURGERY Right 03/16/2017    cataract    KNEE ARTHROSCOPY  89    right    KNEE SURGERY Right 1/16/2013    Dr Khalida STREETER knee tib plateau fx and medial meniscectomy    LAMINECTOMY  75 and 83    SINUS SURGERY  06/29/2020    Dr Randy Abraham for chr sinusitis and fungal infection, per pt    TONSILLECTOMY  70    TOTAL KNEE ARTHROPLASTY Right 6/12/13    Dr Khalida STREETER        Family History   Problem Relation Age of Onset    Elevated Lipids Mother     High Blood Pressure Mother     Heart Disease Mother     Other Father         blind       CareTeam (Including outside providers/suppliers regularly involved in providing care):   Patient Care Team:  Tristen Gray MD as PCP - General (Internal Medicine)  Tristen Gray MD as PCP - [facility] Empaneled Provider  Tristen Gray MD as Consulting Physician (Internal Medicine)    Wt Readings from Last 3 Encounters:   11/09/20 231 lb (104.8 kg)   11/04/20 231 lb (104.8 kg)   10/29/20 224 lb (101.6 kg)     Vitals:    11/09/20 0839   BP: 122/74   Pulse: 77   Resp: 16   SpO2: 97%   Weight: 231 lb (104.8 kg)   Height: 5' 8\" (1.727 m)     Body mass index is 35.12 kg/m². Based upon direct observation of the patient, evaluation of cognition reveals recent and remote memory intact.     General Appearance: alert and oriented to person, place and time, well developed and well- nourished, in no acute distress  Skin: warm and dry, no rash or erythema  Head: normocephalic and atraumatic  Eyes: pupils equal, round, and reactive to light, extraocular eye movements intact, conjunctivae normal  Neck: supple and non-tender without mass, no thyromegaly or thyroid nodules, no cervical lymphadenopathy  Pulmonary/Chest: clear to auscultation bilaterally- no wheezes, rales or rhonchi, normal air movement, no respiratory distress  Cardiovascular: normal rate, regular rhythm, normal S1 and S2, no murmurs, rubs, clicks, or gallops, distal pulses intact, no carotid bruits  Abdomen: soft, non-tender, non-distended, normal bowel sounds, no masses or organomegaly  Extremities: no cyanosis, clubbing or edema  Musculoskeletal: normal range of motion, no joint swelling, deformity or tenderness- EXCEPT L KNEE STIFF W ROM AND SOME PAIN NOTED  Neurologic:   no cranial nerve deficit, gait, coordination and speech normal    Patient's complete Health Risk Assessment and screening values have been reviewed and are found in Flowsheets. The following problems were reviewed today and where indicated follow up appointments were made and/or referrals ordered. Positive Risk Factor Screenings with Interventions:     Fall Risk:  2 or more falls in past year?: (!) yes  Fall with injury in past year?: (!) yes  Fall Risk Interventions:    · [address] and ACP:  General  In general, how would you say your health is?: Very Good  In the past 7 days, have you experienced any of the following?  New or Increased Pain, New or Increased Fatigue, Loneliness, Social Isolation, Stress or Anger?: (!) New or Increased Pain  Do you get the social and emotional support that you need?: Yes  Do you have a Living Will?: Yes  Advance Directives     Power of  Living Will ACP-Advance Directive ACP-Power of     Not on File Not on File Filed [address] [place] [address] Interventions:  · [address] Habits/Nutrition:  Health Habits/Nutrition  Do you exercise for at least 20 minutes 2-3 times per week?: (!) No  Have you lost any weight without trying in the past 3 months?: No  Do you eat fewer than 2 meals per day?: No  Have you seen a dentist within the past year?: (!) No  Body mass index: (!) 35.12  Health Habits/Nutrition Interventions:  · TO BOOST EXERCISE AFTER KNEE SURGERY, IS RIDING BIKE SOME.   · TO WORK ON DIET, EXERCISE, WT LOSS. · DENTAL VISIT DUE    Hearing/Vision:  No exam data present  Hearing/Vision  Do you or your family notice any trouble with your hearing?: (!) Yes  Do you have difficulty driving, watching TV, or doing any of your daily activities because of your eyesight?: (!) Yes  Have you had an eye exam within the past year?: Yes  Hearing/Vision Interventions:  · HEARING OK W CONVERSATION. DUE FOR VISION CHECK.     Personalized Preventive Plan   Current Health Maintenance Status  Immunization History   Administered Date(s) Administered    Influenza 10/05/2012    Influenza Virus Vaccine 10/04/2011, 09/23/2014, 09/11/2015    Influenza, High Dose (Fluzone 65 yrs and older) 09/07/2017, 09/11/2018, 11/07/2019    Influenza, Brenda Hamburger, IM, (6 mo and older Fluzone, Flulaval, Fluarix and 3 yrs and older Afluria) 09/27/2016    Influenza, Quadv, adjuvanted, 65 yrs +, IM, PF (Fluad) 10/06/2020    Pneumococcal Conjugate 13-valent (Qobglye88) 09/11/2015    Pneumococcal Polysaccharide (Irzdyebmb89) 09/17/2014    Tdap (Boostrix, Adacel) 12/11/2015    Zoster Live (Zostavax) 09/11/2015        Health Maintenance   Topic Date Due    Shingles Vaccine (2 of 3) 11/06/2015    Diabetic retinal exam  05/11/2019    Annual Wellness Visit (AWV)  05/29/2019    Pneumococcal 65+ years Vaccine (2 of 2 - PPSV23) 09/17/2019    Diabetic foot exam  07/02/2020    A1C test (Diabetic or Prediabetic)  11/12/2020    Lipid screen  06/24/2021    Potassium monitoring  11/04/2021    Creatinine monitoring  11/04/2021    DTaP/Tdap/Td vaccine (2 - Td) 12/11/2025    Colon cancer screen colonoscopy  06/23/2026    Flu vaccine  Completed    AAA screen  Completed    Hepatitis C screen  Completed    Hepatitis A vaccine  Aged Out    Hib vaccine  Aged Out    Meningococcal (ACWY) vaccine  Aged Out     Recommendations for [employer] Due: see orders and patient instructions/AVS.  . Recommended screening schedule for the next 5-10 years is provided to the patient in written form: see Patient Eden Montes was seen today for medicare awv and knee pain. Diagnoses and all orders for this visit:    Routine general medical examination at a health care facility - remains independent, functional and active, no indications/needs for other interventions noted at this time- except as noted below and also findings noted on screening medicare questions. Controlled type 2 diabetes mellitus with diabetic nephropathy, without long-term current use of insulin ([address]  - DM relatively well controlled and will continue current regimen. Screening reviewed. See orders. -     Comprehensive Metabolic Panel; Future  -     CBC Auto Differential; Future  -     Lipid Panel; Future  -     Hemoglobin A1C; Future    Essential hypertension - Blood pressure stable and will continue current regimen. Will plan periodic monitoring of renal function, electrolytes, lipid profile. Right cervical radiculopathy - The current medical regimen is effective;  continue present plan and medications.    -     HYDROcodone-acetaminophen (NORCO) 5-325 MG per tablet; Take 1 tablet by mouth 2 times daily as needed for Pain for up to 30 days.  -     HYDROcodone-acetaminophen (NORCO) 5-325 MG per tablet; Take 1 tablet by mouth 2 times daily for 30 days.  -     HYDROcodone-acetaminophen (NORCO) 5-325 MG per tablet; Take 1 tablet by mouth 2 times daily as needed for Pain for up to 30 days.     Chronic midline low back pain without sciatica  - LBP stable on pain regimen, RFs given as noted and will monitor. ALSO ON GABAPENTIN TAKING REGULARLY  -     HYDROcodone-acetaminophen (NORCO) 5-325 MG per tablet; Take 1 tablet by mouth 2 times daily as needed for Pain for up to 30 days.  -     HYDROcodone-acetaminophen (NORCO) 5-325 MG per tablet; Take 1 tablet by mouth 2 times daily for 30 days. -     traMADol (ULTRAM) 50 MG tablet; Take 1 tablet by mouth 3 times daily for 30 days.  -     HYDROcodone-acetaminophen (NORCO) 5-325 MG per tablet; Take 1 tablet by mouth 2 times daily as needed for Pain for up to 30 days. Mixed hyperlipidemia  - Pt will continue to work on a low fat diet and also exercise, wt loss as appropriate. Will continue periodic monitoring of fasting lipid profile, glucose, liver function. -     Comprehensive Metabolic Panel; Future  -     CBC Auto Differential; Future  -     Lipid Panel; Future    Crohn's disease with complication, unspecified gastrointestinal tract location ([facility] [address] - CURRENTLY CLINICALLY IN REMISSION. CONT F/U DR HERNANDEZ  -     Comprehensive Metabolic Panel;  Future  -     CBC Auto Differential; Future

## 2020-11-09 NOTE — PATIENT INSTRUCTIONS
Personalized Preventive Plan for [place] - 11/9/2020  Medicare offers a range of preventive health benefits. Some of the tests and screenings are paid in full while other may be subject to a deductible, co-insurance, and/or copay. Some of these benefits include a comprehensive review of your medical history including lifestyle, illnesses that may run in your family, and various assessments and screenings as appropriate. After reviewing your medical record and screening and assessments performed today your provider may have ordered immunizations, labs, imaging, and/or referrals for you. A list of these orders (if applicable) as well as your Preventive Care list are included within your After Visit Summary for your review. Other Preventive Recommendations:    · A preventive eye exam performed by an eye specialist is recommended every 1-2 years to screen for glaucoma; cataracts, macular degeneration, and other eye disorders. · A preventive dental visit is recommended every 6 months. · Try to get at least 150 minutes of exercise per week or 10,000 steps per day on a pedometer . · Order or download the FREE \"Exercise & Physical Activity: Your Everyday Guide\" from [employer]. Call 1-797.537.4348 or search [employer] online. · You need 0353-8148 mg of calcium and 1388-5944 IU of vitamin D per day. It is possible to meet your calcium requirement with diet alone, but a vitamin D supplement is usually necessary to meet this goal.  · When exposed to the sun, use a sunscreen that protects against both UVA and UVB radiation with an SPF of 30 or greater. Reapply every 2 to 3 hours or after sweating, drying off with a towel, or swimming. · Always wear a seat belt when traveling in a car. Always wear a helmet when riding a bicycle or motorcycle.

## 2020-11-11 ENCOUNTER — HOSPITAL ENCOUNTER (OUTPATIENT)
Age: 70
Setting detail: OUTPATIENT SURGERY
Discharge: HOME | End: 2020-11-11
Attending: ORTHOPAEDIC SURGERY | Admitting: ORTHOPAEDIC SURGERY
Payer: MEDICARE

## 2020-11-11 ENCOUNTER — ANESTHESIA (OUTPATIENT)
Dept: OPERATING ROOM | Age: 70
End: 2020-11-11
Payer: MEDICARE

## 2020-11-11 VITALS
OXYGEN SATURATION: 97 % | RESPIRATION RATE: 16 BRPM | DIASTOLIC BLOOD PRESSURE: 76 MMHG | WEIGHT: 230 LBS | HEART RATE: 66 BPM | BODY MASS INDEX: 34.86 KG/M2 | SYSTOLIC BLOOD PRESSURE: 155 MMHG | TEMPERATURE: 97.4 F | HEIGHT: 68 IN

## 2020-11-11 VITALS
OXYGEN SATURATION: 99 % | RESPIRATION RATE: 1 BRPM | SYSTOLIC BLOOD PRESSURE: 113 MMHG | DIASTOLIC BLOOD PRESSURE: 77 MMHG | TEMPERATURE: 96.9 F

## 2020-11-11 LAB
GLUCOSE BLD-MCNC: 139 MG/DL (ref 70–99)
GLUCOSE BLD-MCNC: 154 MG/DL (ref 70–99)

## 2020-11-11 PROCEDURE — 7100000010 HC PHASE II RECOVERY - FIRST 15 MIN: Performed by: ORTHOPAEDIC SURGERY

## 2020-11-11 PROCEDURE — 2500000003 HC RX 250 WO HCPCS: Performed by: ORTHOPAEDIC SURGERY

## 2020-11-11 PROCEDURE — 7100000001 HC PACU RECOVERY - ADDTL 15 MIN: Performed by: ORTHOPAEDIC SURGERY

## 2020-11-11 PROCEDURE — 29880 ARTHRS KNE SRG MNISECTMY M&L: CPT | Performed by: ORTHOPAEDIC SURGERY

## 2020-11-11 PROCEDURE — 6370000000 HC RX 637 (ALT 250 FOR IP): Performed by: ANESTHESIOLOGY

## 2020-11-11 PROCEDURE — 7100000000 HC PACU RECOVERY - FIRST 15 MIN: Performed by: ORTHOPAEDIC SURGERY

## 2020-11-11 PROCEDURE — 82962 GLUCOSE BLOOD TEST: CPT

## 2020-11-11 PROCEDURE — 3700000001 HC ADD 15 MINUTES (ANESTHESIA): Performed by: ORTHOPAEDIC SURGERY

## 2020-11-11 PROCEDURE — 3700000000 HC ANESTHESIA ATTENDED CARE: Performed by: ORTHOPAEDIC SURGERY

## 2020-11-11 PROCEDURE — 3600000012 HC SURGERY LEVEL 2 ADDTL 15MIN: Performed by: ORTHOPAEDIC SURGERY

## 2020-11-11 PROCEDURE — 6360000002 HC RX W HCPCS: Performed by: ORTHOPAEDIC SURGERY

## 2020-11-11 PROCEDURE — 7100000011 HC PHASE II RECOVERY - ADDTL 15 MIN: Performed by: ORTHOPAEDIC SURGERY

## 2020-11-11 PROCEDURE — 6360000002 HC RX W HCPCS: Performed by: NURSE ANESTHETIST, CERTIFIED REGISTERED

## 2020-11-11 PROCEDURE — 3600000002 HC SURGERY LEVEL 2 BASE: Performed by: ORTHOPAEDIC SURGERY

## 2020-11-11 PROCEDURE — 2720000010 HC SURG SUPPLY STERILE: Performed by: ORTHOPAEDIC SURGERY

## 2020-11-11 PROCEDURE — 2580000003 HC RX 258: Performed by: ANESTHESIOLOGY

## 2020-11-11 PROCEDURE — 2709999900 HC NON-CHARGEABLE SUPPLY: Performed by: ORTHOPAEDIC SURGERY

## 2020-11-11 RX ORDER — ONDANSETRON 2 MG/ML
4 INJECTION INTRAMUSCULAR; INTRAVENOUS
Status: DISCONTINUED | OUTPATIENT
Start: 2020-11-11 | End: 2020-11-11 | Stop reason: HOSPADM

## 2020-11-11 RX ORDER — DEXAMETHASONE SODIUM PHOSPHATE 4 MG/ML
INJECTION, SOLUTION INTRA-ARTICULAR; INTRALESIONAL; INTRAMUSCULAR; INTRAVENOUS; SOFT TISSUE PRN
Status: DISCONTINUED | OUTPATIENT
Start: 2020-11-11 | End: 2020-11-11 | Stop reason: SDUPTHER

## 2020-11-11 RX ORDER — HYDROMORPHONE HCL 110MG/55ML
0.5 PATIENT CONTROLLED ANALGESIA SYRINGE INTRAVENOUS EVERY 5 MIN PRN
Status: DISCONTINUED | OUTPATIENT
Start: 2020-11-11 | End: 2020-11-11 | Stop reason: HOSPADM

## 2020-11-11 RX ORDER — SODIUM CHLORIDE, SODIUM LACTATE, POTASSIUM CHLORIDE, CALCIUM CHLORIDE 600; 310; 30; 20 MG/100ML; MG/100ML; MG/100ML; MG/100ML
INJECTION, SOLUTION INTRAVENOUS CONTINUOUS
Status: DISCONTINUED | OUTPATIENT
Start: 2020-11-11 | End: 2020-11-11 | Stop reason: HOSPADM

## 2020-11-11 RX ORDER — FENTANYL CITRATE 50 UG/ML
INJECTION, SOLUTION INTRAMUSCULAR; INTRAVENOUS PRN
Status: DISCONTINUED | OUTPATIENT
Start: 2020-11-11 | End: 2020-11-11 | Stop reason: SDUPTHER

## 2020-11-11 RX ORDER — HYDROCODONE BITARTRATE AND ACETAMINOPHEN 5; 325 MG/1; MG/1
2 TABLET ORAL EVERY 6 HOURS PRN
Qty: 30 TABLET | Refills: 0 | Status: SHIPPED | OUTPATIENT
Start: 2020-11-11 | End: 2021-05-04

## 2020-11-11 RX ORDER — BUPIVACAINE HYDROCHLORIDE 5 MG/ML
INJECTION, SOLUTION EPIDURAL; INTRACAUDAL
Status: COMPLETED | OUTPATIENT
Start: 2020-11-11 | End: 2020-11-11

## 2020-11-11 RX ORDER — HYDROCODONE BITARTRATE AND ACETAMINOPHEN 5; 325 MG/1; MG/1
2 TABLET ORAL PRN
Status: COMPLETED | OUTPATIENT
Start: 2020-11-11 | End: 2020-11-11

## 2020-11-11 RX ORDER — ONDANSETRON 2 MG/ML
INJECTION INTRAMUSCULAR; INTRAVENOUS PRN
Status: DISCONTINUED | OUTPATIENT
Start: 2020-11-11 | End: 2020-11-11 | Stop reason: SDUPTHER

## 2020-11-11 RX ORDER — MEPERIDINE HYDROCHLORIDE 25 MG/ML
12.5 INJECTION INTRAMUSCULAR; INTRAVENOUS; SUBCUTANEOUS EVERY 5 MIN PRN
Status: DISCONTINUED | OUTPATIENT
Start: 2020-11-11 | End: 2020-11-11 | Stop reason: HOSPADM

## 2020-11-11 RX ORDER — PROMETHAZINE HYDROCHLORIDE 25 MG/ML
6.25 INJECTION, SOLUTION INTRAMUSCULAR; INTRAVENOUS
Status: DISCONTINUED | OUTPATIENT
Start: 2020-11-11 | End: 2020-11-11 | Stop reason: HOSPADM

## 2020-11-11 RX ORDER — FENTANYL CITRATE 50 UG/ML
50 INJECTION, SOLUTION INTRAMUSCULAR; INTRAVENOUS EVERY 5 MIN PRN
Status: DISCONTINUED | OUTPATIENT
Start: 2020-11-11 | End: 2020-11-11 | Stop reason: HOSPADM

## 2020-11-11 RX ORDER — LIDOCAINE HYDROCHLORIDE 20 MG/ML
INJECTION, SOLUTION INTRAVENOUS PRN
Status: DISCONTINUED | OUTPATIENT
Start: 2020-11-11 | End: 2020-11-11 | Stop reason: SDUPTHER

## 2020-11-11 RX ORDER — DIPHENHYDRAMINE HYDROCHLORIDE 50 MG/ML
12.5 INJECTION INTRAMUSCULAR; INTRAVENOUS
Status: DISCONTINUED | OUTPATIENT
Start: 2020-11-11 | End: 2020-11-11 | Stop reason: HOSPADM

## 2020-11-11 RX ORDER — LABETALOL HYDROCHLORIDE 5 MG/ML
5 INJECTION, SOLUTION INTRAVENOUS EVERY 10 MIN PRN
Status: DISCONTINUED | OUTPATIENT
Start: 2020-11-11 | End: 2020-11-11 | Stop reason: HOSPADM

## 2020-11-11 RX ORDER — PROPOFOL 10 MG/ML
INJECTION, EMULSION INTRAVENOUS PRN
Status: DISCONTINUED | OUTPATIENT
Start: 2020-11-11 | End: 2020-11-11 | Stop reason: SDUPTHER

## 2020-11-11 RX ORDER — 0.9 % SODIUM CHLORIDE 0.9 %
500 INTRAVENOUS SOLUTION INTRAVENOUS
Status: DISCONTINUED | OUTPATIENT
Start: 2020-11-11 | End: 2020-11-11 | Stop reason: HOSPADM

## 2020-11-11 RX ORDER — HYDROCODONE BITARTRATE AND ACETAMINOPHEN 5; 325 MG/1; MG/1
1 TABLET ORAL PRN
Status: COMPLETED | OUTPATIENT
Start: 2020-11-11 | End: 2020-11-11

## 2020-11-11 RX ORDER — HYDROMORPHONE HCL 110MG/55ML
PATIENT CONTROLLED ANALGESIA SYRINGE INTRAVENOUS PRN
Status: DISCONTINUED | OUTPATIENT
Start: 2020-11-11 | End: 2020-11-11 | Stop reason: SDUPTHER

## 2020-11-11 RX ORDER — HYDRALAZINE HYDROCHLORIDE 20 MG/ML
5 INJECTION INTRAMUSCULAR; INTRAVENOUS EVERY 10 MIN PRN
Status: DISCONTINUED | OUTPATIENT
Start: 2020-11-11 | End: 2020-11-11 | Stop reason: HOSPADM

## 2020-11-11 RX ORDER — TRIAMCINOLONE ACETONIDE 40 MG/ML
INJECTION, SUSPENSION INTRA-ARTICULAR; INTRAMUSCULAR
Status: COMPLETED | OUTPATIENT
Start: 2020-11-11 | End: 2020-11-11

## 2020-11-11 RX ADMIN — CEFAZOLIN 2 G: 10 INJECTION, POWDER, FOR SOLUTION INTRAVENOUS at 07:45

## 2020-11-11 RX ADMIN — HYDROMORPHONE HYDROCHLORIDE 1 MG: 2 INJECTION INTRAMUSCULAR; INTRAVENOUS; SUBCUTANEOUS at 08:07

## 2020-11-11 RX ADMIN — LIDOCAINE HYDROCHLORIDE 60 MG: 20 INJECTION, SOLUTION INTRAVENOUS at 07:38

## 2020-11-11 RX ADMIN — DEXAMETHASONE SODIUM PHOSPHATE 8 MG: 4 INJECTION, SOLUTION INTRAMUSCULAR; INTRAVENOUS at 07:42

## 2020-11-11 RX ADMIN — HYDROMORPHONE HYDROCHLORIDE 1 MG: 2 INJECTION INTRAMUSCULAR; INTRAVENOUS; SUBCUTANEOUS at 08:16

## 2020-11-11 RX ADMIN — HYDROCODONE BITARTRATE AND ACETAMINOPHEN 1 TABLET: 5; 325 TABLET ORAL at 09:42

## 2020-11-11 RX ADMIN — FENTANYL CITRATE 100 MCG: 50 INJECTION INTRAMUSCULAR; INTRAVENOUS at 07:38

## 2020-11-11 RX ADMIN — ONDANSETRON 4 MG: 2 INJECTION INTRAMUSCULAR; INTRAVENOUS at 07:52

## 2020-11-11 RX ADMIN — SODIUM CHLORIDE, POTASSIUM CHLORIDE, SODIUM LACTATE AND CALCIUM CHLORIDE: 600; 310; 30; 20 INJECTION, SOLUTION INTRAVENOUS at 07:30

## 2020-11-11 RX ADMIN — PROPOFOL 200 MG: 10 INJECTION, EMULSION INTRAVENOUS at 07:38

## 2020-11-11 ASSESSMENT — PULMONARY FUNCTION TESTS
PIF_VALUE: 3
PIF_VALUE: 16
PIF_VALUE: 16
PIF_VALUE: 7
PIF_VALUE: 11
PIF_VALUE: 16
PIF_VALUE: 4
PIF_VALUE: 1
PIF_VALUE: 11
PIF_VALUE: 11
PIF_VALUE: 16
PIF_VALUE: 11
PIF_VALUE: 11
PIF_VALUE: 17
PIF_VALUE: 11
PIF_VALUE: 0
PIF_VALUE: 16
PIF_VALUE: 18
PIF_VALUE: 3
PIF_VALUE: 11
PIF_VALUE: 16
PIF_VALUE: 18
PIF_VALUE: 11
PIF_VALUE: 18
PIF_VALUE: 2
PIF_VALUE: 18
PIF_VALUE: 16
PIF_VALUE: 11
PIF_VALUE: 0
PIF_VALUE: 16
PIF_VALUE: 11
PIF_VALUE: 0
PIF_VALUE: 18
PIF_VALUE: 2
PIF_VALUE: 16
PIF_VALUE: 1
PIF_VALUE: 1
PIF_VALUE: 2
PIF_VALUE: 18
PIF_VALUE: 1
PIF_VALUE: 18
PIF_VALUE: 18
PIF_VALUE: 16
PIF_VALUE: 1
PIF_VALUE: 0
PIF_VALUE: 11
PIF_VALUE: 16
PIF_VALUE: 22
PIF_VALUE: 11
PIF_VALUE: 16
PIF_VALUE: 18
PIF_VALUE: 11
PIF_VALUE: 16
PIF_VALUE: 12

## 2020-11-11 ASSESSMENT — PAIN DESCRIPTION - PAIN TYPE
TYPE: SURGICAL PAIN
TYPE: SURGICAL PAIN

## 2020-11-11 ASSESSMENT — PAIN DESCRIPTION - ONSET: ONSET: GRADUAL

## 2020-11-11 ASSESSMENT — PAIN DESCRIPTION - ORIENTATION
ORIENTATION: LEFT;ANTERIOR
ORIENTATION: LEFT

## 2020-11-11 ASSESSMENT — PAIN DESCRIPTION - FREQUENCY
FREQUENCY: INTERMITTENT
FREQUENCY: INTERMITTENT

## 2020-11-11 ASSESSMENT — PAIN SCALES - GENERAL
PAINLEVEL_OUTOF10: 3
PAINLEVEL_OUTOF10: 2
PAINLEVEL_OUTOF10: 4

## 2020-11-11 ASSESSMENT — PAIN DESCRIPTION - PROGRESSION: CLINICAL_PROGRESSION: GRADUALLY WORSENING

## 2020-11-11 ASSESSMENT — PAIN - FUNCTIONAL ASSESSMENT: PAIN_FUNCTIONAL_ASSESSMENT: 0-10

## 2020-11-11 ASSESSMENT — PAIN DESCRIPTION - LOCATION
LOCATION: KNEE
LOCATION: KNEE

## 2020-11-11 ASSESSMENT — PAIN DESCRIPTION - DESCRIPTORS
DESCRIPTORS: BURNING
DESCRIPTORS: BURNING

## 2020-11-11 NOTE — BRIEF OP NOTE
Brief Postoperative Note      Patient: Lory Shah  YOB: 1950  MRN: [id number]    Date of Procedure: 11/11/2020    Pre-Op Diagnosis: TEAR OF MEDIAL MENISCUS OF LEFT KNEE    Post-Op Diagnosis: 1. Left knee medial meniscus tear 2.   Left knee complex lateral meniscus tear       Procedure:  Left knee arthroscopic partial medial and lateral meniscectomies    Surgeon(s):  Komal Stark MD    Assistant:  * No surgical staff found *    Anesthesia: General    Estimated Blood Loss (mL): Minimal    Complications: None    Specimens:   * No specimens in log *    Implants:  * No implants in log *      Drains: * No LDAs found *    Findings:     Electronically signed by Komal Stark MD on 11/11/2020 at 8:40 AM

## 2020-11-11 NOTE — H&P
Chief Complaint   Patient presents with    Knee Pain       left knee MRI completed         History of Present Illness:                             Toñito Ramirez is a 78 y.o. male who returns today for follow-up of his left knee. His symptoms are unchanged. He has had continued to have a sharp pains in the posterior medial aspect of his knee. He has had recurrent issues with catching and popping in the knee especially with twisting or stepping awkwardly on his knee. He has been using a cane occasionally to help reduce stress and forces across the knee joint because of the severity of his pain.     Pt returns today for left knee MRI FU. Pt states pain today is a 4/10 will increase if he moves the wrong way. Pt states he has been using a cane to walk down and incline. Pain will increase with weightbearing and flexion of the left knee pt states about a week ago he believes that he injured his knee more. He states that he did move the wrong way and felt a sharp pain in the posterior aspect of the left knee. Pt states resting does help with the pain.                    Medical History  Patient's medications, allergies, past medical, surgical, social and family histories were reviewed and updated as appropriate.     Past Medical History        Past Medical History:   Diagnosis Date    Blurred vision       right>left  side --pattern dystrophy/Dr Dye    BPH (benign prostatic hypertrophy)      Bradycardia 4/18/2019     Hr 30-40 with skipped beats on BP monitor on Labetalol    Crohn's regional enteritis (HCC)       Dr Sydnee Santos DM II (diabetes mellitus, type II), controlled ([facility] [address] [address]       diet controlled    Eye pain      GERD (gastroesophageal reflux disease)      HTN (hypertension)       also comanaged w Dr Glory Cool.    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)       March 2015- responded to pred taper and cipro/flagyl    Neck pain       Dr Arvind Bergman has evaluated    Osteoarthritis      Osteoarthritis of hands, bilateral      Pattern dystrophy of macula       Dr Sirena Shrestha    Proteinuria      Rash      Right cervical radiculopathy      Right knee pain       Dr Jacey Latif- tib plateau fx.  S/P colonoscopy 2019     Dr Darek Teresa-- ?recheck when?     Stage 3 chronic kidney disease 2019     Dr Yovany Hargrove Testosterone deficiency       on andrgel    Vertigo          Family History         Family History   Problem Relation Age of Onset    Elevated Lipids Mother      High Blood Pressure Mother      Other Father           blind        Social History   Social History            Socioeconomic History    Marital status:        Spouse name: None    Number of children: None    Years of education: None    Highest education level: None   Occupational History    Occupation: PA       Comment: [de-identified]     Occupation: disabled       Comment:    Social Needs    Financial resource strain: None    Food insecurity       Worry: None       Inability: None    Transportation needs       Medical: None       Non-medical: None   Tobacco Use    Smoking status: Former Smoker       Packs/day: 3.00       Years: 30.00       Pack years: 90.00       Types: Cigarettes       Last attempt to quit: 1995       Years since quittin.8    Smokeless tobacco: Never Used   Substance and Sexual Activity    Alcohol use: No    Drug use: No    Sexual activity: Yes       Partners: Female   Lifestyle    Physical activity       Days per week: None       Minutes per session: None    Stress: None   Relationships    Social connections       Talks on phone: None       Gets together: None       Attends [employer] witness service: None       Active member of club or organization: None       Attends meetings of clubs or organizations: None       Relationship status: None    Intimate partner violence       Fear of current or ex partner: None       Emotionally abused: None       Physically abused: None       Forced sexual activity: None   Other Topics Concern    None   Social History Narrative    None        Current Facility-Administered Medications   Current Outpatient Medications   Medication Sig Dispense Refill    amLODIPine (NORVASC) 5 MG tablet Take 1 tablet by mouth daily 90 tablet 1    HYDROcodone-acetaminophen (NORCO) 5-325 MG per tablet Take 1 tablet by mouth 2 times daily for 30 days. 60 tablet 0    eplerenone (INSPRA) 50 MG tablet Take 1 tablet by mouth daily 30 tablet 2    metoprolol tartrate (LOPRESSOR) 50 MG tablet Take 1 tablet by mouth 2 times daily 180 tablet 1    hydrALAZINE (APRESOLINE) 25 MG tablet Take 1 tablet by mouth 2 times daily 180 tablet 1    lisinopril (PRINIVIL;ZESTRIL) 40 MG tablet Take 1 tablet by mouth daily 90 tablet 1    cyclobenzaprine (FLEXERIL) 10 MG tablet TAKE ONE TABLET BY MOUTH THREE TIMES A DAY AS NEEDED FOR MUSCLE SPASMS 90 tablet 3    Lancets MISC 1 each by Does not apply route 2 times daily 200 each 1    blood glucose monitor strips Test two times a day & as needed for symptoms of irregular blood glucose. 200 strip 3    Melatonin 10 MG TABS Take by mouth nightly        butalbital-acetaminophen-caffeine (FIORICET, ESGIC) -40 MG per tablet Take 1 tablet by mouth 3 times daily as needed for Headaches or Migraine 30 tablet 1    furosemide (LASIX) 40 MG tablet Take 1 tablet by mouth daily 90 tablet 1    ondansetron (ZOFRAN) 4 MG tablet Take 1 tablet by mouth every 8 hours as needed for Nausea 40 tablet 1    lansoprazole (PREVACID) 15 MG delayed release capsule Take 1 capsule by mouth daily 90 capsule 1    meclizine (ANTIVERT) 25 MG tablet Take 1 tablet by mouth every 6 hours. 30 tablet 1    gabapentin (NEURONTIN) 300 MG capsule Take 1 capsule by mouth 2 times daily for 30 days.  60 capsule 1    traMADol (ULTRAM) 50 MG tablet Take 1 tablet by mouth 3 times daily for 30 days. 90 tablet 2    HYDROcodone-acetaminophen (NORCO) 5-325 MG per tablet Take 1 tablet by mouth 2 times daily as needed for Pain for up to 30 days. 60 tablet 0      No current facility-administered medications for this visit. Allergies   Allergen Reactions    Aldactone [Spironolactone]         Gynecomastia     Bactrim [Sulfamethoxazole-Trimethoprim]         Nephrology contraindicates-- can cause hyperkalemia    Labetalol         Marked bradycardia with frequent symptomatic ectopy    Metformin And Related [Metformin And Related]         Diarrhea at 500mg daily    Nsaids         W PROTEINURIA         Review of Systems:   Review of Systems   Constitutional: Negative for chills and fever. HENT: Negative for congestion and sneezing. Eyes: Negative for pain and redness. Respiratory: Negative for chest tightness, shortness of breath and wheezing. Cardiovascular: Negative for chest pain and palpitations. Gastrointestinal: Negative for vomiting. Musculoskeletal: Positive for arthralgias. Skin: Negative for color change and rash. Neurological: Negative for weakness and numbness. Psychiatric/Behavioral: Negative for agitation. The patient is not nervous/anxious.                                                  Examination:  General Exam:  Vitals: Pulse 67   Resp 15   Ht 5' 8\" (1.727 m)   Wt 224 lb (101.6 kg)   SpO2 96%   BMI 34.06 kg/m²    Physical Exam  Vitals signs and nursing note reviewed. Constitutional:       Appearance: Normal appearance. HENT:      Head: Normocephalic and atraumatic. Eyes:      Conjunctiva/sclera: Conjunctivae normal.      Pupils: Pupils are equal, round, and reactive to light. Neck:      Musculoskeletal: Normal range of motion.    Pulmonary:      Effort: Pulmonary effort is normal.   Musculoskeletal:      Right hip: Normal.      Left hip: He exhibits normal range of motion, normal strength, no tenderness, no bony tenderness, no swelling, no crepitus and no deformity. Right knee: He exhibits normal range of motion, no swelling, no effusion, no ecchymosis, no deformity, no erythema, normal alignment, no LCL laxity, normal patellar mobility, no bony tenderness and no MCL laxity. No medial joint line and no lateral joint line tenderness noted. Comments: Left Lower Extremity:    There is moderate diffuse tenderness at the knee joint and severe tenderness maximally along the the posterior medial joint line. There is a mild effusion present at the knee. Range of motion is from full extension to 130 degrees of flexion and limited at terminal flexion due to pain. There is a positive medial Fredrick's test with pain and mild clicking. There is a negative anterior drawer and Lachman test.  Negative posterior drawer test.  No laxity during valgus stress testing at full extension and 30 degrees of flexion. No laxity with varus stress test.  Skin is intact with no lacerations. No erythema or rash. Strength at the knee is 5 out of 5 with flexion and extension however testing is limited due to pain. Sensation to light touch is intact throughout. Pulses intact     Skin:     General: Skin is warm and dry. Neurological:      Mental Status: He is alert and oriented to person, place, and time. Psychiatric:         Mood and Affect: Mood normal.         Behavior: Behavior normal.               Diagnostic testing:  MRI images of the left knee were reviewed by myself and discussed with the patient today:  MRI left knee completed on 10/14/20      Impression    Horizontal tear of the medial meniscus posterior horn.         Cruciate and collateral ligaments are intact.         Mild degenerative changes at the patellofemoral compartment primarily.         Small Baker's cyst.          Office Procedures:  No orders of the defined types were placed in this encounter.        Assessment and Plan  1. Left knee medial meniscus tear     2.   Left knee mild primary osteoarthritis     We discussed the findings on x-ray, MRI, and physical exam.  The patient feels that they have exhausted conservative measures. We discussed the mechanical issues at the knee and treatment options both operative and nonoperative. We discussed the mild degenerative joint disease findings on the MRI and I have recommended that we consider minimally invasive surgery.     The pain and dysfunction at the knee are severely limiting at this stage and the patient would like to consider surgical intervention.     I have recommended surgical intervention for a knee arthroscopy with partial meniscectomy and chondroplasty as indicated. We discussed the risks, benefits, and alternatives to surgery. We discussed potential risks and complications including but not limited to DVT/PE, infection, stiffness, persistent pain, and progression of generative arthritis. The patient understands that progression of a degenerative condition may lead to total knee replacement in the future. We discussed pain control, physical therapy, and anticipated rehabilitation.     The patient would like to proceed with knee arthroscopy     The patient was counseled at length about the risks of malinda Covid-19 during their perioperative period and any recovery window from their procedure.  The patient was made aware that malinda Covid-19  may worsen their prognosis for recovering from their procedure  and lend to a higher morbidity and/or mortality risk.  All material risks, benefits, and reasonable alternatives including postponing the procedure were discussed. The patient does wish to proceed with the procedure at this time.        History and Physical exam note from the office visit is copied above from epic. I have reviewed the note and examined the patient and find no relevant changes.      I have reviewed with the patient and/or family the risks, benefits, and alternatives to the procedure as well as expected postoperative course    Poly Gutierrez MD

## 2020-11-11 NOTE — PROGRESS NOTES
0935 In to check on pt. Pt still having burning pain left knee. Call light in reach. [address] given as ordered. Pt denies needs. Call light in reach. 1015 Pt up to side of bed with assist and walker. Pt tolerated well and ready to get dressed to go home. Called Pt wife [employer] to pick him up and wife's questions answered. Left foot warm and pedal pulses good. Toes left foot warm and mobile, cap refill less than 3 seconds. 1020 Pt up to restroom with assist and walker. Pt tolerated well and voided without difficulty. Pt back to room. Call light in reach. 18 Pt discharged to home per wheelchair to wife's private vehicle.

## 2020-11-11 NOTE — PROGRESS NOTES
6576: Patient arrived to PACU from OR. Monitors applied, alarms on. VS wdl. Patient drowsy, easily arouable to name. Patient wiggling toes. Pulses palpable. Surgical dressing to left knee is clean, dry and intact. Ice applied. Report obtained from [facility]: Patient waking up nicely. Alert and oriented. 5231: Patient tolerating ice chips at this time. 3459: Patient turned and repositioned in bed, tolerated well. Dr. Cosme Esparza at bedside speaking with patient. 0915: Phase 1 care complete. 2289: Patient transferred to same day room 1. Report given to [address] [place] [address] Thomas and Sarah UNDERWOOD.

## 2020-11-12 NOTE — OP NOTE
[address], [address] [facility]                                OPERATIVE REPORT    PATIENT NAME: Baron Raman                   :        1950  MED REC NO:   [id number]                          ROOM:  ACCOUNT NO:   [de-identified]                           ADMIT DATE: 2020  PROVIDER:     Mayra Sierra MD    DATE OF PROCEDURE:  2020    PREOPERATIVE DIAGNOSIS:  Left knee medial meniscus tear. POSTOPERATIVE DIAGNOSES:  1. Left knee medial meniscus tear. 2.  Left knee lateral meniscus tear. 3.  Left knee mild primary osteoarthritis. PROCEDURE:  Left knee arthroscopic partial medial and lateral  meniscectomies. SURGEON:  Mayra Sierra MD    ANESTHESIA:  General.    ESTIMATED BLOOD LOSS:  Minimal.    COMPLICATIONS:  None. DISPOSITION:  To PACU in stable condition. OPERATIVE FINDINGS:  1. There was a complex tear of the posterior body of the medial  meniscus with a radial component. 2.  There was diffuse inner margin degenerative complex tearing of the  lateral meniscus. 3.  There was mild chondromalacia in all three compartments of the knee  with no high-grade thinning or deep fissures or cartilage defects. PREOPERATIVE INDICATIONS:  The patient is a 80-year-old who injured his  right knee and had ongoing pain and swelling mechanical issues. He had  an MRI and presented to my office and we had a long discussion regarding  further treatment options. Given his failure of conservative measures,  I recommended surgical intervention for knee arthroscopy and partial  meniscectomy. We discussed the risks and benefits of surgery as well as  the postoperative course. He understood and consent was obtained. DESCRIPTION OF PROCEDURE:  The patient was identified in the  preoperative area and the site was marked. He was taken back to the  operating room, placed supine on the table.   After induction of general  endotracheal anesthesia, the left lower extremity was prepped and draped  in a sterile fashion with a thigh tourniquet. Time-out was performed  and preoperative antibiotics were given. The leg was exsanguinated with  elevation and then tourniquet was inflated to 300 mmHg and deflated at  the conclusion of the case after less than 30 minutes of tourniquet  time. A standard anterolateral portal was established with an 11-blade and a  blunt trocar. Arthroscope was inserted and then a medial portal was  established under direct visualization with a spinal needle followed by  an 11-blade and a blunt trocar. The probe was inserted in the knee and  the knee was brought into the valgus position against the stress post  and the medial meniscus was probed and found to be torn at the posterior  body with a radial component involving approximately two-thirds of the  thickness of the meniscus isolated at the posterior body adjacent to the  meniscal root. The posterior horn and medial body of the meniscus  appeared healthy with only mild areas of calcifications but no tears. The arthroscopic biter and shaver were used to perform a partial  meniscectomy along the torn and loose tissue at the posterior body of  the meniscus and the remaining healthy meniscus tissue was contoured to  a smooth transition at the posterior horn. The remaining meniscus had a  healthy peripheral attachment and good stability against the probe and  the articular surfaces of the medial compartment had minimal  degenerative fraying and thinning diffusely with no evidence of  high-grade or significant articular cartilage wearing or fissuring. The ACL was probed and found to be intact. The knee was then brought  into a figure-of-four position and the lateral compartment was entered. There was diffuse inner margin fraying and tearing of the lateral  meniscus extending from the posterior body up to the anterior horn.   The  peripheral attachments were intact and healthy and then the shaver was  used to perform a partial lateral meniscectomy along the frayed and torn  inner margin fibers of the meniscus and the meniscus was contoured back  to a stable healthy rim of tissue. The lateral compartment again had  mild cartilage wear but no deep or high-grade cartilage loss. The patellofemoral compartment was entered. There was synovitis  present. A limited debridement was carried out with a shaver to better  characterize and evaluate the articular surfaces of the patellofemoral  joint where again there was mild chondromalacia with no deep or loose  articular cartilage fragments. The knee was flushed and drained and  tourniquet was deflated. Portals were closed with 3-0 nylon sutures. The knee was injected with 10 mL of 0.5% Marcaine with 40 mg of Kenalog  for postoperative pain relief. A sterile dressing was applied with  Xeroform, 4x8s, ABD and a sterile Webril with an Ace wrap. The patient  was extubated and taken to PACU in stable condition. All sponge and  needle counts were correct. POSTOPERATIVE PLAN:  He will be weightbearing as tolerated and be  allowed to perform gentle stretching and range of motion activities as  pain allows and then follow up in my office in 10 to 14 days for suture  removal.  We will proceed with rehabilitation.         Anoop Muñoz MD    D: 11/11/2020 8:54:45       T: 11/11/2020 9:01:22     [email or web address]  Job#: [id number]     Doc#: [id number]    CC:

## 2020-11-16 ENCOUNTER — TELEPHONE (OUTPATIENT)
Dept: ORTHOPEDIC SURGERY | Age: 70
End: 2020-11-16

## 2020-11-16 NOTE — TELEPHONE ENCOUNTER
Spoke with Liam at [facility] to give a verbal order for mediation of norco to be filled for break through pain. [facility] gave the ok to fill medication. Patient was notified.

## 2020-11-16 NOTE — ANESTHESIA POSTPROCEDURE EVALUATION
Department of Anesthesiology  Postprocedure Note    Patient: Malena Marks  MRN: [id number]  YOB: 1950  Date of evaluation: 11/16/2020  Time:  8:52 AM     Procedure Summary     Date:  11/11/20 Room / Location:  [address]    Anesthesia Start:  0730 Anesthesia Stop:  2927    Procedure:  LEFT KNEE ARTHROSCOPY WITH PARTIAL MENISCECTOMY CHONDROPLASTY (Left ) Diagnosis:  (TEAR OF MEDIAL MENISCUS OF LEFT KNEE)    Surgeon:  Enrique Bradley MD Responsible Provider:  MARIZA Cruz CRNA    Anesthesia Type:  general ASA Status:  3          Anesthesia Type: general    John Phase I: John Score: 10    John Phase II: Ojhn Score: 10    Last vitals: Reviewed and per EMR flowsheets.        Anesthesia Post Evaluation    Patient location during evaluation: PACU  Patient participation: complete - patient participated  Level of consciousness: sleepy but conscious  Airway patency: patent  Nausea & Vomiting: no nausea  Complications: no  Cardiovascular status: hemodynamically stable  Respiratory status: acceptable  Hydration status: euvolemic

## 2020-11-19 PROBLEM — E87.6 HYPOKALEMIA: Status: ACTIVE | Noted: 2020-11-19

## 2020-11-19 PROBLEM — E83.52 HYPERCALCEMIA: Status: ACTIVE | Noted: 2020-11-19

## 2020-11-24 ENCOUNTER — OFFICE VISIT (OUTPATIENT)
Dept: ORTHOPEDIC SURGERY | Age: 70
End: 2020-11-24

## 2020-11-24 VITALS — BODY MASS INDEX: 36.98 KG/M2 | RESPIRATION RATE: 15 BRPM | WEIGHT: 244 LBS | HEIGHT: 68 IN

## 2020-11-24 PROCEDURE — 99024 POSTOP FOLLOW-UP VISIT: CPT | Performed by: ORTHOPAEDIC SURGERY

## 2020-11-24 NOTE — PROGRESS NOTES
Pt returns today for post op check of the left knee arthroscopy DOS 11/11/20. Pt states that pain today is a 2/10. Pt states that he was having some increased pain in the left knee globally and posterior of the knee. Pt states that the pain has gotten better over the last few days. Pt states that overall he is doing well.

## 2020-11-30 LAB
COMMENT: NORMAL
SARS-COV-2 RNA, RT PCR: NOT DETECTED
SOURCE: NORMAL

## 2020-12-01 DIAGNOSIS — Z20.822 CLOSE EXPOSURE TO COVID-19 VIRUS: ICD-10-CM

## 2020-12-01 PROBLEM — I12.9 HYPERTENSIVE RENAL DISEASE: Status: ACTIVE | Noted: 2020-12-01

## 2020-12-01 PROBLEM — E11.21 DIABETIC NEPHROPATHY ASSOCIATED WITH TYPE 2 DIABETES MELLITUS (HCC): Status: ACTIVE | Noted: 2020-12-01

## 2020-12-01 PROBLEM — E83.9 CHRONIC KIDNEY DISEASE-MINERAL AND BONE DISORDER: Status: ACTIVE | Noted: 2020-12-01

## 2020-12-01 PROBLEM — N18.9 CHRONIC KIDNEY DISEASE-MINERAL AND BONE DISORDER: Status: ACTIVE | Noted: 2020-12-01

## 2020-12-01 PROBLEM — M89.9 CHRONIC KIDNEY DISEASE-MINERAL AND BONE DISORDER: Status: ACTIVE | Noted: 2020-12-01

## 2020-12-01 PROBLEM — R80.8 OTHER PROTEINURIA: Status: ACTIVE | Noted: 2020-12-01

## 2020-12-01 LAB — SARS-COV-2: NOT DETECTED

## 2021-01-05 ENCOUNTER — OFFICE VISIT (OUTPATIENT)
Dept: ORTHOPEDIC SURGERY | Age: 71
End: 2021-01-05
Payer: MEDICARE

## 2021-01-05 VITALS
WEIGHT: 244 LBS | HEART RATE: 75 BPM | RESPIRATION RATE: 16 BRPM | HEIGHT: 68 IN | OXYGEN SATURATION: 96 % | BODY MASS INDEX: 36.98 KG/M2

## 2021-01-05 DIAGNOSIS — Z98.890 S/P LEFT KNEE ARTHROSCOPY: Primary | ICD-10-CM

## 2021-01-05 DIAGNOSIS — M17.12 PRIMARY OSTEOARTHRITIS OF LEFT KNEE: ICD-10-CM

## 2021-01-05 PROCEDURE — 99024 POSTOP FOLLOW-UP VISIT: CPT | Performed by: ORTHOPAEDIC SURGERY

## 2021-01-05 PROCEDURE — 20610 DRAIN/INJ JOINT/BURSA W/O US: CPT | Performed by: ORTHOPAEDIC SURGERY

## 2021-01-05 NOTE — PATIENT INSTRUCTIONS
Steroid injection  Rest the left knee for 24-48 hours  PT ordered  Work on ROM and strengthening exercises per PT protocol   May take Tramdol, Norco, or Tylenol as needed   Rest, ice, and elevate as needed  Weightbearing and activities as tolerated  Follow up in one month

## 2021-01-10 NOTE — PROGRESS NOTES
1/5/2021   Chief Complaint   Patient presents with    Post-Op Check     post op left knee arthroscopy DOS 11/11/20        History of Present Illness:                             Van Baptiste is a 78 y.o. male who presents today for follow-up after left knee arthroscopy. He has noticed some increased swelling and sharp pains along the anteromedial aspect of his knee over the last week. He thinks he may have injured to going up the stairs. He has been dealing with tightness and swelling at the knee as well. Patient returns to the office 7 weeks post operatively following a left knee arthroscopy that was performed on 11/11/2020. Patient reports going up the stairs at home a week ago  when his left knee buckled leaving the knee sore and feeling as if it wants to pop. No other reported incidents of buckling reported, but patient states that he can feel the knee shifting when ambulating so he has been adjusting his weight to prevent the knee from fully giving out. He continues to have varying pain from a sharp pain to a dull, aching pain along the suprapatellar and medial aspect of the knee with continued weakness and stiffness upon awakening. Pain is rated at a 8/10 in office today which patient is treating conservatively with the use of Tramadol, Norco, or Tylenol as needed in addition to elevation and rest.                                          Examination:  General Exam:  Vitals: Pulse 75   Resp 16   Ht 5' 8\" (1.727 m)   Wt 244 lb (110.7 kg)   SpO2 96%   BMI 37.10 kg/m²    Physical Exam   Operative Extremity:    Left lower extremity:  Well-healed arthroscopic incisions at the anterior aspect of the knee. There is moderate tenderness to palpation at the anterior aspect of the medial lateral joint lines. There is a mild effusion of the knee and mild swelling at the healing surgical sites.   Range of motion is from full extension to 120 degrees of flexion with moderate discomfort at the terminal flexion. Assessment and Plan  1. Left knee arthroscopic partial medial lateral meniscectomies    2. Left knee mild primary osteoarthritis    I have reassured the patient that his knee appears to be healing reasonably well from the surgery but it does appear that he is dealing with inflammatory issues tenosynovitis related to increases in his activity. We discussed the underlying degenerative joint disease that is present and the likelihood of this is slowing down his recovery. I discussed the degenerative findings of the left knee on x-ray, MRI, arthroscopic images, and exam with the patient. I have recommended maximizing conservative treatments for the arthritic knee condition. We discussed the use of steroid injections as needed for severe symptoms. Currently patient is having significant pain on a daily basis and this is impacting activities of daily living and ability to get comfortable at rest.  The patient would like to have an injection performed today. Procedure Note, Left Knee Intraarticular Injection:  The left knee was prepped with alcohol and injected intra-articularly with 40 mg of Kenalog and 4 mL of 1% lidocaine through a 22-gauge needle. Sterile Band-Aid was applied. The patient tolerated it well without complications. I have recommended weight loss and maintaining a healthy weight to decrease the forces across the degenerative knee joint. We discussed the importance of maintaining flexibility and strength at the knee. I have advised the patient to have a home exercise program that includes stretching and low impact activities such as walking, biking, or elliptical machines. We discussed the possibility of physical therapy. I have sent an order for physical therapy today. I instructed the patient on the use of over-the-counter anti-inflammatory medications.     Follow-up in 6 weeks for check of the response to the injection and home exercise program              Electronically signed by Kayla Diaz MD on 1/10/2021 at 3:20 PM

## 2021-02-04 DIAGNOSIS — M54.12 RIGHT CERVICAL RADICULOPATHY: ICD-10-CM

## 2021-02-04 RX ORDER — GABAPENTIN 300 MG/1
300 CAPSULE ORAL 2 TIMES DAILY
Qty: 60 CAPSULE | Refills: 1 | Status: SHIPPED | OUTPATIENT
Start: 2021-02-04 | End: 2021-04-09 | Stop reason: SDUPTHER

## 2021-02-05 ENCOUNTER — OFFICE VISIT (OUTPATIENT)
Dept: ORTHOPEDIC SURGERY | Age: 71
End: 2021-02-05

## 2021-02-05 ENCOUNTER — TELEMEDICINE (OUTPATIENT)
Dept: INTERNAL MEDICINE CLINIC | Age: 71
End: 2021-02-05
Payer: MEDICARE

## 2021-02-05 VITALS
OXYGEN SATURATION: 94 % | RESPIRATION RATE: 16 BRPM | HEART RATE: 72 BPM | BODY MASS INDEX: 36.98 KG/M2 | HEIGHT: 68 IN | WEIGHT: 244 LBS

## 2021-02-05 DIAGNOSIS — M54.50 CHRONIC MIDLINE LOW BACK PAIN WITHOUT SCIATICA: ICD-10-CM

## 2021-02-05 DIAGNOSIS — E78.2 MIXED HYPERLIPIDEMIA: ICD-10-CM

## 2021-02-05 DIAGNOSIS — M54.12 RIGHT CERVICAL RADICULOPATHY: ICD-10-CM

## 2021-02-05 DIAGNOSIS — E11.21 CONTROLLED TYPE 2 DIABETES MELLITUS WITH DIABETIC NEPHROPATHY, WITHOUT LONG-TERM CURRENT USE OF INSULIN (HCC): Primary | ICD-10-CM

## 2021-02-05 DIAGNOSIS — E11.21 CONTROLLED TYPE 2 DIABETES MELLITUS WITH DIABETIC NEPHROPATHY, WITHOUT LONG-TERM CURRENT USE OF INSULIN (HCC): ICD-10-CM

## 2021-02-05 DIAGNOSIS — Z98.890 S/P LEFT KNEE ARTHROSCOPY: Primary | ICD-10-CM

## 2021-02-05 DIAGNOSIS — I15.8 OTHER SECONDARY HYPERTENSION: ICD-10-CM

## 2021-02-05 DIAGNOSIS — E78.2 MIXED HYPERLIPIDEMIA: Primary | ICD-10-CM

## 2021-02-05 DIAGNOSIS — I12.9 HYPERTENSIVE RENAL DISEASE: ICD-10-CM

## 2021-02-05 DIAGNOSIS — G89.29 CHRONIC MIDLINE LOW BACK PAIN WITHOUT SCIATICA: ICD-10-CM

## 2021-02-05 LAB
A/G RATIO: 1.5 (ref 1.1–2.2)
ALBUMIN SERPL-MCNC: 4.5 G/DL (ref 3.4–5)
ALP BLD-CCNC: 115 U/L (ref 40–129)
ALT SERPL-CCNC: 20 U/L (ref 10–40)
ANION GAP SERPL CALCULATED.3IONS-SCNC: 15 MMOL/L (ref 3–16)
AST SERPL-CCNC: 15 U/L (ref 15–37)
BASOPHILS ABSOLUTE: 0.1 K/UL (ref 0–0.2)
BASOPHILS RELATIVE PERCENT: 0.8 %
BILIRUB SERPL-MCNC: <0.2 MG/DL (ref 0–1)
BUN BLDV-MCNC: 15 MG/DL (ref 7–20)
CALCIUM SERPL-MCNC: 10 MG/DL (ref 8.3–10.6)
CHLORIDE BLD-SCNC: 100 MMOL/L (ref 99–110)
CHOLESTEROL, TOTAL: 240 MG/DL (ref 0–199)
CO2: 25 MMOL/L (ref 21–32)
CREAT SERPL-MCNC: 1.1 MG/DL (ref 0.8–1.3)
CREATININE URINE: 135.5 MG/DL (ref 39–259)
EOSINOPHILS ABSOLUTE: 0.3 K/UL (ref 0–0.6)
EOSINOPHILS RELATIVE PERCENT: 3.2 %
GFR AFRICAN AMERICAN: >60
GFR NON-AFRICAN AMERICAN: >60
GLOBULIN: 3 G/DL
GLUCOSE BLD-MCNC: 165 MG/DL (ref 70–99)
HCT VFR BLD CALC: 42.7 % (ref 40.5–52.5)
HDLC SERPL-MCNC: 43 MG/DL (ref 40–60)
HEMOGLOBIN: 14.1 G/DL (ref 13.5–17.5)
LDL CHOLESTEROL CALCULATED: ABNORMAL MG/DL
LDL CHOLESTEROL DIRECT: 156 MG/DL
LYMPHOCYTES ABSOLUTE: 1.7 K/UL (ref 1–5.1)
LYMPHOCYTES RELATIVE PERCENT: 20.5 %
MCH RBC QN AUTO: 26.5 PG (ref 26–34)
MCHC RBC AUTO-ENTMCNC: 33 G/DL (ref 31–36)
MCV RBC AUTO: 80.1 FL (ref 80–100)
MICROALBUMIN UR-MCNC: 13.5 MG/DL
MICROALBUMIN/CREAT UR-RTO: 99.6 MG/G (ref 0–30)
MONOCYTES ABSOLUTE: 0.4 K/UL (ref 0–1.3)
MONOCYTES RELATIVE PERCENT: 5 %
NEUTROPHILS ABSOLUTE: 5.8 K/UL (ref 1.7–7.7)
NEUTROPHILS RELATIVE PERCENT: 70.5 %
PDW BLD-RTO: 16.1 % (ref 12.4–15.4)
PLATELET # BLD: 262 K/UL (ref 135–450)
PMV BLD AUTO: 7.5 FL (ref 5–10.5)
POTASSIUM SERPL-SCNC: 4.4 MMOL/L (ref 3.5–5.1)
RBC # BLD: 5.33 M/UL (ref 4.2–5.9)
SODIUM BLD-SCNC: 140 MMOL/L (ref 136–145)
TOTAL PROTEIN: 7.5 G/DL (ref 6.4–8.2)
TRIGL SERPL-MCNC: 356 MG/DL (ref 0–150)
VLDLC SERPL CALC-MCNC: ABNORMAL MG/DL
WBC # BLD: 8.2 K/UL (ref 4–11)

## 2021-02-05 PROCEDURE — 1123F ACP DISCUSS/DSCN MKR DOCD: CPT | Performed by: INTERNAL MEDICINE

## 2021-02-05 PROCEDURE — G8427 DOCREV CUR MEDS BY ELIG CLIN: HCPCS | Performed by: INTERNAL MEDICINE

## 2021-02-05 PROCEDURE — 4040F PNEUMOC VAC/ADMIN/RCVD: CPT | Performed by: INTERNAL MEDICINE

## 2021-02-05 PROCEDURE — 3017F COLORECTAL CA SCREEN DOC REV: CPT | Performed by: INTERNAL MEDICINE

## 2021-02-05 PROCEDURE — 3046F HEMOGLOBIN A1C LEVEL >9.0%: CPT | Performed by: INTERNAL MEDICINE

## 2021-02-05 PROCEDURE — 1036F TOBACCO NON-USER: CPT | Performed by: INTERNAL MEDICINE

## 2021-02-05 PROCEDURE — 99024 POSTOP FOLLOW-UP VISIT: CPT | Performed by: ORTHOPAEDIC SURGERY

## 2021-02-05 PROCEDURE — G8417 CALC BMI ABV UP PARAM F/U: HCPCS | Performed by: INTERNAL MEDICINE

## 2021-02-05 PROCEDURE — 2022F DILAT RTA XM EVC RTNOPTHY: CPT | Performed by: INTERNAL MEDICINE

## 2021-02-05 PROCEDURE — G8484 FLU IMMUNIZE NO ADMIN: HCPCS | Performed by: INTERNAL MEDICINE

## 2021-02-05 PROCEDURE — 99214 OFFICE O/P EST MOD 30 MIN: CPT | Performed by: INTERNAL MEDICINE

## 2021-02-05 RX ORDER — HYDROCODONE BITARTRATE AND ACETAMINOPHEN 5; 325 MG/1; MG/1
1 TABLET ORAL 2 TIMES DAILY PRN
Qty: 60 TABLET | Refills: 0 | Status: SHIPPED | OUTPATIENT
Start: 2021-04-08 | End: 2021-05-04

## 2021-02-05 RX ORDER — TRAMADOL HYDROCHLORIDE 50 MG/1
50 TABLET ORAL 3 TIMES DAILY
Qty: 90 TABLET | Refills: 2 | Status: SHIPPED | OUTPATIENT
Start: 2021-02-05 | End: 2021-05-06 | Stop reason: SDUPTHER

## 2021-02-05 RX ORDER — HYDROCODONE BITARTRATE AND ACETAMINOPHEN 5; 325 MG/1; MG/1
1 TABLET ORAL 2 TIMES DAILY PRN
Qty: 60 TABLET | Refills: 0 | Status: SHIPPED | OUTPATIENT
Start: 2021-02-07 | End: 2021-05-06 | Stop reason: SDUPTHER

## 2021-02-05 RX ORDER — HYDROCODONE BITARTRATE AND ACETAMINOPHEN 5; 325 MG/1; MG/1
1 TABLET ORAL 2 TIMES DAILY
Qty: 60 TABLET | Refills: 0 | Status: SHIPPED | OUTPATIENT
Start: 2021-03-09 | End: 2021-05-04

## 2021-02-05 ASSESSMENT — PATIENT HEALTH QUESTIONNAIRE - PHQ9
DEPRESSION UNABLE TO ASSESS: FUNCTIONAL CAPACITY MOTIVATION LIMITS ACCURACY
SUM OF ALL RESPONSES TO PHQ QUESTIONS 1-9: 0
1. LITTLE INTEREST OR PLEASURE IN DOING THINGS: 0
SUM OF ALL RESPONSES TO PHQ QUESTIONS 1-9: 0
2. FEELING DOWN, DEPRESSED OR HOPELESS: 0
SUM OF ALL RESPONSES TO PHQ QUESTIONS 1-9: 0

## 2021-02-05 NOTE — PROGRESS NOTES
2021    TELEHEALTH EVALUATION -- Audio/Visual (During PJGPT-31 public health emergency)    HPI:    Musa Armstrong (:  1950) has requested an audio/video evaluation for the following concern(s):     SEEING DR VAZQUEZ  [facility] L KNEE. Hypertension: Stable. Denies CP, SOB, cough, visual changes, dizziness, palpitations or HA. Blood pressure typically runs <140/70S outside of the office. DM- sugars also ok at home ~150-160, sl high. HE DID RESTART NEURONTIN TOO FOR NEUROPATHY OF FEET AND HANDS  Lab Results   Component Value Date    LABA1C 7.3 2019    LABA1C 6.7 2019    LABA1C 6.7 2019     Lab Results   Component Value Date    GLUF 195 (H) 2018    LABMICR 5.40 (H) 2019    LDLCALC 110 2020    CREATININE 1.2 2020      chr lbp and oa, stable on norco.  Controlled substances monitoring: possible medication side effects, risk of tolerance and/or dependence, and alternative treatments discussed, no signs of potential drug abuse or diversion identified and OARRS report reviewed today- activity consistent with treatment plan. On tramadol too.    ckd also seeing Dr Mega Alba [facility]    Prior to Visit Medications    Medication Sig Taking? Authorizing Provider   gabapentin (NEURONTIN) 300 MG capsule Take 1 capsule by mouth 2 times daily for 30 days.  Yes Diego Avila MD   eplerenone (INSPRA) 50 MG tablet Take 1 tablet by mouth daily Yes Danielle Cardozo DO   metoprolol tartrate (LOPRESSOR) 50 MG tablet TAKE ONE TABLET BY MOUTH TWICE A DAY Yes Diego Avila MD   hydrALAZINE (APRESOLINE) 25 MG tablet TAKE ONE TABLET BY MOUTH TWICE A DAY Yes Diego Avila MD   furosemide (LASIX) 40 MG tablet Take 1 tablet by mouth See Admin Instructions PRN LEG SWELLING Yes Danielle Cardozo DO   carBAMazepine (TEGRETOL-XR) 100 MG extended release tablet Take 100 mg by mouth daily Yes Historical Provider, MD   ALPRAZolam Alo Suarez) 0.25 MG  Drug use: No             PHYSICAL EXAMINATION:  [ INSTRUCTIONS:  \"[x]\" Indicates a positive item  \"[]\" Indicates a negative item  -- DELETE ALL ITEMS NOT EXAMINED]  Vital Signs: (As obtained by patient/caregiver or practitioner observation)    Blood pressure-  Heart rate-    Respiratory rate-    Temperature-  Pulse oximetry-     Constitutional: [x] Appears well-developed and well-nourished [] No apparent distress      [] Abnormal-   Mental status  [x] Alert and awake  [] Oriented to person/place/time []Able to follow commands      Eyes:  EOM    []  Normal  [] Abnormal-  Sclera  []  Normal  [] Abnormal -         Discharge []  None visible  [] Abnormal -    HENT:   [] Normocephalic, atraumatic. [] Abnormal   [] Mouth/Throat: Mucous membranes are moist.     External Ears [] Normal  [] Abnormal-     Neck: [] No visualized mass     Pulmonary/Chest: [x] Respiratory effort normal.  [] No visualized signs of difficulty breathing or respiratory distress        [] Abnormal-      Musculoskeletal:   [] Normal gait with no signs of ataxia         [] Normal range of motion of neck        [] Abnormal-       Neurological:        [] No Facial Asymmetry (Cranial nerve 7 motor function) (limited exam to video visit)          [] No gaze palsy        [] Abnormal-         Skin:        [] No significant exanthematous lesions or discoloration noted on facial skin         [] Abnormal-            Psychiatric:       [x] Normal Affect [] No Hallucinations        [] Abnormal-     Other pertinent observable physical exam findings-     ASSESSMENT/PLAN:  1. Chronic midline low back pain without sciatica  LBP stable on pain regimen, RFs given as noted and will monitor.    - traMADol (ULTRAM) 50 MG tablet; Take 1 tablet by mouth 3 times daily for 30 days. Dispense: 90 tablet; Refill: 2  - HYDROcodone-acetaminophen (NORCO) 5-325 MG per tablet; Take 1 tablet by mouth 2 times daily as needed for Pain for up to 30 days.   Dispense: 60 tablet; Refill: 0  - HYDROcodone-acetaminophen (NORCO) 5-325 MG per tablet; Take 1 tablet by mouth 2 times daily for 30 days. Dispense: 60 tablet; Refill: 0  - HYDROcodone-acetaminophen (NORCO) 5-325 MG per tablet; Take 1 tablet by mouth 2 times daily as needed for Pain for up to 30 days. Dispense: 60 tablet; Refill: 0    2. Right cervical radiculopathy  The current medical regimen is effective;  continue present plan and medications.    - HYDROcodone-acetaminophen (NORCO) 5-325 MG per tablet; Take 1 tablet by mouth 2 times daily as needed for Pain for up to 30 days. Dispense: 60 tablet; Refill: 0  - HYDROcodone-acetaminophen (NORCO) 5-325 MG per tablet; Take 1 tablet by mouth 2 times daily for 30 days. Dispense: 60 tablet; Refill: 0  - HYDROcodone-acetaminophen (NORCO) 5-325 MG per tablet; Take 1 tablet by mouth 2 times daily as needed for Pain for up to 30 days. Dispense: 60 tablet; Refill: 0    3. Controlled type 2 diabetes mellitus with diabetic nephropathy, without long-term current use of insulin ([facility] [address]  DM relatively well controlled and will continue current regimen. Screening reviewed. See orders. 4. Hypertensive renal disease  ALSO CONT F/U DR RAMOS AND CHECK MONITORING LAB INCL BUN/CR    5. Other secondary hypertension  The current medical regimen is effective;  continue present plan and medications. 6. Mixed hyperlipidemia  *Pt will continue to work on a low fat diet and also exercise, wt loss as appropriate. Will continue periodic monitoring of fasting lipid profile, glucose, liver function. Return in about 3 months (around 5/5/2021) for routine DM. Terrell Alfonso is a 78 y.o. male being evaluated by a Virtual Visit (video visit) encounter to address concerns as mentioned above. A caregiver was present when appropriate.  Due to this being a TeleHealth encounter (During WUK-92 public health emergency), evaluation of the following organ systems was limited: Vitals/Constitutional/EENT/Resp/CV/GI//MS/Neuro/Skin/Heme-Lymph-Imm. Pursuant to the emergency declaration under the [address] [place] [address] and the [employer] Act, this Virtual Visit was conducted with patient's (and/or legal guardian's) consent, to reduce the patient's risk of exposure to COVID-19 and provide necessary medical care. The patient (and/or legal guardian) has also been advised to contact this office for worsening conditions or problems, and seek emergency medical treatment and/or call 911 if deemed necessary. Patient identification was verified at the start of the visit: Yes    Total time spent on this encounter: Not billed by time    Services were provided through a video synchronous discussion virtually to substitute for in-person clinic visit. Patient and provider were located at their individual homes. --Ron Santos MD on 2/5/2021 at 9:01 AM    An electronic signature was used to authenticate this note.

## 2021-02-05 NOTE — PROGRESS NOTES
2/5/2021   Chief Complaint   Patient presents with    Post-Op Check     left knee arthroscopy DOS 11/11/2020        History of Present Illness:                             Musa Armstrong is a 78 y.o. male returns today for follow-up of his left knee arthroscopy. He is doing well and has been doing a home exercise program.  He did require an injection at the last visit which seemed to help reduce the inflammation he was dealing with. He is very pleased with his current function including range of motion and strength and pain relief in the left knee. Patient returns to the office 11 weeks post operatively for a follow up appointment for a left knee arthroscopy that was performed on 11/11/2020. Patient states that injection received at his last visit plus working on at home exercises have been beneficial with symptoms resolving. Patient has not begun formal physical therapy because he had to quarantine but instead began working on at home exercises which he would like to continue before committing to more formal treatment. He has now improved ROM and stability within the left knee, decreased swelling, and no pain in office today. Medical History  Patient's medications, allergies, past medical, surgical, social and family histories were reviewed and updated as appropriate.     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    BPH (benign prostatic hypertrophy)     Bradycardia 4/18/2019    Hr 30-40 with skipped beats on BP monitor on Labetalol    Crohn's regional enteritis ([address]     Dr Dong\"dx with Crohns in [address] [place] [address],[facility]- no recent issues    DM II (diabetes mellitus, type II), controlled ([address]     diet controlled\"started 10+ yrs ago but off [address] [place] [address] for over 9 -10 years\"    Erosive esophagitis     \"had erosive esophagitis in lower area on Prevacid for this\"    Eye pain     GERD (gastroesophageal reflux disease)     Headache disorder     Years since quittin.1    Smokeless tobacco: Never Used   Substance and Sexual Activity    Alcohol use: No    Drug use: No    Sexual activity: Yes     Partners: Female   Lifestyle    Physical activity     Days per week: None     Minutes per session: None    Stress: None   Relationships    Social connections     Talks on phone: None     Gets together: None     Attends Sabianism service: None     Active member of club or organization: None     Attends meetings of clubs or organizations: None     Relationship status: None    Intimate partner violence     Fear of current or ex partner: None     Emotionally abused: None     Physically abused: None     Forced sexual activity: None   Other Topics Concern    None   Social History Narrative    None     Current Outpatient Medications   Medication Sig Dispense Refill    traMADol (ULTRAM) 50 MG tablet Take 1 tablet by mouth 3 times daily for 30 days. 90 tablet 2    [START ON 2021] HYDROcodone-acetaminophen (NORCO) 5-325 MG per tablet Take 1 tablet by mouth 2 times daily as needed for Pain for up to 30 days. 60 tablet 0    gabapentin (NEURONTIN) 300 MG capsule Take 1 capsule by mouth 2 times daily for 30 days. 60 capsule 1    eplerenone (INSPRA) 50 MG tablet Take 1 tablet by mouth daily 30 tablet 2    metoprolol tartrate (LOPRESSOR) 50 MG tablet TAKE ONE TABLET BY MOUTH TWICE A  tablet 1    hydrALAZINE (APRESOLINE) 25 MG tablet TAKE ONE TABLET BY MOUTH TWICE A  tablet 1    furosemide (LASIX) 40 MG tablet Take 1 tablet by mouth See Admin Instructions PRN LEG SWELLING 90 tablet 1    carBAMazepine (TEGRETOL-XR) 100 MG extended release tablet Take 100 mg by mouth daily      ALPRAZolam (XANAX) 0.25 MG tablet Take 0.25 mg by mouth nightly as needed for Sleep.       folic acid (FOLVITE) 1 MG tablet Take 1 mg by mouth daily      amLODIPine (NORVASC) 5 MG tablet Take 1 tablet by mouth daily 90 tablet 1    lisinopril (PRINIVIL;ZESTRIL) 40 MG PROTEINURIA       Review of Systems:   Review of Systems                                            Examination:  General Exam:  Vitals: Pulse 72   Resp 16   Ht 5' 8\" (1.727 m)   Wt 244 lb (110.7 kg)   SpO2 94%   BMI 37.10 kg/m²    Physical Exam     Left lower extremity:  Well-healed arthroscopic incisions. No swelling or effusion at the knee. Full painless active range of motion at the knee with 5 out of 5 strength during flexion and extension. No instability at the knee with varus or valgus stress. Sensation and motor function is intact distally. Patient is walking well without a limp today. Office Procedures:  No orders of the defined types were placed in this encounter. Assessment and Plan  1. Left knee arthroscopic partial medial meniscectomy    He has made good progress with his recovery. I am pleased with his strength and range of motion today. I have recommended he continue with his home exercise program to continue building up strength and range of motion at the knee. We discussed anti-inflammatory medications as needed for arthritic pain. He will follow-up as needed if he would require a steroid injection for severe flareups of inflammation at the knee.     Follow-up as needed    Electronically signed by Juana Yip MD on 2/5/2021 at 9:52 AM

## 2021-02-05 NOTE — PATIENT INSTRUCTIONS
Continue working on at home ROM and strengthening exercises   Rest,  Ice, and elevate as needed  May continue prescribed pain medications as directed  Weightbearing and activities as tolerated  Follow up as needed

## 2021-02-05 NOTE — PROGRESS NOTES
Patient returns to the office 11 weeks post operatively for a follow up appointment for a left knee arthroscopy that was performed on 11/11/2020. Patient states that injection received at his last visit plus working on at home exercises have been beneficial with symptoms resolving. Patient has not begun formal physical therapy because he had to quarantine but instead began working on at home exercises which he would like to continue before committing to more formal treatment. He has now improved ROM and stability within the left knee, decreased swelling, and no pain in office today.

## 2021-02-06 LAB
ESTIMATED AVERAGE GLUCOSE: 168.6 MG/DL
HBA1C MFR BLD: 7.5 %

## 2021-02-08 RX ORDER — ROSUVASTATIN CALCIUM 5 MG/1
5 TABLET, COATED ORAL NIGHTLY
Qty: 30 TABLET | Refills: 3 | Status: SHIPPED | OUTPATIENT
Start: 2021-02-08 | End: 2021-02-26

## 2021-02-15 DIAGNOSIS — K50.919 CROHN'S DISEASE WITH COMPLICATION, UNSPECIFIED GASTROINTESTINAL TRACT LOCATION (HCC): ICD-10-CM

## 2021-02-16 RX ORDER — ONDANSETRON 4 MG/1
4 TABLET, FILM COATED ORAL EVERY 8 HOURS PRN
Qty: 40 TABLET | Refills: 1 | Status: SHIPPED | OUTPATIENT
Start: 2021-02-16 | End: 2022-06-29 | Stop reason: SDUPTHER

## 2021-02-26 ENCOUNTER — OFFICE VISIT (OUTPATIENT)
Dept: INTERNAL MEDICINE CLINIC | Age: 71
End: 2021-02-26
Payer: MEDICARE

## 2021-02-26 ENCOUNTER — HOSPITAL ENCOUNTER (OUTPATIENT)
Age: 71
Discharge: HOME | End: 2021-02-26
Payer: MEDICARE

## 2021-02-26 ENCOUNTER — HOSPITAL ENCOUNTER (OUTPATIENT)
Dept: GENERAL RADIOLOGY | Age: 71
Discharge: HOME | End: 2021-02-26
Payer: MEDICARE

## 2021-02-26 VITALS
DIASTOLIC BLOOD PRESSURE: 78 MMHG | SYSTOLIC BLOOD PRESSURE: 134 MMHG | OXYGEN SATURATION: 96 % | WEIGHT: 232.8 LBS | BODY MASS INDEX: 35.4 KG/M2 | HEART RATE: 71 BPM

## 2021-02-26 DIAGNOSIS — M79.671 RIGHT FOOT PAIN: ICD-10-CM

## 2021-02-26 DIAGNOSIS — M79.671 RIGHT FOOT PAIN: Primary | ICD-10-CM

## 2021-02-26 LAB — URIC ACID: 6.5 MG/DL (ref 3.5–7.2)

## 2021-02-26 PROCEDURE — 4040F PNEUMOC VAC/ADMIN/RCVD: CPT | Performed by: INTERNAL MEDICINE

## 2021-02-26 PROCEDURE — 73630 X-RAY EXAM OF FOOT: CPT

## 2021-02-26 PROCEDURE — 3017F COLORECTAL CA SCREEN DOC REV: CPT | Performed by: INTERNAL MEDICINE

## 2021-02-26 PROCEDURE — 99213 OFFICE O/P EST LOW 20 MIN: CPT | Performed by: INTERNAL MEDICINE

## 2021-02-26 PROCEDURE — G8427 DOCREV CUR MEDS BY ELIG CLIN: HCPCS | Performed by: INTERNAL MEDICINE

## 2021-02-26 PROCEDURE — 84550 ASSAY OF BLOOD/URIC ACID: CPT

## 2021-02-26 PROCEDURE — G8417 CALC BMI ABV UP PARAM F/U: HCPCS | Performed by: INTERNAL MEDICINE

## 2021-02-26 PROCEDURE — 1123F ACP DISCUSS/DSCN MKR DOCD: CPT | Performed by: INTERNAL MEDICINE

## 2021-02-26 PROCEDURE — 36415 COLL VENOUS BLD VENIPUNCTURE: CPT

## 2021-02-26 PROCEDURE — 1036F TOBACCO NON-USER: CPT | Performed by: INTERNAL MEDICINE

## 2021-02-26 PROCEDURE — G8484 FLU IMMUNIZE NO ADMIN: HCPCS | Performed by: INTERNAL MEDICINE

## 2021-02-26 RX ORDER — PREDNISONE 1 MG/1
TABLET ORAL
Qty: 21 TABLET | Refills: 0 | Status: SHIPPED | OUTPATIENT
Start: 2021-02-26 | End: 2021-05-04

## 2021-02-26 NOTE — PROGRESS NOTES
Paola Arteaga  1950 02/26/21    SUBJECTIVE:  Sudden onset of R dorsal redness, swelling and tenderness. Pain worse w any pressure and also walking, standing, wt bearing. No trauma. Sx ongoing for three days but also not improving. No prior hx of gout. Of note, has seen Dr Welton Babinski in the past.    OBJECTIVE:    /78   Pulse 71   Wt 232 lb 12.8 oz (105.6 kg)   SpO2 96%   BMI 35.40 kg/m²     Physical Exam  Constitutional:       Appearance: Normal appearance. Musculoskeletal:         General: Swelling and tenderness present. Feet:    Feet:      Comments: Redness, sl swollen and tender. Skin:     Findings: Erythema present. Neurological:      Mental Status: He is alert. ASSESSMENT:    1. Right foot pain        PLAN:    Sanket Smith was seen today for foot swelling. Diagnoses and all orders for this visit:    Right foot pain- AM CONCERNED ABOUT FX, ANOTHER POSSIBILITY IS GOUT. CHECK UR ACID LEVEL, XRAY, EMPIRIC COURSE PRED AND PENDING RESULTS IF NO IMPROVEMENT OR IF ANY BONY ABNORMALITY NOTED ON XRAY, CONSIDER THEN FOR F/U W DR Rosey Herrera. -     Cancel: XR FOOT RIGHT (2 VIEWS); Future  -     Uric Acid; Future  -     predniSONE (DELTASONE) 5 MG tablet; Take 6 tablets by mouth on day 1, 5 on day 2, 4 on day 3, 3 on day 4, 2 on day 5, 1 on day 6.  -     XR FOOT RIGHT (MIN 3 VIEWS);  Future

## 2021-02-27 NOTE — RESULT ENCOUNTER NOTE
Call pt, lab indicates normal uric acid level making gout unlikely. Also xray foot w no evidence of fracture so the pain and swelling may be from localized flare of arthritis.   Check on Anna Mitchell, if still having pain and swelling then we'd need referral to podiatry, but if resolving then suspect swelling is from flare of the mild arthritis noted on xray

## 2021-04-09 DIAGNOSIS — M54.12 RIGHT CERVICAL RADICULOPATHY: ICD-10-CM

## 2021-04-09 RX ORDER — GABAPENTIN 300 MG/1
300 CAPSULE ORAL 2 TIMES DAILY
Qty: 60 CAPSULE | Refills: 1 | Status: SHIPPED | OUTPATIENT
Start: 2021-04-09 | End: 2021-06-14 | Stop reason: SDUPTHER

## 2021-04-20 ENCOUNTER — TELEPHONE (OUTPATIENT)
Dept: PHARMACY | Facility: CLINIC | Age: 71
End: 2021-04-20

## 2021-04-20 NOTE — TELEPHONE ENCOUNTER
CLINICAL PHARMACY CONSULT: MED RECONCILIATION/REVIEW ADDENDUM    For Pharmacy Admin Tracking Only    PHSO: Yes  Total # of Interventions Recommended: 0  Recommended intervention potential cost savings: 1  Total Interventions Accepted: 0  Time Spent (min): [address] [facility]

## 2021-05-04 PROBLEM — N18.31 STAGE 3A CHRONIC KIDNEY DISEASE (HCC): Status: ACTIVE | Noted: 2019-04-18

## 2021-05-04 PROBLEM — E83.42 HYPOMAGNESEMIA: Status: ACTIVE | Noted: 2021-05-04

## 2021-05-06 ENCOUNTER — OFFICE VISIT (OUTPATIENT)
Dept: INTERNAL MEDICINE CLINIC | Age: 71
End: 2021-05-06
Payer: MEDICARE

## 2021-05-06 ENCOUNTER — TELEPHONE (OUTPATIENT)
Dept: CARDIOLOGY CLINIC | Age: 71
End: 2021-05-06

## 2021-05-06 ENCOUNTER — HOSPITAL ENCOUNTER (OUTPATIENT)
Age: 71
Setting detail: SPECIMEN
Discharge: HOME | End: 2021-05-06
Payer: MEDICARE

## 2021-05-06 VITALS
WEIGHT: 228 LBS | RESPIRATION RATE: 18 BRPM | DIASTOLIC BLOOD PRESSURE: 88 MMHG | HEART RATE: 104 BPM | SYSTOLIC BLOOD PRESSURE: 132 MMHG | BODY MASS INDEX: 34.67 KG/M2 | OXYGEN SATURATION: 97 %

## 2021-05-06 DIAGNOSIS — E11.21 CONTROLLED TYPE 2 DIABETES MELLITUS WITH DIABETIC NEPHROPATHY, WITHOUT LONG-TERM CURRENT USE OF INSULIN (HCC): ICD-10-CM

## 2021-05-06 DIAGNOSIS — R07.89 ATYPICAL CHEST PAIN: ICD-10-CM

## 2021-05-06 DIAGNOSIS — R06.00 DYSPNEA, UNSPECIFIED TYPE: Primary | ICD-10-CM

## 2021-05-06 DIAGNOSIS — E78.2 MIXED HYPERLIPIDEMIA: ICD-10-CM

## 2021-05-06 DIAGNOSIS — I12.9 HYPERTENSIVE RENAL DISEASE: ICD-10-CM

## 2021-05-06 DIAGNOSIS — M54.50 CHRONIC MIDLINE LOW BACK PAIN WITHOUT SCIATICA: ICD-10-CM

## 2021-05-06 DIAGNOSIS — N18.9 CHRONIC KIDNEY DISEASE-MINERAL AND BONE DISORDER: ICD-10-CM

## 2021-05-06 DIAGNOSIS — M54.12 RIGHT CERVICAL RADICULOPATHY: ICD-10-CM

## 2021-05-06 DIAGNOSIS — R07.89 ATYPICAL CHEST PAIN: Primary | ICD-10-CM

## 2021-05-06 DIAGNOSIS — G89.29 CHRONIC MIDLINE LOW BACK PAIN WITHOUT SCIATICA: ICD-10-CM

## 2021-05-06 DIAGNOSIS — K50.919 CROHN'S DISEASE WITH COMPLICATION, UNSPECIFIED GASTROINTESTINAL TRACT LOCATION (HCC): ICD-10-CM

## 2021-05-06 DIAGNOSIS — E83.9 CHRONIC KIDNEY DISEASE-MINERAL AND BONE DISORDER: ICD-10-CM

## 2021-05-06 DIAGNOSIS — M89.9 CHRONIC KIDNEY DISEASE-MINERAL AND BONE DISORDER: ICD-10-CM

## 2021-05-06 LAB
A/G RATIO: 1.4 (ref 1.1–2.2)
ALBUMIN SERPL-MCNC: 4.3 G/DL (ref 3.4–5)
ALP BLD-CCNC: 104 U/L (ref 40–129)
ALT SERPL-CCNC: 14 U/L (ref 10–40)
ANION GAP SERPL CALCULATED.3IONS-SCNC: 13 MMOL/L (ref 3–16)
AST SERPL-CCNC: 11 U/L (ref 15–37)
BASOPHILS ABSOLUTE: 0 K/UL (ref 0–0.2)
BASOPHILS RELATIVE PERCENT: 0.6 %
BILIRUB SERPL-MCNC: 0.3 MG/DL (ref 0–1)
BUN BLDV-MCNC: 13 MG/DL (ref 7–20)
CALCIUM SERPL-MCNC: 9.7 MG/DL (ref 8.3–10.6)
CHLORIDE BLD-SCNC: 101 MMOL/L (ref 99–110)
CHOLESTEROL, TOTAL: 208 MG/DL (ref 0–199)
CO2: 25 MMOL/L (ref 21–32)
CREAT SERPL-MCNC: 1.3 MG/DL (ref 0.8–1.3)
D DIMER: 281 NG/ML(DDU)
EOSINOPHILS ABSOLUTE: 0.3 K/UL (ref 0–0.6)
EOSINOPHILS RELATIVE PERCENT: 3.5 %
GFR AFRICAN AMERICAN: >60
GFR NON-AFRICAN AMERICAN: 55
GLOBULIN: 3 G/DL
GLUCOSE BLD-MCNC: 183 MG/DL (ref 70–99)
HCT VFR BLD CALC: 43.3 % (ref 40.5–52.5)
HDLC SERPL-MCNC: 36 MG/DL (ref 40–60)
HEMOGLOBIN: 14.6 G/DL (ref 13.5–17.5)
LDL CHOLESTEROL CALCULATED: 115 MG/DL
LYMPHOCYTES ABSOLUTE: 1.5 K/UL (ref 1–5.1)
LYMPHOCYTES RELATIVE PERCENT: 19.5 %
MCH RBC QN AUTO: 26.7 PG (ref 26–34)
MCHC RBC AUTO-ENTMCNC: 33.6 G/DL (ref 31–36)
MCV RBC AUTO: 79.4 FL (ref 80–100)
MONOCYTES ABSOLUTE: 0.4 K/UL (ref 0–1.3)
MONOCYTES RELATIVE PERCENT: 4.5 %
NEUTROPHILS ABSOLUTE: 5.6 K/UL (ref 1.7–7.7)
NEUTROPHILS RELATIVE PERCENT: 71.9 %
PDW BLD-RTO: 15.3 % (ref 12.4–15.4)
PLATELET # BLD: 243 K/UL (ref 135–450)
PMV BLD AUTO: 8 FL (ref 5–10.5)
POTASSIUM SERPL-SCNC: 4.2 MMOL/L (ref 3.5–5.1)
RBC # BLD: 5.46 M/UL (ref 4.2–5.9)
SODIUM BLD-SCNC: 139 MMOL/L (ref 136–145)
TOTAL PROTEIN: 7.3 G/DL (ref 6.4–8.2)
TRIGL SERPL-MCNC: 287 MG/DL (ref 0–150)
VLDLC SERPL CALC-MCNC: 57 MG/DL
WBC # BLD: 7.8 K/UL (ref 4–11)

## 2021-05-06 PROCEDURE — G8427 DOCREV CUR MEDS BY ELIG CLIN: HCPCS | Performed by: INTERNAL MEDICINE

## 2021-05-06 PROCEDURE — 3017F COLORECTAL CA SCREEN DOC REV: CPT | Performed by: INTERNAL MEDICINE

## 2021-05-06 PROCEDURE — 4040F PNEUMOC VAC/ADMIN/RCVD: CPT | Performed by: INTERNAL MEDICINE

## 2021-05-06 PROCEDURE — 2022F DILAT RTA XM EVC RTNOPTHY: CPT | Performed by: INTERNAL MEDICINE

## 2021-05-06 PROCEDURE — 99214 OFFICE O/P EST MOD 30 MIN: CPT | Performed by: INTERNAL MEDICINE

## 2021-05-06 PROCEDURE — 1123F ACP DISCUSS/DSCN MKR DOCD: CPT | Performed by: INTERNAL MEDICINE

## 2021-05-06 PROCEDURE — 36415 COLL VENOUS BLD VENIPUNCTURE: CPT | Performed by: INTERNAL MEDICINE

## 2021-05-06 PROCEDURE — G8417 CALC BMI ABV UP PARAM F/U: HCPCS | Performed by: INTERNAL MEDICINE

## 2021-05-06 PROCEDURE — 85379 FIBRIN DEGRADATION QUANT: CPT

## 2021-05-06 PROCEDURE — 1036F TOBACCO NON-USER: CPT | Performed by: INTERNAL MEDICINE

## 2021-05-06 PROCEDURE — 93000 ELECTROCARDIOGRAM COMPLETE: CPT | Performed by: INTERNAL MEDICINE

## 2021-05-06 PROCEDURE — 3051F HG A1C>EQUAL 7.0%<8.0%: CPT | Performed by: INTERNAL MEDICINE

## 2021-05-06 RX ORDER — TRAMADOL HYDROCHLORIDE 50 MG/1
50 TABLET ORAL 3 TIMES DAILY
Qty: 90 TABLET | Refills: 2 | Status: SHIPPED | OUTPATIENT
Start: 2021-05-06 | End: 2021-08-06 | Stop reason: SDUPTHER

## 2021-05-06 RX ORDER — HYDROCODONE BITARTRATE AND ACETAMINOPHEN 5; 325 MG/1; MG/1
1 TABLET ORAL 2 TIMES DAILY PRN
Qty: 60 TABLET | Refills: 0 | Status: SHIPPED | OUTPATIENT
Start: 2021-07-07 | End: 2021-08-06 | Stop reason: SDUPTHER

## 2021-05-06 RX ORDER — HYDROCODONE BITARTRATE AND ACETAMINOPHEN 5; 325 MG/1; MG/1
1 TABLET ORAL 2 TIMES DAILY PRN
Qty: 60 TABLET | Refills: 0 | Status: SHIPPED | OUTPATIENT
Start: 2021-06-07 | End: 2021-06-28

## 2021-05-06 RX ORDER — HYDROCODONE BITARTRATE AND ACETAMINOPHEN 5; 325 MG/1; MG/1
1 TABLET ORAL 2 TIMES DAILY
Qty: 60 TABLET | Refills: 0 | Status: SHIPPED | OUTPATIENT
Start: 2021-05-08 | End: 2021-06-28

## 2021-05-06 NOTE — PROGRESS NOTES
Rhythm: Normal rate and regular rhythm. Heart sounds: Normal heart sounds. No murmur. No friction rub. No gallop. Pulmonary:      Effort: Pulmonary effort is normal. No respiratory distress. Breath sounds: Normal breath sounds. No wheezing or rales. Abdominal:      General: Bowel sounds are normal. There is no distension. Palpations: Abdomen is soft. There is no mass. Tenderness: There is no abdominal tenderness. There is no guarding or rebound. Musculoskeletal:         General: No tenderness. Lymphadenopathy:      Cervical: No cervical adenopathy. Skin:     General: Skin is warm and dry. Findings: No rash. Comments: FOOT EXAM  Visual inspection:  Deformity/amputation: absent  Skin lesions/pre-ulcerative calluses: absent  Edema: right- negative, left- negative    Sensory exam:  Monofilament sensation: normal  (minimum of 5 random plantar locations tested, avoiding callused areas - > 1 area with absence of sensation is + for neuropathy)      Pulses: normal       Neurological:      Mental Status: He is alert. Psychiatric:         Mood and Affect: Mood normal.         ASSESSMENT:    1. Atypical chest pain    2. Controlled type 2 diabetes mellitus with diabetic nephropathy, without long-term current use of insulin ([address]    3. Crohn's disease with complication, unspecified gastrointestinal tract location ([address]    4. Mixed hyperlipidemia    5. Chronic kidney disease-mineral and bone disorder    6. Hypertensive renal disease    7. Right cervical radiculopathy    8. Chronic midline low back pain without sciatica        PLAN:    Jorje Parsons was seen today for 3 month follow-up, other and discuss medications. Diagnoses and all orders for this visit:    Atypical chest pain- d-dimer comes back sl high, we'll check vq r/o PE but clinicaly not strongly suspicious.   Am more concerned about possible cardiac ischemia, sx are exertional and w multiple CRF incl HTN, DM, hyperlipidemia, age, male sex, prior smoker. Refer Dr Iliana Delaney for eval.  Check cxr, his ekg today is w/o ischemic changes  -     Magy May MD, Cardiology, [place]  -     XR CHEST STANDARD (2 VW); Future  -     D-DIMER, QUANTITATIVE; Future  -     EKG 12 Lead  -     Comprehensive Metabolic Panel; Future  -     CBC Auto Differential; Future  -     Lipid Panel; Future    Controlled type 2 diabetes mellitus with diabetic nephropathy, without long-term current use of insulin ([address]  - DM relatively well controlled and will continue current regimen. Screening reviewed. See orders. -     Comprehensive Metabolic Panel; Future  -     CBC Auto Differential; Future  -     Lipid Panel; Future  -     Hemoglobin A1C; Future  -     HM DIABETES FOOT EXAM    Crohn's disease with complication, unspecified gastrointestinal tract location [facility]) - for cont f/u Dr Lenore Jiménez    Mixed hyperlipidemia  - Pt will continue to work on a low fat diet and also exercise, wt loss as appropriate. Will continue periodic monitoring of fasting lipid profile, glucose, liver function. -     Comprehensive Metabolic Panel; Future  -     CBC Auto Differential; Future  -     Lipid Panel; Future    Chronic kidney disease-mineral and bone disorder- cont f/u Dr Jose Matute, for CKD we'll hold on CTA and do VQ instead    Hypertensive renal disease- bp stable and seeing DR Cardozo    Right cervical radiculopathy- continue present management. Will monitor.    -     HYDROcodone-acetaminophen (NORCO) 5-325 MG per tablet; Take 1 tablet by mouth 2 times daily as needed for Pain for up to 30 days.  -     HYDROcodone-acetaminophen (NORCO) 5-325 MG per tablet; Take 1 tablet by mouth 2 times daily as needed for Pain for up to 30 days.  -     HYDROcodone-acetaminophen (NORCO) 5-325 MG per tablet; Take 1 tablet by mouth 2 times daily for 30 days.     Chronic midline low back pain without sciatica  - LBP stable on pain regimen, RFs given as noted and will monitor.    - HYDROcodone-acetaminophen (NORCO) 5-325 MG per tablet; Take 1 tablet by mouth 2 times daily as needed for Pain for up to 30 days. -     traMADol (ULTRAM) 50 MG tablet; Take 1 tablet by mouth 3 times daily for 30 days.  -     HYDROcodone-acetaminophen (NORCO) 5-325 MG per tablet; Take 1 tablet by mouth 2 times daily as needed for Pain for up to 30 days.  -     HYDROcodone-acetaminophen (NORCO) 5-325 MG per tablet; Take 1 tablet by mouth 2 times daily for 30 days.

## 2021-05-07 LAB
ESTIMATED AVERAGE GLUCOSE: 182.9 MG/DL
HBA1C MFR BLD: 8 %

## 2021-05-07 RX ORDER — EMPAGLIFLOZIN 10 MG/1
1 TABLET, FILM COATED ORAL DAILY
Qty: 90 TABLET | Refills: 1 | Status: SHIPPED | OUTPATIENT
Start: 2021-05-07 | End: 2021-09-17

## 2021-05-07 NOTE — RESULT ENCOUNTER NOTE
Call pt, labs ok/chol sl high but improved overall from 240--208. Most importantly sugar however is sl worse w a1c rising 7.5--8.0      IF HIS INSURANCE WILL COVER, I'D REC WE ADD A NEW DM MED, JARDIANCE 10MG DAILY. HOWEVER IF IS TOO EXPENSIVE AND NOT COVERED PLS ASK ARI TO CALL US BACK.  Place med changes/corrections on EPIC medlist please

## 2021-05-10 ENCOUNTER — HOSPITAL ENCOUNTER (OUTPATIENT)
Dept: GENERAL RADIOLOGY | Age: 71
Discharge: HOME | End: 2021-05-10
Payer: MEDICARE

## 2021-05-10 ENCOUNTER — HOSPITAL ENCOUNTER (OUTPATIENT)
Age: 71
Discharge: HOME | End: 2021-05-10
Payer: MEDICARE

## 2021-05-10 ENCOUNTER — HOSPITAL ENCOUNTER (OUTPATIENT)
Dept: NUCLEAR MEDICINE | Age: 71
Discharge: HOME | End: 2021-05-10
Payer: MEDICARE

## 2021-05-10 DIAGNOSIS — R07.89 ATYPICAL CHEST PAIN: ICD-10-CM

## 2021-05-10 DIAGNOSIS — R06.00 DYSPNEA, UNSPECIFIED TYPE: ICD-10-CM

## 2021-05-10 PROCEDURE — 3430000000 HC RX DIAGNOSTIC RADIOPHARMACEUTICAL: Performed by: INTERNAL MEDICINE

## 2021-05-10 PROCEDURE — 78580 LUNG PERFUSION IMAGING: CPT

## 2021-05-10 PROCEDURE — 71046 X-RAY EXAM CHEST 2 VIEWS: CPT

## 2021-05-10 PROCEDURE — A9540 TC99M MAA: HCPCS | Performed by: INTERNAL MEDICINE

## 2021-05-10 RX ADMIN — Medication 6 MILLICURIE: at 08:40

## 2021-05-11 LAB — DIABETIC RETINOPATHY: NEGATIVE

## 2021-05-20 ENCOUNTER — NURSE ONLY (OUTPATIENT)
Dept: CARDIOLOGY CLINIC | Age: 71
End: 2021-05-20
Payer: MEDICARE

## 2021-05-20 ENCOUNTER — OFFICE VISIT (OUTPATIENT)
Dept: CARDIOLOGY CLINIC | Age: 71
End: 2021-05-20
Payer: MEDICARE

## 2021-05-20 VITALS
HEART RATE: 86 BPM | WEIGHT: 224.2 LBS | BODY MASS INDEX: 33.98 KG/M2 | HEIGHT: 68 IN | DIASTOLIC BLOOD PRESSURE: 90 MMHG | SYSTOLIC BLOOD PRESSURE: 166 MMHG

## 2021-05-20 DIAGNOSIS — E78.00 PURE HYPERCHOLESTEROLEMIA: ICD-10-CM

## 2021-05-20 DIAGNOSIS — R00.2 PALPITATIONS: ICD-10-CM

## 2021-05-20 DIAGNOSIS — R07.9 CHEST PAIN, UNSPECIFIED TYPE: Primary | ICD-10-CM

## 2021-05-20 DIAGNOSIS — N18.31 STAGE 3A CHRONIC KIDNEY DISEASE (HCC): ICD-10-CM

## 2021-05-20 DIAGNOSIS — E11.21 CONTROLLED TYPE 2 DIABETES MELLITUS WITH DIABETIC NEPHROPATHY, WITHOUT LONG-TERM CURRENT USE OF INSULIN (HCC): ICD-10-CM

## 2021-05-20 DIAGNOSIS — I15.8 OTHER SECONDARY HYPERTENSION: ICD-10-CM

## 2021-05-20 DIAGNOSIS — I49.9 IRREGULAR HEART RATE: Primary | ICD-10-CM

## 2021-05-20 DIAGNOSIS — R07.9 CHEST PAIN, UNSPECIFIED TYPE: ICD-10-CM

## 2021-05-20 PROCEDURE — 99215 OFFICE O/P EST HI 40 MIN: CPT | Performed by: INTERNAL MEDICINE

## 2021-05-20 PROCEDURE — 3052F HG A1C>EQUAL 8.0%<EQUAL 9.0%: CPT | Performed by: INTERNAL MEDICINE

## 2021-05-20 PROCEDURE — 4040F PNEUMOC VAC/ADMIN/RCVD: CPT | Performed by: INTERNAL MEDICINE

## 2021-05-20 PROCEDURE — 93225 XTRNL ECG REC<48 HRS REC: CPT | Performed by: INTERNAL MEDICINE

## 2021-05-20 PROCEDURE — G8428 CUR MEDS NOT DOCUMENT: HCPCS | Performed by: INTERNAL MEDICINE

## 2021-05-20 PROCEDURE — G8417 CALC BMI ABV UP PARAM F/U: HCPCS | Performed by: INTERNAL MEDICINE

## 2021-05-20 PROCEDURE — 3017F COLORECTAL CA SCREEN DOC REV: CPT | Performed by: INTERNAL MEDICINE

## 2021-05-20 PROCEDURE — 93000 ELECTROCARDIOGRAM COMPLETE: CPT | Performed by: INTERNAL MEDICINE

## 2021-05-20 PROCEDURE — 2022F DILAT RTA XM EVC RTNOPTHY: CPT | Performed by: INTERNAL MEDICINE

## 2021-05-20 PROCEDURE — 1123F ACP DISCUSS/DSCN MKR DOCD: CPT | Performed by: INTERNAL MEDICINE

## 2021-05-20 PROCEDURE — 1036F TOBACCO NON-USER: CPT | Performed by: INTERNAL MEDICINE

## 2021-05-20 NOTE — PROGRESS NOTES
deficiency     on andrgel    Vertigo     \"last used Meclazine since 2/2020\"    Wears glasses     Wears partial dentures     upper       Vitals:    05/20/21 0844 05/20/21 0857   BP: (!) 180/94 (!) 166/90   Pulse: 86    Weight: 224 lb 3.2 oz (101.7 kg)    Height: 5' 8\" (1.727 m)       Body mass index is 34.09 kg/m². Wt Readings from Last 3 Encounters:   05/20/21 224 lb 3.2 oz (101.7 kg)   05/06/21 228 lb (103.4 kg)   05/04/21 228 lb (103.4 kg)     Constitutional:  Patient is moderately obese pleasant male in no apparent distress. HEENT: He wears glasses and facemask. Cardiovascular: Auscultation: Normal S1 and S2. No significant murmurs or gallops noted. Carotids are negative for bruits. Abdominal aorta is nonpalpable. No epigastric bruit noted. Peripheral pulses: Pedal pulses are 1+. Respiratory:  Respiratory effort is normal. Breath sounds are diminished bilaterally without any wheezing or rhonchi. Extremities:  No edema, clubbing,  Cyanosis, petechiae. Abdomen: Right upper quadrant and flank tenderness noted with normal bowel sounds. No organomegaly noted. Neurologic:  Oriented to time, place, and person and non-anxious. No focal neurological deficit noted. Psychiatric: Normal mood and effect. Pertinent records reviewed and discussed with patient and results are as follow:  Chest x-ray on May 10, 2021 reported: The heart is normal size and configuration. Atherosclerotic changes of the   thoracic aorta are present. The pulmonary vasculature is normal.  The lungs   are clear. No acute osseous abnormality is seen. Impression   No acute cardiopulmonary. CHEST RADIOGRAPH:       No focal areas of consolidation or significant effusions on recent chest   radiograph. Lung perfusion scan on May 10, 2021 reported   Impression   Normal perfusion. No pulmonary embolism.      EKG today is consistent with normal sinus rhythm with occasional PVCs 86 bpm.    Lab Results   Component Value Date    WBC 7.8 05/06/2021    HGB 14.6 05/06/2021    HCT 43.3 05/06/2021     05/06/2021     Lab Results   Component Value Date    CHOL 208 (H) 05/06/2021    TRIG 287 (H) 05/06/2021    HDL 36 (L) 05/06/2021    LDLCALC 115 (H) 05/06/2021    LDLDIRECT 156 (H) 02/05/2021     Lab Results   Component Value Date    BUN 13 05/06/2021    CREATININE 1.3 05/06/2021     05/06/2021    K 4.2 05/06/2021     No results found for: INR  Lab Results   Component Value Date    LABA1C 8.0 05/06/2021   The 10-year ASCVD risk score (Sadie Wen, et al., 2013) is: 53%    Values used to calculate the score:      Age: 78 years      Sex: Male      Is Non- : No      Diabetic: Yes      Tobacco smoker: No      Systolic Blood Pressure: 361 mmHg      Is BP treated: Yes      HDL Cholesterol: 36 mg/dL      Total Cholesterol: 208 mg/dL    ASSESSMENT/PLAN:    1. Chest pain, unspecified type  Assessment & Plan:  Chest discomfort is somewhat atypical most likely secondary to frequent PVCs. Patient has high 10-year ASCVD risk score of 53%. We will repeat nonresume cardiac testings to evaluate him further. Continue aggressive risk factor modification and follow-up. Orders:  -     EKG 12 Lead  -     NM MYOCARDIAL SPECT REST EXERCISE OR RX; Future  -     ECHO Complete 2D W Doppler W Color; Future  -     Holter Monitor 24 Hour; Future  2. Stage 3a chronic kidney disease  Assessment & Plan:  Creatinine 1.3 reported on May 6, 2021 is an improvement from prior levels of 1.5 and 1.6 the last few years. He follows up closely with nephrology. Continue optimal antihypertensive regimen and follow-up. 3. Controlled type 2 diabetes mellitus with diabetic nephropathy, without long-term current use of insulin ([facility])  Assessment & Plan:  Recent hemoglobin A1c was 8.0. Patient follows up closely with PCP for diabetes management.   4. Pure hypercholesterolemia  Assessment & Plan:  Has been tried on multiple statins which she did not tolerate. Recent LDL was 156 and his ASCVD score is 58% for 10-year risk of having cardiac events. We discussed PCSK9 therapy to lower his LDL below 100 if possible. We also discussed the participation in a research trial if PCSK9 therapy is not affordable. 5. Other secondary hypertension  Assessment & Plan: He believes his home blood pressure readings are much better than here. He reports his home readings are below 605 systolic on current multiple medications. Continue the same. Orders:  -     ECHO Complete 2D W Doppler W Color; Future  6. Palpitations  Assessment & Plan: We will monitor him for quantification for 24 hours and compared from before. He had frequent PVCs which he is sensitive to. Orders:  -     ECHO Complete 2D W Doppler W Color; Future  -     Holter Monitor 24 Hour; Future    Continue current cardiovascular medications which have been reviewed and discussed individually with you. Follow-up in 4 weeks for the results of repeat Echo, Holter and pharmacological stress test, , sooner if needed. An electronic signature was used to authenticate this note.     --Jose Rafael Escoto MD

## 2021-05-20 NOTE — ASSESSMENT & PLAN NOTE
Chest discomfort is somewhat atypical most likely secondary to frequent PVCs. Patient has high 10-year ASCVD risk score of 53%. We will repeat nonresume cardiac testings to evaluate him further. Continue aggressive risk factor modification and follow-up.

## 2021-05-20 NOTE — ASSESSMENT & PLAN NOTE
Creatinine 1.3 reported on May 6, 2021 is an improvement from prior levels of 1.5 and 1.6 the last few years. He follows up closely with nephrology. Continue optimal antihypertensive regimen and follow-up.

## 2021-05-20 NOTE — ASSESSMENT & PLAN NOTE
We will monitor him for quantification for 24 hours and compared from before. He had frequent PVCs which he is sensitive to.

## 2021-05-20 NOTE — LETTER
Josh Philippe  1950  [id number]    Have you had any Chest Pain that is not new? - Yes  If Yes DO EKG - How does it feel - Heaviness   How long does the pain last -  minutes    How long have you been having the pain - Years   Did you take a    And did it relieve the pain -      DO EKG IF: Patient has a Heart Rate above 100 or below 40     CAD (Coronary Artery Disease) patient should have one on file every 6 months        Have you had any Shortness of Breath - Yes  If Yes - When at rest and on exertion    Have you had any dizziness - No  If Yes DO ORTHOSTATIC BP - when do you feel dizzy    How long does it last .        Sitting wait 5 minutes do supine (laying down) wait 5 minutes then do standing - log each in \"vitals\" area in Epic   Be sure to ask what symptoms they are having if they get dizzy while completing ortho stats such as: room spinning, nausea, etc.    Have you had any palpitations that are not new? - Yes  If Yes DO EKG - Do you feel your heart skipping  How long does it last - .  hours     Is the patient on any of the following medications -   If Yes DO EKG - Needs done every 6 months    Do you have any edema - swelling in bilateral ankles, legs and feet under control    If Yes - CHECK TO SEE IF THE EDEMA IS PITTING  How long have they been having edema - Years  If Yes - Have they worn compression stockings Yes sometimes  If they have worn compression stockings      Vein \"LEG PROBLEM Questionnaire\"  1. Do you have prominent leg veins? No   2. Do you have any skin discoloration? No  3. Do you have any healed or active sores? No  4. Do you have swelling of the legs? Yes  5. Do you have a family history of varicose veins? No  6. Does your profession involve pro-longed        standing or heavy lifting? No  7. Have you been fighting overweight problems? Yes  8. Do you have restless legs? No  9.    Do you have any night time cramps? No  10. Do you have any of the following in your legs:        Aching and Tiredness     When did you have your last labs drawn 04/2021  Where did you have them done   What doctor ordered     If we do not have these labs you are retrieve these labs for these providers! Do you have a surgery or procedure scheduled in the near future - No  If Yes- DO EKG  If Yes - Who is the surgery with?    Phone number of physician   Fax number of physician   Type of surgery   GIVE THIS INFORMATION TO AMANDO MACK     Ask patient if they [facility] to [facility] if they are not already signed up     Check to see if we have an E-MAIL on file for the patient     Check medication list thoroughly!!! AND RECONCILE OUTSIDE MEDICATIONS  If dose has changed change the entire order not just the [place] At check out add to every patient's \"wrap up\" the following dot phrase AFTERHOURSEDUCATION and ensure we explain this to our patients

## 2021-05-20 NOTE — PATIENT INSTRUCTIONS
Continue current cardiovascular medications which have been reviewed and discussed individually with you. Follow-up in 4 weeks for the results of repeat Echo, Holter and pharmacological stress test, , sooner if needed. Please be informed that if you contact our office outside of normal business hours the physician on call cannot help with any scheduling or rescheduling issues, procedure instruction questions or any type of medication issue. We advise you for any urgent/emergency that you go to the nearest emergency room! PLEASE CALL OUR OFFICE DURING NORMAL BUSINESS HOURS    Monday - Friday   8 am to 5 pm    [place]: [phone]    [place]:  [phone]  Please hold on to these instructions the  will call you within 1-9 business days when we receive authorization from your insurance. Echocardiogram    WHAT TO EXPECT:   ? This test will take approximately 45 minutes. ? It is an ultrasound of the heart. ? It can look at the valves and chambers inside the heart   ? There is no special instructions for this test.     If you are unable to keep this appointment, please notify us 24 hours prior to test at [phone]. Please hold on to these instructions the  will call you within 1-9 business days when we receive authorization from your insurance. Nuclear Stress Test    WHAT TO EXPECT:   ? You will need to confirm the test or it could be cancelled. ? This test will take approximately 2 hours: 1 hour in the AM &    1 hour in the PM. You will be given a time by the Technologist after the first part is completed to come back. ? You will be given a medication, through an IV in the hand, this will safely simulate exercise. This IV is also needed to inject the radioactive isotope unless you are able toe walk the treadmill. ? You will receive an injection in the AM & PM before the pictures. ?  Using a special camera, you will have one set of pictures of your heart taken in the AM and a set of pictures in the PM.     PREPARATION FOR TEST:  ? Eat a light breakfast such as water or juice and toast.  ? If you are DIABETIC: Eat a normal breakfast with NO CAFFEINE and take your insulin as normal.   ? AVOID ALL FOODS & DRINKS containing CAFFEINE 12 HOURS PRIOR TO THE TEST: Including coffee, Tea, Hollie and other soft drinks even those labeled  caffeine free or decaffeinated.  ? HOLD THESE MEDICATIONS Persantine & Theophylline (Theodur)  24 hours prior & bring your inhaler with you.    The physician will specify if the following Beta blockers need to be held for 24 hours prior to test: Lopressor (metoprolol)

## 2021-05-20 NOTE — ASSESSMENT & PLAN NOTE
He believes his home blood pressure readings are much better than here. He reports his home readings are below 480 systolic on current multiple medications. Continue the same.

## 2021-05-20 NOTE — ASSESSMENT & PLAN NOTE
Has been tried on multiple statins which she did not tolerate. Recent LDL was 156 and his ASCVD score is 58% for 10-year risk of having cardiac events. We discussed PCSK9 therapy to lower his LDL below 100 if possible. We also discussed the participation in a research trial if PCSK9 therapy is not affordable.

## 2021-05-24 PROCEDURE — 93227 XTRNL ECG REC<48 HR R&I: CPT | Performed by: INTERNAL MEDICINE

## 2021-06-14 ENCOUNTER — PROCEDURE VISIT (OUTPATIENT)
Dept: CARDIOLOGY CLINIC | Age: 71
End: 2021-06-14
Payer: MEDICARE

## 2021-06-14 DIAGNOSIS — I15.8 OTHER SECONDARY HYPERTENSION: ICD-10-CM

## 2021-06-14 DIAGNOSIS — R00.2 PALPITATIONS: ICD-10-CM

## 2021-06-14 DIAGNOSIS — R07.9 CHEST PAIN, UNSPECIFIED TYPE: ICD-10-CM

## 2021-06-14 DIAGNOSIS — M54.12 RIGHT CERVICAL RADICULOPATHY: ICD-10-CM

## 2021-06-14 LAB
LV EF: 58 %
LV EF: 61 %
LVEF MODALITY: NORMAL
LVEF MODALITY: NORMAL

## 2021-06-14 PROCEDURE — 93016 CV STRESS TEST SUPVJ ONLY: CPT | Performed by: INTERNAL MEDICINE

## 2021-06-14 PROCEDURE — 93017 CV STRESS TEST TRACING ONLY: CPT | Performed by: INTERNAL MEDICINE

## 2021-06-14 PROCEDURE — 93306 TTE W/DOPPLER COMPLETE: CPT | Performed by: INTERNAL MEDICINE

## 2021-06-14 PROCEDURE — A9500 TC99M SESTAMIBI: HCPCS | Performed by: INTERNAL MEDICINE

## 2021-06-14 PROCEDURE — 78452 HT MUSCLE IMAGE SPECT MULT: CPT | Performed by: INTERNAL MEDICINE

## 2021-06-14 PROCEDURE — 93018 CV STRESS TEST I&R ONLY: CPT | Performed by: INTERNAL MEDICINE

## 2021-06-14 RX ORDER — GABAPENTIN 300 MG/1
300 CAPSULE ORAL 2 TIMES DAILY
Qty: 60 CAPSULE | Refills: 1 | Status: SHIPPED | OUTPATIENT
Start: 2021-06-14 | End: 2021-08-18 | Stop reason: SDUPTHER

## 2021-06-15 ENCOUNTER — TELEPHONE (OUTPATIENT)
Dept: CARDIOLOGY CLINIC | Age: 71
End: 2021-06-15

## 2021-06-15 NOTE — TELEPHONE ENCOUNTER
Spoke with patient about his NM stress test and echo and let him know to follow up as schedule and to call if needed sooner. Nm stress test 6/14/2021   Normal study suggestive of normal perfusion in distribution of all   coronaries and normal left ventricular size and function, LVEF is 61%     Echo 6/14/2021   Normal left ventricle structure and systolic function with an ejection   fraction of 55-60%. Grade I diastolic dysfunction. Sclerotic, but non-stenotic aortic valve. Normal pulmonary artery pressure, 33 mmHg. No evidence of pericardial effusion.

## 2021-06-18 ENCOUNTER — TELEPHONE (OUTPATIENT)
Dept: INTERNAL MEDICINE CLINIC | Age: 71
End: 2021-06-18

## 2021-06-18 NOTE — TELEPHONE ENCOUNTER
Pt called in stating that Dr Adrianne Kat would like for him to participate in a cholesterol medication trial since he is unable to use statins but pt wanted to be sure that this is ok . He is unsure of what the medication name is but stated that it is an injection that he would be given every two weeks and they wanted this trial run to begin Monday June 21 .

## 2021-06-23 ENCOUNTER — OFFICE VISIT (OUTPATIENT)
Dept: CARDIOLOGY CLINIC | Age: 71
End: 2021-06-23
Payer: MEDICARE

## 2021-06-23 VITALS
RESPIRATION RATE: 16 BRPM | SYSTOLIC BLOOD PRESSURE: 136 MMHG | BODY MASS INDEX: 34.21 KG/M2 | DIASTOLIC BLOOD PRESSURE: 80 MMHG | HEART RATE: 72 BPM | WEIGHT: 225 LBS

## 2021-06-23 DIAGNOSIS — N18.31 STAGE 3A CHRONIC KIDNEY DISEASE (HCC): ICD-10-CM

## 2021-06-23 DIAGNOSIS — R00.2 PALPITATIONS: ICD-10-CM

## 2021-06-23 DIAGNOSIS — E78.00 PURE HYPERCHOLESTEROLEMIA: ICD-10-CM

## 2021-06-23 DIAGNOSIS — E11.21 CONTROLLED TYPE 2 DIABETES MELLITUS WITH DIABETIC NEPHROPATHY, WITHOUT LONG-TERM CURRENT USE OF INSULIN (HCC): ICD-10-CM

## 2021-06-23 DIAGNOSIS — Z78.9 STATIN INTOLERANCE: ICD-10-CM

## 2021-06-23 DIAGNOSIS — I15.8 OTHER SECONDARY HYPERTENSION: ICD-10-CM

## 2021-06-23 DIAGNOSIS — I20.8 OTHER FORMS OF ANGINA PECTORIS (HCC): Primary | ICD-10-CM

## 2021-06-23 PROCEDURE — 3052F HG A1C>EQUAL 8.0%<EQUAL 9.0%: CPT | Performed by: INTERNAL MEDICINE

## 2021-06-23 PROCEDURE — 2022F DILAT RTA XM EVC RTNOPTHY: CPT | Performed by: INTERNAL MEDICINE

## 2021-06-23 PROCEDURE — 4040F PNEUMOC VAC/ADMIN/RCVD: CPT | Performed by: INTERNAL MEDICINE

## 2021-06-23 PROCEDURE — 3017F COLORECTAL CA SCREEN DOC REV: CPT | Performed by: INTERNAL MEDICINE

## 2021-06-23 PROCEDURE — 1123F ACP DISCUSS/DSCN MKR DOCD: CPT | Performed by: INTERNAL MEDICINE

## 2021-06-23 PROCEDURE — 99213 OFFICE O/P EST LOW 20 MIN: CPT | Performed by: INTERNAL MEDICINE

## 2021-06-23 PROCEDURE — G8427 DOCREV CUR MEDS BY ELIG CLIN: HCPCS | Performed by: INTERNAL MEDICINE

## 2021-06-23 PROCEDURE — G8417 CALC BMI ABV UP PARAM F/U: HCPCS | Performed by: INTERNAL MEDICINE

## 2021-06-23 PROCEDURE — 1036F TOBACCO NON-USER: CPT | Performed by: INTERNAL MEDICINE

## 2021-06-23 RX ORDER — TRAMADOL HYDROCHLORIDE 50 MG/1
50 TABLET ORAL
COMMUNITY
Start: 2021-06-07 | End: 2022-02-15

## 2021-06-23 NOTE — PATIENT INSTRUCTIONS
Continue current cardiovascular medications which have been reviewed and discussed individually with you. Patient is also counseled for low cholesterol, low sodium, low fat diet and to  increase in fresh fruits and vegetables consumption and for 20-30 minutes of daily aerobic activies. Weight management and healthy lifestyle choices are discussed. Multiple questions answered. Patient verbalized understanding and these goals will be monitored on follow up visits. Counseled for calorie counting, reduction in serving size and exercise and lifestyle modification for weight loss. Follow-up in 6 months, sooner if needed.

## 2021-06-23 NOTE — PROGRESS NOTES
Swathi Santillan (:  1950) is a 78 y.o. male,     Chief Complaint   Patient presents with    Hypertension     Pt states he has episodes of palpitations, but not new. Pt states he has shortness of breath with exertion. Pt is non-smoker.  Hyperlipidemia    Diabetes     Patient is here for follow up for atypical chest pains he had on last visit and has multiple coronary artery disease risk factors including diabetes and hyperlipidemia. He had noninvasive evaluation for palpitations and atypical chest pains and today's he is here for the results. He denies any significant recurrence of symptoms and he does not exercise and he is fully vaccinated for COVID-19 and he is compliant to his medications. He could not afford PCSK9 therapy due to the cost and he has been enrolled in research as of last Monday. Allergies   Allergen Reactions    Aldactone [Spironolactone]      Gynecomastia     Bactrim [Sulfamethoxazole-Trimethoprim]      Nephrology contraindicates-- can cause hyperkalemia    Crestor [Rosuvastatin]      myalgias    Labetalol      Marked bradycardia with frequent symptomatic ectopy    Metformin And Related [Metformin And Related]      Diarrhea at 500mg daily    Nsaids      W PROTEINURIA    Statins      myalgias    Tegretol [Carbamazepine]      JITTERS, INSOMNIA     Prior to Admission medications    Medication Sig Start Date End Date Taking? Authorizing Provider   traMADol (ULTRAM) 50 MG tablet 50 mg. Takes 2 tabs in a.m. and 1 in p.m. 21  Yes Historical Provider, MD   gabapentin (NEURONTIN) 300 MG capsule Take 1 capsule by mouth 2 times daily for 30 days.  21 Yes Lucila Stafford MD   hydrALAZINE (APRESOLINE) 25 MG tablet TAKE ONE TABLET BY MOUTH TWICE A DAY 21  Yes Lucila Stafford MD   metoprolol tartrate (LOPRESSOR) 50 MG tablet TAKE ONE TABLET BY MOUTH TWICE A DAY 21  Yes Lucila Stafford MD   amLODIPine (NORVASC) 5 MG tablet TAKE ONE TABLET BY MOUTH DAILY 5/24/21  Yes Leo Curtis, MD   empagliflozin (JARDIANCE) 10 MG tablet Take 1 tablet by mouth daily 5/7/21  Yes Leo Curtis, MD   HYDROcodone-acetaminophen (NORCO) 5-325 MG per tablet Take 1 tablet by mouth 2 times daily as needed for Pain for up to 30 days. 7/7/21 8/6/21 Yes Leo Curtis, MD   HYDROcodone-acetaminophen (NORCO) 5-325 MG per tablet Take 1 tablet by mouth 2 times daily as needed for Pain for up to 30 days. 6/7/21 7/7/21 Yes Leo Route, MD   HYDROcodone-acetaminophen (NORCO) 5-325 MG per tablet Take 1 tablet by mouth 2 times daily for 30 days. 5/8/21 6/23/21 Yes Leo Curtis, MD   eplerenone (INSPRA) 50 MG tablet Take 1 tablet by mouth 2 times daily 5/4/21  Yes Danielle Cardozo DO   lisinopril (PRINIVIL;ZESTRIL) 40 MG tablet TAKE ONE TABLET BY MOUTH DAILY 3/9/21  Yes Leo Curtis, MD   cyclobenzaprine (FLEXERIL) 10 MG tablet TAKE ONE TABLET BY MOUTH THREE TIMES A DAY AS NEEDED FOR MUSCLE SPASMS 2/18/21  Yes Leo MD Curtis   ondansetron [facility]) 4 MG tablet Take 1 tablet by mouth every 8 hours as needed for Nausea 2/16/21  Yes Leo Curtis, MD   furosemide (LASIX) 40 MG tablet Take 1 tablet by mouth See Admin Instructions PRN LEG SWELLING 12/1/20  Yes Tito Cardozo DO   Lancets MISC 1 each by Does not apply route 2 times daily 5/6/20  Yes Leo MD Curtis   blood glucose monitor strips Test two times a day & as needed for symptoms of irregular blood glucose. 5/6/20  Yes Leo MD Curtis   butalbital-acetaminophen-caffeine (FIORICET, ESGIC) -02 MG per tablet Take 1 tablet by mouth 3 times daily as needed for Headaches or Migraine 4/29/20  Yes Leo Curtis, MD   lansoprazole (PREVACID) 15 MG delayed release capsule Take 1 capsule by mouth daily 9/27/16  Yes Leo MD Curtis   meclizine (ANTIVERT) 25 MG tablet Take 1 tablet by mouth every 6 hours.  3/10/15  Yes Leo Acevedo MD     Past Medical History: Diagnosis Date    Blurred vision     right>left  side --pattern dystrophy/Dr Dye    BPH (benign prostatic hypertrophy)     Bradycardia 04/18/2019    Hr 30-40 with skipped beats on BP monitor on Labetalol    Crohn's regional enteritis [facility])     Dr Dong\"dx with Crohns in [address]- no recent issues    DM II (diabetes mellitus, type II), controlled ([facility] [address] 2008    diet controlled\"started 10+ yrs ago but off [address] for over 9 -10 years\"    Erosive esophagitis     \"had erosive esophagitis in lower area on Prevacid for this\"    Eye pain     GERD (gastroesophageal reflux disease)     Headache disorder     [place] (hard of hearing)     HTN (hypertension)     also comanaged w Dr Marilee Bañuelos. 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  S/P colonoscopy 01/17/2019    Dr Dylon Valiente-- ?recheck when?  Stage 3 chronic kidney disease ([facility] [address] 04/18/2019    Dr Gomes Gamma Testosterone deficiency     on andrgel    Vertigo     \"last used Meclazine since 2/2020\"    Wears glasses     Wears partial dentures     upper       Vitals:    06/23/21 0956   BP: 136/80   Pulse: 72   Resp: 16   Weight: 225 lb (102.1 kg)      Body mass index is 34.21 kg/m².   Wt Readings from Last 3 Encounters: 06/23/21 225 lb (102.1 kg)   05/20/21 224 lb 3.2 oz (101.7 kg)   05/06/21 228 lb (103.4 kg)     Following test results are reviewed with the patient and details are discussed and multiple questions are answered. 24 hours of Holter monitoring done in May 2021 to evaluate cardiac arrhythmias is a technically adequate quality consistent with normal sinus rhythm with an average rate of 64 bpm.  Minimum rate of 51 bpm occurred at 12:23 AM.  Maximum rate of 99 bpm.  8:41 AM.  Frequent 1950 isolated PVCs and 5 ventricular couplets noted. Rare time PACs are also present without complex arrhythmias. Patient reported shortness of breath during normal rate and rhythm. Nuclear stress test done on June 14, 2021 reported  Normal study suggestive of normal perfusion in distribution of all  coronaries and normal left ventricular size and function, LVEF is 61% . Echocardiogram done on June 14, 2021 reported   Normal left ventricle structure and systolic function with an ejection   fraction of 55-60%. Grade I diastolic dysfunction. Sclerotic, but non-stenotic aortic valve. Normal pulmonary artery pressure, 33 mmHg. No evidence of pericardial effusion. Pertinent records reviewed and discussed with patient and results are as follow:    Lab Results   Component Value Date    WBC 7.8 05/06/2021    HGB 14.6 05/06/2021    HCT 43.3 05/06/2021     05/06/2021     Lab Results   Component Value Date    CHOL 208 (H) 05/06/2021    TRIG 287 (H) 05/06/2021    HDL 36 (L) 05/06/2021    LDLCALC 115 (H) 05/06/2021    LDLDIRECT 156 (H) 02/05/2021     Lab Results   Component Value Date    BUN 13 05/06/2021    CREATININE 1.3 05/06/2021     05/06/2021    K 4.2 05/06/2021     No results found for: INR  Lab Results   Component Value Date    LABA1C 8.0 05/06/2021     ASSESSMENT/PLAN:    1. Other forms of angina pectoris ([address]  2.  Controlled type 2 diabetes mellitus with diabetic nephropathy, without long-term current use of insulin ([facility] [address]  3. Stage 3a chronic kidney disease  4. Pure hypercholesterolemia  5. Other secondary hypertension  6. Palpitations    Continue current cardiovascular medications which have been reviewed and discussed individually with you. Patient is also counseled for low cholesterol, low sodium, low fat diet and to  increase in fresh fruits and vegetables consumption and for 20-30 minutes of daily aerobic activies. Weight management and healthy lifestyle choices are discussed. Multiple questions answered. Patient verbalized understanding and these goals will be monitored on follow up visits. Counseled for calorie counting, reduction in serving size and exercise and lifestyle modification for weight loss. Follow-up in 6 months, sooner if needed. On this date 6/23/2021 I have spent 20 minutes reviewing previous notes, test results and face to face with the patient discussing the diagnosis and importance of compliance with the treatment plan as well as documenting on the day of the visit. An electronic signature was used to authenticate this note.     --Gómez Arevalo MD

## 2021-07-19 ENCOUNTER — TELEMEDICINE (OUTPATIENT)
Dept: INTERNAL MEDICINE CLINIC | Age: 71
End: 2021-07-19
Payer: MEDICARE

## 2021-07-19 DIAGNOSIS — K52.9 ACUTE GASTROENTERITIS: Primary | ICD-10-CM

## 2021-07-19 PROCEDURE — 1036F TOBACCO NON-USER: CPT | Performed by: INTERNAL MEDICINE

## 2021-07-19 PROCEDURE — 4040F PNEUMOC VAC/ADMIN/RCVD: CPT | Performed by: INTERNAL MEDICINE

## 2021-07-19 PROCEDURE — G8427 DOCREV CUR MEDS BY ELIG CLIN: HCPCS | Performed by: INTERNAL MEDICINE

## 2021-07-19 PROCEDURE — G8417 CALC BMI ABV UP PARAM F/U: HCPCS | Performed by: INTERNAL MEDICINE

## 2021-07-19 PROCEDURE — 99213 OFFICE O/P EST LOW 20 MIN: CPT | Performed by: INTERNAL MEDICINE

## 2021-07-19 PROCEDURE — 3017F COLORECTAL CA SCREEN DOC REV: CPT | Performed by: INTERNAL MEDICINE

## 2021-07-19 PROCEDURE — 1123F ACP DISCUSS/DSCN MKR DOCD: CPT | Performed by: INTERNAL MEDICINE

## 2021-07-19 RX ORDER — DIPHENOXYLATE HYDROCHLORIDE AND ATROPINE SULFATE 2.5; .025 MG/1; MG/1
1 TABLET ORAL DAILY PRN
Qty: 7 TABLET | Refills: 0 | Status: SHIPPED | OUTPATIENT
Start: 2021-07-19 | End: 2021-07-26

## 2021-07-19 RX ORDER — CIPROFLOXACIN 250 MG/1
250 TABLET, FILM COATED ORAL 2 TIMES DAILY
Qty: 14 TABLET | Refills: 0 | Status: SHIPPED | OUTPATIENT
Start: 2021-07-19 | End: 2021-07-26

## 2021-07-19 RX ORDER — METRONIDAZOLE 250 MG/1
250 TABLET ORAL 3 TIMES DAILY
Qty: 21 TABLET | Refills: 0 | Status: SHIPPED | OUTPATIENT
Start: 2021-07-19 | End: 2021-07-26

## 2021-07-19 NOTE — PROGRESS NOTES
2021    TELEHEALTH EVALUATION -- Audio/Visual (During WOCVX-83 public health emergency)    HPI:    Malena Marks (:  1950) has requested an audio/video evaluation for the following concern(s):    Coronary artery disease has been asymptomatic w/o any ongoing sx of CP, SOB/SEVERINO, palpitations, lt headedness, diaphoresis. Is continuing medications regularly. Recent EVAL DR MEET WHITTEN, SEEN LAST MO.  JUST STARTED INJECTION [facility]  - ?  [facility] [place] [facility]? SQ EVERY TWO WEEKS, W SOME N/V/DIARRHEA STARTED LAST WED 5D AGO. KEEPING SOME FOOD DOWN. STILL USING ZOFRAN, UP TO 3-4X/DAY AND ALSO W STOOLS FOUL, WE'LL TRY COURSE OF ABX. NO CHANGE IN DIET, ILL CONTACTS, FEVER OR CHILLS. DENIES ABD PAIN        Review of Systems    Prior to Visit Medications    Medication Sig Taking? Authorizing Provider   traMADol (ULTRAM) 50 MG tablet 50 mg. Takes 2 tabs in a.m. and 1 in p.m. Yes Historical Provider, MD   hydrALAZINE (APRESOLINE) 25 MG tablet TAKE ONE TABLET BY MOUTH TWICE A DAY Yes Hollis Myers MD   metoprolol tartrate (LOPRESSOR) 50 MG tablet TAKE ONE TABLET BY MOUTH TWICE A DAY Yes Hollis Myers MD   amLODIPine (NORVASC) 5 MG tablet TAKE ONE TABLET BY MOUTH DAILY Yes Hollis Myers MD   empagliflozin (JARDIANCE) 10 MG tablet Take 1 tablet by mouth daily Yes Hollis Myers MD   HYDROcodone-acetaminophen (NORCO) 5-325 MG per tablet Take 1 tablet by mouth 2 times daily as needed for Pain for up to 30 days.  Yes Hollis Myers MD   eplerenone (INSPRA) 50 MG tablet Take 1 tablet by mouth 2 times daily Yes Danielle Cardozo DO   lisinopril (PRINIVIL;ZESTRIL) 40 MG tablet TAKE ONE TABLET BY MOUTH DAILY Yes Hollis Myers MD   cyclobenzaprine (FLEXERIL) 10 MG tablet TAKE ONE TABLET BY MOUTH THREE TIMES A DAY AS NEEDED FOR MUSCLE SPASMS Yes Hollis Myers MD   ondansetron (ZOFRAN) 4 MG tablet Take 1 tablet by mouth every 8 hours as needed for Nausea Yes Hollis Myers MD furosemide (LASIX) 40 MG tablet Take 1 tablet by mouth See Admin Instructions PRN LEG SWELLING Yes Danielle Cardozo,    Lancets MISC 1 each by Does not apply route 2 times daily Yes Emmanuel Garcia MD   blood glucose monitor strips Test two times a day & as needed for symptoms of irregular blood glucose. Yes Emmanuel Garcia MD   butalbital-acetaminophen-caffeine (FIORICET, ESGIC) -43 MG per tablet Take 1 tablet by mouth 3 times daily as needed for Headaches or Migraine Yes Emmanuel Garcia MD   lansoprazole (PREVACID) 15 MG delayed release capsule Take 1 capsule by mouth daily Yes Emmanuel Garcia MD   meclizine (ANTIVERT) 25 MG tablet Take 1 tablet by mouth every 6 hours. Yes Emmanuel Garcia MD   gabapentin (NEURONTIN) 300 MG capsule Take 1 capsule by mouth 2 times daily for 30 days. Emmanuel Garcia MD       Social History     Tobacco Use    Smoking status: Former Smoker     Packs/day: 3.00     Years: 30.00     Pack years: 90.00     Types: Cigarettes     Quit date: 1995     Years since quittin.5    Smokeless tobacco: Never Used   Vaping Use    Vaping Use: Never used   Substance Use Topics    Alcohol use: No     Comment: caffeine 1 coffee 1 pop    Drug use: No           PHYSICAL EXAMINATION:  [ INSTRUCTIONS:  \"[x]\" Indicates a positive item  \"[]\" Indicates a negative item  -- DELETE ALL ITEMS NOT EXAMINED]  Vital Signs: (As obtained by patient/caregiver or practitioner observation)    Blood pressure-  Heart rate-    Respiratory rate-    Temperature-  Pulse oximetry-     Constitutional: [x] Appears well-developed and well-nourished [] No apparent distress      [] Abnormal-   Mental status  [x] Alert and awake  [] Oriented to person/place/time []Able to follow commands      Eyes:  EOM    []  Normal  [] Abnormal-  Sclera  []  Normal  [] Abnormal -         Discharge []  None visible  [] Abnormal -    HENT:   [] Normocephalic, atraumatic.   [] Abnormal   [] Mouth/Throat: Mucous membranes are moist.     External Ears [] Normal  [] Abnormal-     Neck: [] No visualized mass     Pulmonary/Chest: [x] Respiratory effort normal.  [] No visualized signs of difficulty breathing or respiratory distress        [] Abnormal-      Musculoskeletal:   [] Normal gait with no signs of ataxia         [] Normal range of motion of neck        [] Abnormal-       Neurological:        [] No Facial Asymmetry (Cranial nerve 7 motor function) (limited exam to video visit)          [] No gaze palsy        [] Abnormal-         Skin:        [] No significant exanthematous lesions or discoloration noted on facial skin         [] Abnormal-            Psychiatric:       [] Normal Affect [] No Hallucinations        [] Abnormal-     Other pertinent observable physical exam findings-     ASSESSMENT/PLAN:  1. Acute gastroenteritis  CLINICALLY SUSPECTED, WILL PUSH FLUIDS AND CONT PRN ZOFRAN, ADD PRN LOMOTIL AND EMPIRIC COURSE ABX, To call back one week if not improving.      - metroNIDAZOLE (FLAGYL) 250 MG tablet; Take 1 tablet by mouth 3 times daily for 7 days  Dispense: 21 tablet; Refill: 0  - ciprofloxacin (CIPRO) 250 MG tablet; Take 1 tablet by mouth 2 times daily for 7 days  Dispense: 14 tablet; Refill: 0  - diphenoxylate-atropine (DIPHENATOL) 2.5-0.025 MG per tablet; Take 1 tablet by mouth daily as needed for Diarrhea for up to 7 days. Dispense: 7 tablet; Refill: 0      Return if symptoms worsen or fail to improve. Mariah Ortiz, was evaluated through a synchronous (real-time) audio-video encounter. The patient (or guardian if applicable) is aware that this is a billable service. Verbal consent to proceed has been obtained within the past 12 months. The visit was conducted pursuant to the emergency declaration under the [facility], [address] [place] [address] authority and the [email or web address] and [employer] General Act.   Patient identification was verified, and

## 2021-08-06 ENCOUNTER — OFFICE VISIT (OUTPATIENT)
Dept: INTERNAL MEDICINE CLINIC | Age: 71
End: 2021-08-06
Payer: MEDICARE

## 2021-08-06 VITALS
RESPIRATION RATE: 16 BRPM | HEART RATE: 70 BPM | DIASTOLIC BLOOD PRESSURE: 74 MMHG | WEIGHT: 220 LBS | OXYGEN SATURATION: 97 % | BODY MASS INDEX: 33.45 KG/M2 | SYSTOLIC BLOOD PRESSURE: 136 MMHG

## 2021-08-06 DIAGNOSIS — G89.29 CHRONIC MIDLINE LOW BACK PAIN WITHOUT SCIATICA: ICD-10-CM

## 2021-08-06 DIAGNOSIS — I12.9 HYPERTENSIVE RENAL DISEASE: ICD-10-CM

## 2021-08-06 DIAGNOSIS — E11.21 CONTROLLED TYPE 2 DIABETES MELLITUS WITH DIABETIC NEPHROPATHY, WITHOUT LONG-TERM CURRENT USE OF INSULIN (HCC): Primary | ICD-10-CM

## 2021-08-06 DIAGNOSIS — M54.12 RIGHT CERVICAL RADICULOPATHY: ICD-10-CM

## 2021-08-06 DIAGNOSIS — M54.50 CHRONIC MIDLINE LOW BACK PAIN WITHOUT SCIATICA: ICD-10-CM

## 2021-08-06 PROCEDURE — 3052F HG A1C>EQUAL 8.0%<EQUAL 9.0%: CPT | Performed by: INTERNAL MEDICINE

## 2021-08-06 PROCEDURE — G8417 CALC BMI ABV UP PARAM F/U: HCPCS | Performed by: INTERNAL MEDICINE

## 2021-08-06 PROCEDURE — 4040F PNEUMOC VAC/ADMIN/RCVD: CPT | Performed by: INTERNAL MEDICINE

## 2021-08-06 PROCEDURE — 1036F TOBACCO NON-USER: CPT | Performed by: INTERNAL MEDICINE

## 2021-08-06 PROCEDURE — 99214 OFFICE O/P EST MOD 30 MIN: CPT | Performed by: INTERNAL MEDICINE

## 2021-08-06 PROCEDURE — 1123F ACP DISCUSS/DSCN MKR DOCD: CPT | Performed by: INTERNAL MEDICINE

## 2021-08-06 PROCEDURE — 3017F COLORECTAL CA SCREEN DOC REV: CPT | Performed by: INTERNAL MEDICINE

## 2021-08-06 PROCEDURE — G8427 DOCREV CUR MEDS BY ELIG CLIN: HCPCS | Performed by: INTERNAL MEDICINE

## 2021-08-06 PROCEDURE — 2022F DILAT RTA XM EVC RTNOPTHY: CPT | Performed by: INTERNAL MEDICINE

## 2021-08-06 RX ORDER — TRAMADOL HYDROCHLORIDE 50 MG/1
50 TABLET ORAL 3 TIMES DAILY
Qty: 90 TABLET | Refills: 2 | Status: SHIPPED | OUTPATIENT
Start: 2021-08-06 | End: 2021-11-04 | Stop reason: SDUPTHER

## 2021-08-06 RX ORDER — HYDROCODONE BITARTRATE AND ACETAMINOPHEN 5; 325 MG/1; MG/1
1 TABLET ORAL 2 TIMES DAILY PRN
Qty: 60 TABLET | Refills: 0 | Status: SHIPPED | OUTPATIENT
Start: 2021-08-06 | End: 2021-11-04 | Stop reason: SDUPTHER

## 2021-08-06 RX ORDER — HYDROCODONE BITARTRATE AND ACETAMINOPHEN 5; 325 MG/1; MG/1
1 TABLET ORAL 2 TIMES DAILY PRN
Qty: 60 TABLET | Refills: 0 | Status: SHIPPED | OUTPATIENT
Start: 2021-10-05 | End: 2021-11-04 | Stop reason: SDUPTHER

## 2021-08-06 RX ORDER — HYDROCODONE BITARTRATE AND ACETAMINOPHEN 5; 325 MG/1; MG/1
1 TABLET ORAL 2 TIMES DAILY
Qty: 60 TABLET | Refills: 0 | Status: SHIPPED | OUTPATIENT
Start: 2021-09-05 | End: 2021-11-04 | Stop reason: SDUPTHER

## 2021-08-06 NOTE — PROGRESS NOTES
respiratory distress. Breath sounds: Normal breath sounds. No wheezing or rales. Abdominal:      General: Bowel sounds are normal. There is no distension. Palpations: Abdomen is soft. There is no mass. Tenderness: There is no abdominal tenderness. There is no guarding or rebound. Musculoskeletal:         General: No tenderness. Cervical back: Normal range of motion and neck supple. Lymphadenopathy:      Cervical: No cervical adenopathy. Skin:     General: Skin is warm and dry. Neurological:      Mental Status: He is alert. Psychiatric:         Mood and Affect: Mood normal.         ASSESSMENT:    1. Controlled type 2 diabetes mellitus with diabetic nephropathy, without long-term current use of insulin ([address]    2. Hypertensive renal disease    3. Chronic midline low back pain without sciatica    4. Right cervical radiculopathy        PLAN:    Jericho Santoro was seen today for diabetes and nausea. Diagnoses and all orders for this visit:    Controlled type 2 diabetes mellitus with diabetic nephropathy, without long-term current use of insulin ([address] - DM relatively well controlled and will continue current regimen. Screening reviewed. See orders. AM EXPECTING HIS A1C ON JARDIANC SHOULD BE SIGNIFICANTLY IMPROVED FR PRIOR 8.  -     CBC Auto Differential; Future  -     Comprehensive Metabolic Panel; Future  -     Lipid Panel; Future  -     Hemoglobin A1C; Future    Hypertensive renal disease - Blood pressure stable and will continue current regimen. Will plan periodic monitoring of renal function, electrolytes, lipid profile. -     CBC Auto Differential; Future  -     Comprehensive Metabolic Panel; Future  -     Lipid Panel; Future    Chronic midline low back pain without sciatica  - LBP stable on pain regimen, RFs given as noted and will monitor.    -     traMADol (ULTRAM) 50 MG tablet;  Take 1 tablet by mouth 3 times daily for 30 days.  -     HYDROcodone-acetaminophen (NORCO) 5-325 MG per tablet; Take 1 tablet by mouth 2 times daily as needed for Pain for up to 30 days.  -     HYDROcodone-acetaminophen (NORCO) 5-325 MG per tablet; Take 1 tablet by mouth 2 times daily for 30 days.  -     HYDROcodone-acetaminophen (NORCO) 5-325 MG per tablet; Take 1 tablet by mouth 2 times daily as needed for Pain for up to 30 days. Right cervical radiculopathy- The current medical regimen is effective;  continue present plan and medications.    -     HYDROcodone-acetaminophen (NORCO) 5-325 MG per tablet; Take 1 tablet by mouth 2 times daily as needed for Pain for up to 30 days.  -     HYDROcodone-acetaminophen (NORCO) 5-325 MG per tablet; Take 1 tablet by mouth 2 times daily for 30 days.  -     HYDROcodone-acetaminophen (NORCO) 5-325 MG per tablet; Take 1 tablet by mouth 2 times daily as needed for Pain for up to 30 days.

## 2021-08-09 DIAGNOSIS — I12.9 HYPERTENSIVE RENAL DISEASE: ICD-10-CM

## 2021-08-09 DIAGNOSIS — E11.21 CONTROLLED TYPE 2 DIABETES MELLITUS WITH DIABETIC NEPHROPATHY, WITHOUT LONG-TERM CURRENT USE OF INSULIN (HCC): ICD-10-CM

## 2021-08-09 LAB
A/G RATIO: 1.3 (ref 1.1–2.2)
ALBUMIN SERPL-MCNC: 4.1 G/DL (ref 3.4–5)
ALP BLD-CCNC: 115 U/L (ref 40–129)
ALT SERPL-CCNC: 13 U/L (ref 10–40)
ANION GAP SERPL CALCULATED.3IONS-SCNC: 14 MMOL/L (ref 3–16)
AST SERPL-CCNC: 11 U/L (ref 15–37)
BASOPHILS ABSOLUTE: 0.1 K/UL (ref 0–0.2)
BASOPHILS RELATIVE PERCENT: 0.9 %
BILIRUB SERPL-MCNC: <0.2 MG/DL (ref 0–1)
BUN BLDV-MCNC: 17 MG/DL (ref 7–20)
CALCIUM SERPL-MCNC: 9.7 MG/DL (ref 8.3–10.6)
CHLORIDE BLD-SCNC: 101 MMOL/L (ref 99–110)
CHOLESTEROL, TOTAL: 151 MG/DL (ref 0–199)
CO2: 26 MMOL/L (ref 21–32)
CREAT SERPL-MCNC: 1.5 MG/DL (ref 0.8–1.3)
EOSINOPHILS ABSOLUTE: 0.4 K/UL (ref 0–0.6)
EOSINOPHILS RELATIVE PERCENT: 5.1 %
GFR AFRICAN AMERICAN: 56
GFR NON-AFRICAN AMERICAN: 46
GLOBULIN: 3.1 G/DL
GLUCOSE BLD-MCNC: 151 MG/DL (ref 70–99)
HCT VFR BLD CALC: 43 % (ref 40.5–52.5)
HDLC SERPL-MCNC: 36 MG/DL (ref 40–60)
HEMOGLOBIN: 14.3 G/DL (ref 13.5–17.5)
LDL CHOLESTEROL CALCULATED: 59 MG/DL
LYMPHOCYTES ABSOLUTE: 1.8 K/UL (ref 1–5.1)
LYMPHOCYTES RELATIVE PERCENT: 26.6 %
MCH RBC QN AUTO: 26.7 PG (ref 26–34)
MCHC RBC AUTO-ENTMCNC: 33.4 G/DL (ref 31–36)
MCV RBC AUTO: 80 FL (ref 80–100)
MONOCYTES ABSOLUTE: 0.4 K/UL (ref 0–1.3)
MONOCYTES RELATIVE PERCENT: 5.6 %
NEUTROPHILS ABSOLUTE: 4.2 K/UL (ref 1.7–7.7)
NEUTROPHILS RELATIVE PERCENT: 61.8 %
PDW BLD-RTO: 15.8 % (ref 12.4–15.4)
PLATELET # BLD: 231 K/UL (ref 135–450)
PMV BLD AUTO: 7.7 FL (ref 5–10.5)
POTASSIUM SERPL-SCNC: 4.7 MMOL/L (ref 3.5–5.1)
RBC # BLD: 5.37 M/UL (ref 4.2–5.9)
SODIUM BLD-SCNC: 141 MMOL/L (ref 136–145)
TOTAL PROTEIN: 7.2 G/DL (ref 6.4–8.2)
TRIGL SERPL-MCNC: 279 MG/DL (ref 0–150)
VLDLC SERPL CALC-MCNC: 56 MG/DL
WBC # BLD: 6.9 K/UL (ref 4–11)

## 2021-08-09 PROCEDURE — 36415 COLL VENOUS BLD VENIPUNCTURE: CPT | Performed by: INTERNAL MEDICINE

## 2021-08-10 LAB
ESTIMATED AVERAGE GLUCOSE: 177.2 MG/DL
HBA1C MFR BLD: 7.8 %

## 2021-08-10 NOTE — RESULT ENCOUNTER NOTE
Call pt, labs ok overall w decr a1c from 8--7.8, also chol dropping from 208--151.   However, kidney fxn is down slightly so is also very important to regularly drink 3 glasses of water/day

## 2021-08-18 DIAGNOSIS — M54.12 RIGHT CERVICAL RADICULOPATHY: ICD-10-CM

## 2021-08-18 DIAGNOSIS — M54.50 CHRONIC MIDLINE LOW BACK PAIN WITHOUT SCIATICA: ICD-10-CM

## 2021-08-18 DIAGNOSIS — G89.29 CHRONIC MIDLINE LOW BACK PAIN WITHOUT SCIATICA: ICD-10-CM

## 2021-08-19 RX ORDER — CYCLOBENZAPRINE HCL 10 MG
TABLET ORAL
Qty: 90 TABLET | Refills: 1 | Status: SHIPPED | OUTPATIENT
Start: 2021-08-19 | End: 2021-12-04 | Stop reason: SDUPTHER

## 2021-08-19 RX ORDER — GABAPENTIN 300 MG/1
300 CAPSULE ORAL 2 TIMES DAILY
Qty: 60 CAPSULE | Refills: 1 | Status: SHIPPED | OUTPATIENT
Start: 2021-08-19 | End: 2021-11-05 | Stop reason: SDUPTHER

## 2021-09-05 DIAGNOSIS — I10 ESSENTIAL HYPERTENSION: ICD-10-CM

## 2021-09-05 DIAGNOSIS — R80.9 CONTROLLED TYPE 2 DIABETES MELLITUS WITH MICROALBUMINURIA, WITHOUT LONG-TERM CURRENT USE OF INSULIN (HCC): ICD-10-CM

## 2021-09-05 DIAGNOSIS — E11.29 CONTROLLED TYPE 2 DIABETES MELLITUS WITH MICROALBUMINURIA, WITHOUT LONG-TERM CURRENT USE OF INSULIN (HCC): ICD-10-CM

## 2021-09-07 RX ORDER — LISINOPRIL 40 MG/1
TABLET ORAL
Qty: 90 TABLET | Refills: 1 | Status: SHIPPED | OUTPATIENT
Start: 2021-09-07 | End: 2021-10-18

## 2021-09-17 ENCOUNTER — TELEPHONE (OUTPATIENT)
Dept: INTERNAL MEDICINE CLINIC | Age: 71
End: 2021-09-17

## 2021-09-17 RX ORDER — GLIPIZIDE 5 MG/1
5 TABLET ORAL DAILY
Qty: 30 TABLET | Refills: 2 | Status: SHIPPED | OUTPATIENT
Start: 2021-09-17 | End: 2021-12-20

## 2021-09-17 NOTE — TELEPHONE ENCOUNTER
Patient states the Jardiance copay increased to $145 now. He cannot afford to pay that monthly. He would like an alternative. Please advise.

## 2021-09-17 NOTE — TELEPHONE ENCOUNTER
No good generic equivalent, but would be ok to switch to glipizide 5mg daily and cont strict work on DM diet.   I took jardiance off [employer]

## 2021-10-01 ENCOUNTER — TELEMEDICINE (OUTPATIENT)
Dept: INTERNAL MEDICINE CLINIC | Age: 71
End: 2021-10-01
Payer: MEDICARE

## 2021-10-01 DIAGNOSIS — R68.89 FLU-LIKE SYMPTOMS: Primary | ICD-10-CM

## 2021-10-01 DIAGNOSIS — E11.21 CONTROLLED TYPE 2 DIABETES MELLITUS WITH DIABETIC NEPHROPATHY, WITHOUT LONG-TERM CURRENT USE OF INSULIN (HCC): ICD-10-CM

## 2021-10-01 DIAGNOSIS — J01.00 ACUTE NON-RECURRENT MAXILLARY SINUSITIS: ICD-10-CM

## 2021-10-01 PROCEDURE — 4040F PNEUMOC VAC/ADMIN/RCVD: CPT | Performed by: INTERNAL MEDICINE

## 2021-10-01 PROCEDURE — G8484 FLU IMMUNIZE NO ADMIN: HCPCS | Performed by: INTERNAL MEDICINE

## 2021-10-01 PROCEDURE — 2022F DILAT RTA XM EVC RTNOPTHY: CPT | Performed by: INTERNAL MEDICINE

## 2021-10-01 PROCEDURE — 3017F COLORECTAL CA SCREEN DOC REV: CPT | Performed by: INTERNAL MEDICINE

## 2021-10-01 PROCEDURE — G8417 CALC BMI ABV UP PARAM F/U: HCPCS | Performed by: INTERNAL MEDICINE

## 2021-10-01 PROCEDURE — 1036F TOBACCO NON-USER: CPT | Performed by: INTERNAL MEDICINE

## 2021-10-01 PROCEDURE — G8427 DOCREV CUR MEDS BY ELIG CLIN: HCPCS | Performed by: INTERNAL MEDICINE

## 2021-10-01 PROCEDURE — 1123F ACP DISCUSS/DSCN MKR DOCD: CPT | Performed by: INTERNAL MEDICINE

## 2021-10-01 PROCEDURE — 3051F HG A1C>EQUAL 7.0%<8.0%: CPT | Performed by: INTERNAL MEDICINE

## 2021-10-01 PROCEDURE — 99213 OFFICE O/P EST LOW 20 MIN: CPT | Performed by: INTERNAL MEDICINE

## 2021-10-01 RX ORDER — BENZONATATE 100 MG/1
100 CAPSULE ORAL 3 TIMES DAILY PRN
Qty: 30 CAPSULE | Refills: 0 | Status: SHIPPED | OUTPATIENT
Start: 2021-10-01 | End: 2021-10-11

## 2021-10-01 RX ORDER — PREDNISONE 1 MG/1
TABLET ORAL
Qty: 21 TABLET | Refills: 0 | Status: SHIPPED | OUTPATIENT
Start: 2021-10-01 | End: 2021-10-12

## 2021-10-01 RX ORDER — AZITHROMYCIN 250 MG/1
250 TABLET, FILM COATED ORAL SEE ADMIN INSTRUCTIONS
Qty: 6 TABLET | Refills: 0 | Status: SHIPPED | OUTPATIENT
Start: 2021-10-01 | End: 2021-10-06

## 2021-10-01 NOTE — PROGRESS NOTES
10/1/2021    TELEHEALTH EVALUATION -- Audio/Visual (During LHZSX-46 public health emergency)    HPI:    Tex Briscoe (:  1950) has requested an audio/video evaluation for the following concern(s):    Onset yesterday evening w cough and sinus congestion, sl SOB, ST. No loss of taste or smell, no fever. Has had MODERNA vaccine COVID. DM- sugars stable, has lost wt to 217. Lab Results   Component Value Date    LABA1C 7.8 2021    LABA1C 8.0 2021    LABA1C 7.5 2021     Lab Results   Component Value Date    GLUF 195 (H) 2018    LABMICR 13.50 (H) 2021    LDLCALC 59 2021    CREATININE 1.5 (H) 2021         Review of Systems    Prior to Visit Medications    Medication Sig Taking? Authorizing Provider   eplerenone (INSPRA) 50 MG tablet Take 1 tablet by mouth 2 times daily Yes Danielle Cardozo DO   glipiZIDE (GLUCOTROL) 5 MG tablet Take 1 tablet by mouth daily Yes Hieu Matthews MD   lisinopril (PRINIVIL;ZESTRIL) 40 MG tablet TAKE ONE TABLET BY MOUTH DAILY Yes Hieu Matthews MD   cyclobenzaprine (FLEXERIL) 10 MG tablet TAKE ONE TABLET BY MOUTH THREE TIMES A DAY AS NEEDED FOR MUSCLE SPASMS Yes Hieu Matthews MD   HYDROcodone-acetaminophen (NORCO) 5-325 MG per tablet Take 1 tablet by mouth 2 times daily as needed for Pain for up to 30 days. Yes Hieu Matthews MD   HYDROcodone-acetaminophen (NORCO) 5-325 MG per tablet Take 1 tablet by mouth 2 times daily for 30 days. Yes Hieu Matthews MD   traMADol (ULTRAM) 50 MG tablet 50 mg. Takes 2 tabs in a.m. and 1 in p.m.  Yes Historical Provider, MD   hydrALAZINE (APRESOLINE) 25 MG tablet TAKE ONE TABLET BY MOUTH TWICE A DAY Yes Hieu Matthews MD   metoprolol tartrate (LOPRESSOR) 50 MG tablet TAKE ONE TABLET BY MOUTH TWICE A DAY Yes Hieu Matthews MD   amLODIPine (NORVASC) 5 MG tablet TAKE ONE TABLET BY MOUTH DAILY Yes Hieu Matthews MD   ondansetron (ZOFRAN) 4 MG tablet Take 1 tablet by mouth every 8 hours as needed for Nausea Yes Kourtney De La Fuente MD   furosemide (LASIX) 40 MG tablet Take 1 tablet by mouth See Admin Instructions PRN LEG SWELLING Yes Danielle Cardozo,    Lancets MISC 1 each by Does not apply route 2 times daily Yes Kourtney De La Fuente MD   blood glucose monitor strips Test two times a day & as needed for symptoms of irregular blood glucose. Yes Kourtney De La Fuente MD   butalbital-acetaminophen-caffeine (FIORICET, ESGIC) -02 MG per tablet Take 1 tablet by mouth 3 times daily as needed for Headaches or Migraine Yes Kourtney De La Fuente MD   lansoprazole (PREVACID) 15 MG delayed release capsule Take 1 capsule by mouth daily Yes Kourtney De La Fuente MD   meclizine (ANTIVERT) 25 MG tablet Take 1 tablet by mouth every 6 hours. Yes Kourtney De La Fuente MD   gabapentin (NEURONTIN) 300 MG capsule Take 1 capsule by mouth 2 times daily for 30 days. Kourtney De La Fuente MD   HYDROcodone-acetaminophen [facility]) 5-325 MG per tablet Take 1 tablet by mouth 2 times daily as needed for Pain for up to 30 days. Kourtney De La Fuente MD       Social History     Tobacco Use    Smoking status: Former Smoker     Packs/day: 3.00     Years: 30.00     Pack years: 90.00     Types: Cigarettes     Quit date: 1995     Years since quittin.7    Smokeless tobacco: Never Used   Vaping Use    Vaping Use: Never used   Substance Use Topics    Alcohol use: No     Comment: caffeine 1 coffee 1 pop    Drug use:  No             PHYSICAL EXAMINATION:  [ INSTRUCTIONS:  \"[x]\" Indicates a positive item  \"[]\" Indicates a negative item  -- DELETE ALL ITEMS NOT EXAMINED]  Vital Signs: (As obtained by patient/caregiver or practitioner observation)    Blood pressure-  Heart rate-    Respiratory rate-    Temperature-  Pulse oximetry-     Constitutional: [x] Appears well-developed and well-nourished [] No apparent distress      [] Abnormal-   Mental status  [] Alert and awake  [] Oriented to person/place/time []Able to follow commands      Eyes:  EOM    []  Normal  [] Abnormal-  Sclera  []  Normal  [] Abnormal -         Discharge []  None visible  [] Abnormal -    HENT:   [] Normocephalic, atraumatic. [] Abnormal   [] Mouth/Throat: Mucous membranes are moist.     External Ears [] Normal  [] Abnormal-     Neck: [] No visualized mass     Pulmonary/Chest: [x] Respiratory effort normal.  [] No visualized signs of difficulty breathing or respiratory distress        [] Abnormal-      Musculoskeletal:   [] Normal gait with no signs of ataxia         [] Normal range of motion of neck        [] Abnormal-       Neurological:        [] No Facial Asymmetry (Cranial nerve 7 motor function) (limited exam to video visit)          [] No gaze palsy        [] Abnormal-         Skin:        [] No significant exanthematous lesions or discoloration noted on facial skin         [] Abnormal-            Psychiatric:       [x] Normal Affect [] No Hallucinations        [] Abnormal-     Other pertinent observable physical exam findings-     ASSESSMENT/PLAN:  1. Flu-like symptoms  Empiric rx for sinusitis/bronchitis but also check covid and if pos would be good candidate for regeneron. To call back one week if not improving.      - Covid-19 Ambulatory; Future  - azithromycin (ZITHROMAX) 250 MG tablet; Take 1 tablet by mouth See Admin Instructions for 5 days 500mg on day 1 followed by 250mg on days 2 - 5  Dispense: 6 tablet; Refill: 0  - predniSONE (DELTASONE) 5 MG tablet; Take 6 tablets by mouth on day 1, 5 on day 2, 4 on day 3, 3 on day 4, 2 on day 5, 1 on day 6. Dispense: 21 tablet; Refill: 0  - benzonatate (TESSALON) 100 MG capsule; Take 1 capsule by mouth 3 times daily as needed for Cough  Dispense: 30 capsule; Refill: 0    2. Acute non-recurrent maxillary sinusitis  Plan as noted  - Covid-19 Ambulatory; Future  - azithromycin (ZITHROMAX) 250 MG tablet;  Take 1 tablet by mouth See Admin Instructions for 5 days 500mg on day 1 followed by 250mg on days 2 - 5  Dispense: 6 tablet; Refill: 0  - predniSONE (DELTASONE) 5 MG tablet; Take 6 tablets by mouth on day 1, 5 on day 2, 4 on day 3, 3 on day 4, 2 on day 5, 1 on day 6. Dispense: 21 tablet; Refill: 0  - benzonatate (TESSALON) 100 MG capsule; Take 1 capsule by mouth 3 times daily as needed for Cough  Dispense: 30 capsule; Refill: 0    3. Controlled type 2 diabetes mellitus with diabetic nephropathy, without long-term current use of insulin (HCC)  Last a1c ok so should srinivasa short course steroid      Return if symptoms worsen or fail to improve. Chema Armenta, was evaluated through a synchronous (real-time) audio-video encounter. The patient (or guardian if applicable) is aware that this is a billable service. Verbal consent to proceed has been obtained within the past 12 months. The visit was conducted pursuant to the emergency declaration under the [address] [place] [address] authority and the [employer] and [employer] General Act. Patient identification was verified, and a caregiver was present when appropriate. The patient was located in a state where the provider was credentialed to provide care. Total time spent on this encounter: Not billed by time    --Yara Keene MD on 10/1/2021 at 11:43 AM    An electronic signature was used to authenticate this note.

## 2021-10-02 LAB
COMMENT: NORMAL
SARS-COV-2 RNA, RT PCR: NOT DETECTED
SOURCE: NORMAL

## 2021-10-11 ENCOUNTER — TELEPHONE (OUTPATIENT)
Dept: INTERNAL MEDICINE CLINIC | Age: 71
End: 2021-10-11

## 2021-10-11 NOTE — TELEPHONE ENCOUNTER
----- Message from Simone Cortezagustin sent at 10/11/2021  8:11 AM EDT -----  Subject: Message to Provider    QUESTIONS  Information for Provider? Patient was treated last week for bronchitis   with Dr. Abel Daniels, he states that has resolved but he has an extreme sore   throat. Screened red and offered a virtual visit but pt declined and said   \"he needs to be in office today. \"   ---------------------------------------------------------------------------  --------------  CALL BACK INFO  What is the best way for the office to contact you? OK to leave message on   voicemail  Preferred Call Back Phone Number? [phone]  ---------------------------------------------------------------------------  --------------  SCRIPT ANSWERS  Relationship to Patient?  Self

## 2021-10-12 ENCOUNTER — OFFICE VISIT (OUTPATIENT)
Dept: INTERNAL MEDICINE CLINIC | Age: 71
End: 2021-10-12
Payer: MEDICARE

## 2021-10-12 VITALS
BODY MASS INDEX: 33.45 KG/M2 | HEART RATE: 75 BPM | OXYGEN SATURATION: 96 % | DIASTOLIC BLOOD PRESSURE: 67 MMHG | RESPIRATION RATE: 16 BRPM | SYSTOLIC BLOOD PRESSURE: 117 MMHG | WEIGHT: 220 LBS

## 2021-10-12 DIAGNOSIS — J02.9 ACUTE PHARYNGITIS, UNSPECIFIED ETIOLOGY: Primary | ICD-10-CM

## 2021-10-12 PROCEDURE — 4040F PNEUMOC VAC/ADMIN/RCVD: CPT | Performed by: INTERNAL MEDICINE

## 2021-10-12 PROCEDURE — 99213 OFFICE O/P EST LOW 20 MIN: CPT | Performed by: INTERNAL MEDICINE

## 2021-10-12 PROCEDURE — G8427 DOCREV CUR MEDS BY ELIG CLIN: HCPCS | Performed by: INTERNAL MEDICINE

## 2021-10-12 PROCEDURE — 1036F TOBACCO NON-USER: CPT | Performed by: INTERNAL MEDICINE

## 2021-10-12 PROCEDURE — G8484 FLU IMMUNIZE NO ADMIN: HCPCS | Performed by: INTERNAL MEDICINE

## 2021-10-12 PROCEDURE — G8417 CALC BMI ABV UP PARAM F/U: HCPCS | Performed by: INTERNAL MEDICINE

## 2021-10-12 PROCEDURE — 1123F ACP DISCUSS/DSCN MKR DOCD: CPT | Performed by: INTERNAL MEDICINE

## 2021-10-12 PROCEDURE — 3017F COLORECTAL CA SCREEN DOC REV: CPT | Performed by: INTERNAL MEDICINE

## 2021-10-12 RX ORDER — AMOXICILLIN AND CLAVULANATE POTASSIUM 875; 125 MG/1; MG/1
1 TABLET, FILM COATED ORAL 2 TIMES DAILY
Qty: 20 TABLET | Refills: 0 | Status: SHIPPED | OUTPATIENT
Start: 2021-10-12 | End: 2021-10-22

## 2021-10-12 RX ORDER — PREDNISONE 1 MG/1
TABLET ORAL
Qty: 21 TABLET | Refills: 0 | Status: SHIPPED | OUTPATIENT
Start: 2021-10-12 | End: 2021-11-04 | Stop reason: ALTCHOICE

## 2021-10-12 RX ORDER — LIDOCAINE HYDROCHLORIDE 20 MG/ML
5 SOLUTION OROPHARYNGEAL 4 TIMES DAILY
Qty: 140 ML | Refills: 0 | Status: SHIPPED | OUTPATIENT
Start: 2021-10-12 | End: 2021-10-19

## 2021-10-12 NOTE — PROGRESS NOTES
Trent Burgess  1950  10/12/21    SUBJECTIVE: the past two weeks w onset of cough and ST, no fever or chills. Cough has resolved but ST is worse and neck sore. Neck glands also swollen. I'd treated him 10/1 w zpak and pred taper, tessalon. PCR COVID test neg 10/1. OBJECTIVE:    /67   Pulse 75   Resp 16   Wt 220 lb (99.8 kg)   SpO2 96%   BMI 33.45 kg/m²     Physical Exam  Vitals reviewed. Constitutional:       Appearance: He is well-developed. HENT:      Mouth/Throat:      Mouth: Mucous membranes are moist.      Pharynx: Oropharynx is clear. Posterior oropharyngeal erythema present. No oropharyngeal exudate. Cardiovascular:      Rate and Rhythm: Normal rate and regular rhythm. Heart sounds: Normal heart sounds. No murmur heard. No friction rub. No gallop. Pulmonary:      Effort: Pulmonary effort is normal. No respiratory distress. Breath sounds: Normal breath sounds. No wheezing or rales. Abdominal:      General: Bowel sounds are normal. There is no distension. Palpations: Abdomen is soft. Tenderness: There is no abdominal tenderness. Musculoskeletal:      Cervical back: Neck supple. Lymphadenopathy:      Cervical: Cervical adenopathy present. Neurological:      Mental Status: He is alert. ASSESSMENT:    1. Acute pharyngitis, unspecified etiology        PLAN:    Yashira Fountain was seen today for pharyngitis and other. Diagnoses and all orders for this visit:    Acute pharyngitis, unspecified etiology - strep suspected, will treat empirically as below, if no better in two days then also start pred taper. To call back one week if not improving.      -     amoxicillin-clavulanate (AUGMENTIN) 875-125 MG per tablet; Take 1 tablet by mouth 2 times daily for 10 days  -     lidocaine viscous hcl (XYLOCAINE) 2 % SOLN solution; Take 5 mLs by mouth 4 times daily for 7 days  -     predniSONE (DELTASONE) 5 MG tablet;  Take 6 tablets by mouth on day 1, 5 on day 2, 4 on day 3, 3 on day 4, 2 on day 5, 1 on day 6.

## 2021-11-01 ASSESSMENT — LIFESTYLE VARIABLES: HOW OFTEN DO YOU HAVE A DRINK CONTAINING ALCOHOL: 0

## 2021-11-03 ASSESSMENT — LIFESTYLE VARIABLES
AUDIT TOTAL SCORE: INCOMPLETE
HOW OFTEN DO YOU HAVE A DRINK CONTAINING ALCOHOL: NEVER
AUDIT-C TOTAL SCORE: INCOMPLETE

## 2021-11-04 ENCOUNTER — OFFICE VISIT (OUTPATIENT)
Dept: INTERNAL MEDICINE CLINIC | Age: 71
End: 2021-11-04
Payer: MEDICARE

## 2021-11-04 VITALS
BODY MASS INDEX: 33.91 KG/M2 | RESPIRATION RATE: 18 BRPM | HEART RATE: 69 BPM | DIASTOLIC BLOOD PRESSURE: 68 MMHG | WEIGHT: 223 LBS | OXYGEN SATURATION: 97 % | SYSTOLIC BLOOD PRESSURE: 132 MMHG

## 2021-11-04 DIAGNOSIS — I12.9 HYPERTENSIVE RENAL DISEASE: ICD-10-CM

## 2021-11-04 DIAGNOSIS — K50.10 CROHN'S DISEASE OF LARGE INTESTINE WITHOUT COMPLICATION (HCC): ICD-10-CM

## 2021-11-04 DIAGNOSIS — G89.29 CHRONIC MIDLINE LOW BACK PAIN WITHOUT SCIATICA: ICD-10-CM

## 2021-11-04 DIAGNOSIS — M54.50 CHRONIC MIDLINE LOW BACK PAIN WITHOUT SCIATICA: ICD-10-CM

## 2021-11-04 DIAGNOSIS — M54.12 RIGHT CERVICAL RADICULOPATHY: ICD-10-CM

## 2021-11-04 DIAGNOSIS — Z23 NEED FOR INFLUENZA VACCINATION: ICD-10-CM

## 2021-11-04 DIAGNOSIS — E11.21 CONTROLLED TYPE 2 DIABETES MELLITUS WITH DIABETIC NEPHROPATHY, WITHOUT LONG-TERM CURRENT USE OF INSULIN (HCC): Primary | ICD-10-CM

## 2021-11-04 DIAGNOSIS — K50.919 CROHN'S DISEASE WITH COMPLICATION, UNSPECIFIED GASTROINTESTINAL TRACT LOCATION (HCC): ICD-10-CM

## 2021-11-04 PROCEDURE — 1036F TOBACCO NON-USER: CPT | Performed by: INTERNAL MEDICINE

## 2021-11-04 PROCEDURE — G8484 FLU IMMUNIZE NO ADMIN: HCPCS | Performed by: INTERNAL MEDICINE

## 2021-11-04 PROCEDURE — 99214 OFFICE O/P EST MOD 30 MIN: CPT | Performed by: INTERNAL MEDICINE

## 2021-11-04 PROCEDURE — 2022F DILAT RTA XM EVC RTNOPTHY: CPT | Performed by: INTERNAL MEDICINE

## 2021-11-04 PROCEDURE — 1123F ACP DISCUSS/DSCN MKR DOCD: CPT | Performed by: INTERNAL MEDICINE

## 2021-11-04 PROCEDURE — 90694 VACC AIIV4 NO PRSRV 0.5ML IM: CPT | Performed by: INTERNAL MEDICINE

## 2021-11-04 PROCEDURE — 3017F COLORECTAL CA SCREEN DOC REV: CPT | Performed by: INTERNAL MEDICINE

## 2021-11-04 PROCEDURE — 4040F PNEUMOC VAC/ADMIN/RCVD: CPT | Performed by: INTERNAL MEDICINE

## 2021-11-04 PROCEDURE — G8427 DOCREV CUR MEDS BY ELIG CLIN: HCPCS | Performed by: INTERNAL MEDICINE

## 2021-11-04 PROCEDURE — G8417 CALC BMI ABV UP PARAM F/U: HCPCS | Performed by: INTERNAL MEDICINE

## 2021-11-04 PROCEDURE — G0008 ADMIN INFLUENZA VIRUS VAC: HCPCS | Performed by: INTERNAL MEDICINE

## 2021-11-04 PROCEDURE — 3051F HG A1C>EQUAL 7.0%<8.0%: CPT | Performed by: INTERNAL MEDICINE

## 2021-11-04 RX ORDER — HYDROCODONE BITARTRATE AND ACETAMINOPHEN 5; 325 MG/1; MG/1
1 TABLET ORAL 2 TIMES DAILY PRN
Qty: 60 TABLET | Refills: 0 | Status: SHIPPED | OUTPATIENT
Start: 2021-11-04 | End: 2021-12-30

## 2021-11-04 RX ORDER — TRAMADOL HYDROCHLORIDE 50 MG/1
50 TABLET ORAL 3 TIMES DAILY
Qty: 90 TABLET | Refills: 2 | Status: SHIPPED | OUTPATIENT
Start: 2021-11-04 | End: 2022-02-02 | Stop reason: SDUPTHER

## 2021-11-04 RX ORDER — HYDROCODONE BITARTRATE AND ACETAMINOPHEN 5; 325 MG/1; MG/1
1 TABLET ORAL 2 TIMES DAILY
Qty: 60 TABLET | Refills: 0 | Status: SHIPPED | OUTPATIENT
Start: 2021-12-04 | End: 2021-12-30

## 2021-11-04 RX ORDER — HYDROCODONE BITARTRATE AND ACETAMINOPHEN 5; 325 MG/1; MG/1
1 TABLET ORAL 2 TIMES DAILY PRN
Qty: 60 TABLET | Refills: 0 | Status: SHIPPED | OUTPATIENT
Start: 2022-01-03 | End: 2022-02-02 | Stop reason: SDUPTHER

## 2021-11-04 NOTE — PROGRESS NOTES
Stoney Farris  1950 11/04/21    SUBJECTIVE:    DM-  Sugars improving last time w a1c 7.8. At home glc runs 150-180  Lab Results   Component Value Date    LABA1C 7.8 08/09/2021    LABA1C 8.0 05/06/2021    LABA1C 7.5 02/05/2021     Lab Results   Component Value Date    GLUF 195 (H) 03/20/2018    LABMICR 13.50 (H) 02/05/2021    LDLCALC 59 08/09/2021    CREATININE 1.4 10/08/2021     fhx of dementia mother and father, he sometimes has issues w short term memory. Hypertension: Stable. Denies CP, SOB, cough, visual changes, dizziness, palpitations or CHAVEZ. Also seeing Dr Nay Muro    Chr LBP stable on meds, rf due. On neurontin, norco, tramadol  Controlled substances monitoring: possible medication side effects, risk of tolerance and/or dependence, and alternative treatments discussed, no signs of potential drug abuse or diversion identified and OARRS report reviewed today- activity consistent with treatment plan. chrons dz, had colonoscopy 2019 but we do not have rept, f/u per pt 5 yrs so 2024. NO CURRENT ABD PAIN, DIARRHEA, BLOOD IN STOOLS    OBJECTIVE:    /68   Pulse 69   Resp 18   Wt 223 lb (101.2 kg)   SpO2 97%   BMI 33.91 kg/m²     Physical Exam  Vitals reviewed. Constitutional:       General: He is not in acute distress. Appearance: He is well-developed. HENT:      Head: Normocephalic and atraumatic. Eyes:      General: No scleral icterus. Right eye: No discharge. Left eye: No discharge. Conjunctiva/sclera: Conjunctivae normal.      Pupils: Pupils are equal, round, and reactive to light. Neck:      Thyroid: No thyromegaly. Vascular: No JVD. Trachea: No tracheal deviation. Cardiovascular:      Rate and Rhythm: Normal rate and regular rhythm. Heart sounds: Normal heart sounds. No murmur heard. No friction rub. No gallop. Pulmonary:      Effort: Pulmonary effort is normal. No respiratory distress. Breath sounds: Normal breath sounds. No wheezing or rales. Abdominal:      General: Bowel sounds are normal. There is no distension. Palpations: Abdomen is soft. There is no mass. Tenderness: There is no abdominal tenderness. There is no guarding or rebound. Musculoskeletal:         General: No tenderness. Cervical back: Normal range of motion and neck supple. Lymphadenopathy:      Cervical: No cervical adenopathy. Skin:     General: Skin is warm and dry. Neurological:      Mental Status: He is alert. Psychiatric:         Mood and Affect: Mood normal.         ASSESSMENT:    1. Controlled type 2 diabetes mellitus with diabetic nephropathy, without long-term current use of insulin ([address]    2. Hypertensive renal disease    3. Chronic midline low back pain without sciatica    4. Right cervical radiculopathy    5. Need for influenza vaccination    6. Crohn's disease of large intestine without complication ([address]    7. Crohn's disease with complication, unspecified gastrointestinal tract location [facility])        PLAN:    Italia Wadsworth was seen today for diabetes. Diagnoses and all orders for this visit:    Controlled type 2 diabetes mellitus with diabetic nephropathy, without long-term current use of insulin ([address]  - DM relatively well controlled and will continue current regimen. Screening reviewed. See orders. -     Comprehensive Metabolic Panel; Future  -     CBC Auto Differential; Future  -     Lipid Panel; Future  -     Hemoglobin A1C; Future    Hypertensive renal disease- FOR CONT F/U DR RAMOS, F/U RENAL FXN, LAB  -     Comprehensive Metabolic Panel; Future  -     CBC Auto Differential; Future  -     Lipid Panel; Future    Chronic midline low back pain without sciatica  - Pt will continue to work on a low fat diet and also exercise, wt loss as appropriate. Will continue periodic monitoring of fasting lipid profile, glucose, liver function.    -     HYDROcodone-acetaminophen (NORCO) 5-325 MG per tablet;  Take 1 tablet by mouth 2 times daily as needed for Pain for up to 30 days.  -     HYDROcodone-acetaminophen (NORCO) 5-325 MG per tablet; Take 1 tablet by mouth 2 times daily for 30 days.  -     HYDROcodone-acetaminophen (NORCO) 5-325 MG per tablet; Take 1 tablet by mouth 2 times daily as needed for Pain for up to 30 days. -     traMADol (ULTRAM) 50 MG tablet; Take 1 tablet by mouth 3 times daily for 30 days. Right cervical radiculopathy- The current medical regimen is effective;  continue present plan and medications.    -     HYDROcodone-acetaminophen (NORCO) 5-325 MG per tablet; Take 1 tablet by mouth 2 times daily as needed for Pain for up to 30 days.  -     HYDROcodone-acetaminophen (NORCO) 5-325 MG per tablet; Take 1 tablet by mouth 2 times daily for 30 days.  -     HYDROcodone-acetaminophen (NORCO) 5-325 MG per tablet; Take 1 tablet by mouth 2 times daily as needed for Pain for up to 30 days. Crohn's disease of large intestine without complication ([facility]- CLINICALLY IN REMISSION AND FOR CONT F/U GI    Crohn's disease with complication, unspecified gastrointestinal tract location [facility])    Need for influenza vaccination  -     INFLUENZA, QUADV, ADJUVANTED, 65 YRS =, IM, PF, PREFILL SYR, 0.5ML (FLUAD)    Other orders  -     zoster recombinant adjuvanted vaccine (SHINGRIX) 50 MCG/0.5ML SUSR injection;  Inject 0.5 mLs into the muscle once for 1 dose

## 2021-11-04 NOTE — PATIENT INSTRUCTIONS
To screen for dementia please do the SHANNAN test online, free and created at [facility]

## 2021-11-05 DIAGNOSIS — M54.12 RIGHT CERVICAL RADICULOPATHY: ICD-10-CM

## 2021-11-05 RX ORDER — GABAPENTIN 300 MG/1
300 CAPSULE ORAL 2 TIMES DAILY
Qty: 60 CAPSULE | Refills: 1 | Status: SHIPPED | OUTPATIENT
Start: 2021-11-05 | End: 2022-03-11 | Stop reason: SDUPTHER

## 2021-11-28 DIAGNOSIS — I10 ESSENTIAL HYPERTENSION: ICD-10-CM

## 2021-11-29 RX ORDER — HYDRALAZINE HYDROCHLORIDE 25 MG/1
TABLET, FILM COATED ORAL
Qty: 180 TABLET | Refills: 1 | Status: SHIPPED | OUTPATIENT
Start: 2021-11-29 | End: 2022-05-31

## 2021-11-29 RX ORDER — METOPROLOL TARTRATE 50 MG/1
TABLET, FILM COATED ORAL
Qty: 180 TABLET | Refills: 1 | Status: SHIPPED | OUTPATIENT
Start: 2021-11-29 | End: 2022-05-31

## 2021-12-16 ENCOUNTER — VIRTUAL VISIT (OUTPATIENT)
Dept: INTERNAL MEDICINE CLINIC | Age: 71
End: 2021-12-16
Payer: MEDICARE

## 2021-12-16 DIAGNOSIS — J01.00 ACUTE NON-RECURRENT MAXILLARY SINUSITIS: Primary | ICD-10-CM

## 2021-12-16 PROCEDURE — 4040F PNEUMOC VAC/ADMIN/RCVD: CPT | Performed by: INTERNAL MEDICINE

## 2021-12-16 PROCEDURE — 1036F TOBACCO NON-USER: CPT | Performed by: INTERNAL MEDICINE

## 2021-12-16 PROCEDURE — 99213 OFFICE O/P EST LOW 20 MIN: CPT | Performed by: INTERNAL MEDICINE

## 2021-12-16 PROCEDURE — G8427 DOCREV CUR MEDS BY ELIG CLIN: HCPCS | Performed by: INTERNAL MEDICINE

## 2021-12-16 PROCEDURE — 3017F COLORECTAL CA SCREEN DOC REV: CPT | Performed by: INTERNAL MEDICINE

## 2021-12-16 PROCEDURE — G8484 FLU IMMUNIZE NO ADMIN: HCPCS | Performed by: INTERNAL MEDICINE

## 2021-12-16 PROCEDURE — 1123F ACP DISCUSS/DSCN MKR DOCD: CPT | Performed by: INTERNAL MEDICINE

## 2021-12-16 PROCEDURE — G8417 CALC BMI ABV UP PARAM F/U: HCPCS | Performed by: INTERNAL MEDICINE

## 2021-12-16 RX ORDER — LEVOFLOXACIN 500 MG/1
500 TABLET, FILM COATED ORAL DAILY
Qty: 10 TABLET | Refills: 0 | Status: SHIPPED | OUTPATIENT
Start: 2021-12-16 | End: 2021-12-26

## 2021-12-16 RX ORDER — PREDNISONE 1 MG/1
TABLET ORAL
Qty: 36 TABLET | Refills: 0 | Status: SHIPPED | OUTPATIENT
Start: 2021-12-16 | End: 2022-03-11

## 2021-12-16 NOTE — PROGRESS NOTES
2021    TELEHEALTH EVALUATION -- Audio/Visual (During EIREE-33 public health emergency)    HPI:    Ashwini Quinn (:  1950) has requested an audio/video evaluation for the following concern(s):    3d ghx of worsening cough and low gr fever, SOB and diffuse aches. Has had flu shot too. Some aches, no loss of taste or smell and sat 95-97% RA. Denies n/v/d. Minimally SOB. Chest congestion noted w cough constant. Review of Systems    Prior to Visit Medications    Medication Sig Taking? Authorizing Provider   cyclobenzaprine (FLEXERIL) 10 MG tablet TAKE ONE TABLET BY MOUTH THREE TIMES A DAY AS NEEDED FOR MUSCLE SPASMS Yes Christa Velazquez MD   metoprolol tartrate (LOPRESSOR) 50 MG tablet TAKE ONE TABLET BY MOUTH TWICE A DAY Yes Christa Velazquez MD   hydrALAZINE (APRESOLINE) 25 MG tablet TAKE ONE TABLET BY MOUTH TWICE A DAY Yes Christa Velazquez MD   amLODIPine (NORVASC) 5 MG tablet TAKE ONE TABLET BY MOUTH DAILY Yes Christa Velazquez MD   HYDROcodone-acetaminophen (NORCO) 5-325 MG per tablet Take 1 tablet by mouth 2 times daily as needed for Pain for up to 30 days. Yes Christa Velazquez MD   HYDROcodone-acetaminophen (NORCO) 5-325 MG per tablet Take 1 tablet by mouth 2 times daily for 30 days. Yes Christa Velazquez MD   eplerenone (INSPRA) 50 MG tablet Take 1 tablet by mouth 2 times daily Yes Danielle Cardozo DO   glipiZIDE (GLUCOTROL) 5 MG tablet Take 1 tablet by mouth daily Yes Christa Velazquez MD   traMADol (ULTRAM) 50 MG tablet 50 mg. Takes 2 tabs in a.m. and 1 in p.m.  Yes Historical Provider, MD   ondansetron (ZOFRAN) 4 MG tablet Take 1 tablet by mouth every 8 hours as needed for Nausea Yes Christa Velazquez MD   furosemide (LASIX) 40 MG tablet Take 1 tablet by mouth See Admin Instructions PRN LEG SWELLING Yes Danielle Cardozo DO   Lancets MISC 1 each by Does not apply route 2 times daily Yes Christa Velazquez MD   blood glucose monitor strips Test two times a day Pulmonary/Chest: [x] Respiratory effort normal.  [] No visualized signs of difficulty breathing or respiratory distress        [] Abnormal-      Musculoskeletal:   [] Normal gait with no signs of ataxia         [] Normal range of motion of neck        [] Abnormal-       Neurological:        [] No Facial Asymmetry (Cranial nerve 7 motor function) (limited exam to video visit)          [] No gaze palsy        [] Abnormal-         Skin:        [] No significant exanthematous lesions or discoloration noted on facial skin         [] Abnormal-            Psychiatric:       [] Normal Affect [] No Hallucinations        [] Abnormal-     Other pertinent observable physical exam findings-     ASSESSMENT/PLAN:  1. Acute non-recurrent maxillary sinusitis  Consistent w presentation, rec rx as below and fluids, rest.  Pt to call back one week if not improving. If has worsening sx especially any loss of taste, smell, SOB then suggest also getting a home covid test ASAP    - levoFLOXacin (LEVAQUIN) 500 MG tablet; Take 1 tablet by mouth daily for 10 days  Dispense: 10 tablet; Refill: 0  - predniSONE (DELTASONE) 5 MG tablet; Take 8 pills, then 7,6,5,4,3,2,1  Dispense: 36 tablet; Refill: 0      Return if symptoms worsen or fail to improve. Giles Carlton, was evaluated through a synchronous (real-time) audio-video encounter. The patient (or guardian if applicable) is aware that this is a billable service. Verbal consent to proceed has been obtained within the past 12 months. The visit was conducted pursuant to the emergency declaration under the [address] [place] [address] authority and the [employer] Act. Patient identification was verified, and a caregiver was present when appropriate. The patient was located in a state where the provider was credentialed to provide care.     Total time spent on this encounter: Not billed by time    --Leana Carnes MD on 12/16/2021 at 9:33 AM    An electronic signature was used to authenticate this note.

## 2021-12-30 ENCOUNTER — OFFICE VISIT (OUTPATIENT)
Dept: CARDIOLOGY CLINIC | Age: 71
End: 2021-12-30
Payer: MEDICARE

## 2021-12-30 VITALS
WEIGHT: 225.2 LBS | HEART RATE: 60 BPM | DIASTOLIC BLOOD PRESSURE: 82 MMHG | SYSTOLIC BLOOD PRESSURE: 138 MMHG | BODY MASS INDEX: 35.35 KG/M2 | HEIGHT: 67 IN

## 2021-12-30 DIAGNOSIS — I10 PRIMARY HYPERTENSION: Primary | ICD-10-CM

## 2021-12-30 DIAGNOSIS — N18.31 STAGE 3A CHRONIC KIDNEY DISEASE (HCC): ICD-10-CM

## 2021-12-30 DIAGNOSIS — E11.21 CONTROLLED TYPE 2 DIABETES MELLITUS WITH DIABETIC NEPHROPATHY, WITHOUT LONG-TERM CURRENT USE OF INSULIN (HCC): ICD-10-CM

## 2021-12-30 DIAGNOSIS — E78.00 PURE HYPERCHOLESTEROLEMIA: ICD-10-CM

## 2021-12-30 PROCEDURE — 4040F PNEUMOC VAC/ADMIN/RCVD: CPT | Performed by: INTERNAL MEDICINE

## 2021-12-30 PROCEDURE — 3051F HG A1C>EQUAL 7.0%<8.0%: CPT | Performed by: INTERNAL MEDICINE

## 2021-12-30 PROCEDURE — 1036F TOBACCO NON-USER: CPT | Performed by: INTERNAL MEDICINE

## 2021-12-30 PROCEDURE — G8417 CALC BMI ABV UP PARAM F/U: HCPCS | Performed by: INTERNAL MEDICINE

## 2021-12-30 PROCEDURE — G8484 FLU IMMUNIZE NO ADMIN: HCPCS | Performed by: INTERNAL MEDICINE

## 2021-12-30 PROCEDURE — 99214 OFFICE O/P EST MOD 30 MIN: CPT | Performed by: INTERNAL MEDICINE

## 2021-12-30 PROCEDURE — 3017F COLORECTAL CA SCREEN DOC REV: CPT | Performed by: INTERNAL MEDICINE

## 2021-12-30 PROCEDURE — G8427 DOCREV CUR MEDS BY ELIG CLIN: HCPCS | Performed by: INTERNAL MEDICINE

## 2021-12-30 PROCEDURE — 2022F DILAT RTA XM EVC RTNOPTHY: CPT | Performed by: INTERNAL MEDICINE

## 2021-12-30 PROCEDURE — 1123F ACP DISCUSS/DSCN MKR DOCD: CPT | Performed by: INTERNAL MEDICINE

## 2021-12-30 NOTE — PROGRESS NOTES
2 times daily for 30 days. 11/5/21 12/5/21  Molina Hurley MD     Past Medical History:   Diagnosis Date    Blurred vision 04/03/2012    right>left  side --pattern dystrophy/Dr Dye    BPH (benign prostatic hypertrophy) 09/11/2015    Bradycardia 04/18/2019    Hr 30-40 with skipped beats on BP monitor on Labetalol    Crohn's regional enteritis ([facility] 01/03/2012    Dr Dong\"dx with Crohns in [address]- no recent issues    DM II (diabetes mellitus, type II), controlled ([facility] 2008    insurance not cover Jardiance    Erosive esophagitis 03/30/2012    \"had erosive esophagitis in lower area on Prevacid for this\"    Eye pain 04/03/2012    GERD (gastroesophageal reflux disease) 04/03/2012    Headache disorder     [place] (hard of hearing) 09/13/2017    HTN (hypertension) 01/03/2021    also comanaged w Dr Radha Liz. 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  S/P colonoscopy 01/17/2019    Dr Deanna Cid-- ?recheck when?     Stage 3 chronic kidney disease ([facility] 04/18/2019    Dr Joanell Freshwater Testosterone deficiency 04/03/2012    on andrgel    Vertigo 04/03/2012    \"last used Meclazine since 2/2020\"    Wears glasses     Wears partial dentures 05/2020    upper       Vitals:    12/30/21 0935   BP: 138/82   Site: Left Upper Arm   Position: Sitting   Cuff Size: Large Adult   Pulse: 60   Weight: 225 lb 3.2 oz (102.2 kg)   Height: 5' 7\" (1.702 m)      Body mass index is 35.27 kg/m². Wt Readings from Last 3 Encounters:   12/30/21 225 lb 3.2 oz (102.2 kg)   11/04/21 223 lb (101.2 kg)   10/12/21 220 lb (99.8 kg)     Constitutional:  Patient is moderately overweight male in no apparent distress. HEENT: Is wearing glasses and facemask. No conjunctival pallor noted. Cardiovascular: Auscultation: Normal S1 and S2. No significant murmurs noted. Peripheral pulses: Pedal pulses are 1+. Respiratory:  Respiratory effort is normal. Breath sounds are clear to auscultation. Extremities:  No edema, clubbing,  Cyanosis, petechiae. Abdomen:  No masses or tenderness. No organomegaly noted. Neurologic:  Oriented to time, place, and person and non-anxious. No focal neurological deficit noted. Psychiatric: Normal mood and effect. Pertinent records reviewed and discussed with patient and results are as follow:    Lab Results   Component Value Date    WBC 6.9 08/09/2021    HGB 14.3 08/09/2021    HCT 43.0 08/09/2021     08/09/2021     Lab Results   Component Value Date    CHOL 151 08/09/2021    TRIG 279 (H) 08/09/2021    HDL 36 (L) 08/09/2021    LDLCALC 59 08/09/2021    LDLDIRECT 156 (H) 02/05/2021     Lab Results   Component Value Date    BUN 19 10/08/2021    CREATININE 1.4 10/08/2021     10/08/2021    K 4.0 10/08/2021     No results found for: INR  Lab Results   Component Value Date    LABA1C 7.8 08/09/2021     ASSESSMENT/PLAN:    1. Primary hypertension  Assessment & Plan:  Fairly well-controlled on current medications continue the same. 2. Pure hypercholesterolemia  Assessment & Plan:  LDL reported 59 on August 9, 2021. Patient in a research study for primary prevention.    3. Stage 3a chronic kidney disease  Assessment & Plan:  Recent creatinine was 1.4. He follows up with nephrology Dr. Beverly Teague regularly. 4. Controlled type 2 diabetes mellitus with diabetic nephropathy, without long-term current use of insulin ([facility])  Assessment & Plan:  Last hemoglobin A1c was 7.8. He has follow-up with PCP. Continue current cardiovascular medications which have been reviewed and discussed individually with you. Counseled for calorie counting, reduction in serving size and exercise and lifestyle modification for weight loss. Primary/secondary prevention is the goal by aggressive risk modification, healthy and therapeutic life style changes for cardiovascular risk reduction. Various goals are discussed and questions answered. Counseled for 30 minutes a day of moderate intensity aerobic exercise like activity. Follow-up in 12 months with EKG, sooner if needed. An electronic signature was used to authenticate this note.     --Sobia Sorto MD

## 2021-12-30 NOTE — PATIENT INSTRUCTIONS
**It is YOUR responsibilty to bring medication bottles and/or updated medication list to [address] [place] [address]. This will allow us to better serve you and all your healthcare needs**  Please be informed that if you contact our office outside of normal business hours the physician on call cannot help with any scheduling or rescheduling issues, procedure instruction questions or any type of medication issue. We advise you for any urgent/emergency that you go to the nearest emergency room! PLEASE CALL OUR OFFICE DURING NORMAL BUSINESS HOURS    Monday - Friday   8 am to 5 pm    [address]: [phone]    [place]:  [phone]  Continue current cardiovascular medications which have been reviewed and discussed individually with you. Counseled for calorie counting, reduction in serving size and exercise and lifestyle modification for weight loss. Primary/secondary prevention is the goal by aggressive risk modification, healthy and therapeutic life style changes for cardiovascular risk reduction. Various goals are discussed and questions answered. Counseled for 30 minutes a day of moderate intensity aerobic exercise like activity. Follow-up in 12 months with EKG, sooner if needed.

## 2022-02-01 PROBLEM — J32.0 CHRONIC MAXILLARY SINUSITIS: Status: ACTIVE | Noted: 2020-01-09

## 2022-02-01 PROBLEM — B47.9 MYCETOMA: Status: ACTIVE | Noted: 2020-07-28

## 2022-02-01 PROBLEM — J32.2 CHRONIC ETHMOIDAL SINUSITIS: Status: ACTIVE | Noted: 2020-07-28

## 2022-02-02 ENCOUNTER — OFFICE VISIT (OUTPATIENT)
Dept: INTERNAL MEDICINE CLINIC | Age: 72
End: 2022-02-02
Payer: MEDICARE

## 2022-02-02 VITALS
HEART RATE: 69 BPM | WEIGHT: 229.13 LBS | HEIGHT: 67 IN | DIASTOLIC BLOOD PRESSURE: 80 MMHG | BODY MASS INDEX: 35.96 KG/M2 | OXYGEN SATURATION: 96 % | SYSTOLIC BLOOD PRESSURE: 140 MMHG

## 2022-02-02 DIAGNOSIS — G89.29 CHRONIC MIDLINE LOW BACK PAIN WITHOUT SCIATICA: ICD-10-CM

## 2022-02-02 DIAGNOSIS — E11.21 CONTROLLED TYPE 2 DIABETES MELLITUS WITH DIABETIC NEPHROPATHY, WITHOUT LONG-TERM CURRENT USE OF INSULIN (HCC): Primary | ICD-10-CM

## 2022-02-02 DIAGNOSIS — I10 PRIMARY HYPERTENSION: ICD-10-CM

## 2022-02-02 DIAGNOSIS — M54.12 RIGHT CERVICAL RADICULOPATHY: ICD-10-CM

## 2022-02-02 DIAGNOSIS — N18.31 STAGE 3A CHRONIC KIDNEY DISEASE (HCC): ICD-10-CM

## 2022-02-02 DIAGNOSIS — E78.00 PURE HYPERCHOLESTEROLEMIA: ICD-10-CM

## 2022-02-02 DIAGNOSIS — K50.10 CROHN'S DISEASE OF LARGE INTESTINE WITHOUT COMPLICATION (HCC): ICD-10-CM

## 2022-02-02 DIAGNOSIS — M25.512 ACUTE PAIN OF LEFT SHOULDER: ICD-10-CM

## 2022-02-02 DIAGNOSIS — M54.50 CHRONIC MIDLINE LOW BACK PAIN WITHOUT SCIATICA: ICD-10-CM

## 2022-02-02 PROBLEM — E87.6 HYPOKALEMIA: Status: RESOLVED | Noted: 2020-11-19 | Resolved: 2022-02-02

## 2022-02-02 PROBLEM — J32.0 CHRONIC MAXILLARY SINUSITIS: Status: RESOLVED | Noted: 2020-01-09 | Resolved: 2022-02-02

## 2022-02-02 PROBLEM — E83.42 HYPOMAGNESEMIA: Status: RESOLVED | Noted: 2021-05-04 | Resolved: 2022-02-02

## 2022-02-02 PROBLEM — R07.9 CHEST PAIN: Status: RESOLVED | Noted: 2018-04-02 | Resolved: 2022-02-02

## 2022-02-02 PROBLEM — E83.52 HYPERCALCEMIA: Status: RESOLVED | Noted: 2020-11-19 | Resolved: 2022-02-02

## 2022-02-02 PROCEDURE — 1036F TOBACCO NON-USER: CPT | Performed by: INTERNAL MEDICINE

## 2022-02-02 PROCEDURE — 3017F COLORECTAL CA SCREEN DOC REV: CPT | Performed by: INTERNAL MEDICINE

## 2022-02-02 PROCEDURE — 99214 OFFICE O/P EST MOD 30 MIN: CPT | Performed by: INTERNAL MEDICINE

## 2022-02-02 PROCEDURE — 3046F HEMOGLOBIN A1C LEVEL >9.0%: CPT | Performed by: INTERNAL MEDICINE

## 2022-02-02 PROCEDURE — 4040F PNEUMOC VAC/ADMIN/RCVD: CPT | Performed by: INTERNAL MEDICINE

## 2022-02-02 PROCEDURE — G8427 DOCREV CUR MEDS BY ELIG CLIN: HCPCS | Performed by: INTERNAL MEDICINE

## 2022-02-02 PROCEDURE — 2022F DILAT RTA XM EVC RTNOPTHY: CPT | Performed by: INTERNAL MEDICINE

## 2022-02-02 PROCEDURE — G8417 CALC BMI ABV UP PARAM F/U: HCPCS | Performed by: INTERNAL MEDICINE

## 2022-02-02 PROCEDURE — G8484 FLU IMMUNIZE NO ADMIN: HCPCS | Performed by: INTERNAL MEDICINE

## 2022-02-02 PROCEDURE — 1123F ACP DISCUSS/DSCN MKR DOCD: CPT | Performed by: INTERNAL MEDICINE

## 2022-02-02 RX ORDER — HYDROCODONE BITARTRATE AND ACETAMINOPHEN 5; 325 MG/1; MG/1
1 TABLET ORAL 2 TIMES DAILY
Qty: 60 TABLET | Refills: 0 | Status: SHIPPED | OUTPATIENT
Start: 2022-03-04 | End: 2022-02-15

## 2022-02-02 RX ORDER — HYDROCODONE BITARTRATE AND ACETAMINOPHEN 5; 325 MG/1; MG/1
1 TABLET ORAL 2 TIMES DAILY PRN
Qty: 60 TABLET | Refills: 0 | Status: SHIPPED | OUTPATIENT
Start: 2022-04-03 | End: 2022-05-03 | Stop reason: SDUPTHER

## 2022-02-02 RX ORDER — TRAMADOL HYDROCHLORIDE 50 MG/1
50 TABLET ORAL
Status: CANCELLED | OUTPATIENT
Start: 2022-02-02

## 2022-02-02 RX ORDER — TRAMADOL HYDROCHLORIDE 50 MG/1
50 TABLET ORAL 3 TIMES DAILY
Qty: 90 TABLET | Refills: 2 | Status: SHIPPED | OUTPATIENT
Start: 2022-02-02 | End: 2022-05-03 | Stop reason: SDUPTHER

## 2022-02-02 RX ORDER — HYDROCODONE BITARTRATE AND ACETAMINOPHEN 5; 325 MG/1; MG/1
1 TABLET ORAL 2 TIMES DAILY PRN
Qty: 60 TABLET | Refills: 0 | Status: SHIPPED | OUTPATIENT
Start: 2022-02-02 | End: 2022-02-15

## 2022-02-02 SDOH — ECONOMIC STABILITY: FOOD INSECURITY: WITHIN THE PAST 12 MONTHS, THE FOOD YOU BOUGHT JUST DIDN'T LAST AND YOU DIDN'T HAVE MONEY TO GET MORE.: NEVER TRUE

## 2022-02-02 SDOH — ECONOMIC STABILITY: FOOD INSECURITY: WITHIN THE PAST 12 MONTHS, YOU WORRIED THAT YOUR FOOD WOULD RUN OUT BEFORE YOU GOT MONEY TO BUY MORE.: NEVER TRUE

## 2022-02-02 ASSESSMENT — SOCIAL DETERMINANTS OF HEALTH (SDOH): HOW HARD IS IT FOR YOU TO PAY FOR THE VERY BASICS LIKE FOOD, HOUSING, MEDICAL CARE, AND HEATING?: NOT HARD AT ALL

## 2022-02-02 NOTE — PROGRESS NOTES
Ling Maravilla  1950 02/02/22    SUBJECTIVE:  DM w neuropathy, due for f/u lab, last a1c was improving as below. TODAY IMPROVED A1C TO 7.4. Lab Results   Component Value Date    LABA1C 7.8 08/09/2021    LABA1C 8.0 05/06/2021    LABA1C 7.5 02/05/2021     Lab Results   Component Value Date    GLUF 195 (H) 03/20/2018    LABMICR 13.50 (H) 02/05/2021    LDLCALC 59 08/09/2021    CREATININE 1.4 10/08/2021     w meals has some nausea also. He co some L shoulder pain. Has some weakness w abduction and decr ROM. Has seen Dr Angeline Edmondson in the past.    For chr LBP also on tramadol, rf due. Controlled substances monitoring: possible medication side effects, risk of tolerance and/or dependence, and alternative treatments discussed, no signs of potential drug abuse or diversion identified and OARRS report reviewed today- activity consistent with treatment plan. Hypertension: Stable. Denies CP, SOB, cough, visual changes, dizziness, palpitations or HA. Ckd, MONITORING RENAL FXN. ALSO SEEING DR RAMOS REGULARLY    CROHN''S DZ. SEEING DR HERNANDEZ LOCALLY AND NO CURRENT SX abd pain, n/v/d at this time. OBJECTIVE:    BP (!) 140/80   Pulse 69   Ht 5' 7\" (1.702 m)   Wt 229 lb 2 oz (103.9 kg)   SpO2 96%   BMI 35.89 kg/m²     Physical Exam  Vitals reviewed. Constitutional:       General: He is not in acute distress. Appearance: He is well-developed. HENT:      Head: Normocephalic and atraumatic. Nose: Nose normal.      Mouth/Throat:      Mouth: Mucous membranes are moist.      Pharynx: Oropharynx is clear. No oropharyngeal exudate. Eyes:      General: No scleral icterus. Right eye: No discharge. Left eye: No discharge. Conjunctiva/sclera: Conjunctivae normal.      Pupils: Pupils are equal, round, and reactive to light. Neck:      Thyroid: No thyromegaly. Vascular: No JVD. Trachea: No tracheal deviation.    Cardiovascular:      Rate and Rhythm: Normal rate and regular rhythm. Heart sounds: Normal heart sounds. No murmur heard. No friction rub. No gallop. Pulmonary:      Effort: Pulmonary effort is normal. No respiratory distress. Breath sounds: Normal breath sounds. No wheezing or rales. Abdominal:      General: Bowel sounds are normal. There is no distension. Palpations: Abdomen is soft. There is no mass. Tenderness: There is no abdominal tenderness. There is no guarding or rebound. Musculoskeletal:         General: Tenderness (L bicipital tendon, pt w ?sl weakness noted w shoulder abduction?- also limited by pain) present. Cervical back: Normal range of motion and neck supple. Lymphadenopathy:      Cervical: No cervical adenopathy. Skin:     General: Skin is warm and dry. Findings: No rash. Neurological:      Mental Status: He is alert. Psychiatric:         Mood and Affect: Mood normal.         ASSESSMENT:    1. Controlled type 2 diabetes mellitus with diabetic nephropathy, without long-term current use of insulin ([address]    2. Crohn's disease of large intestine without complication ([address]    3. Chronic midline low back pain without sciatica    4. Right cervical radiculopathy    5. Stage 3a chronic kidney disease ([address]    6. Primary hypertension    7. Acute pain of left shoulder    8. Pure hypercholesterolemia        PLAN:    Lucy Mays was seen today for 3 month follow-up, diabetes, peripheral neuropathy, shoulder pain and medication refill. Diagnoses and all orders for this visit:    Controlled type 2 diabetes mellitus with diabetic nephropathy, without long-term current use of insulin ([address] - DM relatively well controlled and will continue current regimen. Screening reviewed. See orders. A1C IMPROVED TODAY ON POCT  -     Comprehensive Metabolic Panel; Future  -     CBC Auto Differential; Future  -     Lipid Panel; Future  -     Microalbumin / Creatinine Urine Ratio;  Future    Crohn's disease of large intestine without complication ([facility]- CONT F/U DR HERNANDEZ AND CLINICALLY STABLE  -     Comprehensive Metabolic Panel; Future  -     CBC Auto Differential; Future    Chronic midline low back pain without sciatica - LBP stable on pain regimen, RFs given as noted and will monitor.    -     HYDROcodone-acetaminophen (NORCO) 5-325 MG per tablet; Take 1 tablet by mouth 2 times daily as needed for Pain for up to 30 days. -     traMADol (ULTRAM) 50 MG tablet; Take 1 tablet by mouth 3 times daily for 30 days.  -     HYDROcodone-acetaminophen (NORCO) 5-325 MG per tablet; Take 1 tablet by mouth 2 times daily for 30 days.  -     HYDROcodone-acetaminophen (NORCO) 5-325 MG per tablet; Take 1 tablet by mouth 2 times daily as needed for Pain for up to 30 days. Right cervical radiculopathy- CONT PAIN REGIMEN  -     HYDROcodone-acetaminophen (NORCO) 5-325 MG per tablet; Take 1 tablet by mouth 2 times daily as needed for Pain for up to 30 days.  -     HYDROcodone-acetaminophen (NORCO) 5-325 MG per tablet; Take 1 tablet by mouth 2 times daily for 30 days.  -     HYDROcodone-acetaminophen (NORCO) 5-325 MG per tablet; Take 1 tablet by mouth 2 times daily as needed for Pain for up to 30 days. Stage 3a chronic kidney disease ([facility]- F/U LAB AND CONT EVAL DR RAMOS    Primary hypertension - Blood pressure stable and will continue current regimen. Will plan periodic monitoring of renal function, electrolytes, lipid profile. Acute pain of left shoulder- clinically w bicipital tendinitis, but sx are prolonged and w some weakness so refer for ortho eval.  -     Karla Browne MD, Orthopedic Surgery, [place]    Pure hypercholesterolemia - Pt will continue to work on a low fat diet and also exercise, wt loss as appropriate. Will continue periodic monitoring of fasting lipid profile, glucose, liver function.    -     Lipid Panel; Future  -     TSH without Reflex;  Future

## 2022-02-15 ENCOUNTER — OFFICE VISIT (OUTPATIENT)
Dept: ORTHOPEDIC SURGERY | Age: 72
End: 2022-02-15
Payer: MEDICARE

## 2022-02-15 VITALS
HEART RATE: 83 BPM | BODY MASS INDEX: 35.94 KG/M2 | HEIGHT: 67 IN | OXYGEN SATURATION: 98 % | RESPIRATION RATE: 15 BRPM | WEIGHT: 229 LBS

## 2022-02-15 DIAGNOSIS — M75.42 IMPINGEMENT SYNDROME, SHOULDER, LEFT: ICD-10-CM

## 2022-02-15 DIAGNOSIS — S46.912A LEFT SHOULDER STRAIN, INITIAL ENCOUNTER: Primary | ICD-10-CM

## 2022-02-15 PROCEDURE — 1123F ACP DISCUSS/DSCN MKR DOCD: CPT | Performed by: ORTHOPAEDIC SURGERY

## 2022-02-15 PROCEDURE — G8417 CALC BMI ABV UP PARAM F/U: HCPCS | Performed by: ORTHOPAEDIC SURGERY

## 2022-02-15 PROCEDURE — 99214 OFFICE O/P EST MOD 30 MIN: CPT | Performed by: ORTHOPAEDIC SURGERY

## 2022-02-15 PROCEDURE — 1036F TOBACCO NON-USER: CPT | Performed by: ORTHOPAEDIC SURGERY

## 2022-02-15 PROCEDURE — 4040F PNEUMOC VAC/ADMIN/RCVD: CPT | Performed by: ORTHOPAEDIC SURGERY

## 2022-02-15 PROCEDURE — 3017F COLORECTAL CA SCREEN DOC REV: CPT | Performed by: ORTHOPAEDIC SURGERY

## 2022-02-15 PROCEDURE — G8484 FLU IMMUNIZE NO ADMIN: HCPCS | Performed by: ORTHOPAEDIC SURGERY

## 2022-02-15 PROCEDURE — G8427 DOCREV CUR MEDS BY ELIG CLIN: HCPCS | Performed by: ORTHOPAEDIC SURGERY

## 2022-02-15 NOTE — PROGRESS NOTES
Patient presents to the office today for evaluation of the left shoulder. Pt states onset of pain about 3 months ago. Pt denies any injury or accident. Pt states he is having a difficult time lifting or moving his arm. Pt states he has a difficult time sleeping on his left shoulder due to the pain.

## 2022-02-15 NOTE — PROGRESS NOTES
2/15/2022   Chief Complaint   Patient presents with    Shoulder Pain     left shoulder pain        History of Present Illness:                             Kara Lanes is a 73 y.o. male who presents today for evaluation of his left shoulder pain and weakness. Symptoms have been present for 3 to 4 months. He has pain with significant at the anterior aspect of the shoulder and radiate down the lateral aspect of his arm. Pain is worse with repetitive lifting pushing or pulling activities. He has pain while laying on that side at night. He has difficult time sleeping on the shoulder. He feels weakness with elevation of his arm away from his body. Patient presents to the office today for evaluation of the left shoulder. Pt states onset of pain about 3 months ago. Pt denies any injury or accident. Pt states he is having a difficult time lifting or moving his arm. Pt states he has a difficult time sleeping on his left shoulder due to the pain.            Medical History  Patient's medications, allergies, past medical, surgical, social and family histories were reviewed and updated as appropriate. Past Medical History:   Diagnosis Date    Blurred vision 04/03/2012    right>left  side --pattern dystrophy/Dr Dye    BPH (benign prostatic hypertrophy) 09/11/2015    Bradycardia 04/18/2019    Hr 30-40 with skipped beats on BP monitor on Labetalol    Crohn's regional enteritis ([facility] 01/03/2012    Dr Dong\"dx with Crohns in [address] [place] [address],[facility]- no recent issues    DM II (diabetes mellitus, type II), controlled ([facility] 2008    insurance not cover Jardiance    Erosive esophagitis 03/30/2012    \"had erosive esophagitis in lower area on Prevacid for this\"    Eye pain 04/03/2012    GERD (gastroesophageal reflux disease) 04/03/2012    Headache disorder     Hughes (hard of hearing) 09/13/2017    HTN (hypertension) 01/03/2021    also comanaged w Dr Bhavin Mcneil.    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  S/P colonoscopy 01/17/2019    Dr Alfredo Patterson-- ?recheck when?     Stage 3 chronic kidney disease ([facility] 04/18/2019    Dr Stephenie Parsons Testosterone deficiency 04/03/2012    on andrgel    Vertigo 04/03/2012    \"last used Shelton Pepper since 2/2020\"   Rosalene Catia Wears glasses     Wears partial dentures 05/2020    upper      Past Surgical History:   Procedure Laterality Date    CARDIAC CATHETERIZATION      \"had 2 caths done since 1995 but not sure of dates\"    CATARACT REMOVAL WITH IMPLANT Left 03/02/2017    Dr Neto Harvey Right 03/16/2017    Dr Martin Turcios  2005    Dr Lizz Shell  8/2011    Dr Harshal Shannon COLONOSCOPY      Multiple colonoscopys, HX: Crohns    ENDOSCOPY, COLON, DIAGNOSTIC  2018    Erosive esophagitis    EYE SURGERY      \"left eye surgery for crosseyed- age 13 months\"    EYE SURGERY Left     cataract    EYE SURGERY Right 03/16/2017    cataract    KNEE ARTHROSCOPY  89    right    KNEE ARTHROSCOPY Left 11/11/2020    LEFT KNEE ARTHROSCOPY WITH PARTIAL MENISCECTOMY CHONDROPLASTY performed by Joe Cornell MD at [address] [place] [address] Right 2013    Dr Odilon STREETER knee tib plateau fx and medial meniscectomy    LAMINECTOMY  75 and 85    SINUS SURGERY  2020    Dr Rose Clement for chr sinusitis and fungal infection, per pt    TONSILLECTOMY  70    TOTAL KNEE ARTHROPLASTY Right 15    Dr Odilon STREETER      Family History   Problem Relation Age of Onset    High Cholesterol Mother     Elevated Lipids Mother     High Blood Pressure Mother     Heart Disease Mother     High Cholesterol Father     Other Father         blind     Social History     Socioeconomic History    Marital status:      Spouse name: Not on file    Number of children: Not on file    Years of education: Not on file    Highest education level: Not on file   Occupational History    Occupation: PA     Comment: [place]     Occupation: disabled     Comment:    Tobacco Use    Smoking status: Former Smoker     Packs/day: 3.00     Years: 30.00     Pack years: 90.00     Types: Cigarettes     Quit date: 1995     Years since quittin.1    Smokeless tobacco: Never Used   Vaping Use    Vaping Use: Never used   Substance and Sexual Activity    Alcohol use: No     Comment: caffeine 1 coffee 1 pop    Drug use: No    Sexual activity: Yes     Partners: Female   Other Topics Concern    Not on file   Social History Narrative    Not on file     Social Determinants of Health     Financial Resource Strain: Low Risk     Difficulty of Paying Living Expenses: Not hard at all   Food Insecurity: No Food Insecurity    Worried About [address] in the Last Year: Never true    [facility] in the Last Year: Never true   Transportation Needs:     Lack of Transportation (Medical): Not on file    Lack of Transportation (Non-Medical):  Not on file   Physical Activity:     Days of Exercise per Week: Not on file    Minutes of Exercise per Session: Not on file   Stress:     Feeling of Stress : Not on file   Social Connections:     Frequency of Communication with Friends and Family: Not on file    Frequency of Social Gatherings with Friends and Family: Not on file    Attends Yarsani Services: Not on file    Active Member of Clubs or Organizations: Not on file    Attends Club or Organization Meetings: Not on file    Marital Status: Not on file   Intimate Partner Violence:     Fear of Current or Ex-Partner: Not on file    Emotionally Abused: Not on file    Physically Abused: Not on file    Sexually Abused: Not on file   Housing Stability:     Unable to Pay for Housing in the Last Year: Not on file    Number of [place] in the Last Year: Not on file    Unstable Housing in the Last Year: Not on file     Current Outpatient Medications   Medication Sig Dispense Refill    [START ON 4/3/2022] HYDROcodone-acetaminophen (NORCO) 5-325 MG per tablet Take 1 tablet by mouth 2 times daily as needed for Pain for up to 30 days. 60 tablet 0    traMADol (ULTRAM) 50 MG tablet Take 1 tablet by mouth 3 times daily for 30 days. 90 tablet 2    cyclobenzaprine (FLEXERIL) 10 MG tablet TAKE ONE TABLET BY MOUTH THREE TIMES A DAY AS NEEDED FOR MUSCLE SPASMS 90 tablet 1    metoprolol tartrate (LOPRESSOR) 50 MG tablet TAKE ONE TABLET BY MOUTH TWICE A  tablet 1    hydrALAZINE (APRESOLINE) 25 MG tablet TAKE ONE TABLET BY MOUTH TWICE A  tablet 1    amLODIPine (NORVASC) 5 MG tablet TAKE ONE TABLET BY MOUTH DAILY 90 tablet 1    eplerenone (INSPRA) 50 MG tablet Take 1 tablet by mouth 2 times daily 180 tablet 3    ondansetron (ZOFRAN) 4 MG tablet Take 1 tablet by mouth every 8 hours as needed for Nausea 40 tablet 1    furosemide (LASIX) 40 MG tablet Take 1 tablet by mouth See Admin Instructions PRN LEG SWELLING 90 tablet 1    Lancets MISC 1 each by Does not apply route 2 times daily 200 each 1    blood glucose monitor strips Test two times a day & as needed for symptoms of irregular blood glucose.  200 strip 3    butalbital-acetaminophen-caffeine (FIORICET, ESGIC) -40 MG per tablet Take 1 tablet by mouth 3 times daily as needed for Headaches or Migraine 30 tablet 1    glipiZIDE (GLUCOTROL) 5 MG tablet TAKE ONE TABLET BY MOUTH DAILY 30 tablet 3    predniSONE (DELTASONE) 5 MG tablet Take 8 pills, then 7,6,5,4,3,2,1 (Patient not taking: Reported on 2/15/2022) 36 tablet 0    gabapentin (NEURONTIN) 300 MG capsule Take 1 capsule by mouth 2 times daily for 30 days. 60 capsule 1    lansoprazole (PREVACID) 15 MG delayed release capsule Take 1 capsule by mouth daily 90 capsule 1    meclizine (ANTIVERT) 25 MG tablet Take 1 tablet by mouth every 6 hours. 30 tablet 1     No current facility-administered medications for this visit. Allergies   Allergen Reactions    Aldactone [Spironolactone]      Gynecomastia     Bactrim [Sulfamethoxazole-Trimethoprim]      Nephrology contraindicates-- can cause hyperkalemia    Crestor [Rosuvastatin]      myalgias    Labetalol      Marked bradycardia with frequent symptomatic ectopy    Lisinopril Other (See Comments)     Throat swelling- stopped by Dr. Pat St Metformin And Related [Metformin And Related]      Diarrhea at 500mg daily    Nsaids      W PROTEINURIA    Statins      myalgias    Tegretol [Carbamazepine]      JITTERS, INSOMNIA         Review of Systems   Constitutional: Negative for chills and fever. HENT: Negative for congestion and sneezing. Eyes: Negative for pain and redness. Respiratory: Negative for chest tightness, shortness of breath and wheezing. Cardiovascular: Negative for chest pain and palpitations. Gastrointestinal: Negative for vomiting. Musculoskeletal: Positive for arthralgias. Skin: Negative for color change and rash. Neurological: Negative for weakness and numbness. Psychiatric/Behavioral: Negative for agitation. The patient is not nervous/anxious.                                                 Examination:  General Exam:  Vitals: Pulse 83   Resp 15   Ht 5' 7\" (1.702 m)   Wt 229 lb (103.9 kg)   SpO2 98%   BMI 35.87 kg/m²    Physical Exam  Vitals and nursing note reviewed. Constitutional:       Appearance: Normal appearance. HENT:      Head: Normocephalic and atraumatic. Eyes:      Conjunctiva/sclera: Conjunctivae normal.      Pupils: Pupils are equal, round, and reactive to light. Pulmonary:      Effort: Pulmonary effort is normal.   Musculoskeletal:      Right shoulder: No swelling, deformity, laceration, tenderness or bony tenderness. Normal range of motion. Normal strength. Right elbow: Normal.      Left elbow: Normal. No swelling, deformity, effusion or lacerations. Normal range of motion. No tenderness. Cervical back: Normal range of motion. Comments: Left Upper Extremity:    There is moderate to severe tenderness diffusely throughout the shoulder. Tenderness to palpation is maximal over the anterior and lateral aspects of the shoulder specifically along the greater tuberosity and proximal biceps tendon. Active range of motion is limited due to pain. Forward elevation present to 100 degrees, abduction present to 80 degrees, external rotation 15 degrees. Passive range of motion is moderately restricted and limited due to pain and apprehension. Forward elevation 120 degrees, abduction 100 degrees. Rotator cuff testing is difficult due to pain. Strength 4/5 forward elevation and abduction of the shoulder. There is breakaway to strength testing due to pain. Positive empty can test.  Positive drop arm sign. Positive Powers and Neer signs. Moderate pain at the acromioclavicular joint with crossarm test.    Skin is intact. Sensation is intact in the upper extremity. 2+ radial pulse. No edema. No muscle atrophy. Skin:     General: Skin is warm and dry. Neurological:      Mental Status: He is alert and oriented to person, place, and time.    Psychiatric:         Mood and Affect: Mood normal.         Behavior: Behavior normal.            Diagnostic testing:  X-ray images were reviewed by myself and discussed with the patient:  X-ray 4 views Left Shoulder:       X-rays show moderate degenerative changes at the acromioclavicular joint    with joint space narrowing and small osteophyte formation and spurring.     No degenerative changes at the glenohumeral joint.  Type II acromion.     Normal bone density.  Normal alignment.  No abnormal calcifications or    soft tissue swelling.       Impression: Acromioclavicular joint degenerative changes with type II    acromion         Office Procedures:  Orders Placed This Encounter   Procedures    MRI SHOULDER LEFT WO CONTRAST     Standing Status:   Future     Standing Expiration Date:   2/15/2023       Assessment and Plan  1. Left shoulder strain, possible rotator cuff tear    2. Left shoulder impingement syndrome    The patient is having worsening pain and dysfunction in the shoulder despite appropriate conservative measures. I feel it is medically necessary to obtain an MRI to evaluate for tear of the rotator cuff and other intra-articular pathology such injury to the labrum, biceps tendon, and cartilage surfaces of the shoulder. I have instructed the patient on gentle range of motion exercises for the shoulder and avoidance of lifting pushing and pulling with that arm. They will follow-up after the MRI for a review of the results.       Electronically signed by Angeline Cardenas MD on 2/15/2022 at 9:24 AM

## 2022-02-15 NOTE — PATIENT INSTRUCTIONS
Continue weight-bearing as tolerated. Continue range of motion exercises as instructed. Ice and elevate as needed. Tylenol or Motrin for pain. Follow up after MRI completed. Central scheduling [phone] will be calling you to schedule your MRI. If you do not hear from them with in a week give them a call.

## 2022-02-19 ENCOUNTER — HOSPITAL ENCOUNTER (OUTPATIENT)
Dept: MRI IMAGING | Age: 72
Discharge: HOME | End: 2022-02-19
Payer: MEDICARE

## 2022-02-19 DIAGNOSIS — S46.912A LEFT SHOULDER STRAIN, INITIAL ENCOUNTER: ICD-10-CM

## 2022-02-19 DIAGNOSIS — M75.42 IMPINGEMENT SYNDROME, SHOULDER, LEFT: ICD-10-CM

## 2022-02-19 PROCEDURE — 73221 MRI JOINT UPR EXTREM W/O DYE: CPT

## 2022-02-20 ASSESSMENT — ENCOUNTER SYMPTOMS
EYE REDNESS: 0
SHORTNESS OF BREATH: 0
COLOR CHANGE: 0
VOMITING: 0
EYE PAIN: 0
CHEST TIGHTNESS: 0
WHEEZING: 0

## 2022-03-08 ENCOUNTER — OFFICE VISIT (OUTPATIENT)
Dept: ORTHOPEDIC SURGERY | Age: 72
End: 2022-03-08
Payer: MEDICARE

## 2022-03-08 VITALS
HEART RATE: 65 BPM | HEIGHT: 67 IN | BODY MASS INDEX: 35.94 KG/M2 | RESPIRATION RATE: 17 BRPM | WEIGHT: 229 LBS | OXYGEN SATURATION: 98 %

## 2022-03-08 DIAGNOSIS — E66.01 SEVERE OBESITY (BMI 35.0-39.9) WITH COMORBIDITY (HCC): ICD-10-CM

## 2022-03-08 DIAGNOSIS — M75.42 IMPINGEMENT SYNDROME, SHOULDER, LEFT: ICD-10-CM

## 2022-03-08 DIAGNOSIS — M75.112 NONTRAUMATIC INCOMPLETE TEAR OF LEFT ROTATOR CUFF: ICD-10-CM

## 2022-03-08 DIAGNOSIS — M25.512 LEFT SHOULDER PAIN, UNSPECIFIED CHRONICITY: Primary | ICD-10-CM

## 2022-03-08 DIAGNOSIS — M19.012 OSTEOARTHRITIS, LOCALIZED, SHOULDER, LEFT: ICD-10-CM

## 2022-03-08 PROCEDURE — 1123F ACP DISCUSS/DSCN MKR DOCD: CPT | Performed by: ORTHOPAEDIC SURGERY

## 2022-03-08 PROCEDURE — 3017F COLORECTAL CA SCREEN DOC REV: CPT | Performed by: ORTHOPAEDIC SURGERY

## 2022-03-08 PROCEDURE — 20610 DRAIN/INJ JOINT/BURSA W/O US: CPT | Performed by: ORTHOPAEDIC SURGERY

## 2022-03-08 PROCEDURE — G8427 DOCREV CUR MEDS BY ELIG CLIN: HCPCS | Performed by: ORTHOPAEDIC SURGERY

## 2022-03-08 PROCEDURE — 99214 OFFICE O/P EST MOD 30 MIN: CPT | Performed by: ORTHOPAEDIC SURGERY

## 2022-03-08 PROCEDURE — G8417 CALC BMI ABV UP PARAM F/U: HCPCS | Performed by: ORTHOPAEDIC SURGERY

## 2022-03-08 PROCEDURE — G8484 FLU IMMUNIZE NO ADMIN: HCPCS | Performed by: ORTHOPAEDIC SURGERY

## 2022-03-08 PROCEDURE — 4040F PNEUMOC VAC/ADMIN/RCVD: CPT | Performed by: ORTHOPAEDIC SURGERY

## 2022-03-08 PROCEDURE — 1036F TOBACCO NON-USER: CPT | Performed by: ORTHOPAEDIC SURGERY

## 2022-03-08 RX ORDER — TRAMADOL HYDROCHLORIDE 50 MG/1
TABLET ORAL
COMMUNITY
Start: 2022-03-04 | End: 2022-10-12

## 2022-03-08 NOTE — PROGRESS NOTES
Patient returns to the office with left shoulder pain and going over MRI results. Pt stated that there has not been any changes in his shoulder and not getting any better or worse. Pt has been taking norco for the pain with mild relief. Pt stated he has sharp intermittent pains throughout the day and rated his pain a 7/10 today. Pt denies any new falls. MRI completed: 02/19/22    Impression   Moderate supraspinatus and infraspinatus tendinosis.  Low-grade   intrasubstance tear involving the supraspinatus/infraspinatus overlap tendon   fibers at the footprint.  No full-thickness rotator cuff tear identified.       Mild-to-moderate AC joint degenerative changes with small volume   subacromial-subdeltoid bursal fluid.       Suspected small intramuscular lipoma in the supraspinatus muscle measuring 12   x 9 x 10 mm.

## 2022-03-08 NOTE — PATIENT INSTRUCTIONS
Continue weight-bearing as tolerated. Continue range of motion exercises as instructed. Ice and elevate as needed. Tylenol or Motrin for pain. Follow up in 6 weeks. Steroid injection given into the left shoulder. Patient Education        Rotator Cuff: Exercises  Introduction  Here are some examples of exercises for you to try. The exercises may be suggested for a condition or for rehabilitation. Start each exercise slowly. Ease off the exercises if you start to have pain. You will be told when to start these exercises and which ones will work best for you. How to do the exercises  Pendulum swing    If you have pain in your back, do not do this exercise. 1. Hold on to a table or the back of a chair with your good arm. Then bend forward a little and let your sore arm hang straight down. This exercise does not use the arm muscles. Rather, use your legs and your hips to create movement that makes your arm swing freely. 2. Use the movement from your hips and legs to guide the slightly swinging arm back and forth like a pendulum (or elephant trunk). Then guide it in circles that start small (about the size of a dinner plate). Make the circles a bit larger each day, as your pain allows. 3. Do this exercise for 5 minutes, 5 to 7 times each day. 4. As you have less pain, try bending over a little farther to do this exercise. This will increase the amount of movement at your shoulder. Posterior stretching exercise    1. Hold the elbow of your injured arm with your other hand. 2. Use your hand to pull your injured arm gently up and across your body. You will feel a gentle stretch across the back of your injured shoulder. 3. Hold for at least 15 to 30 seconds. Then slowly lower your arm. 4. Repeat 2 to 4 times. Up-the-back stretch    Your doctor or physical therapist may want you to wait to do this stretch until you have regained most of your range of motion and strength.  You can do this stretch in different ways. Hold any of these stretches for at least 15 to 30 seconds. Repeat them 2 to 4 times. 1. Light stretch: Put your hand in your back pocket. Let it rest there to stretch your shoulder. 2. Moderate stretch: With your other hand, hold your injured arm (palm outward) behind your back by the wrist. Pull your arm up gently to stretch your shoulder. 3. Advanced stretch: Put a towel over your other shoulder. Put the hand of your injured arm behind your back. Now hold the back end of the towel. With the other hand, hold the front end of the towel in front of your body. Pull gently on the front end of the towel. This will bring your hand farther up your back to stretch your shoulder. Overhead stretch    1. Standing about an arm's length away, grasp onto a solid surface. You could use a countertop, a doorknob, or the back of a sturdy chair. 2. With your knees slightly bent, bend forward with your arms straight. Lower your upper body, and let your shoulders stretch. 3. As your shoulders are able to stretch farther, you may need to take a step or two backward. 4. Hold for at least 15 to 30 seconds. Then stand up and relax. If you had stepped back during your stretch, step forward so you can keep your hands on the solid surface. 5. Repeat 2 to 4 times. Shoulder flexion (lying down)    To make a wand for this exercise, use a piece of PVC pipe or a broom handle with the broom removed. Make the wand about a foot wider than your shoulders. 1. Lie on your back, holding a wand with both hands. Your palms should face down as you hold the wand. 2. Keeping your elbows straight, slowly raise your arms over your head. Raise them until you feel a stretch in your shoulders, upper back, and chest.  3. Hold for 15 to 30 seconds. 4. Repeat 2 to 4 times. Shoulder rotation (lying down)    To make a wand for this exercise, use a piece of PVC pipe or a broom handle with the broom removed.  Make the wand about a foot wider than your shoulders. 1. Lie on your back. Hold a wand with both hands with your elbows bent and palms up. 2. Keep your elbows close to your body, and move the wand across your body toward the sore arm. 3. Hold for 8 to 12 seconds. 4. Repeat 2 to 4 times. Wall climbing (to the side)    Avoid any movement that is straight to your side, and be careful not to arch your back. Your arm should stay about 30 degrees to the front of your side. 1. Stand with your side to a wall so that your fingers can just touch it at an angle about 30 degrees toward the front of your body. 2. Walk the fingers of your injured arm up the wall as high as pain permits. Try not to shrug your shoulder up toward your ear as you move your arm up. 3. Hold that position for a count of at least 15 to 20.  4. Walk your fingers back down to the starting position. 5. Repeat at least 2 to 4 times. Try to reach higher each time. Wall climbing (to the front)    During this stretching exercise, be careful not to arch your back. 1. Face a wall, and stand so your fingers can just touch it. 2. Keeping your shoulder down, walk the fingers of your injured arm up the wall as high as pain permits. (Don't shrug your shoulder up toward your ear.)  3. Hold your arm in that position for at least 15 to 30 seconds. 4. Slowly walk your fingers back down to where you started. 5. Repeat at least 2 to 4 times. Try to reach higher each time. Shoulder blade squeeze    1. Stand with your arms at your sides, and squeeze your shoulder blades together. Do not raise your shoulders up as you squeeze. 2. Hold 6 seconds. 3. Repeat 8 to 12 times. Scapular exercise: Arm reach    1. Lie flat on your back. This exercise is a very slight motion that starts with your arms raised (elbows straight, arms straight). 2. From this position, reach higher toward the li or ceiling. Keep your elbows straight. All motion should be from your shoulder blade only.   3. Relax your arms back to where you started. 4. Repeat 8 to 12 times. Arm raise to the side    During this strengthening exercise, your arm should stay about 30 degrees to the front of your side. 1. Slowly raise your injured arm to the side, with your thumb facing up. Raise your arm 60 degrees at the most (shoulder level is 90 degrees). 2. Hold the position for 3 to 5 seconds. Then lower your arm back to your side. If you need to, bring your \"good\" arm across your body and place it under the elbow as you lower your injured arm. Use your good arm to keep your injured arm from dropping down too fast.  3. Repeat 8 to 12 times. 4. When you first start out, don't hold any extra weight in your hand. As you get stronger, you may use a 1-pound to 2-pound dumbbell or a small can of food. Shoulder flexor and extensor exercise    These are isometric exercises. That means you contract your muscles without actually moving. 1. Push forward (flex): Stand facing a wall or doorjamb, about 6 inches or less back. Hold your injured arm against your body. Make a closed fist with your thumb on top. Then gently push your hand forward into the wall with about 25% to 50% of your strength. Don't let your body move backward as you push. Hold for about 6 seconds. Relax for a few seconds. Repeat 8 to 12 times. 2. Push backward (extend): Stand with your back flat against a wall. Your upper arm should be against the wall, with your elbow bent 90 degrees (your hand straight ahead). Push your elbow gently back against the wall with about 25% to 50% of your strength. Don't let your body move forward as you push. Hold for about 6 seconds. Relax for a few seconds. Repeat 8 to 12 times. Scapular exercise: Wall push-ups    This exercise is best done with your fingers somewhat turned out, rather than straight up and down. 1. Stand facing a wall, about 12 inches to 18 inches away. 2. Place your hands on the wall at shoulder height.   3. Slowly bend your elbows and bring your face to the wall. Keep your back and hips straight. 4. Push back to where you started. 5. Repeat 8 to 12 times. 6. When you can do this exercise against a wall comfortably, you can try it against a counter. You can then slowly progress to the end of a couch, then to a sturdy chair, and finally to the floor. Scapular exercise: Retraction    For this exercise, you will need elastic exercise material, such as surgical tubing or Thera-Band. 1. Put the band around a solid object at about waist level. (A bedpost will work well.) Each hand should hold an end of the band. 2. With your elbows at your sides and bent to 90 degrees, pull the band back. Your shoulder blades should move toward each other. Then move your arms back where you started. 3. Repeat 8 to 12 times. 4. If you have good range of motion in your shoulders, try this exercise with your arms lifted out to the sides. Keep your elbows at a 90-degree angle. Raise the elastic band up to about shoulder level. Pull the band back to move your shoulder blades toward each other. Then move your arms back where you started. Internal rotator strengthening exercise    1. Start by tying a piece of elastic exercise material to a doorknob. You can use surgical tubing or Thera-Band. 2. Stand or sit with your shoulder relaxed and your elbow bent 90 degrees. Your upper arm should rest comfortably against your side. Squeeze a rolled towel between your elbow and your body for comfort. This will help keep your arm at your side. 3. Hold one end of the elastic band in the hand of the painful arm. 4. Slowly rotate your forearm toward your body until it touches your belly. Slowly move it back to where you started. 5. Keep your elbow and upper arm firmly tucked against the towel roll or at your side. 6. Repeat 8 to 12 times. External rotator strengthening exercise    1. Start by tying a piece of elastic exercise material to a doorknob.  You can use surgical tubing or Thera-Band. (You may also hold one end of the band in each hand.)  2. Stand or sit with your shoulder relaxed and your elbow bent 90 degrees. Your upper arm should rest comfortably against your side. Squeeze a rolled towel between your elbow and your body for comfort. This will help keep your arm at your side. 3. Hold one end of the elastic band with the hand of the painful arm. 4. Start with your forearm across your belly. Slowly rotate the forearm out away from your body. Keep your elbow and upper arm tucked against the towel roll or the side of your body until you begin to feel tightness in your shoulder. Slowly move your arm back to where you started. 5. Repeat 8 to 12 times. Follow-up care is a key part of your treatment and safety. Be sure to make and go to all appointments, and call your doctor if you are having problems. It's also a good idea to know your test results and keep a list of the medicines you take. Where can you learn more? Go to https://[email or web address] org and sign in to your [employer] account. Enter Benji Velasco in the [facility] box to learn more about \"Rotator Cuff: Exercises. \"     If you do not have an account, please click on the \"Sign Up Now\" link. Current as of: July 1, 2021               Content Version: 13.1  © 5172-5565 [employer]. Care instructions adapted under license by [facility] ([facility]). If you have questions about a medical condition or this instruction, always ask your healthcare professional. [place] [address] any warranty or liability for your use of this information.

## 2022-03-09 LAB
ABSOLUTE IMMATURE GRANULOCYTE: 0.1 K/UL (ref 0–0.1)
ALBUMIN/GLOBULIN RATIO: 1.5 RATIO (ref 0.8–2.6)
ALBUMIN: 4.7 G/DL (ref 3.5–5.2)
ALP BLD-CCNC: 122 U/L (ref 23–144)
ALT SERPL-CCNC: 13 U/L (ref 0–60)
AST SERPL-CCNC: 10 U/L (ref 0–55)
BACTERIA, URINE: NORMAL /HPF
BASOPHILS ABSOLUTE: 0 K/UL (ref 0–0.3)
BASOPHILS RELATIVE PERCENT: 0.2 % (ref 0–2)
BILIRUB SERPL-MCNC: 0.1 MG/DL (ref 0–1.2)
BILIRUBIN URINE: NEGATIVE MG/DL
BUN BLDV-MCNC: 25 MG/DL (ref 3–29)
BUN BLDV-MCNC: 25 MG/DL (ref 3–29)
BUN/CREAT BLD: 19 (ref 7–25)
BUN/CREAT BLD: 19 (ref 7–25)
CALCIUM SERPL-MCNC: 10.2 MG/DL (ref 8.5–10.5)
CALCIUM SERPL-MCNC: 10.2 MG/DL (ref 8.5–10.5)
CHLORIDE BLD-SCNC: 100 MEQ/L (ref 96–110)
CHLORIDE BLD-SCNC: 99 MEQ/L (ref 96–110)
CHOLESTEROL: 164 MG/DL
CLARITY: CLEAR
CO2: 20 MEQ/L (ref 19–32)
CO2: 21 MEQ/L (ref 19–32)
COLOR, URINE: YELLOW
CREAT SERPL-MCNC: 1.3 MG/DL (ref 0.5–1.4)
CREAT SERPL-MCNC: 1.3 MG/DL (ref 0.5–1.4)
CREATININE URINE: 100.7 MG/DL
CREATININE URINE: 101.1 MG/DL
DIFFERENTIAL TYPE: ABNORMAL
EOSINOPHILS ABSOLUTE: 0 K/UL (ref 0–0.5)
EOSINOPHILS RELATIVE PERCENT: 0 % (ref 0–5)
FASTING STATUS: ABNORMAL
GLOBULIN: 3.1 G/DL (ref 1.9–3.6)
GLOMERULAR FILTRATION RATE: 59 MLS/MIN/1.73M2
GLOMERULAR FILTRATION RATE: 59 MLS/MIN/1.73M2
GLUCOSE BLD-MCNC: 287 MG/DL (ref 70–99)
GLUCOSE BLD-MCNC: 298 MG/DL (ref 70–99)
GLUCOSE URINE: >1000 MG/DL
HCT VFR BLD CALC: 45.8 % (ref 37.5–51)
HDLC SERPL-MCNC: 50 MG/DL
HEMOGLOBIN: 15.2 G/DL (ref 13–17.7)
IMMATURE GRANULOCYTES: 0.6 %
KETONES, URINE: NEGATIVE MG/DL
LDL CHOLESTEROL CALCULATED: 60 MG/DL
LEUKOCYTE ESTERASE, URINE: NEGATIVE
LYMPHOCYTES ABSOLUTE: 1.3 K/UL (ref 0.9–4.1)
LYMPHOCYTES RELATIVE PERCENT: 7.5 % (ref 14–51)
MCH RBC QN AUTO: 26.2 PG (ref 26–34)
MCHC RBC AUTO-ENTMCNC: 33.2 G/DL (ref 30.7–35.5)
MCV RBC AUTO: 79 FL (ref 80–100)
MICROALBUMIN UR-MCNC: 5820 MCG/DL
MICROALBUMIN/CREAT UR-RTO: 58 MCG/MG CREAT.
MONOCYTES ABSOLUTE: 0.5 K/UL (ref 0.2–1)
MONOCYTES RELATIVE PERCENT: 2.8 % (ref 4–12)
NEUTROPHILS ABSOLUTE: 15.2 K/UL (ref 1.8–7.5)
NEUTROPHILS RELATIVE PERCENT: 88.9 % (ref 42–80)
NITRATE, UA: NEGATIVE
NUCLEATED RBCS: 0 /100 WBC
PDW BLD-RTO: 14.6 %
PH, URINE: 6 (ref 4.5–8)
PLATELET # BLD: 313 K/UL (ref 140–400)
PMV BLD AUTO: 9.3 FL (ref 7.2–11.7)
POTASSIUM SERPL-SCNC: 4.1 MEQ/L (ref 3.4–5.3)
POTASSIUM SERPL-SCNC: 4.2 MEQ/L (ref 3.4–5.3)
PROT/CREAT RATIO, UR: 0.2 RATIO (ref 5–170)
RBC # BLD: 5.8 M/UL (ref 4.14–5.8)
RBC URINE: NORMAL /HPF (ref 0–2)
SODIUM BLD-SCNC: 136 MEQ/L (ref 135–148)
SODIUM BLD-SCNC: 137 MEQ/L (ref 135–148)
SPECIFIC GRAVITY, URINE: 1.03 (ref 1–1.03)
SQUAMOUS EPITHELIAL CELLS: NORMAL /HPF (ref 0–5)
STATUS: ABNORMAL
TOTAL PROTEIN, URINE: 19 MG/DL
TOTAL PROTEIN, URINE: 20 MG/DL
TOTAL PROTEIN: 7.8 G/DL (ref 6–8.3)
TRIGL SERPL-MCNC: 271 MG/DL
TSH SERPL DL<=0.05 MIU/L-ACNC: 0.55 MCIU/ML (ref 0.4–4.5)
URINE HGB: ABNORMAL MG/DL
UROBILINOGEN, URINE: <2 MG/DL (ref 0–2)
VITAMIN D 25-HYDROXY: 20 NG/ML (ref 30–100)
VLDLC SERPL CALC-MCNC: 54 MG/DL (ref 4–38)
WBC URINE: NORMAL /HPF (ref 0–5)
WBC: 17.1 K/UL (ref 3.5–10.9)

## 2022-03-11 DIAGNOSIS — M54.12 RIGHT CERVICAL RADICULOPATHY: ICD-10-CM

## 2022-03-11 RX ORDER — GABAPENTIN 300 MG/1
300 CAPSULE ORAL 2 TIMES DAILY
Qty: 60 CAPSULE | Refills: 1 | Status: SHIPPED | OUTPATIENT
Start: 2022-03-11 | End: 2022-05-18 | Stop reason: SDUPTHER

## 2022-03-14 PROBLEM — M75.112 NONTRAUMATIC INCOMPLETE TEAR OF LEFT ROTATOR CUFF: Status: ACTIVE | Noted: 2022-03-14

## 2022-03-14 RX ORDER — FLUCONAZOLE 100 MG/1
100 TABLET ORAL DAILY
Qty: 7 TABLET | Refills: 0 | Status: SHIPPED | OUTPATIENT
Start: 2022-03-14 | End: 2022-03-21

## 2022-03-14 ASSESSMENT — ENCOUNTER SYMPTOMS
EYE PAIN: 0
VOMITING: 0
EYE REDNESS: 0
COLOR CHANGE: 0
SHORTNESS OF BREATH: 0
WHEEZING: 0
CHEST TIGHTNESS: 0

## 2022-03-14 NOTE — PROGRESS NOTES
3/8/2022   Chief Complaint   Patient presents with    Shoulder Pain     left        History of Present Illness:                             Ling Maravilla is a 73 y.o. male returns today for follow-up of his left shoulder. He recently had his MRI and is here today for a review of the results. Symptoms are unchanged. Still has stiffness and sharp pains throughout the day. Still having weakness with reaching and lifting activities. Patient returns to the office with left shoulder pain and going over MRI results. Pt stated that there has not been any changes in his shoulder and not getting any better or worse. Pt has been taking norco for the pain with mild relief. Pt stated he has sharp intermittent pains throughout the day and rated his pain a 7/10 today. Pt denies any new falls.                           Medical History  Patient's medications, allergies, past medical, surgical, social and family histories were reviewed and updated as appropriate. Past Medical History:   Diagnosis Date    Blurred vision 04/03/2012    right>left  side --pattern dystrophy/Dr Dye    BPH (benign prostatic hypertrophy) 09/11/2015    Bradycardia 04/18/2019    Hr 30-40 with skipped beats on BP monitor on Labetalol    Crohn's regional enteritis ([facility] [address] 01/03/2012    Dr Dong\"dx with Crohns in [address] [place] [address]- no recent issues    DM II (diabetes mellitus, type II), controlled ([address] 2008    insurance not cover Jardiance    Erosive esophagitis 03/30/2012    \"had erosive esophagitis in lower area on Prevacid for this\"    Eye pain 04/03/2012    GERD (gastroesophageal reflux disease) 04/03/2012    Headache disorder     Kiana (hard of hearing) 09/13/2017    HTN (hypertension) 01/03/2021    also comanaged w Dr Raul Magdaleno.    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  S/P colonoscopy 01/17/2019    Dr Malick Brooks-- ?recheck when?     Stage 3 chronic kidney disease ([facility] [address] 04/18/2019    Dr Ramila Wells Testosterone deficiency 04/03/2012    on andrgel    Vertigo 04/03/2012    \"last used Chriss Deal since 2/2020\"   Corey Layer Wears glasses     Wears partial dentures 05/2020    upper      Past Surgical History:   Procedure Laterality Date    CARDIAC CATHETERIZATION      \"had 2 caths done since 1995 but not sure of dates\"    CATARACT REMOVAL WITH IMPLANT Left 03/02/2017    Dr Jacobo Thayer Right 03/16/2017    Dr Marshall Mas  2005    Dr Meaghan Quinteros  8/2011    Dr Teofilo Lawson COLONOSCOPY      Multiple colonoscopys, HX: Crohns    ENDOSCOPY, COLON, DIAGNOSTIC  2018    Erosive esophagitis    EYE SURGERY      \"left eye surgery for crosseyed- age 13 months\"    EYE SURGERY Left     cataract    EYE SURGERY Right 03/16/2017    cataract    KNEE ARTHROSCOPY  89    right    KNEE ARTHROSCOPY Left 11/11/2020    LEFT KNEE ARTHROSCOPY WITH PARTIAL MENISCECTOMY CHONDROPLASTY performed by Jayant Daniel MD at [place] [address] Right 1/16/2013    Dr Briggs Poster- R knee tib plateau fx and medial meniscectomy    LAMINECTOMY  75 and 85    SINUS SURGERY  06/29/2020    Dr Ron Parekh for chr sinusitis and fungal infection, per pt    TONSILLECTOMY  78    TOTAL KNEE ARTHROPLASTY Right 15    Dr Dot STREETER      Family History   Problem Relation Age of Onset    High Cholesterol Mother     Elevated Lipids Mother     High Blood Pressure Mother     Heart Disease Mother     High Cholesterol Father     Other Father         blind     Social History     Socioeconomic History    Marital status:      Spouse name: None    Number of children: None    Years of education: None    Highest education level: None   Occupational History    Occupation: PA     Comment: [place]     Occupation: disabled     Comment:    Tobacco Use    Smoking status: Former Smoker     Packs/day: 3.00     Years: 30.00     Pack years: 90.00     Types: Cigarettes     Quit date: 1995     Years since quittin.2    Smokeless tobacco: Never Used   Vaping Use    Vaping Use: Never used   Substance and Sexual Activity    Alcohol use: No     Comment: caffeine 1 coffee 1 pop    Drug use: No    Sexual activity: Yes     Partners: Female   Other Topics Concern    None   Social History Narrative    None     Social Determinants of Health     Financial Resource Strain: Low Risk     Difficulty of Paying Living Expenses: Not hard at all   Food Insecurity: No Food Insecurity    Worried About Running Out of Food in the Last Year: Never true    Carly of Food in the Last Year: Never true   Transportation Needs:     Lack of Transportation (Medical): Not on file    Lack of Transportation (Non-Medical):  Not on file   Physical Activity:     Days of Exercise per Week: Not on file    Minutes of Exercise per Session: Not on file   Stress:     Feeling of Stress : Not on file   Social Connections:     Frequency of Communication with Friends and Family: Not on file    Frequency of Social Gatherings with Friends and Family: Not on file    Attends [employer] Services: Not on file    Active Member of Clubs or Organizations: Not on file    Attends Club or Organization Meetings: Not on file    Marital Status: Not on file   Intimate Partner Violence:     Fear of Current or Ex-Partner: Not on file    Emotionally Abused: Not on file    Physically Abused: Not on file    Sexually Abused: Not on file   Housing Stability:     Unable to Pay for Housing in the Last Year: Not on file    Number of [place] in the Last Year: Not on file    Unstable Housing in the Last Year: Not on file     Current Outpatient Medications   Medication Sig Dispense Refill    traMADol (ULTRAM) 50 MG tablet       [START ON 4/3/2022] HYDROcodone-acetaminophen (NORCO) 5-325 MG per tablet Take 1 tablet by mouth 2 times daily as needed for Pain for up to 30 days. 60 tablet 0    glipiZIDE (GLUCOTROL) 5 MG tablet TAKE ONE TABLET BY MOUTH DAILY 30 tablet 3    cyclobenzaprine (FLEXERIL) 10 MG tablet TAKE ONE TABLET BY MOUTH THREE TIMES A DAY AS NEEDED FOR MUSCLE SPASMS 90 tablet 1    metoprolol tartrate (LOPRESSOR) 50 MG tablet TAKE ONE TABLET BY MOUTH TWICE A  tablet 1    hydrALAZINE (APRESOLINE) 25 MG tablet TAKE ONE TABLET BY MOUTH TWICE A  tablet 1    amLODIPine (NORVASC) 5 MG tablet TAKE ONE TABLET BY MOUTH DAILY 90 tablet 1    eplerenone (INSPRA) 50 MG tablet Take 1 tablet by mouth 2 times daily 180 tablet 3    ondansetron (ZOFRAN) 4 MG tablet Take 1 tablet by mouth every 8 hours as needed for Nausea 40 tablet 1    furosemide (LASIX) 40 MG tablet Take 1 tablet by mouth See Admin Instructions PRN LEG SWELLING 90 tablet 1    Lancets MISC 1 each by Does not apply route 2 times daily 200 each 1    blood glucose monitor strips Test two times a day & as needed for symptoms of irregular blood glucose.  200 strip 3    butalbital-acetaminophen-caffeine (FIORICET, ESGIC) -40 MG per tablet Take 1 tablet by mouth 3 times daily as needed for Headaches or Migraine 30 tablet 1    lansoprazole (PREVACID) 15 MG delayed release capsule Take 1 capsule by mouth daily 90 capsule 1    meclizine (ANTIVERT) 25 MG tablet Take 1 tablet by mouth every 6 hours. 30 tablet 1    gabapentin (NEURONTIN) 300 MG capsule Take 1 capsule by mouth 2 times daily for 30 days. 60 capsule 1    vitamin D3 (CHOLECALCIFEROL) 25 MCG (1000 UT) TABS tablet Take 2 tablets by mouth daily 60 tablet 11     No current facility-administered medications for this visit. Allergies   Allergen Reactions    Aldactone [Spironolactone]      Gynecomastia     Bactrim [Sulfamethoxazole-Trimethoprim]      Nephrology contraindicates-- can cause hyperkalemia    Crestor [Rosuvastatin]      myalgias    Labetalol      Marked bradycardia with frequent symptomatic ectopy    Lisinopril Other (See Comments)     Throat swelling- stopped by Dr. Ashly Tolentino Metformin And Related [Metformin And Related]      Diarrhea at 500mg daily    Nsaids      W PROTEINURIA    Statins      myalgias    Tegretol [Carbamazepine]      JITTERS, INSOMNIA         Review of Systems   Constitutional: Negative for chills and fever. HENT: Negative for congestion and sneezing. Eyes: Negative for pain and redness. Respiratory: Negative for chest tightness, shortness of breath and wheezing. Cardiovascular: Negative for chest pain and palpitations. Gastrointestinal: Negative for vomiting. Musculoskeletal: Positive for arthralgias. Skin: Negative for color change and rash. Neurological: Negative for weakness and numbness. Psychiatric/Behavioral: Negative for agitation. The patient is not nervous/anxious. Examination:  General Exam:  Vitals: Pulse 65   Resp 17   Ht 5' 7\" (1.702 m)   Wt 229 lb (103.9 kg)   SpO2 98%   BMI 35.87 kg/m²    Physical Exam  Vitals and nursing note reviewed. Constitutional:       Appearance: Normal appearance. HENT:      Head: Normocephalic and atraumatic.    Eyes:      Conjunctiva/sclera: Conjunctivae normal.      Pupils: Pupils are equal, round, and reactive to light. Pulmonary:      Effort: Pulmonary effort is normal.   Musculoskeletal:      Right shoulder: No swelling, deformity, laceration, tenderness, bony tenderness or crepitus. Normal range of motion. Normal strength. Normal pulse. Left shoulder: Tenderness and bony tenderness present. No swelling, deformity or laceration. Decreased range of motion. Decreased strength. Normal pulse. Right elbow: No swelling, deformity, effusion or lacerations. Normal range of motion. No tenderness. Left elbow: Normal.      Cervical back: Normal range of motion. Comments: Left upper Extremity:    There is moderate global tenderness throughout the shoulder most significant at the anterior lateral aspect. There is mildly restricted range of motion of the shoulder with pain at the extremes of forward elevation and abduction. Forward elevation 120 degrees, abduction 100 degrees, internal rotation to L5, external rotation 30 degrees. Strength is 4/5 with rotator cuff testing but there is breakaway due to pain. Positive empty can sign. Negative drop arm sign. Positive Neer sign. Positive Powers sign. Mild pain at the acromioclavicular joint with crossarm test.  No crepitation. Mild clicking at the shoulder with overhead rotational motion. Skin is intact. Sensation is intact throughout the upper extremity. No instability with passive motion of the shoulder. 2+ radial pulse. No edema. No muscle atrophy. Normal scapular motion. Skin:     General: Skin is warm and dry. Neurological:      Mental Status: He is alert and oriented to person, place, and time.    Psychiatric:         Mood and Affect: Mood normal.         Behavior: Behavior normal.            Diagnostic testing:    MRI images of the left shoulder were reviewed by myself and discussed with the patient today:   MRI completed: 02/19/22     Impression   Moderate supraspinatus and infraspinatus tendinosis.  Low-grade intrasubstance tear involving the supraspinatus/infraspinatus overlap tendon   fibers at the footprint.  No full-thickness rotator cuff tear identified.       Mild-to-moderate AC joint degenerative changes with small volume   subacromial-subdeltoid bursal fluid.       Suspected small intramuscular lipoma in the supraspinatus muscle measuring 12   x 9 x 10 mm.           Office Procedures:  Orders Placed This Encounter   Procedures    84743 - TN DRAIN/INJECT LARGE JOINT/BURSA       Assessment and Plan  1. Left shoulder partial-thickness rotator cuff tear    2. Left shoulder primary osteoarthritis and impingement syndrome    We reviewed the MRI findings. He has a partial-thickness tear of the rotator cuff which appears to be mild in nature. I recommend that we maximize conservative treatments. We discussed potential for surgical invention if he fails conservative measures. I have recommended a steroid injection to help alleviate symptoms that we can proceed with rehabilitation. Procedure Note, Left Shoulder Subacromial Injection:    The posterior aspect of the left shoulder joint was prepped with alcohol. The subacromial space was then injected using a 22-gauge needle. The shoulder was injected with 80 mg of Kenalog and 4 mL of 1% plain lidocaine. The patient tolerated the procedure well with no complications. A Band-Aid was applied. We discussed physical therapy but he is opted for home exercise program.  I have outlined stretching and rotator cuff strengthening exercises and given him a printout of exercises to do. Continue with anti-inflammatory medications. Follow-up in 6 weeks for check of his response to this treatment plan.       Electronically signed by Alberta Obregon MD on 3/14/2022 at 9:04 AM

## 2022-04-13 ENCOUNTER — TELEMEDICINE (OUTPATIENT)
Dept: INTERNAL MEDICINE CLINIC | Age: 72
End: 2022-04-13
Payer: MEDICARE

## 2022-04-13 DIAGNOSIS — K04.7 TOOTH ABSCESS: Primary | ICD-10-CM

## 2022-04-13 PROCEDURE — 1123F ACP DISCUSS/DSCN MKR DOCD: CPT | Performed by: INTERNAL MEDICINE

## 2022-04-13 PROCEDURE — G8417 CALC BMI ABV UP PARAM F/U: HCPCS | Performed by: INTERNAL MEDICINE

## 2022-04-13 PROCEDURE — 4040F PNEUMOC VAC/ADMIN/RCVD: CPT | Performed by: INTERNAL MEDICINE

## 2022-04-13 PROCEDURE — 3017F COLORECTAL CA SCREEN DOC REV: CPT | Performed by: INTERNAL MEDICINE

## 2022-04-13 PROCEDURE — G8427 DOCREV CUR MEDS BY ELIG CLIN: HCPCS | Performed by: INTERNAL MEDICINE

## 2022-04-13 PROCEDURE — 1036F TOBACCO NON-USER: CPT | Performed by: INTERNAL MEDICINE

## 2022-04-13 PROCEDURE — 99213 OFFICE O/P EST LOW 20 MIN: CPT | Performed by: INTERNAL MEDICINE

## 2022-04-13 RX ORDER — AMOXICILLIN AND CLAVULANATE POTASSIUM 875; 125 MG/1; MG/1
1 TABLET, FILM COATED ORAL 2 TIMES DAILY
Qty: 20 TABLET | Refills: 0 | Status: SHIPPED | OUTPATIENT
Start: 2022-04-13 | End: 2022-04-23

## 2022-04-13 RX ORDER — LIDOCAINE HYDROCHLORIDE 20 MG/ML
5 SOLUTION OROPHARYNGEAL 4 TIMES DAILY
Qty: 100 ML | Refills: 0 | Status: SHIPPED | OUTPATIENT
Start: 2022-04-13 | End: 2022-04-18

## 2022-04-13 NOTE — PROGRESS NOTES
2022    TELEHEALTH EVALUATION -- Audio/Visual (During PFHEO-54 public health emergency)    HPI:    Kamran Lewis (:  1950) has requested an audio/video evaluation for the following concern(s):     l UPPER GUM SWELLING AND TENDERNESS NOTED AND CAUSING SOME PAIN OF L MAXILLARY SINUS REGION. NO FEVER OR CHILLS. DENIES SINUS DRAINAGE. STATES PLANS TO ALSO F/U W DR GUZMAN FOR EVALUATION    Review of Systems    Prior to Visit Medications    Medication Sig Taking? Authorizing Provider   vitamin D3 (CHOLECALCIFEROL) 25 MCG (1000 UT) TABS tablet Take 2 tablets by mouth daily Yes Danielle Cardozo DO   traMADol (ULTRAM) 50 MG tablet  Yes Historical Provider, MD   HYDROcodone-acetaminophen (NORCO) 5-325 MG per tablet Take 1 tablet by mouth 2 times daily as needed for Pain for up to 30 days. Yes Renee Valle MD   glipiZIDE (GLUCOTROL) 5 MG tablet TAKE ONE TABLET BY MOUTH DAILY Yes Renee Valle MD   cyclobenzaprine (FLEXERIL) 10 MG tablet TAKE ONE TABLET BY MOUTH THREE TIMES A DAY AS NEEDED FOR MUSCLE SPASMS Yes Renee Valle MD   metoprolol tartrate (LOPRESSOR) 50 MG tablet TAKE ONE TABLET BY MOUTH TWICE A DAY Yes Renee Valle MD   hydrALAZINE (APRESOLINE) 25 MG tablet TAKE ONE TABLET BY MOUTH TWICE A DAY Yes Renee Valle MD   amLODIPine (NORVASC) 5 MG tablet TAKE ONE TABLET BY MOUTH DAILY Yes Renee Valle MD   eplerenone (INSPRA) 50 MG tablet Take 1 tablet by mouth 2 times daily Yes Danielle Cardozo DO   ondansetron (ZOFRAN) 4 MG tablet Take 1 tablet by mouth every 8 hours as needed for Nausea Yes Renee Valle MD   furosemide (LASIX) 40 MG tablet Take 1 tablet by mouth See Admin Instructions PRN LEG SWELLING Yes Danielle Cardozo DO   Lancets MISC 1 each by Does not apply route 2 times daily Yes Reene Valle MD   blood glucose monitor strips Test two times a day & as needed for symptoms of irregular blood glucose.  Yes Renee Valle MD butalbital-acetaminophen-caffeine (FIORICET, ESGIC) -40 MG per tablet Take 1 tablet by mouth 3 times daily as needed for Headaches or Migraine Yes Jameel Jones MD   lansoprazole (PREVACID) 15 MG delayed release capsule Take 1 capsule by mouth daily Yes Jameel Jones MD   meclizine (ANTIVERT) 25 MG tablet Take 1 tablet by mouth every 6 hours. Yes Jameel Jones MD   gabapentin (NEURONTIN) 300 MG capsule Take 1 capsule by mouth 2 times daily for 30 days. Jameel Jones MD       Social History     Tobacco Use    Smoking status: Former Smoker     Packs/day: 3.00     Years: 30.00     Pack years: 90.00     Types: Cigarettes     Quit date: 1995     Years since quittin.2    Smokeless tobacco: Never Used   Vaping Use    Vaping Use: Never used   Substance Use Topics    Alcohol use: No     Comment: caffeine 1 coffee 1 pop    Drug use: No             PHYSICAL EXAMINATION:  [ INSTRUCTIONS:  \"[x]\" Indicates a positive item  \"[]\" Indicates a negative item  -- DELETE ALL ITEMS NOT EXAMINED]  Vital Signs: (As obtained by patient/caregiver or practitioner observation)    Blood pressure-  Heart rate-    Respiratory rate-    Temperature-  Pulse oximetry-     Constitutional: [] Appears well-developed and well-nourished [] No apparent distress      [] Abnormal-   Mental status  [] Alert and awake  [] Oriented to person/place/time []Able to follow commands      Eyes:  EOM    []  Normal  [] Abnormal-  Sclera  []  Normal  [] Abnormal -         Discharge []  None visible  [] Abnormal -    HENT:   [] Normocephalic, atraumatic.   [x] Abnormal SWOLLEN ERYTHEMATOUS L UPPER GUM ABOVE INCISOR NOTED[] Mouth/Throat: Mucous membranes are moist.     External Ears [] Normal  [] Abnormal-     Neck: [] No visualized mass     Pulmonary/Chest: [] Respiratory effort normal.  [] No visualized signs of difficulty breathing or respiratory distress        [] Abnormal-      Musculoskeletal:   [] Normal gait with no signs of ataxia         [] Normal range of motion of neck        [] Abnormal-       Neurological:        [] No Facial Asymmetry (Cranial nerve 7 motor function) (limited exam to video visit)          [] No gaze palsy        [] Abnormal-         Skin:        [] No significant exanthematous lesions or discoloration noted on facial skin         [] Abnormal-            Psychiatric:       [] Normal Affect [] No Hallucinations        [] Abnormal-     Other pertinent observable physical exam findings-     ASSESSMENT/PLAN:  1. Tooth abscess  rx as noted on abx but also was clear w Marylee Elam definitive care is to be determined by dentistry and likely needs teeth pulled  - amoxicillin-clavulanate (AUGMENTIN) 875-125 MG per tablet; Take 1 tablet by mouth 2 times daily for 10 days  Dispense: 20 tablet; Refill: 0  - lidocaine viscous hcl (XYLOCAINE) 2 % SOLN solution; Take 5 mLs by mouth 4 times daily for 5 days  Dispense: 100 mL; Refill: 0      Return if symptoms worsen or fail to improve. Louis Matthews, was evaluated through a synchronous (real-time) audio-video encounter. The patient (or guardian if applicable) is aware that this is a billable service, which includes applicable co-pays. This Virtual Visit was conducted with patient's (and/or legal guardian's) consent. The visit was conducted pursuant to the emergency declaration under the [address] [place] [address], [address] [place] [address] authority and the [employer] Act. Patient identification was verified, and a caregiver was present when appropriate. The patient was located at home in a state where the provider was licensed to provide care. Total time spent on this encounter: Not billed by time    --Jameel Jones MD on 4/13/2022 at 2:51 PM    An electronic signature was used to authenticate this note.

## 2022-04-19 ENCOUNTER — OFFICE VISIT (OUTPATIENT)
Dept: ORTHOPEDIC SURGERY | Age: 72
End: 2022-04-19
Payer: MEDICARE

## 2022-04-19 VITALS
HEART RATE: 76 BPM | BODY MASS INDEX: 36.1 KG/M2 | OXYGEN SATURATION: 97 % | HEIGHT: 67 IN | WEIGHT: 230 LBS | RESPIRATION RATE: 18 BRPM

## 2022-04-19 DIAGNOSIS — M19.012 OSTEOARTHRITIS, LOCALIZED, SHOULDER, LEFT: Primary | ICD-10-CM

## 2022-04-19 PROCEDURE — 99213 OFFICE O/P EST LOW 20 MIN: CPT | Performed by: ORTHOPAEDIC SURGERY

## 2022-04-19 PROCEDURE — 1123F ACP DISCUSS/DSCN MKR DOCD: CPT | Performed by: ORTHOPAEDIC SURGERY

## 2022-04-19 PROCEDURE — G8427 DOCREV CUR MEDS BY ELIG CLIN: HCPCS | Performed by: ORTHOPAEDIC SURGERY

## 2022-04-19 PROCEDURE — G8417 CALC BMI ABV UP PARAM F/U: HCPCS | Performed by: ORTHOPAEDIC SURGERY

## 2022-04-19 PROCEDURE — 1036F TOBACCO NON-USER: CPT | Performed by: ORTHOPAEDIC SURGERY

## 2022-04-19 PROCEDURE — 4040F PNEUMOC VAC/ADMIN/RCVD: CPT | Performed by: ORTHOPAEDIC SURGERY

## 2022-04-19 PROCEDURE — 3017F COLORECTAL CA SCREEN DOC REV: CPT | Performed by: ORTHOPAEDIC SURGERY

## 2022-04-19 NOTE — PROGRESS NOTES
Patient returns to the office with a follow up of a left shoulder injection. Pt stated that the injection started to take full effect about a week after the injection and since then has not had any pain. Pt stated he has better ROM and able to exercise again. Pt denies any concerns.

## 2022-04-19 NOTE — PROGRESS NOTES
4/19/2022   Chief Complaint   Patient presents with    Shoulder Pain     left shoulder injection 3/8/22        History of Present Illness:                             Jonatan Gómez is a 73 y.o. male who returns today for follow-up of his left shoulder. The injection helped with his pain relief and he has been able to resume his exercise activities. He feels he has good range of motion and strength of the shoulder today. No complaints today    Patient returns to the office with a follow up of a left shoulder injection. Pt stated that the injection started to take full effect about a week after the injection and since then has not had any pain. Pt stated he has better ROM and able to exercise again. Pt denies any concerns. Medical History  Patient's medications, allergies, past medical, surgical, social and family histories were reviewed and updated as appropriate. Review of Systems   Constitutional: Negative for activity change and fever. Respiratory: Negative for chest tightness and shortness of breath. Cardiovascular: Negative for chest pain. Skin: Negative for color change. Neurological: Negative for dizziness. Psychiatric/Behavioral: The patient is not nervous/anxious. Examination:  General Exam:  Vitals: Pulse 76   Resp 18   Ht 5' 7\" (1.702 m)   Wt 230 lb (104.3 kg)   SpO2 97%   BMI 36.02 kg/m²    Physical Exam     Left Upper Extremity:    There is improved minimal tenderness diffusely the shoulder. There is improved range of motion of the shoulder with pain at the extremes of forward elevation and abduction. Forward elevation 150 degrees, abduction 140 degrees, internal rotation to L5, external rotation 30 degrees. Strength is 5/5 with rotator cuff testing . Positive empty can sign. Negative drop arm sign. Positive Neer sign. Positive Powers sign.   Mild pain at the acromioclavicular joint with crossarm test.  No crepitation. Skin is intact. Sensation is intact throughout the upper extremity. No instability with passive motion of the shoulder. 2+ radial pulse. No edema. No muscle atrophy. Normal scapular motion. Diagnostic testing:    MRI images of the left shoulder were reviewed again:  Moderate supraspinatus and infraspinatus tendinosis.  Low-grade   intrasubstance tear involving the supraspinatus/infraspinatus overlap tendon   fibers at the footprint.  No full-thickness rotator cuff tear identified.       Mild-to-moderate AC joint degenerative changes with small volume   subacromial-subdeltoid bursal fluid.       Suspected small intramuscular lipoma in the supraspinatus muscle measuring 12   x 9 x 10 mm.        MRI images of the left knee were again reviewed by myself:  MENISCI: No lateral meniscus tear identified.  Horizontal tear of the medial   meniscus posterior horn.       CRUCIATE LIGAMENTS: ACL and PCL are intact.       EXTENSOR MECHANISM: Patellar and distal quadriceps tendons are intact.       LATERAL COLLATERAL LIGAMENT COMPLEX: Iliotibial band, fibular collateral   ligament, and biceps femoris tendon are intact.       MEDIAL COLLATERAL LIGAMENT COMPLEX: MCL is intact.       KNEE JOINT: Small volume knee joint fluid.  Small Baker's cyst.  Mild   cartilage thinning at the median ridge of the patella extending to the   lateral patellar facet with a focal near full-thickness cartilage defect at   the median ridge.  Subchondral cystic changes seen at the median ridge.  Mild   cartilage thinning at the lateral trochlea.       BONE MARROW: No acute fracture is seen.  Bone marrow edema near the posterior   root attachment of the medial meniscus is likely degenerative in nature.           Impression   Horizontal tear of the medial meniscus posterior horn.       Cruciate and collateral ligaments are intact.       Mild degenerative changes at the patellofemoral compartment primarily.       Small Baker's cyst.             Office Procedures:  No orders of the defined types were placed in this encounter. Assessment and Plan  1. Left shoulder partial-thickness rotator cuff tear    2. Left shoulder primary osteoarthritis and impingement syndrome    3. Left knee primary osteoarthritis    He has responded well to the steroid injection I recommended he continue with his home exercise program.  He will continue with stretching and low impact strengthening. Follow-up as needed if he would require another injection to the shoulder. We also reviewed his degenerative joint disease in the left knee. He may benefit from a steroid injection therapy for his arthritic symptoms flareup.   He will continue with over-the-counter anti-inflammatory medications and home exercise program.    Follow-up if knee symptoms worsen                    Electronically signed by Stacia Cosby MD on 4/19/2022 at 10:32 AM

## 2022-04-25 ASSESSMENT — ENCOUNTER SYMPTOMS
SHORTNESS OF BREATH: 0
COLOR CHANGE: 0
CHEST TIGHTNESS: 0

## 2022-04-29 RX ORDER — GLIPIZIDE 5 MG/1
TABLET ORAL
Qty: 30 TABLET | Refills: 3 | Status: SHIPPED | OUTPATIENT
Start: 2022-04-29 | End: 2022-05-03 | Stop reason: SDUPTHER

## 2022-05-03 ENCOUNTER — OFFICE VISIT (OUTPATIENT)
Dept: INTERNAL MEDICINE CLINIC | Age: 72
End: 2022-05-03
Payer: MEDICARE

## 2022-05-03 VITALS
WEIGHT: 237 LBS | OXYGEN SATURATION: 94 % | SYSTOLIC BLOOD PRESSURE: 138 MMHG | DIASTOLIC BLOOD PRESSURE: 74 MMHG | HEART RATE: 61 BPM | HEIGHT: 67 IN | RESPIRATION RATE: 16 BRPM | BODY MASS INDEX: 37.2 KG/M2

## 2022-05-03 DIAGNOSIS — N18.31 STAGE 3A CHRONIC KIDNEY DISEASE (HCC): ICD-10-CM

## 2022-05-03 DIAGNOSIS — E11.21 CONTROLLED TYPE 2 DIABETES MELLITUS WITH DIABETIC NEPHROPATHY, WITHOUT LONG-TERM CURRENT USE OF INSULIN (HCC): Primary | ICD-10-CM

## 2022-05-03 DIAGNOSIS — N18.30 STAGE 3 CHRONIC KIDNEY DISEASE, UNSPECIFIED WHETHER STAGE 3A OR 3B CKD (HCC): ICD-10-CM

## 2022-05-03 DIAGNOSIS — R41.3 MEMORY DEFICIT: ICD-10-CM

## 2022-05-03 DIAGNOSIS — E78.2 MIXED HYPERLIPIDEMIA: ICD-10-CM

## 2022-05-03 DIAGNOSIS — M54.50 CHRONIC MIDLINE LOW BACK PAIN WITHOUT SCIATICA: ICD-10-CM

## 2022-05-03 DIAGNOSIS — G58.9 MONONEUROPATHY: ICD-10-CM

## 2022-05-03 DIAGNOSIS — I10 PRIMARY HYPERTENSION: ICD-10-CM

## 2022-05-03 DIAGNOSIS — M54.12 RIGHT CERVICAL RADICULOPATHY: ICD-10-CM

## 2022-05-03 DIAGNOSIS — G89.29 CHRONIC MIDLINE LOW BACK PAIN WITHOUT SCIATICA: ICD-10-CM

## 2022-05-03 PROCEDURE — 1123F ACP DISCUSS/DSCN MKR DOCD: CPT | Performed by: INTERNAL MEDICINE

## 2022-05-03 PROCEDURE — 3046F HEMOGLOBIN A1C LEVEL >9.0%: CPT | Performed by: INTERNAL MEDICINE

## 2022-05-03 PROCEDURE — 99214 OFFICE O/P EST MOD 30 MIN: CPT | Performed by: INTERNAL MEDICINE

## 2022-05-03 PROCEDURE — 2022F DILAT RTA XM EVC RTNOPTHY: CPT | Performed by: INTERNAL MEDICINE

## 2022-05-03 PROCEDURE — G8417 CALC BMI ABV UP PARAM F/U: HCPCS | Performed by: INTERNAL MEDICINE

## 2022-05-03 PROCEDURE — 1036F TOBACCO NON-USER: CPT | Performed by: INTERNAL MEDICINE

## 2022-05-03 PROCEDURE — 3017F COLORECTAL CA SCREEN DOC REV: CPT | Performed by: INTERNAL MEDICINE

## 2022-05-03 PROCEDURE — G8427 DOCREV CUR MEDS BY ELIG CLIN: HCPCS | Performed by: INTERNAL MEDICINE

## 2022-05-03 PROCEDURE — 4040F PNEUMOC VAC/ADMIN/RCVD: CPT | Performed by: INTERNAL MEDICINE

## 2022-05-03 RX ORDER — HYDROCODONE BITARTRATE AND ACETAMINOPHEN 5; 325 MG/1; MG/1
1 TABLET ORAL 2 TIMES DAILY PRN
Qty: 60 TABLET | Refills: 0 | Status: SHIPPED | OUTPATIENT
Start: 2022-05-04 | End: 2022-10-12

## 2022-05-03 RX ORDER — HYDROCODONE BITARTRATE AND ACETAMINOPHEN 5; 325 MG/1; MG/1
1 TABLET ORAL 2 TIMES DAILY
Qty: 60 TABLET | Refills: 0 | Status: SHIPPED | OUTPATIENT
Start: 2022-07-03 | End: 2022-07-29 | Stop reason: SDUPTHER

## 2022-05-03 RX ORDER — GLIPIZIDE 5 MG/1
5 TABLET ORAL
Qty: 60 TABLET | Refills: 3 | Status: SHIPPED | OUTPATIENT
Start: 2022-05-03 | End: 2022-08-18 | Stop reason: SDUPTHER

## 2022-05-03 RX ORDER — HYDROCODONE BITARTRATE AND ACETAMINOPHEN 5; 325 MG/1; MG/1
1 TABLET ORAL 2 TIMES DAILY PRN
Qty: 60 TABLET | Refills: 0 | Status: SHIPPED | OUTPATIENT
Start: 2022-06-03 | End: 2022-10-12

## 2022-05-03 RX ORDER — TRAMADOL HYDROCHLORIDE 50 MG/1
50 TABLET ORAL 3 TIMES DAILY
Qty: 90 TABLET | Refills: 2 | Status: SHIPPED | OUTPATIENT
Start: 2022-05-03 | End: 2022-08-02 | Stop reason: SDUPTHER

## 2022-05-03 NOTE — PROGRESS NOTES
Malena Marks  1950 05/03/22    SUBJECTIVE:  Had recent ins physical and a1c elevated 8_+, BMI high. DM- wt is up, WE'LL TRY INCR GLIPIZIDE FROM 5MG DAILY TO BID. Lab Results   Component Value Date    LABA1C 7.8 08/09/2021    LABA1C 8.0 05/06/2021    LABA1C 7.5 02/05/2021     Lab Results   Component Value Date    GLUF 195 (H) 03/20/2018    LABMICR 5820.0 03/09/2022    LDLCALC 60 03/09/2022    CREATININE 1.3 03/09/2022     L shoulder pain improved after inj and seen by Dr Pierce Rojas regularly. Hypertension: Stable. Denies CP, SOB, cough, visual changes, dizziness, palpitations or CHAVEZ. ALSO SEES DR Chaparro Copeland, DR Armen Guadalupe. HAS CKD. NO HX OF ANGINA CURRENTLY. CHR LBP AND OA, ON PAIN MEDS W RF DUE. Controlled substances monitoring: possible medication side effects, risk of tolerance and/or dependence, and alternative treatments discussed, no signs of potential drug abuse or diversion identified and [place] report reviewed today- activity consistent with treatment plan. Some memory issues noted. We'll check MMSE, refer to Neuro also for DM neuropathy    OBJECTIVE:    BP (!) 140/70   Pulse 61   Resp 16   Ht 5' 7\" (1.702 m)   Wt 237 lb (107.5 kg)   SpO2 94%   BMI 37.12 kg/m²     Physical Exam  Vitals reviewed. Constitutional:       Appearance: He is well-developed. Cardiovascular:      Rate and Rhythm: Normal rate and regular rhythm. Heart sounds: Normal heart sounds. No murmur heard. No friction rub. No gallop. Pulmonary:      Effort: Pulmonary effort is normal. No respiratory distress. Breath sounds: Normal breath sounds. No wheezing or rales. Abdominal:      General: Bowel sounds are normal. There is no distension. Palpations: Abdomen is soft. Tenderness: There is no abdominal tenderness. Musculoskeletal:      Cervical back: Neck supple. Right lower leg: No edema. Left lower leg: No edema. Neurological:      Mental Status: He is alert. ASSESSMENT:    1. Controlled type 2 diabetes mellitus with diabetic nephropathy, without long-term current use of insulin ([facility] [address]    2. Primary hypertension    3. Stage 3a chronic kidney disease    4. Mixed hyperlipidemia    5. Stage 3 chronic kidney disease, unspecified whether stage 3a or 3b CKD ([facility] [address]    6. Chronic midline low back pain without sciatica    7. Right cervical radiculopathy    8. Memory deficit    9. Mononeuropathy        PLAN:    Romaine Boston was seen today for 3 month follow-up, diabetes, memory loss and peripheral neuropathy. Diagnoses and all orders for this visit:    Controlled type 2 diabetes mellitus with diabetic nephropathy, without long-term current use of insulin (HCC) - A1C HIGH, WE'LL ADD LOW DOSE GLIPIZIDE AND CONT WORKING ON DIET, EXERCISE. F/U LAB  -     glipiZIDE (GLUCOTROL) 5 MG tablet; Take 1 tablet by mouth 2 times daily (before meals)  -     Comprehensive Metabolic Panel; Future  -     CBC with Auto Differential; Future  -     Lipid Panel; Future  -     Hemoglobin A1C; Future    Primary hypertension - Blood pressure stable and will continue current regimen. Will plan periodic monitoring of renal function, electrolytes, lipid profile. -     Comprehensive Metabolic Panel; Future  -     CBC with Auto Differential; Future  -     Lipid Panel; Future    Stage 3a chronic kidney disease- CONT MONITOR RENAL FXN W SERIAL LAB    Mixed hyperlipidemia- Pt will continue to work on a low fat diet and also exercise, wt loss as appropriate. Will continue periodic monitoring of fasting lipid profile, glucose, liver function. -     Comprehensive Metabolic Panel; Future    Stage 3 chronic kidney disease, unspecified whether stage 3a or 3b CKD (HCC)    Chronic midline low back pain without sciatica- LBP stable on pain regimen, RFs given as noted and will monitor.    -     HYDROcodone-acetaminophen (NORCO) 5-325 MG per tablet; Take 1 tablet by mouth 2 times daily for 30 days.   - HYDROcodone-acetaminophen (NORCO) 5-325 MG per tablet; Take 1 tablet by mouth 2 times daily as needed for Pain for up to 30 days.  -     HYDROcodone-acetaminophen (NORCO) 5-325 MG per tablet; Take 1 tablet by mouth 2 times daily as needed for Pain for up to 30 days. -     traMADol (ULTRAM) 50 MG tablet; Take 1 tablet by mouth 3 times daily for 30 days. Right cervical radiculopathy- ALSO FOR PERIODIC NECK PAIN CONT NORCO W RF  -     HYDROcodone-acetaminophen (NORCO) 5-325 MG per tablet; Take 1 tablet by mouth 2 times daily for 30 days.  -     HYDROcodone-acetaminophen (NORCO) 5-325 MG per tablet; Take 1 tablet by mouth 2 times daily as needed for Pain for up to 30 days.  -     HYDROcodone-acetaminophen (NORCO) 5-325 MG per tablet; Take 1 tablet by mouth 2 times daily as needed for Pain for up to 30 days. Memory deficit- DID OK W MMSE FROM HIS INSURANCE PHYSICAL, [facility] AND ALSO REFER FOR EVAL NEUROLOGY  -     Lois Montero MD, Neurology, [place]  -     TSH; Future  -     Vitamin B12; Future    Mononeuropathy - LIKELY DM NEUROPAHTY BUT W SOME CHR LBP ALSO COULD BE FR RADICULOPATHY SO LIKELY WILL NEED EMG STUDIES  -     Lois Montero MD, Neurology, [place]  -     TSH;  Future  -     Vitamin B12; Future

## 2022-05-13 ENCOUNTER — OFFICE VISIT (OUTPATIENT)
Dept: INTERNAL MEDICINE CLINIC | Age: 72
End: 2022-05-13
Payer: MEDICARE

## 2022-05-13 ENCOUNTER — HOSPITAL ENCOUNTER (OUTPATIENT)
Dept: GENERAL RADIOLOGY | Age: 72
Discharge: HOME | End: 2022-05-13
Payer: MEDICARE

## 2022-05-13 ENCOUNTER — HOSPITAL ENCOUNTER (OUTPATIENT)
Age: 72
Discharge: HOME | End: 2022-05-13
Payer: MEDICARE

## 2022-05-13 VITALS
WEIGHT: 237 LBS | DIASTOLIC BLOOD PRESSURE: 74 MMHG | HEIGHT: 67 IN | SYSTOLIC BLOOD PRESSURE: 128 MMHG | RESPIRATION RATE: 16 BRPM | OXYGEN SATURATION: 94 % | BODY MASS INDEX: 37.2 KG/M2 | HEART RATE: 65 BPM

## 2022-05-13 DIAGNOSIS — M25.561 ACUTE PAIN OF RIGHT KNEE: ICD-10-CM

## 2022-05-13 DIAGNOSIS — M25.551 ACUTE RIGHT HIP PAIN: Primary | ICD-10-CM

## 2022-05-13 DIAGNOSIS — M25.551 ACUTE RIGHT HIP PAIN: ICD-10-CM

## 2022-05-13 PROCEDURE — 3017F COLORECTAL CA SCREEN DOC REV: CPT | Performed by: INTERNAL MEDICINE

## 2022-05-13 PROCEDURE — G8427 DOCREV CUR MEDS BY ELIG CLIN: HCPCS | Performed by: INTERNAL MEDICINE

## 2022-05-13 PROCEDURE — 73502 X-RAY EXAM HIP UNI 2-3 VIEWS: CPT

## 2022-05-13 PROCEDURE — 99213 OFFICE O/P EST LOW 20 MIN: CPT | Performed by: INTERNAL MEDICINE

## 2022-05-13 PROCEDURE — 4040F PNEUMOC VAC/ADMIN/RCVD: CPT | Performed by: INTERNAL MEDICINE

## 2022-05-13 PROCEDURE — 1036F TOBACCO NON-USER: CPT | Performed by: INTERNAL MEDICINE

## 2022-05-13 PROCEDURE — 73562 X-RAY EXAM OF KNEE 3: CPT

## 2022-05-13 PROCEDURE — 1123F ACP DISCUSS/DSCN MKR DOCD: CPT | Performed by: INTERNAL MEDICINE

## 2022-05-13 PROCEDURE — G8417 CALC BMI ABV UP PARAM F/U: HCPCS | Performed by: INTERNAL MEDICINE

## 2022-05-13 RX ORDER — PREDNISONE 1 MG/1
TABLET ORAL
Qty: 36 TABLET | Refills: 0 | Status: SHIPPED | OUTPATIENT
Start: 2022-05-13 | End: 2022-05-31 | Stop reason: ALTCHOICE

## 2022-05-13 NOTE — PROGRESS NOTES
Daivd Barby  1950 05/13/22    SUBJECTIVE:  Young Jennifer on stairs a week ago and landed on R knee. Also landed on R side, bumped head but no residual headache. Ache also R hip and thigh  r tka was 2013. OBJECTIVE:    /74   Pulse 65   Resp 16   Ht 5' 7\" (1.702 m)   Wt 237 lb (107.5 kg)   SpO2 94%   BMI 37.12 kg/m²     Physical Exam  Constitutional:       Appearance: Normal appearance. Musculoskeletal:         General: Tenderness (R lateral hip, stiff w ROM and decr ext rotation. r knee medially sl tender but no effusion and ROM intact.) present. Neurological:      Mental Status: He is alert. ASSESSMENT:    1. Acute right hip pain    2. Acute pain of right knee        PLAN:  LIKELY CONTUSION, SPRAIN BUT ALSO R/O FX, NEED TO BE SURE R TKA IS INTACT. CHECK XRAYS, IF NEG BUT SX PERSIST THEN WILL NEED Federica Astrid was seen today for fall and hip pain. Diagnoses and all orders for this visit:    Acute right hip pain  -     Cancel: XR HIP RIGHT (2-3 VIEWS); Future  -     predniSONE (DELTASONE) 5 MG tablet; Take 8 pills, then 7,6,5,4,3,2,1  -     XR HIP 2-3 VW W PELVIS RIGHT; Future    Acute pain of right knee  -     Cancel: XR KNEE RIGHT (1-2 VIEWS); Future  -     predniSONE (DELTASONE) 5 MG tablet; Take 8 pills, then 7,6,5,4,3,2,1  -     XR KNEE RIGHT (3 VIEWS);  Future

## 2022-05-18 DIAGNOSIS — M54.12 RIGHT CERVICAL RADICULOPATHY: ICD-10-CM

## 2022-05-19 RX ORDER — GABAPENTIN 300 MG/1
300 CAPSULE ORAL 2 TIMES DAILY
Qty: 60 CAPSULE | Refills: 1 | Status: SHIPPED | OUTPATIENT
Start: 2022-05-19 | End: 2022-06-14

## 2022-05-23 DIAGNOSIS — I10 ESSENTIAL HYPERTENSION: ICD-10-CM

## 2022-05-23 RX ORDER — AMLODIPINE BESYLATE 5 MG/1
TABLET ORAL
Qty: 90 TABLET | Refills: 1 | Status: SHIPPED | OUTPATIENT
Start: 2022-05-23

## 2022-05-29 DIAGNOSIS — I10 ESSENTIAL HYPERTENSION: ICD-10-CM

## 2022-05-31 RX ORDER — METOPROLOL TARTRATE 50 MG/1
TABLET, FILM COATED ORAL
Qty: 180 TABLET | Refills: 1 | Status: SHIPPED | OUTPATIENT
Start: 2022-05-31

## 2022-05-31 RX ORDER — HYDRALAZINE HYDROCHLORIDE 25 MG/1
TABLET, FILM COATED ORAL
Qty: 180 TABLET | Refills: 1 | Status: SHIPPED | OUTPATIENT
Start: 2022-05-31 | End: 2022-10-12

## 2022-06-10 DIAGNOSIS — M54.50 CHRONIC MIDLINE LOW BACK PAIN WITHOUT SCIATICA: ICD-10-CM

## 2022-06-10 DIAGNOSIS — M54.12 RIGHT CERVICAL RADICULOPATHY: ICD-10-CM

## 2022-06-10 DIAGNOSIS — G89.29 CHRONIC MIDLINE LOW BACK PAIN WITHOUT SCIATICA: ICD-10-CM

## 2022-06-10 RX ORDER — CYCLOBENZAPRINE HCL 10 MG
TABLET ORAL
Qty: 90 TABLET | Refills: 1 | Status: SHIPPED | OUTPATIENT
Start: 2022-06-10

## 2022-06-14 ENCOUNTER — OFFICE VISIT (OUTPATIENT)
Dept: NEUROLOGY | Age: 72
End: 2022-06-14
Payer: MEDICARE

## 2022-06-14 VITALS
OXYGEN SATURATION: 98 % | HEIGHT: 67 IN | DIASTOLIC BLOOD PRESSURE: 90 MMHG | HEART RATE: 79 BPM | BODY MASS INDEX: 36.57 KG/M2 | SYSTOLIC BLOOD PRESSURE: 180 MMHG | WEIGHT: 233 LBS

## 2022-06-14 DIAGNOSIS — M54.16 LUMBAR RADICULOPATHY: ICD-10-CM

## 2022-06-14 DIAGNOSIS — E55.9 VITAMIN D DEFICIENCY: ICD-10-CM

## 2022-06-14 DIAGNOSIS — R41.3 MEMORY LOSS: Primary | ICD-10-CM

## 2022-06-14 DIAGNOSIS — R47.89 WORD FINDING DIFFICULTY: ICD-10-CM

## 2022-06-14 LAB
A/G RATIO: 1.2 (ref 1.1–2.2)
ALBUMIN SERPL-MCNC: 4.2 G/DL (ref 3.4–5)
ALP BLD-CCNC: 115 U/L (ref 40–129)
ALT SERPL-CCNC: <5 U/L (ref 10–40)
ANION GAP SERPL CALCULATED.3IONS-SCNC: 16 MMOL/L (ref 3–16)
AST SERPL-CCNC: <5 U/L (ref 15–37)
BASOPHILS ABSOLUTE: 0.1 K/UL (ref 0–0.2)
BASOPHILS RELATIVE PERCENT: 0.9 %
BILIRUB SERPL-MCNC: <0.2 MG/DL (ref 0–1)
BUN BLDV-MCNC: 13 MG/DL (ref 7–20)
CALCIUM SERPL-MCNC: 9.6 MG/DL (ref 8.3–10.6)
CHLORIDE BLD-SCNC: 99 MMOL/L (ref 99–110)
CO2: 22 MMOL/L (ref 21–32)
CREAT SERPL-MCNC: 1.3 MG/DL (ref 0.8–1.3)
EOSINOPHILS ABSOLUTE: 0.3 K/UL (ref 0–0.6)
EOSINOPHILS RELATIVE PERCENT: 3.6 %
GFR AFRICAN AMERICAN: >60
GFR NON-AFRICAN AMERICAN: 55
GLUCOSE BLD-MCNC: 241 MG/DL (ref 70–99)
HCT VFR BLD CALC: 44.2 % (ref 40.5–52.5)
HEMOGLOBIN: 14.7 G/DL (ref 13.5–17.5)
LYMPHOCYTES ABSOLUTE: 1.7 K/UL (ref 1–5.1)
LYMPHOCYTES RELATIVE PERCENT: 23.7 %
MCH RBC QN AUTO: 27.1 PG (ref 26–34)
MCHC RBC AUTO-ENTMCNC: 33.2 G/DL (ref 31–36)
MCV RBC AUTO: 81.6 FL (ref 80–100)
MONOCYTES ABSOLUTE: 0.4 K/UL (ref 0–1.3)
MONOCYTES RELATIVE PERCENT: 5.4 %
NEUTROPHILS ABSOLUTE: 4.8 K/UL (ref 1.7–7.7)
NEUTROPHILS RELATIVE PERCENT: 66.4 %
PDW BLD-RTO: 15.9 % (ref 12.4–15.4)
PLATELET # BLD: 261 K/UL (ref 135–450)
PMV BLD AUTO: 7.6 FL (ref 5–10.5)
POTASSIUM SERPL-SCNC: 5.3 MMOL/L (ref 3.5–5.1)
RBC # BLD: 5.42 M/UL (ref 4.2–5.9)
SODIUM BLD-SCNC: 137 MMOL/L (ref 136–145)
TOTAL PROTEIN: 7.6 G/DL (ref 6.4–8.2)
TSH SERPL DL<=0.05 MIU/L-ACNC: 1.01 UIU/ML (ref 0.27–4.2)
WBC # BLD: 7.2 K/UL (ref 4–11)

## 2022-06-14 PROCEDURE — G8417 CALC BMI ABV UP PARAM F/U: HCPCS | Performed by: STUDENT IN AN ORGANIZED HEALTH CARE EDUCATION/TRAINING PROGRAM

## 2022-06-14 PROCEDURE — G8427 DOCREV CUR MEDS BY ELIG CLIN: HCPCS | Performed by: STUDENT IN AN ORGANIZED HEALTH CARE EDUCATION/TRAINING PROGRAM

## 2022-06-14 PROCEDURE — 99205 OFFICE O/P NEW HI 60 MIN: CPT | Performed by: STUDENT IN AN ORGANIZED HEALTH CARE EDUCATION/TRAINING PROGRAM

## 2022-06-14 PROCEDURE — 1123F ACP DISCUSS/DSCN MKR DOCD: CPT | Performed by: STUDENT IN AN ORGANIZED HEALTH CARE EDUCATION/TRAINING PROGRAM

## 2022-06-14 PROCEDURE — 3017F COLORECTAL CA SCREEN DOC REV: CPT | Performed by: STUDENT IN AN ORGANIZED HEALTH CARE EDUCATION/TRAINING PROGRAM

## 2022-06-14 PROCEDURE — 1036F TOBACCO NON-USER: CPT | Performed by: STUDENT IN AN ORGANIZED HEALTH CARE EDUCATION/TRAINING PROGRAM

## 2022-06-14 RX ORDER — GABAPENTIN 300 MG/1
300 CAPSULE ORAL 3 TIMES DAILY
Qty: 90 CAPSULE | Refills: 3 | Status: SHIPPED | OUTPATIENT
Start: 2022-06-14 | End: 2022-10-12 | Stop reason: SDUPTHER

## 2022-06-14 NOTE — PROGRESS NOTES
6/14/22    Mar Qualia  1950    Chief Complaint   Patient presents with    New Patient     sharp pain going into feet & toes 8-9 months.  Memory Loss     Has noticed having a hard time getting his words out correctly for the past 2-3 years       Neurologic Consult Note    Subjective    History of Present Illness  Mary Miles is a 73 y.o. male presenting today for neurologic evaluation of memory loss. He is not accompanied by anyone today. He tells me that over the past year, he has had difficulties finding words when in conversation. 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  Since having retired he has noted that he has had worsening word finding. Reports that both of his parents had dementia so he wants to be very cognizant of this. He currently lives at home with his wife. She has noticed that she is in his memory per the patient. He reports that she complains that he has both long and short-term memory difficulties. He has recently been initiated on vitamin D supplementation. He is currently taking vitamin D 2000 IU daily. He is currently in a study for injectable cholesterol medication. This has been going on a year and he has noticed the memory problems since the new drug. Regarding activities of daily living, he denies any difficulties with eating, bathing, dressing, toileting or ambulation. He continues to cook and clean and manages all of his medications without any assistance. He continues to manage the finances in the household and has no problems with this. 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 He has not gotten lost.  He denies any other safety concerns. He is not having any hallucinations. He does report that his wife is noted he has become more short tempered with little things. He is not aggressive. He does tell me that he sleeps terribly at night due to pain. He describes having pain in the bottom of his feet. He does admit to snoring and suspects that he has obstructive sleep apnea but states that there is no way he would be able to tolerate a CPAP device as he does have panic disorder as well. When asking about his mood, he denies having any concerns regarding anxiety or depression. He does have hx of diabetes. He reports prominent pain in the bottom of his feet at nighttime. He states that this keeps him awake. He has history of multiple low back surgeries. Review of Symptoms:  Neurologic   Symptoms: no difficulty with gait or walking, no bowel symptoms, no vertigo, no confusion, +memory loss, no speech disorder, no visual loss, no double vision, no dizziness, no loss of hearing, no sensory disturbances, no weakness, no headaches, no bladder symptoms, no seizures, no excessive fatigue and no syncope    Current Outpatient Medications   Medication Sig Dispense Refill    gabapentin (NEURONTIN) 300 MG capsule Take 1 capsule by mouth 3 times daily for 120 days. Intended supply: 90 days 90 capsule 3    cyclobenzaprine (FLEXERIL) 10 mg tablet TAKE ONE TABLET BY MOUTH THREE TIMES A DAY AS NEEDED FOR MUSCLE SPASMS 90 tablet 1    hydrALAZINE (APRESOLINE) 25 MG tablet TAKE ONE TABLET BY MOUTH TWICE A  tablet 1    metoprolol tartrate (LOPRESSOR) 50 MG tablet TAKE ONE TABLET BY MOUTH TWICE A  tablet 1    amLODIPine (NORVASC) 5 MG tablet TAKE ONE TABLET BY MOUTH DAILY 90 tablet 1    glipiZIDE (GLUCOTROL) 5 MG tablet Take 1 tablet by mouth 2 times daily (before meals) 60 tablet 3    [START ON 7/3/2022] HYDROcodone-acetaminophen (NORCO) 5-325 MG per tablet Take 1 tablet by mouth 2 times daily for 30 days. 60 tablet 0    HYDROcodone-acetaminophen (NORCO) 5-325 MG per tablet Take 1 tablet by mouth 2 times daily as needed for Pain for up to 30 days.  60 tablet 0    vitamin D3 (CHOLECALCIFEROL) 25 MCG (1000 UT) TABS tablet Take 2 tablets by mouth daily 60 tablet 11    traMADol (ULTRAM) 50 MG tablet       eplerenone (INSPRA) 50 MG tablet Take 1 tablet by mouth 2 times daily 180 tablet 3    ondansetron (ZOFRAN) 4 MG tablet Take 1 tablet by mouth every 8 hours as needed for Nausea 40 tablet 1    furosemide (LASIX) 40 MG tablet Take 1 tablet by mouth See Admin Instructions PRN LEG SWELLING 90 tablet 1    Lancets MISC 1 each by Does not apply route 2 times daily 200 each 1    blood glucose monitor strips Test two times a day & as needed for symptoms of irregular blood glucose. 200 strip 3    butalbital-acetaminophen-caffeine (FIORICET, ESGIC) -40 MG per tablet Take 1 tablet by mouth 3 times daily as needed for Headaches or Migraine 30 tablet 1    lansoprazole (PREVACID) 15 MG delayed release capsule Take 1 capsule by mouth daily 90 capsule 1    meclizine (ANTIVERT) 25 MG tablet Take 1 tablet by mouth every 6 hours. 30 tablet 1    HYDROcodone-acetaminophen (NORCO) 5-325 MG per tablet Take 1 tablet by mouth 2 times daily as needed for Pain for up to 30 days. 60 tablet 0     No current facility-administered medications for this visit. Past Medical History:   Diagnosis Date    Blurred vision 04/03/2012    right>left  side --pattern dystrophy/Dr Dye    BPH (benign prostatic hypertrophy) 09/11/2015    Bradycardia 04/18/2019    Hr 30-40 with skipped beats on BP monitor on Labetalol    Crohn's regional enteritis ([facility] [address] 01/03/2012    Dr Dong\"dx with Crohns in [address] [place] [address],[facility]- no recent issues    DM II (diabetes mellitus, type II), controlled ([facility] [address] 2008    insurance not cover Jardiance    Erosive esophagitis 03/30/2012    \"had erosive esophagitis in lower area on Prevacid for this\"    Eye pain 04/03/2012    GERD (gastroesophageal reflux disease) 04/03/2012    Headache disorder     [place] (hard of hearing) 09/13/2017    HTN (hypertension) 01/03/2021    also comanaged w Dr Judith Williamson.    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  S/P colonoscopy 01/17/2019    Dr Mary Merida-- ?recheck when?     Stage 3 chronic kidney disease ([facility] [address] 04/18/2019    Dr Stewart Score Testosterone deficiency 04/03/2012    on andrgel    Vertigo 04/03/2012    \"last used Vola Dial since 2/2020\"   Anu See Wears glasses     Wears partial dentures 05/2020    upper        Past Surgical History:   Procedure Laterality Date    CARDIAC CATHETERIZATION      \"had 2 caths done since 1995 but not sure of dates\"    CATARACT EXTRACTION W/  INTRAOCULAR LENS IMPLANT Left 03/02/2017    Dr Edna Tom Right 03/16/2017    Dr Fernandez Begum  2005    Dr Anson Vazquez  8/2011    Dr Maris Pollock COLONOSCOPY      Multiple colonoscopys, HX: Crohns    ENDOSCOPY, COLON, DIAGNOSTIC  2018    Erosive esophagitis    EYE SURGERY      \"left eye surgery for crosseyed- age 13 months\"    EYE SURGERY Left     cataract    EYE SURGERY Right 03/16/2017    cataract    KNEE ARTHROSCOPY  89    right    KNEE ARTHROSCOPY Left 11/11/2020    LEFT KNEE ARTHROSCOPY WITH PARTIAL MENISCECTOMY CHONDROPLASTY performed by Komal Stark MD at [place] [id number] Right 2013    Dr Eda STREETER knee tib plateau fx and medial meniscectomy    LAMINECTOMY  75 and 85    SINUS SURGERY  2020    Dr Harris Batista for chr sinusitis and fungal infection, per pt    TONSILLECTOMY  70    TOTAL KNEE ARTHROPLASTY Right 15    Dr Eda STREETER         Social History     Socioeconomic History    Marital status:      Spouse name: Not on file    Number of children: Not on file    Years of education: Not on file    Highest education level: Not on file   Occupational History    Occupation: PA     Comment: [employer]     Occupation: disabled     Comment:    Tobacco Use    Smoking status: Former Smoker     Packs/day: 3.00     Years: 30.00     Pack years: 90.00     Types: Cigarettes     Quit date: 1995     Years since quittin.4    Smokeless tobacco: Never Used   Vaping Use    Vaping Use: Never used   Substance and Sexual Activity    Alcohol use: No     Comment: caffeine 1 coffee 1 pop    Drug use: No    Sexual activity: Yes     Partners: Female   Other Topics Concern    Not on file   Social History Narrative    Not on file     Social Determinants of Health     Financial Resource Strain: Low Risk     Difficulty of Paying Living Expenses: Not hard at all   Food Insecurity: No Food Insecurity    Worried About [facility] in the Last Year: Never true    [facility] in the Last Year: Never true   Transportation Needs:     Lack of Transportation (Medical): Not on file    Lack of Transportation (Non-Medical):  Not on file   Physical Activity:     Days of Exercise per Week: Not on file    Minutes of Exercise per Session: Not on file   Stress:     Feeling of Stress : Not on file   Social Connections:     Frequency of Communication with Friends and Family: Not on file    Frequency of Social Gatherings with Friends and Family: Not on file    Attends [employer] Services: Not on file    Active Member of Clubs or Organizations: Not on file    Attends Club or Organization Meetings: Not on file    Marital Status: Not on file   Intimate Partner Violence:     Fear of Current or Ex-Partner: Not on file    Emotionally Abused: Not on file    Physically Abused: Not on file    Sexually Abused: Not on file   Housing Stability:     Unable to Pay for Housing in the Last Year: Not on file    Number of [place] in the Last Year: Not on file    Unstable Housing in the Last Year: Not on file       Family History   Problem Relation Age of Onset    High Cholesterol Mother     Elevated Lipids Mother     High Blood Pressure Mother     Heart Disease Mother     High Cholesterol Father     Other Father         blind       Objective    Physical Exam:  Also present during visit: None    Constitutional   Weight: well nourished  Heart/Vascular   No cyanosis or clubbing appreciated  Neck   Appearance/Palpation/Auscultation: supple  Mental Status   Orientation: oriented to person, oriented to place, oriented to problem and oriented to time   Mood/Affect: appropriate mood and appropriate affect   Memory/Other: recent memory intact, remote memory intact, fund of knowledge intact, attention span normal and concentration normal  Language   Language: (normal) language, no dysarthria, (normal) articulation and no dysphasia/aphasia  Cranial Nerves   CN II Right: visual fields appear intact   CN II Left: visual fields appear intact   CN III, IV, VI: EOM no nystagmus, normal pursuit and extraocular muscle strength normal   CN III: pupil normal size, pupil reactive to light and dark, pupil accomodates and no ptosis   CN IV: normal   CN VI: normal   CN V Right: normal sensation and muscles of mastication intact   CN V Left: normal sensation and muscles of mastication intact   CN VII Right: normal facial expression   CN VII Left: normal facial expression   CN VIII Right: hearing in tact to normal conversation   CN VIII Left: hearing in tact to normal conversation   CN IX,X: normal palatal movement   CN XI Right: normal sternocleidomastoid and normal trapezius   CN XI Left: normal sternocleidomastoid and normal trapezius   CN XII: no tremors of the tongue, no fasciculation of the tongue, tongue protrudes midline, normal power to left and normal power to right  Gait and Stance   Gait/Posture: station normal, ambulates independently, and gait normal  Motor/Coordination Exam   General: no bradykinesia, no tremors, no chorea, no athetosis, no myoclonus and no dyskinesia   Right Upper Extremity: normal motor strength, normal bulk and normal tone   Left Upper Extremity: normal motor strength, normal bulk and normal tone   Right Lower Extremity: normal motor strength, normal bulk and normal tone   Left Lower Extremity: normal motor strength, normal bulk and normal tone   Coordination: no drift, normal finger-to-nose, normal heel-to-shin and rapid alternating movements normal  Reflexes   Reflexes Right: 2/4 biceps, brachioradialis, 1/4 patellar, and achilles    Reflexes Left: 2/4 biceps, brachioradialis,1/4 patellar, and achilles    Frontal Release Signs: frontal release signs absent  Sensory   Sensation: normal light touch, decreased PP in the left L5 dermatome, normal vibration, normal DSS, and no neglect    Lungs   No auditory wheezing  Skin   Inspection: no jaundice, no lesions, no rashes and no cyanosis    27 Point MMSE Screen:   Orientation (Time):    Orientation (Place):    Learning 3 Objects: 33   Attention:    Recall 3 Objects: 3/3   Namin/2   Repitition:    3-Step Command: 3/3   TOTAL:   Drawing and writing perfect    BP (!) 180/90 (Site: Right Upper Arm, Position: Sitting, Cuff Size: Medium Adult)   Pulse 79   Ht 5' 7\" (1.702 m)   Wt 233 lb (105.7 kg)   SpO2 98%   BMI 36.49 kg/m²     Assessment and Plan     Diagnosis Orders   1.  Memory loss  MRI BRAIN WO CONTRAST Ambulatory referral to Sleep Medicine    CBC with Auto Differential    Comprehensive Metabolic Panel    Vitamin D 25 Hydroxy    Vitamin B12 & Folate    TSH    MRI BRAIN WO CONTRAST    Neuropsychological testing    TSH    Vitamin B12 & Folate    Vitamin D 25 Hydroxy    Comprehensive Metabolic Panel    CBC with Auto Differential   2. Word finding difficulty  MRI BRAIN WO CONTRAST    Ambulatory referral to Sleep Medicine    CBC with Auto Differential    Comprehensive Metabolic Panel    Vitamin D 25 Hydroxy    Vitamin B12 & Folate    TSH    MRI BRAIN WO CONTRAST    Neuropsychological testing    TSH    Vitamin B12 & Folate    Vitamin D 25 Hydroxy    Comprehensive Metabolic Panel    CBC with Auto Differential   3. Lumbar radiculopathy  gabapentin (NEURONTIN) 300 MG capsule   4. Vitamin D deficiency  Vitamin D 25 Hydroxy    Vitamin D 25 Hydroxy     Mary Miles was seen today in neurologic consultation regarding memory concerns. After taking a detailed history, Mary Miles describes prominent word finding difficulties. I do feel that this is subjective in nature as I did not appreciate this on examination today. Furthermore, he performed perfectly on my in office Mini-Mental status examination. I explained to him today that I currently do not find that he fits the criteria for the diagnosis of dementia. Nonetheless, he does have strong family history for dementia so I do feel it is important that we continue to monitor him closely moving forward. I would like to obtain baseline work-up for memory with lab work looking for reversible causes of memory loss as well as MRI brain without contrast to ensure no structural etiology for his word finding difficulties. We will also obtain an office memory testing that will serve as a baseline moving forward. Mary Miles also describes prominent pain in his feet that is preventing him from being able to fall asleep at nighttime.   He does describe neuropathic pain that I suspect is secondary to a lumbar radiculopathy with a possible superimposed peripheral neuropathy. He does not want to obtain EMG at this time as he had a poor experience with EMG in the past.  We will increase his gabapentin to 300 mg 3 times daily. We will continue to monitor this moving forward. I spent >60 minutes total time regarding this patient's encounter. This included obtaining history, performing a neurological medical exam, developing an assessment / plan, and documenting via EMR. Return in about 3 months (around 9/14/2022) for follow up with [facility].     Andrei Rios DO

## 2022-06-14 NOTE — PROGRESS NOTES
Pt had labs drawn in our office today from his right [facility]. Pt had a red, lav, and 2 SST tubes. Pt tolerated well.

## 2022-06-15 LAB
FOLATE: 6.37 NG/ML (ref 4.78–24.2)
VITAMIN B-12: 463 PG/ML (ref 211–911)
VITAMIN D 25-HYDROXY: 36.9 NG/ML

## 2022-06-28 ENCOUNTER — HOSPITAL ENCOUNTER (OUTPATIENT)
Dept: MRI IMAGING | Age: 72
Discharge: HOME | End: 2022-06-28
Payer: MEDICARE

## 2022-06-28 DIAGNOSIS — R47.89 WORD FINDING DIFFICULTY: ICD-10-CM

## 2022-06-28 DIAGNOSIS — R41.3 MEMORY LOSS: ICD-10-CM

## 2022-06-28 PROCEDURE — 70551 MRI BRAIN STEM W/O DYE: CPT

## 2022-06-29 DIAGNOSIS — K50.919 CROHN'S DISEASE WITH COMPLICATION, UNSPECIFIED GASTROINTESTINAL TRACT LOCATION (HCC): ICD-10-CM

## 2022-06-30 RX ORDER — ONDANSETRON 4 MG/1
4 TABLET, FILM COATED ORAL EVERY 8 HOURS PRN
Qty: 40 TABLET | Refills: 1 | Status: SHIPPED | OUTPATIENT
Start: 2022-06-30

## 2022-07-12 ENCOUNTER — HOSPITAL ENCOUNTER (OUTPATIENT)
Dept: SLEEP CENTER | Age: 72
Discharge: HOME | End: 2022-07-12
Payer: MEDICARE

## 2022-07-12 DIAGNOSIS — G47.33 OSA (OBSTRUCTIVE SLEEP APNEA): Primary | ICD-10-CM

## 2022-07-12 PROCEDURE — 99211 OFF/OP EST MAY X REQ PHY/QHP: CPT

## 2022-07-12 ASSESSMENT — SLEEP AND FATIGUE QUESTIONNAIRES
NECK CIRCUMFERENCE (INCHES): 19.75
HOW LIKELY ARE YOU TO NOD OFF OR FALL ASLEEP WHEN YOU ARE A PASSENGER IN A CAR FOR AN HOUR WITHOUT A BREAK: 0
HOW LIKELY ARE YOU TO NOD OFF OR FALL ASLEEP IN A CAR, WHILE STOPPED FOR A FEW MINUTES IN TRAFFIC: 0
ESS TOTAL SCORE: 8
HOW LIKELY ARE YOU TO NOD OFF OR FALL ASLEEP WHILE SITTING INACTIVE IN A PUBLIC PLACE: 0
HOW LIKELY ARE YOU TO NOD OFF OR FALL ASLEEP WHILE WATCHING TV: 3
HOW LIKELY ARE YOU TO NOD OFF OR FALL ASLEEP WHILE LYING DOWN TO REST IN THE AFTERNOON WHEN CIRCUMSTANCES PERMIT: 2
HOW LIKELY ARE YOU TO NOD OFF OR FALL ASLEEP WHILE SITTING AND READING: 1
HOW LIKELY ARE YOU TO NOD OFF OR FALL ASLEEP WHILE SITTING QUIETLY AFTER LUNCH WITHOUT ALCOHOL: 2
HOW LIKELY ARE YOU TO NOD OFF OR FALL ASLEEP WHILE SITTING AND TALKING TO SOMEONE: 0

## 2022-07-12 NOTE — PROGRESS NOTES
Flory Smith MD, Galileo Oliva MD, Ccey Celis MD, Sadie Mckenzie MD, [facility]      [address], [address] [facility]   PH: [phone]  F: [phone]     Subjective:     Patient ID: Ofelia Winters is a 81 y.o. male, referred to the sleep center for   Chief Complaint   Patient presents with    Other     R41.3 Memory Loss    Other     R47.89 Word finding difficulty   . Referring physician: kelley lara    History: 81year old male with loud snoring. He is witnessed to stop breathing. He does not feel fresh in morning. He feels tired and fatigued all day. He has EDS. he has gained about 50 lbs in past 10 years    Social History     Socioeconomic History    Marital status:      Spouse name: Not on file    Number of children: Not on file    Years of education: Not on file    Highest education level: Not on file   Occupational History    Occupation: PA     Comment: [employer]     Occupation: disabled     Comment:    Tobacco Use    Smoking status: Former Smoker     Packs/day: 3.00     Years: 30.00     Pack years: 90.00     Types: Cigarettes     Quit date: 1995     Years since quittin.5    Smokeless tobacco: Never Used   Vaping Use    Vaping Use: Never used   Substance and Sexual Activity    Alcohol use: No     Comment: caffeine 1 coffee 1 pop    Drug use: No    Sexual activity: Yes     Partners: Female   Other Topics Concern    Not on file   Social History Narrative    Not on file     Social Determinants of Health     Financial Resource Strain: Low Risk     Difficulty of Paying Living Expenses: Not hard at all   Food Insecurity: No Food Insecurity    Worried About [address] in the Last Year: Never true    [facility] in the Last Year: Never true   Transportation Needs:     Lack of Transportation (Medical):  Not on file    Lack of Transportation (Non-Medical): Not on file   Physical Activity:     Days of Exercise per Week: Not on file    Minutes of Exercise per Session: Not on file   Stress:     Feeling of Stress : Not on file   Social Connections:     Frequency of Communication with Friends and Family: Not on file    Frequency of Social Gatherings with Friends and Family: Not on file    Attends Religion Services: Not on file    Active Member of [address] [place] [address] or Organizations: Not on file    Attends Club or Organization Meetings: Not on file    Marital Status: Not on file   Intimate Partner Violence:     Fear of Current or Ex-Partner: Not on file    Emotionally Abused: Not on file    Physically Abused: Not on file    Sexually Abused: Not on file   Housing Stability:     Unable to Pay for Housing in the Last Year: Not on file    Number of [place] in the Last Year: Not on file    Unstable Housing in the Last Year: Not on file       Prior to Admission medications    Medication Sig Start Date End Date Taking? Authorizing Provider   ondansetron (ZOFRAN) 4 MG tablet Take 1 tablet by mouth every 8 hours as needed for Nausea 6/30/22  Yes Lenin Benavidez MD   gabapentin (NEURONTIN) 300 MG capsule Take 1 capsule by mouth 3 times daily for 120 days.  Intended supply: 90 days 6/14/22 10/12/22 Yes Alexandre Mathur DO   cyclobenzaprine (FLEXERIL) 10 mg tablet TAKE ONE TABLET BY MOUTH THREE TIMES A DAY AS NEEDED FOR MUSCLE SPASMS 6/10/22  Yes Lenin Benavidez MD   hydrALAZINE (APRESOLINE) 25 MG tablet TAKE ONE TABLET BY MOUTH TWICE A DAY 5/31/22  Yes Lenin Benavidez MD   metoprolol tartrate (LOPRESSOR) 50 MG tablet TAKE ONE TABLET BY MOUTH TWICE A DAY 5/31/22  Yes Lenin Benavidez MD   amLODIPine (NORVASC) 5 MG tablet TAKE ONE TABLET BY MOUTH DAILY 5/23/22  Yes Lenin Benavidez MD   glipiZIDE (GLUCOTROL) 5 MG tablet Take 1 tablet by mouth 2 times daily (before meals) 5/3/22  Yes Lenin Benavidez MD   HYDROcodone-acetaminophen [facility] 5-325 MG per tablet Take 1 tablet by mouth 2 times daily for 30 days. 7/3/22 8/2/22 Yes Cliff Frausto MD   vitamin D3 (CHOLECALCIFEROL) 25 MCG (1000 UT) TABS tablet Take 2 tablets by mouth daily 3/11/22  Yes Danielle Cardozo DO   traMADol (ULTRAM) 50 MG tablet  3/4/22  Yes Rosy Provider, MD   eplerenone (INSPRA) 50 MG tablet Take 1 tablet by mouth 2 times daily 9/22/21  Yes Danielle Cardozo DO   furosemide (LASIX) 40 MG tablet Take 1 tablet by mouth See Admin Instructions PRN LEG SWELLING 12/1/20  Yes Danielle Cardozo DO   Lancets MISC 1 each by Does not apply route 2 times daily 5/6/20  Yes Cliff Frausto MD   blood glucose monitor strips Test two times a day & as needed for symptoms of irregular blood glucose. 5/6/20  Yes Cliff Frausto MD   butalbital-acetaminophen-caffeine (FIORICET, ESGIC) -56 MG per tablet Take 1 tablet by mouth 3 times daily as needed for Headaches or Migraine 4/29/20  Yes Cliff Frausto MD   lansoprazole (PREVACID) 15 MG delayed release capsule Take 1 capsule by mouth daily 9/27/16  Yes Cliff Frausto MD   meclizine (ANTIVERT) 25 MG tablet Take 1 tablet by mouth every 6 hours. 3/10/15  Yes Cliff Frausto MD   HYDROcodone-acetaminophen (NORCO) 5-325 MG per tablet Take 1 tablet by mouth 2 times daily as needed for Pain for up to 30 days. 6/3/22 7/3/22  Cliff Frausto MD   HYDROcodone-acetaminophen (NORCO) 5-325 MG per tablet Take 1 tablet by mouth 2 times daily as needed for Pain for up to 30 days.  5/4/22 6/3/22  Cliff Frausto MD       Allergies as of 07/12/2022 - Fully Reviewed 07/12/2022   Allergen Reaction Noted    Aldactone [spironolactone]  04/18/2019    Bactrim [sulfamethoxazole-trimethoprim]  02/25/2020    Crestor [rosuvastatin]  02/26/2021    Labetalol  04/18/2019    Lisinopril Other (See Comments) 11/04/2021    Metformin and related [metformin and related]  10/14/2013    Nsaids  12/07/2017    Statins  02/26/2021  Tegretol [carbamazepine]  05/04/2021       Patient Active Problem List   Diagnosis    Primary hypertension    Crohn's regional enteritis ([address]    Hyperlipidemia    Gastroesophageal reflux disease without esophagitis    Testosterone deficiency    Low back pain    Osteoarthritis, localized, shoulder, left    DM II (diabetes mellitus, type II), controlled ([address]    Neck pain    Right cervical radiculopathy    S/P colonoscopy    Blurred vision    Pattern dystrophy of macula    Benign prostatic hyperplasia    Osteoarthritis of hands, bilateral    Dysphagia    Nausea    Crohn's disease of large intestine without complication ([address]    Status post total right knee replacement    Arthrofibrosis of total knee arthroplasty ([address]    Acute nonintractable headache    Bradycardia    Stage 3a chronic kidney disease    Diabetic nephropathy associated with type 2 diabetes mellitus ([address]    Hypertensive renal disease    Chronic kidney disease-mineral and bone disorder    Other proteinuria    S/P left knee arthroscopy    Palpitations    Statin intolerance    Chronic ethmoidal sinusitis    Left shoulder strain, initial encounter    Impingement syndrome, shoulder, left    Nontraumatic incomplete tear of left rotator cuff    Chronic renal disease, stage III ([address] [[id number]]       Past Medical History:   Diagnosis Date    Blurred vision 04/03/2012    right>left  side --pattern dystrophy/Dr Dye    BPH (benign prostatic hypertrophy) 09/11/2015    Bradycardia 04/18/2019    Hr 30-40 with skipped beats on BP monitor on Labetalol    Crohn's regional enteritis ([address] 01/03/2012    Dr Dong\"dx with Crohns in [address] [place] [address],[facility]- no recent issues    DM II (diabetes mellitus, type II), controlled ([address] 2008    insurance not cover Jardiance    Erosive esophagitis 03/30/2012    \"had erosive esophagitis in lower area on Prevacid for this\"    Eye pain 04/03/2012    GERD (gastroesophageal reflux disease) 04/03/2012    Headache disorder     [place] (hard of hearing) 09/13/2017    HTN (hypertension) 01/03/2021    also comanaged w Dr Desire East. 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  S/P colonoscopy 01/17/2019    Dr Sheila Dennison-- ?recheck when?     Stage 3 chronic kidney disease ([facility] 04/18/2019    Dr Temo Richardson Testosterone deficiency 04/03/2012    on andrgel    Vertigo 04/03/2012    \"last used Floyde Suffolk since 2/2020\"   [facility] Wears glasses     Wears partial dentures 05/2020    upper        Past Surgical History:   Procedure Laterality Date    CARDIAC CATHETERIZATION      \"had 2 caths done since 1995 but not sure of dates\"    CATARACT EXTRACTION W/  INTRAOCULAR LENS IMPLANT Left 03/02/2017    Dr Bib Wiggins Right 03/16/2017    Dr Villa Prieto  2005    Dr Sasha Santillan  8/2011    Dr Katy Hathaway COLONOSCOPY      Multiple colonoscopys, HX: Crohns    ENDOSCOPY, COLON, DIAGNOSTIC  2018    Erosive esophagitis    EYE SURGERY      \"left eye surgery for crosseyed- age 13 months\"    EYE SURGERY Left     cataract    EYE SURGERY Right 03/16/2017    cataract    KNEE ARTHROSCOPY  89    right    KNEE ARTHROSCOPY Left 11/11/2020    LEFT KNEE ARTHROSCOPY WITH PARTIAL MENISCECTOMY CHONDROPLASTY performed by Galo Lennon MD at [address] Right 1/16/2013    Dr Gisella STREETER knee tib plateau fx and medial meniscectomy    LAMINECTOMY  75 and 83    SINUS SURGERY  06/29/2020    Dr Garcia Handler for chr sinusitis and fungal infection, per pt    TONSILLECTOMY  70    TOTAL KNEE ARTHROPLASTY Right 6/12/13    Dr Gisella STREETER        Family History   Problem Relation Age of Onset    High Cholesterol Mother     Elevated Lipids Mother     High Blood Pressure Mother     Heart Disease Mother     High Cholesterol Father     Other Father         blind         Objective: There were no vitals filed for this visit. Neck circumference (Inches): 19.75  Inches  [place] - Total score: 8    Gen: No distress. Eyes: PERRL. No sclera icterus. No conjunctival injection. ENT: No discharge. Pharynx clear. External appearance of ears and nose normal.  Neck: Trachea midline. No obvious mass. Resp: No accessory muscle use. No crackles. No wheezes. No rhonchi. No dullness on percussion. CV: Regular rate. Regular rhythm. No murmur or rub. No edema. GI: Non-tender. Non-distended. No hernia. Skin: Warm, dry, normal texture and turgor. No nodule on exposed extremities. Lymph: No cervical LAD. No supraclavicular LAD. M/S: No cyanosis. No clubbing. No joint deformity. Psych: Oriented x 3. No anxiety. Awake. Alert. Intact judgement and insight.     Mallampati Airway Classification:   []1 []2 [x]3 []4        Sleep Complaints/Symptoms:    Normal Bedtime:      Normal Wake Time:   Average Sleep Time:      Number of Awakenings:    Duration of Sleep Complaints: 40 years    [x] Snoring     [] Improved [x] Not Improved    [] Choking/Gasping for Breath  [] Improved [] Not Improved       [x] Witnessed Apneas              [] Improved [x] Not Improved  [x] Daytime Sleepiness             [] Improved [x] Not Improved  [] Morning Headaches    [] Improved [] Not Improved  [] Frequent Awakenings       [] Improved [] Not Improved  [] Jerky Movements   [] Improved [] Not Improved   [] Restless Legs   [] Improved [] Not Improved   [] Difficulty Initiating Sleep  [] Improved [] Not Improved   [] Difficulty Maintaining Sleep  [] Improved [] Not Improved   [] Restless Sleep    [] Improved [] Not Improved   [] Sleep Paralysis    [] Improved [] Not Improved   [] Muscle Weakness w/ Emotion  [] Improved [] Not Improved  [] Other :     CPAP Usage:    []  Patient has never worn CPAP  []  Patient has worn CPAP previously but discontinued use  []  Current PAP user,  [years]   []  Patient Tolerates Well   []  Patient Does Not Tolerate     []  Patient Uses CPAP      []  More Than 4 Hours      []  Less Than 4 Hours  []  CPAP/BPAP/ASV Pressure Readings   []  CPAP Pressure      cm H20   []  BPAP Pressure       cm H20   []  ASV Pressure         cm H20      Assessment:      Diagnosis:  prob sosa       Patient Active Problem List    Diagnosis Date Noted    Chronic renal disease, stage III ([facility] [[id number]] 05/03/2022     Priority: Medium    Nontraumatic incomplete tear of left rotator cuff 03/14/2022    Left shoulder strain, initial encounter 02/15/2022    Impingement syndrome, shoulder, left 02/15/2022    Statin intolerance 06/23/2021    Palpitations 05/20/2021    S/P left knee arthroscopy 02/05/2021    Diabetic nephropathy associated with type 2 diabetes mellitus ([facility] 12/01/2020    Hypertensive renal disease 12/01/2020    Chronic kidney disease-mineral and bone disorder 12/01/2020    Other proteinuria 12/01/2020    Chronic ethmoidal sinusitis 07/28/2020    Bradycardia 04/18/2019     Overview Note:     Hr 30-40 with skipped beats on BP monitor on Labetalol      Stage 3a chronic kidney disease 04/18/2019     Overview Note:     Dr Kip Mcbride      Acute nonintractable headache     Status post total right knee replacement 09/13/2017    Arthrofibrosis of total knee arthroplasty ([facility] 09/13/2017    Dysphagia     Nausea     Crohn's disease of large intestine without complication ([facility]     Benign prostatic hyperplasia      Overview Note:     Updating Deprecated Diagnoses      Osteoarthritis of hands, bilateral     Pattern dystrophy of macula      Overview Note:     Dr Merline Sesay vision      Overview Note:     right>left  side pattern dystrophy/Dr Dye      Neck pain      Overview Note:     Dr Gloria Stone has evaluated      Right cervical radiculopathy     DM II (diabetes mellitus, type II), controlled ([facility]     S/P colonoscopy 08/24/2012     Overview Note:     Dr Rajan Thomas, recheck 1-2 yrs due to polyp      Gastroesophageal reflux disease without esophagitis     Testosterone deficiency      Overview Note:     on andrgel      Low back pain      Overview Note:     replace inactive diagnosis      Osteoarthritis, localized, shoulder, left     Primary hypertension      Overview Note:     1 Lopressor 50 twice a day Zestril 40 mg a day amlodipine 5 mg a day and hydralazine 25 mg 3 times a day      Crohn's regional enteritis ([address]     Hyperlipidemia      Overview Note:     Borderline       Plan:        Sleep Study:     []  Sleep hygiene/ relaxation methods & CBTi principles review with patient     [x]  HST - Home Sleep Study   []  PSG - Overnight Diagnostic Polysomnogram     []  CPAP Titration    [] Split Night Study    [] BiLevel Titration    [] ASV - Auto-Servo Ventilation Titration       []  MSLT - Multiple Sleep Latency Test   []  MWT - Maintenance of Wakefulness Test    CPAP Therapy:     []  Patient to be seen for new mask fitting/desensitization   []  AutoPAP Titration    []  CPAP supplies and equipment at ________cmH2O    []  Continue same CPAP pressure   []  Change CPAP pressure to _______cm H2O   []  CPAP supplies only, no pressure change   []  Refer for an oral appliance       Medications:       [x]  Continue current medication    []  Add Medication:  ________________    Follow-Up:     []  No follow up required. Patient to return as needed. []  2 weeks   []  4 weeks   []  2 months   []  4 months   []  6 months   []  1 year for CPAP compliance evaluation. Patient to return sooner, as needed. [x]  Follow up after sleep study   []  Other: __advised weight loss and sleep hygiene____________    No orders of the defined types were placed in this encounter.          Electronically signed by Kaylyn Lou MD on 7/12/2022 at 11:52 AM

## 2022-07-21 ENCOUNTER — TELEPHONE (OUTPATIENT)
Dept: NEUROLOGY | Age: 72
End: 2022-07-21

## 2022-07-27 VITALS — WEIGHT: 233 LBS | BODY MASS INDEX: 36.57 KG/M2 | HEIGHT: 67 IN

## 2022-07-28 ENCOUNTER — HOSPITAL ENCOUNTER (OUTPATIENT)
Dept: SLEEP CENTER | Age: 72
Discharge: HOME | End: 2022-07-28
Payer: MEDICARE

## 2022-07-28 DIAGNOSIS — G47.33 OSA (OBSTRUCTIVE SLEEP APNEA): ICD-10-CM

## 2022-07-28 PROCEDURE — G0398 HOME SLEEP TEST/TYPE 2 PORTA: HCPCS

## 2022-07-28 NOTE — PROGRESS NOTES
Jignesh Macd  1950  arrived at Sleep Center on 7/28/2022 for set up and instruction of home sleep study with the [facility] unit. he was instructed on proper set-up and operation of HST unit. Written instructions with set up diagram given for reference and reinforcement of verbal instruction and demonstration. he was able to return demonstration appropriately. No home environment, vision, dexterity, comprehension concerns with this patient based on completed forms and patient interactions. Patient will return unit after 2 nights as instructed.     Electronically signed by Karma Curran on 7/28/2022 at 10:38 AM

## 2022-07-29 ENCOUNTER — HOSPITAL ENCOUNTER (OUTPATIENT)
Dept: ULTRASOUND IMAGING | Age: 72
Discharge: HOME | End: 2022-07-29
Payer: MEDICARE

## 2022-07-29 ENCOUNTER — TELEPHONE (OUTPATIENT)
Dept: INTERNAL MEDICINE CLINIC | Age: 72
End: 2022-07-29

## 2022-07-29 ENCOUNTER — OFFICE VISIT (OUTPATIENT)
Dept: INTERNAL MEDICINE CLINIC | Age: 72
End: 2022-07-29
Payer: MEDICARE

## 2022-07-29 VITALS
OXYGEN SATURATION: 98 % | RESPIRATION RATE: 16 BRPM | SYSTOLIC BLOOD PRESSURE: 136 MMHG | DIASTOLIC BLOOD PRESSURE: 78 MMHG

## 2022-07-29 DIAGNOSIS — E11.21 CONTROLLED TYPE 2 DIABETES MELLITUS WITH DIABETIC NEPHROPATHY, WITHOUT LONG-TERM CURRENT USE OF INSULIN (HCC): ICD-10-CM

## 2022-07-29 DIAGNOSIS — M54.50 CHRONIC MIDLINE LOW BACK PAIN WITHOUT SCIATICA: ICD-10-CM

## 2022-07-29 DIAGNOSIS — K50.10 CROHN'S DISEASE OF LARGE INTESTINE WITHOUT COMPLICATION (HCC): ICD-10-CM

## 2022-07-29 DIAGNOSIS — M79.604 RIGHT LEG PAIN: ICD-10-CM

## 2022-07-29 DIAGNOSIS — M54.12 RIGHT CERVICAL RADICULOPATHY: ICD-10-CM

## 2022-07-29 DIAGNOSIS — I10 ESSENTIAL HYPERTENSION: Primary | ICD-10-CM

## 2022-07-29 DIAGNOSIS — G89.29 CHRONIC MIDLINE LOW BACK PAIN WITHOUT SCIATICA: ICD-10-CM

## 2022-07-29 PROCEDURE — 99214 OFFICE O/P EST MOD 30 MIN: CPT | Performed by: INTERNAL MEDICINE

## 2022-07-29 PROCEDURE — 1123F ACP DISCUSS/DSCN MKR DOCD: CPT | Performed by: INTERNAL MEDICINE

## 2022-07-29 PROCEDURE — 93971 EXTREMITY STUDY: CPT

## 2022-07-29 RX ORDER — HYDROCODONE BITARTRATE AND ACETAMINOPHEN 5; 325 MG/1; MG/1
1 TABLET ORAL 2 TIMES DAILY
Qty: 60 TABLET | Refills: 0 | Status: SHIPPED | OUTPATIENT
Start: 2022-08-02 | End: 2022-08-29 | Stop reason: SDUPTHER

## 2022-07-29 ASSESSMENT — PATIENT HEALTH QUESTIONNAIRE - PHQ9
SUM OF ALL RESPONSES TO PHQ QUESTIONS 1-9: 0
SUM OF ALL RESPONSES TO PHQ QUESTIONS 1-9: 0
1. LITTLE INTEREST OR PLEASURE IN DOING THINGS: 0
2. FEELING DOWN, DEPRESSED OR HOPELESS: 0
SUM OF ALL RESPONSES TO PHQ9 QUESTIONS 1 & 2: 0
SUM OF ALL RESPONSES TO PHQ QUESTIONS 1-9: 0
SUM OF ALL RESPONSES TO PHQ QUESTIONS 1-9: 0

## 2022-07-29 NOTE — PROGRESS NOTES
Harris Yoder  1950 07/29/22    SUBJECTIVE:  htn- bp stable but sporadic PACs,palpitations, is on beta blocker and HR ~50s, cont f/u Dr Susie Aguilera. GI Crohns, sporadic diarrhea, seeing Dr Avila Yun. Last colonoscopy 2019,     DM-  due for f/u hgb a1c, running ~200. We are unsure if tried jardiance, we'll try a script and samples. Lab Results   Component Value Date    LABA1C 7.8 08/09/2021    LABA1C 8.0 05/06/2021    LABA1C 7.5 02/05/2021     Lab Results   Component Value Date    GLUF 195 (H) 03/20/2018    LABMICR 5820.0 03/09/2022    LDLCALC 60 03/09/2022    CREATININE 1.3 06/14/2022     R calf w some cramping and pain, swelling. No prior DVT. OBJECTIVE:    /78   Resp 16   SpO2 98%     Physical Exam  Constitutional:       Appearance: Normal appearance. HENT:      Head: Normocephalic and atraumatic. Eyes:      Extraocular Movements: Extraocular movements intact. Conjunctiva/sclera: Conjunctivae normal.      Pupils: Pupils are equal, round, and reactive to light. Cardiovascular:      Rate and Rhythm: Normal rate and regular rhythm. Heart sounds: Normal heart sounds. No murmur heard. Pulmonary:      Effort: Pulmonary effort is normal. No respiratory distress. Breath sounds: Normal breath sounds. Abdominal:      General: Abdomen is flat. Bowel sounds are normal. There is no distension. Palpations: Abdomen is soft. There is no mass. Tenderness: There is no abdominal tenderness. Hernia: No hernia is present. Musculoskeletal:         General: Swelling and tenderness (R posterior calf) present. Cervical back: Neck supple. Skin:     Findings: No erythema (no leg erythema r calf). Neurological:      Mental Status: He is alert. Psychiatric:         Mood and Affect: Mood normal.       ASSESSMENT:    1. Essential hypertension    2. Right leg pain    3.  Controlled type 2 diabetes mellitus with diabetic nephropathy, without long-term current use of insulin ([address] 4. Crohn's disease of large intestine without complication ([facility]        PLAN:    Mayank Lujan was seen today for leg pain. Diagnoses and all orders for this visit:    Essential hypertension - Blood pressure stable and will continue current regimen. Will plan periodic monitoring of renal function, electrolytes, lipid profile. -     Comprehensive Metabolic Panel; Future  -     CBC with Auto Differential; Future  -     Lipid Panel; Future    Right leg pain - check stat u/s, comes back neg DVT so may be sprain. Rec rest and elevation, alt cold and warm compresses  -     US DUP LOWER EXTREMITY RIGHT BA (aka DUPLEX); Future  -     VL LOWER EXTREMITY VENOUS RIGHT; Future    Controlled type 2 diabetes mellitus with diabetic nephropathy, without long-term current use of insulin ([facility]  - jardiance not covered, fU lab and we'll need more aggressive work w meds and DM diet if a1c still high  -     Comprehensive Metabolic Panel; Future  -     CBC with Auto Differential; Future  -     Lipid Panel; Future  -     Hemoglobin A1C; Future  -     Discontinue: empagliflozin (JARDIANCE) 10 MG tablet; Take 1 tablet by mouth in the morning. Crohn's disease of large intestine without complication ([facility]- check inflammatory markers for any dz activity  -     Comprehensive Metabolic Panel; Future  -     CBC with Auto Differential; Future  -     C-Reactive Protein; Future    Chronic midline low back pain without sciatica - LBP stable on pain regimen, RFs given as noted and will monitor.    -     HYDROcodone-acetaminophen (NORCO) 5-325 MG per tablet; Take 1 tablet by mouth in the morning and 1 tablet before bedtime. Do all this for 30 days. Right cervical radiculopathy- rf one month and f/u also one month  -     HYDROcodone-acetaminophen (NORCO) 5-325 MG per tablet; Take 1 tablet by mouth in the morning and 1 tablet before bedtime. Do all this for 30 days.

## 2022-08-02 DIAGNOSIS — G89.29 CHRONIC MIDLINE LOW BACK PAIN WITHOUT SCIATICA: ICD-10-CM

## 2022-08-02 DIAGNOSIS — M54.50 CHRONIC MIDLINE LOW BACK PAIN WITHOUT SCIATICA: ICD-10-CM

## 2022-08-02 RX ORDER — TRAMADOL HYDROCHLORIDE 50 MG/1
50 TABLET ORAL 3 TIMES DAILY
Qty: 90 TABLET | Refills: 0 | Status: SHIPPED | OUTPATIENT
Start: 2022-08-02 | End: 2022-08-29 | Stop reason: SDUPTHER

## 2022-08-09 ENCOUNTER — HOSPITAL ENCOUNTER (OUTPATIENT)
Dept: SLEEP CENTER | Age: 72
Discharge: HOME | End: 2022-08-09

## 2022-08-09 DIAGNOSIS — G47.33 OSA (OBSTRUCTIVE SLEEP APNEA): Primary | ICD-10-CM

## 2022-08-09 PROCEDURE — 9990000010 HC NO CHARGE VISIT

## 2022-08-09 NOTE — PROGRESS NOTES
Rosaline Phillips MD, Arlyn Edwards MD, Calvin Ortiz MD, Christa Zhang MD, [facility]      [address], [address] [facility]   PH: [phone]  F: [phone]     Subjective:     Patient ID: Cindi Jansen is a 81 y.o. male, referred to the sleep center for   Chief Complaint   Patient presents with    Sleep Apnea    2 Week Follow-Up   . Referring physician:  kelley lara    History:no change    Social History     Socioeconomic History    Marital status:      Spouse name: Not on file    Number of children: Not on file    Years of education: Not on file    Highest education level: Not on file   Occupational History    Occupation: PA     Comment: [employer]     Occupation: disabled     Comment:    Tobacco Use    Smoking status: Former     Packs/day: 3.00     Years: 30.00     Pack years: 90.00     Types: Cigarettes     Quit date: 1995     Years since quittin.6    Smokeless tobacco: Never   Vaping Use    Vaping Use: Never used   Substance and Sexual Activity    Alcohol use: No     Comment: caffeine 1 coffee 1 pop    Drug use: No    Sexual activity: Yes     Partners: Female   Other Topics Concern    Not on file   Social History Narrative    Not on file     Social Determinants of Health     Financial Resource Strain: Low Risk     Difficulty of Paying Living Expenses: Not hard at all   Food Insecurity: No Food Insecurity    Worried About Running Out of Food in the Last Year: Never true    Ran Out of Food in the Last Year: Never true   Transportation Needs: Not on file   Physical Activity: Not on file   Stress: Not on file   Social Connections: Not on file   Intimate Partner Violence: Not on file   Housing Stability: Not on file       Prior to Admission medications    Medication Sig Start Date End Date Taking?  Authorizing Provider   traMADol (ULTRAM) 50 MG tablet Take 1 tablet by mouth in the morning and 1 tablet at noon and 1 tablet before bedtime. Do all this for 30 days. 8/2/22 9/1/22 Yes Saul Garnett MD   HYDROcodone-acetaminophen (NORCO) 5-325 MG per tablet Take 1 tablet by mouth in the morning and 1 tablet before bedtime. Do all this for 30 days. 8/2/22 9/1/22 Yes Saul Garnett MD   ondansetron (ZOFRAN) 4 MG tablet Take 1 tablet by mouth every 8 hours as needed for Nausea 6/30/22  Yes Saul Garnett MD   gabapentin (NEURONTIN) 300 MG capsule Take 1 capsule by mouth 3 times daily for 120 days. Intended supply: 90 days 6/14/22 10/12/22 Yes Spencer Martínez DO   cyclobenzaprine (FLEXERIL) 10 mg tablet TAKE ONE TABLET BY MOUTH THREE TIMES A DAY AS NEEDED FOR MUSCLE SPASMS 6/10/22  Yes Saul Garnett MD   hydrALAZINE (APRESOLINE) 25 MG tablet TAKE ONE TABLET BY MOUTH TWICE A DAY 5/31/22  Yes Saul Garnett MD   metoprolol tartrate (LOPRESSOR) 50 MG tablet TAKE ONE TABLET BY MOUTH TWICE A DAY 5/31/22  Yes Saul Garnett MD   amLODIPine (NORVASC) 5 MG tablet TAKE ONE TABLET BY MOUTH DAILY 5/23/22  Yes aSul Garnett MD   glipiZIDE (GLUCOTROL) 5 MG tablet Take 1 tablet by mouth 2 times daily (before meals) 5/3/22  Yes Saul Garnett MD   vitamin D3 (CHOLECALCIFEROL) 25 MCG (1000 UT) TABS tablet Take 2 tablets by mouth daily 3/11/22  Yes Danielle Cardozo DO   traMADol (ULTRAM) 50 MG tablet  3/4/22  Yes Rosy Provider, MD   eplerenone (INSPRA) 50 MG tablet Take 1 tablet by mouth 2 times daily 9/22/21  Yes Danielle Cardozo DO   furosemide (LASIX) 40 MG tablet Take 1 tablet by mouth See Admin Instructions PRN LEG SWELLING 12/1/20  Yes Ana Cardozo DO   Lancets MISC 1 each by Does not apply route 2 times daily 5/6/20  Yes Saul Garnett MD   blood glucose monitor strips Test two times a day & as needed for symptoms of irregular blood glucose.  5/6/20  Yes Saul Garnett MD   butalbital-acetaminophen-caffeine (FIORICET, ESGIC) -36 MG per tablet Take 1 tablet by mouth 3 times daily as needed for Headaches or Migraine 4/29/20  Yes Justus Matos MD   lansoprazole (PREVACID) 15 MG delayed release capsule Take 1 capsule by mouth daily 9/27/16  Yes Justus Matos MD   meclizine (ANTIVERT) 25 MG tablet Take 1 tablet by mouth every 6 hours. 3/10/15  Yes Justus Matos MD   HYDROcodone-acetaminophen (NORCO) 5-325 MG per tablet Take 1 tablet by mouth 2 times daily as needed for Pain for up to 30 days. 6/3/22 7/3/22  Justus Matos MD   HYDROcodone-acetaminophen (NORCO) 5-325 MG per tablet Take 1 tablet by mouth 2 times daily as needed for Pain for up to 30 days.  5/4/22 6/3/22  Justus Matos MD       Allergies as of 08/09/2022 - Fully Reviewed 08/09/2022   Allergen Reaction Noted    Aldactone [spironolactone]  04/18/2019    Bactrim [sulfamethoxazole-trimethoprim]  02/25/2020    Crestor [rosuvastatin]  02/26/2021    Labetalol  04/18/2019    Lisinopril Other (See Comments) 11/04/2021    Metformin and related [metformin and related]  10/14/2013    Nsaids  12/07/2017    Statins  02/26/2021    Tegretol [carbamazepine]  05/04/2021       Patient Active Problem List   Diagnosis    Primary hypertension    Crohn's regional enteritis ([address]    Hyperlipidemia    Gastroesophageal reflux disease without esophagitis    Testosterone deficiency    Low back pain    Osteoarthritis, localized, shoulder, left    DM II (diabetes mellitus, type II), controlled ([address]    Neck pain    Right cervical radiculopathy    S/P colonoscopy    Blurred vision    Pattern dystrophy of macula    Benign prostatic hyperplasia    Osteoarthritis of hands, bilateral    Dysphagia    Nausea    Crohn's disease of large intestine without complication ([address]    Status post total right knee replacement    Arthrofibrosis of total knee arthroplasty (HCC)    Acute nonintractable headache    Bradycardia    Stage 3a chronic kidney disease    Diabetic nephropathy associated with type 2 diabetes mellitus ([address]    Hypertensive renal disease    Chronic kidney disease-mineral and bone disorder    Other proteinuria    S/P left knee arthroscopy    Palpitations    Statin intolerance    Chronic ethmoidal sinusitis    Left shoulder strain, initial encounter    Impingement syndrome, shoulder, left    Nontraumatic incomplete tear of left rotator cuff    Chronic renal disease, stage III ([address] [[id number]]       Past Medical History:   Diagnosis Date    Blurred vision 04/03/2012    right>left  side --pattern dystrophy/Dr Dye    BPH (benign prostatic hypertrophy) 09/11/2015    Bradycardia 04/18/2019    Hr 30-40 with skipped beats on BP monitor on Labetalol    Crohn's regional enteritis ([address] 01/03/2012    Dr Dong\"dx with Crohns in [address] [place] [address],[facility]- no recent issues    DM II (diabetes mellitus, type II), controlled ([address] 2008    insurance not cover Jardiance    Erosive esophagitis 03/30/2012    \"had erosive esophagitis in lower area on Prevacid for this\"    GERD (gastroesophageal reflux disease) 04/03/2012    Headache disorder     Platinum (hard of hearing) 09/13/2017    HTN (hypertension) 01/03/2021    also comanaged w Dr Sho Oreilly.     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 S/P colonoscopy 01/17/2019    Dr Srinivas Dhillon-- ?recheck when?- per pt 2024    Stage 3 chronic kidney disease ([facility] 04/18/2019    Dr Kip Mcbride    Testosterone deficiency 04/03/2012    on andrgel    Vertigo 04/03/2012    \"last used Meclazine since 2/2020\"       Past Surgical History:   Procedure Laterality Date    CARDIAC CATHETERIZATION      \"had 2 caths done since 1995 but not sure of dates\"    CATARACT REMOVAL WITH IMPLANT Left 03/02/2017    Dr Blank Schwarz Right 03/16/2017    Dr Eugenie Bhatti  2005    Dr Silverio Tillman  8/2011    Dr Gifford Westmoreland    COLONOSCOPY      Multiple colonoscopys, HX: Crohns    ENDOSCOPY, COLON, DIAGNOSTIC  2018    Erosive esophagitis    EYE SURGERY      \"left eye surgery for crosseyed- age 13 months\"    EYE SURGERY Left     cataract    EYE SURGERY Right 03/16/2017    cataract    KNEE ARTHROSCOPY  89    right    KNEE ARTHROSCOPY Left 11/11/2020    LEFT KNEE ARTHROSCOPY WITH PARTIAL MENISCECTOMY CHONDROPLASTY performed by Ezra Luther MD at [address] Right 1/16/2013    Dr Abelardo Malik- R knee tib plateau fx and medial meniscectomy    LAMINECTOMY  75 and 85    SINUS SURGERY  06/29/2020    Dr Jayant Buchanan for chr sinusitis and fungal infection, per pt    TONSILLECTOMY  70    TOTAL KNEE ARTHROPLASTY Right 6/12/13    Dr Abelardo Malik- R        Family History   Problem Relation Age of Onset    High Cholesterol Mother     Elevated Lipids Mother     High Blood Pressure Mother     Heart Disease Mother     High Cholesterol Father     Other Father         blind         Objective: There were no vitals filed for this visit. Inches  Hart -      Gen: No distress. Eyes: PERRL. No sclera icterus. No conjunctival injection. ENT: No discharge. Pharynx clear. External appearance of ears and nose normal.  Neck: Trachea midline. No obvious mass. Resp: No accessory muscle use. No crackles.  No wheezes. No rhonchi. No dullness on percussion. CV: Regular rate. Regular rhythm. No murmur or rub. No edema. GI: Non-tender. Non-distended. No hernia. Skin: Warm, dry, normal texture and turgor. No nodule on exposed extremities. Lymph: No cervical LAD. No supraclavicular LAD. M/S: No cyanosis. No clubbing. No joint deformity. Psych: Oriented x 3. No anxiety. Awake. Alert. Intact judgement and insight.     Mallampati Airway Classification:   []1 []2 [x]3 []4        Sleep Complaints/Symptoms:    Normal Bedtime:      Normal Wake Time:   Average Sleep Time:      Number of Awakenings:    Duration of Sleep Complaints: 2years    [x] Snoring     [] Improved [x] Not Improved    [] Choking/Gasping for Breath  [] Improved [] Not Improved       [x] Witnessed Apneas              [] Improved [x] Not Improved  [] Daytime Sleepiness             [] Improved [] Not Improved  [] Morning Headaches    [] Improved [] Not Improved  [] Frequent Awakenings       [] Improved [] Not Improved  [] Jerky Movements   [] Improved [] Not Improved   [] Restless Legs   [] Improved [] Not Improved   [] Difficulty Initiating Sleep  [] Improved [] Not Improved   [] Difficulty Maintaining Sleep  [] Improved [] Not Improved   [] Restless Sleep    [] Improved [] Not Improved   [] Sleep Paralysis    [] Improved [] Not Improved   [] Muscle Weakness w/ Emotion  [] Improved [] Not Improved  [] Other :     CPAP Usage:    [x]  Patient has never worn CPAP  []  Patient has worn CPAP previously but discontinued use  []  Current PAP user,  [years]   []  Patient Tolerates Well   []  Patient Does Not Tolerate     []  Patient Uses CPAP      []  More Than 4 Hours      []  Less Than 4 Hours  []  CPAP/BPAP/ASV Pressure Readings   []  CPAP Pressure      cm H20   []  BPAP Pressure       cm H20   []  ASV Pressure         cm H20      Assessment:      Diagnosis:  mod sosa with ahi 23/hr and drop in pulse ox 77%       Patient Active Problem List    Diagnosis Date Noted    Chronic renal disease, stage III [facility]) [[id number]] 05/03/2022     Priority: Medium    Nontraumatic incomplete tear of left rotator cuff 03/14/2022    Left shoulder strain, initial encounter 02/15/2022    Impingement syndrome, shoulder, left 02/15/2022    Statin intolerance 06/23/2021    Palpitations 05/20/2021    S/P left knee arthroscopy 02/05/2021    Diabetic nephropathy associated with type 2 diabetes mellitus ([address] 12/01/2020    Hypertensive renal disease 12/01/2020    Chronic kidney disease-mineral and bone disorder 12/01/2020    Other proteinuria 12/01/2020    Chronic ethmoidal sinusitis 07/28/2020    Bradycardia 04/18/2019     Overview Note:     Hr 30-40 with skipped beats on BP monitor on Labetalol      Stage 3a chronic kidney disease 04/18/2019     Overview Note:     Dr Philip Castellano      Acute nonintractable headache     Status post total right knee replacement 09/13/2017    Arthrofibrosis of total knee arthroplasty ([address] 09/13/2017    Dysphagia     Nausea     Crohn's disease of large intestine without complication (HCC)     Benign prostatic hyperplasia      Overview Note:     Updating Deprecated Diagnoses      Osteoarthritis of hands, bilateral     Pattern dystrophy of macula      Overview Note:     Dr Liv Mcintyre vision      Overview Note:     right>left  side pattern dystrophy/Dr Dye      Neck pain      Overview Note:     Dr Claude Giraldo has evaluated      Right cervical radiculopathy     DM II (diabetes mellitus, type II), controlled ([address]     S/P colonoscopy 08/24/2012     Overview Note:     Dr Kory Amin, recheck 1-2 yrs due to polyp      Gastroesophageal reflux disease without esophagitis     Testosterone deficiency      Overview Note:     on andrgel      Low back pain      Overview Note:     replace inactive diagnosis      Osteoarthritis, localized, shoulder, left     Primary hypertension      Overview Note:     1 Lopressor 50 twice a day Zestril 40 mg a day amlodipine 5 mg a day and hydralazine 25 mg

## 2022-08-10 NOTE — PROGRESS NOTES
Results for the most recent sleep study on Elizabeth Pillai  1950 are finalized and available. Please see media tab.     Electronically signed by Edson Foley on 8/9/2022 at 10:34 PM

## 2022-08-17 ENCOUNTER — TELEPHONE (OUTPATIENT)
Dept: INTERNAL MEDICINE CLINIC | Age: 72
End: 2022-08-17

## 2022-08-17 NOTE — TELEPHONE ENCOUNTER
Patient calls- he was having issues with HR 30-40s. He saw Dr. Christi Hines on 8/15 and his HR was 48. Dr. Christi Hines suggested to decrease Metoprolol to 25mg BID instead of 50mg BID. Patient will try this and see how it works and if it stays low then he will call back and let us know.

## 2022-08-18 DIAGNOSIS — E11.21 CONTROLLED TYPE 2 DIABETES MELLITUS WITH DIABETIC NEPHROPATHY, WITHOUT LONG-TERM CURRENT USE OF INSULIN (HCC): ICD-10-CM

## 2022-08-18 LAB
ABSOLUTE IMMATURE GRANULOCYTE: 0 K/UL (ref 0–0.1)
ALBUMIN/GLOBULIN RATIO: 1.7 RATIO (ref 0.8–2.6)
ALBUMIN: 4.4 G/DL (ref 3.5–5.2)
ALP BLD-CCNC: 101 U/L (ref 23–144)
ALT SERPL-CCNC: 28 U/L (ref 0–60)
AST SERPL-CCNC: 24 U/L (ref 0–55)
BASOPHILS ABSOLUTE: 0.1 K/UL (ref 0–0.3)
BASOPHILS RELATIVE PERCENT: 1.1 % (ref 0–2)
BILIRUB SERPL-MCNC: 0.4 MG/DL (ref 0–1.2)
BUN BLDV-MCNC: 14 MG/DL (ref 3–29)
BUN/CREAT BLD: 13 (ref 7–25)
C-REACTIVE PROTEIN: 2.52 MG/DL
CALCIUM SERPL-MCNC: 9.7 MG/DL (ref 8.5–10.5)
CHLORIDE BLD-SCNC: 95 MEQ/L (ref 96–110)
CHOLESTEROL: 116 MG/DL
CO2: 27 MEQ/L (ref 19–32)
CREAT SERPL-MCNC: 1.1 MG/DL (ref 0.5–1.4)
DIFFERENTIAL TYPE: ABNORMAL
EOSINOPHILS ABSOLUTE: 0.3 K/UL (ref 0–0.5)
EOSINOPHILS RELATIVE PERCENT: 4.3 % (ref 0–5)
GLOBULIN: 2.6 G/DL (ref 1.9–3.6)
GLOMERULAR FILTRATION RATE: 72 MLS/MIN/1.73M2
GLUCOSE BLD-MCNC: 160 MG/DL (ref 70–99)
HBA1C MFR BLD: 8.8 % (ref 4–6)
HCT VFR BLD CALC: 44.8 % (ref 37.5–51)
HDLC SERPL-MCNC: 38 MG/DL
HEMOGLOBIN: 15.3 G/DL (ref 13–17.7)
IMMATURE GRANULOCYTES: 0.3 %
LDL CHOLESTEROL CALCULATED: 35 MG/DL
LYMPHOCYTES ABSOLUTE: 1.8 K/UL (ref 0.9–4.1)
LYMPHOCYTES RELATIVE PERCENT: 25.5 % (ref 14–51)
MCH RBC QN AUTO: 26.7 PG (ref 26–34)
MCHC RBC AUTO-ENTMCNC: 34.2 G/DL (ref 30.7–35.5)
MCV RBC AUTO: 78.2 FL (ref 80–100)
MONOCYTES ABSOLUTE: 0.4 K/UL (ref 0.2–1)
MONOCYTES RELATIVE PERCENT: 5.4 % (ref 4–12)
NEUTROPHILS ABSOLUTE: 4.6 K/UL (ref 1.8–7.5)
NEUTROPHILS RELATIVE PERCENT: 63.4 % (ref 42–80)
NUCLEATED RBCS: 0 /100 WBC
PDW BLD-RTO: 14.6 %
PLATELET # BLD: 235 K/UL (ref 140–400)
PMV BLD AUTO: 9.3 FL (ref 7.2–11.7)
POTASSIUM SERPL-SCNC: 4 MEQ/L (ref 3.4–5.3)
RBC # BLD: 5.73 M/UL (ref 4.14–5.8)
SODIUM BLD-SCNC: 136 MEQ/L (ref 135–148)
STATUS: ABNORMAL
TOTAL PROTEIN: 7 G/DL (ref 6–8.3)
TRIGL SERPL-MCNC: 215 MG/DL
VLDLC SERPL CALC-MCNC: 43 MG/DL (ref 4–38)
WBC: 7.2 K/UL (ref 3.5–10.9)

## 2022-08-18 RX ORDER — GLIPIZIDE 10 MG/1
10 TABLET ORAL
Qty: 60 TABLET | Refills: 2 | Status: SHIPPED | OUTPATIENT
Start: 2022-08-18

## 2022-08-18 NOTE — RESULT ENCOUNTER NOTE
Call pt, labs ok/chol ok BUT SUGARS ARE TOO HIGH, A1C RISING TO 8.8 FROM 7.8 LAST YR.   INSURANCE NOTCOVERING JARDIANCE, REC WE TRY INCR GLIPIZIDE FROM 5MG BID TO 10MG BID, HE'LL NEED TO WATCH FOR ANY HYPOGLYCEMIA AND CALL IF [address] DROPPING <100

## 2022-08-29 ENCOUNTER — OFFICE VISIT (OUTPATIENT)
Dept: INTERNAL MEDICINE CLINIC | Age: 72
End: 2022-08-29
Payer: MEDICARE

## 2022-08-29 VITALS
DIASTOLIC BLOOD PRESSURE: 68 MMHG | WEIGHT: 234 LBS | BODY MASS INDEX: 36.73 KG/M2 | SYSTOLIC BLOOD PRESSURE: 138 MMHG | HEIGHT: 67 IN | OXYGEN SATURATION: 96 % | HEART RATE: 94 BPM

## 2022-08-29 DIAGNOSIS — G89.29 CHRONIC MIDLINE LOW BACK PAIN WITHOUT SCIATICA: ICD-10-CM

## 2022-08-29 DIAGNOSIS — M54.50 CHRONIC MIDLINE LOW BACK PAIN WITHOUT SCIATICA: ICD-10-CM

## 2022-08-29 DIAGNOSIS — Z00.00 ROUTINE GENERAL MEDICAL EXAMINATION AT A HEALTH CARE FACILITY: Primary | ICD-10-CM

## 2022-08-29 DIAGNOSIS — E11.21 CONTROLLED TYPE 2 DIABETES MELLITUS WITH DIABETIC NEPHROPATHY, WITHOUT LONG-TERM CURRENT USE OF INSULIN (HCC): ICD-10-CM

## 2022-08-29 DIAGNOSIS — M54.12 RIGHT CERVICAL RADICULOPATHY: ICD-10-CM

## 2022-08-29 DIAGNOSIS — I10 PRIMARY HYPERTENSION: ICD-10-CM

## 2022-08-29 DIAGNOSIS — Z00.00 MEDICARE ANNUAL WELLNESS VISIT, SUBSEQUENT: ICD-10-CM

## 2022-08-29 DIAGNOSIS — N18.31 STAGE 3A CHRONIC KIDNEY DISEASE (HCC): ICD-10-CM

## 2022-08-29 DIAGNOSIS — R00.2 PALPITATIONS: ICD-10-CM

## 2022-08-29 PROBLEM — Z99.89 OSA ON CPAP: Status: ACTIVE | Noted: 2022-08-29

## 2022-08-29 PROBLEM — G47.33 OSA ON CPAP: Status: ACTIVE | Noted: 2022-08-29

## 2022-08-29 PROCEDURE — 99214 OFFICE O/P EST MOD 30 MIN: CPT | Performed by: INTERNAL MEDICINE

## 2022-08-29 PROCEDURE — 1123F ACP DISCUSS/DSCN MKR DOCD: CPT | Performed by: INTERNAL MEDICINE

## 2022-08-29 PROCEDURE — G0439 PPPS, SUBSEQ VISIT: HCPCS | Performed by: INTERNAL MEDICINE

## 2022-08-29 PROCEDURE — 3052F HG A1C>EQUAL 8.0%<EQUAL 9.0%: CPT | Performed by: INTERNAL MEDICINE

## 2022-08-29 RX ORDER — HYDROCODONE BITARTRATE AND ACETAMINOPHEN 5; 325 MG/1; MG/1
1 TABLET ORAL 2 TIMES DAILY
Qty: 60 TABLET | Refills: 0 | Status: SHIPPED | OUTPATIENT
Start: 2022-10-01 | End: 2022-10-12

## 2022-08-29 RX ORDER — TRAMADOL HYDROCHLORIDE 50 MG/1
50 TABLET ORAL 3 TIMES DAILY
Qty: 90 TABLET | Refills: 0 | Status: SHIPPED | OUTPATIENT
Start: 2022-09-01 | End: 2022-10-01

## 2022-08-29 RX ORDER — HYDROCODONE BITARTRATE AND ACETAMINOPHEN 5; 325 MG/1; MG/1
1 TABLET ORAL 2 TIMES DAILY
Qty: 60 TABLET | Refills: 0 | Status: SHIPPED | OUTPATIENT
Start: 2022-10-31 | End: 2022-10-12

## 2022-08-29 RX ORDER — HYDROCODONE BITARTRATE AND ACETAMINOPHEN 5; 325 MG/1; MG/1
1 TABLET ORAL 2 TIMES DAILY PRN
Qty: 60 TABLET | Refills: 0 | Status: SHIPPED | OUTPATIENT
Start: 2022-09-01 | End: 2022-10-12

## 2022-08-29 ASSESSMENT — PATIENT HEALTH QUESTIONNAIRE - PHQ9
SUM OF ALL RESPONSES TO PHQ9 QUESTIONS 1 & 2: 0
2. FEELING DOWN, DEPRESSED OR HOPELESS: 0
SUM OF ALL RESPONSES TO PHQ QUESTIONS 1-9: 0
SUM OF ALL RESPONSES TO PHQ QUESTIONS 1-9: 0
1. LITTLE INTEREST OR PLEASURE IN DOING THINGS: 0
SUM OF ALL RESPONSES TO PHQ QUESTIONS 1-9: 0
SUM OF ALL RESPONSES TO PHQ QUESTIONS 1-9: 0

## 2022-08-29 ASSESSMENT — LIFESTYLE VARIABLES
HOW MANY STANDARD DRINKS CONTAINING ALCOHOL DO YOU HAVE ON A TYPICAL DAY: PATIENT DOES NOT DRINK
HOW OFTEN DO YOU HAVE A DRINK CONTAINING ALCOHOL: NEVER

## 2022-08-29 NOTE — PROGRESS NOTES
HYDROcodone-acetaminophen (NORCO) 5-325 MG per tablet; Take 1 tablet by mouth 2 times daily for 30 days. , Disp-60 tablet, R-0Normal  -     HYDROcodone-acetaminophen (NORCO) 5-325 MG per tablet; Take 1 tablet by mouth 2 times daily for 30 days. , Disp-60 tablet, R-0Normal  -     HYDROcodone-acetaminophen (NORCO) 5-325 MG per tablet; Take 1 tablet by mouth 2 times daily as needed for Pain for up to 30 days. , Disp-60 tablet, R-0Normal    Recommendations for Preventive Services Due: see orders and patient instructions/AVS.  Recommended screening schedule for the next 5-10 years is provided to the patient in written form: see Patient Instructions/AVS.     Return in about 3 months (around 11/29/2022) for routine DM. Subjective   The following acute and/or chronic problems were also addressed today:  Has had periodic low hr to 30s, confirmed by Dr Cisco Hassan. Then seen by Dr Keegan Matos and has had holter done which showed premature beats. Dr Cisco Hassan has decr metoprolol now to 25mg BID from 50mg BID. DM- last sugars higher w incr a1c but has had steroid injection to L shoulder earlier this yr by Dr Giancarlo Molina. We thus incr glipizide and glc now 175-200,   Lab Results   Component Value Date    LABA1C 8.8 (H) 08/18/2022    LABA1C 7.8 08/09/2021    LABA1C 8.0 05/06/2021     Lab Results   Component Value Date    GLUF 195 (H) 03/20/2018    LABMICR 5820.0 03/09/2022    LDLCALC 35 08/18/2022    CREATININE 1.1 08/18/2022     CKD, creat stable and seeing Dr Cisco Hassan regularly. Lipids, chol much better on study drug thr [facility]. Recent dx of VAN and started cpap but not srinivasa well. He'll cont to work w DR Kory Amor    Patient's complete Health Risk Assessment and screening values have been reviewed and are found in Flowsheets. The following problems were reviewed today and where indicated follow up appointments were made and/or referrals ordered.     Positive Risk Factor Screenings with Interventions:    Fall Risk:  Do you feel unsteady or are you worried about falling? : no  2 or more falls in past year?: (!) yes  Fall with injury in past year?: (!) yes   Fall Risk Interventions:    Kwame Hooks in tub earlier this yr but now more cautious/careful. General Health and ACP:  General  In general, how would you say your health is?: Good  In the past 7 days, have you experienced any of the following: New or Increased Pain, New or Increased Fatigue, Loneliness, Social Isolation, Stress or Anger?: (!) Yes  Select all that apply: (!) New or Increased Fatigue, New or Increased Pain, Stress  Do you get the social and emotional support that you need?: Yes  Do you have a Living Will?: Yes    Advance Directives       Power of  Living Will ACP-Advance Directive ACP-Power of     Not on File Not on File Not on File Not on File        General Health Risk Interventions:  Evaluating w holter as bradycardia, palpitations have caused sx. For cont /fu Dr Ce Thacker Habits/Nutrition:  Physical Activity: Inactive    Days of Exercise per Week: 0 days    Minutes of Exercise per Session: 0 min     Have you lost any weight without trying in the past 3 months?: No  Body mass index: (!) 36.65  Have you seen the dentist within the past year?: (!) No  Health Habits/Nutrition Interventions: Wt is down and getting back on track w diet. Also rec for dental eval. Needs teeth pulled but no ins. Hearing/Vision:  Do you or your family notice any trouble with your hearing that hasn't been managed with hearing aids?: (!) Yes  Do you have difficulty driving, watching TV, or doing any of your daily activities because of your eyesight?: (!) Yes  Have you had an eye exam within the past year?: Yes  No results found. Hearing/Vision Interventions: Following w Dr Cherelle Underwood regularly, Dr Yandel Alston. Advised also for hearing assessment.             Objective   Vitals:    08/29/22 0910   BP: (!) 150/70   Site: Left Upper Arm   Position: Sitting   Cuff Size: Large Adult 1 tablet at noon and 1 tablet before bedtime. Do all this for 30 days. Christa Velazquez MD   HYDROcodone-acetaminophen [facility]) 5-325 MG per tablet Take 1 tablet by mouth in the morning and 1 tablet before bedtime. Do all this for 30 days. Christa Velazquez MD   ondansetron (ZOFRAN) 4 MG tablet Take 1 tablet by mouth every 8 hours as needed for Nausea  Christa Velazquez MD   gabapentin (NEURONTIN) 300 MG capsule Take 1 capsule by mouth 3 times daily for 120 days. Intended supply: 90 days  Yaneth Maurer,    cyclobenzaprine (FLEXERIL) 10 mg tablet TAKE ONE TABLET BY MOUTH THREE TIMES A DAY AS NEEDED FOR MUSCLE SPASMS  Christa Velazquez MD   hydrALAZINE (APRESOLINE) 25 MG tablet TAKE ONE TABLET BY MOUTH TWICE A DAY  Christa Velazquez MD   metoprolol tartrate (LOPRESSOR) 50 MG tablet TAKE ONE TABLET BY MOUTH TWICE A DAY  Christa Velazquez MD   amLODIPine (NORVASC) 5 MG tablet TAKE ONE TABLET BY MOUTH DAILY  Christa Velazquez MD   HYDROcodone-acetaminophen (NORCO) 5-325 MG per tablet Take 1 tablet by mouth 2 times daily as needed for Pain for up to 30 days. Christa Velazquez MD   HYDROcodone-acetaminophen [facility]) 5-325 MG per tablet Take 1 tablet by mouth 2 times daily as needed for Pain for up to 30 days. Christa Velazquez MD   vitamin D3 (CHOLECALCIFEROL) 25 MCG (1000 UT) TABS tablet Take 2 tablets by mouth daily  Danielle Cardozo DO   traMADol (ULTRAM) 50 MG tablet   Historical Provider, MD   eplerenone (INSPRA) 50 MG tablet Take 1 tablet by mouth 2 times daily  Danielle Cardozo DO   furosemide (LASIX) 40 MG tablet Take 1 tablet by mouth See Admin Instructions PRN LEG SWELLING  Danielle Cardozo DO   Lancets MISC 1 each by Does not apply route 2 times daily  Christa Velazquez MD   blood glucose monitor strips Test two times a day & as needed for symptoms of irregular blood glucose.   Christa Velazquez MD   butalbital-acetaminophen-caffeine (FIORICET, ESGIC) -34 MG per tablet Take 1 tablet by mouth 3 times daily as needed for Headaches or Migraine  Jethro Handley MD   lansoprazole (PREVACID) 15 MG delayed release capsule Take 1 capsule by mouth daily  Jethro Handley MD   meclizine (ANTIVERT) 25 MG tablet Take 1 tablet by mouth every 6 hours.   Jethro Handley MD       CareTe (Including outside providers/suppliers regularly involved in providing care):   Patient Care Team:  Jethro Handley MD as PCP - General (Internal Medicine)  Jethrokristin Handley MD as PCP - [facility][1[facility]  Jethro Handley MD as Consulting Physician (Internal Medicine)     Reviewed and updated this visit:  Tobacco  Allergies  Med Hx  Surg Hx  Soc Hx  Fam Hx

## 2022-08-29 NOTE — PATIENT INSTRUCTIONS
Personalized Preventive Plan for Orisrael Bel - 8/29/2022  Medicare offers a range of preventive health benefits. Some of the tests and screenings are paid in full while other may be subject to a deductible, co-insurance, and/or copay. Some of these benefits include a comprehensive review of your medical history including lifestyle, illnesses that may run in your family, and various assessments and screenings as appropriate. After reviewing your medical record and screening and assessments performed today your provider may have ordered immunizations, labs, imaging, and/or referrals for you. A list of these orders (if applicable) as well as your Preventive Care list are included within your After Visit Summary for your review. Other Preventive Recommendations:    A preventive eye exam performed by an eye specialist is recommended every 1-2 years to screen for glaucoma; cataracts, macular degeneration, and other eye disorders. A preventive dental visit is recommended every 6 months. Try to get at least 150 minutes of exercise per week or 10,000 steps per day on a pedometer . Order or download the FREE \"Exercise & Physical Activity: Your Everyday Guide\" from The "[employer]. Call 5-911.607.7001 or search The "[employer] online. You need 8497-0648 mg of calcium and 2118-7395 IU of vitamin D per day. It is possible to meet your calcium requirement with diet alone, but a vitamin D supplement is usually necessary to meet this goal.  When exposed to the sun, use a sunscreen that protects against both UVA and UVB radiation with an SPF of 30 or greater. Reapply every 2 to 3 hours or after sweating, drying off with a towel, or swimming. Always wear a seat belt when traveling in a car. Always wear a helmet when riding a bicycle or motorcycle.

## 2022-08-30 ENCOUNTER — NURSE ONLY (OUTPATIENT)
Dept: CARDIOLOGY CLINIC | Age: 72
End: 2022-08-30
Payer: MEDICARE

## 2022-08-30 ENCOUNTER — TELEPHONE (OUTPATIENT)
Dept: INTERNAL MEDICINE CLINIC | Age: 72
End: 2022-08-30

## 2022-08-30 DIAGNOSIS — K04.7 TOOTH INFECTION: Primary | ICD-10-CM

## 2022-08-30 DIAGNOSIS — R00.2 PALPITATIONS: ICD-10-CM

## 2022-08-30 PROCEDURE — 93227 XTRNL ECG REC<48 HR R&I: CPT | Performed by: INTERNAL MEDICINE

## 2022-08-30 RX ORDER — AMOXICILLIN AND CLAVULANATE POTASSIUM 875; 125 MG/1; MG/1
1 TABLET, FILM COATED ORAL 2 TIMES DAILY
Qty: 20 TABLET | Refills: 0 | Status: SHIPPED | OUTPATIENT
Start: 2022-08-30 | End: 2022-09-09

## 2022-08-30 NOTE — PROGRESS NOTES
24 hour holter monitor [email or web address]  for Palpitations  Serial # [id number] . Instructed patient on monitor and proper use. Instructed on diary. When to remove and bring it back. Must leave the holter monitor on  without removing for the duration of time ordered. Answered all questions the patient had. Instructed patient to call [facility] at [phone] with any questions or concerns with the monitor.

## 2022-08-30 NOTE — TELEPHONE ENCOUNTER
Patient calls asking for Augmentin for tooth infection. He said he is still trying to get his teeth situation take care of and hasn't been able to yet. Upper gum swelling and tenderness and causing pain. He does see Dr. Dequan López but hasn't been into him yet.  Please advise

## 2022-09-15 ENCOUNTER — TELEPHONE (OUTPATIENT)
Dept: CARDIOLOGY CLINIC | Age: 72
End: 2022-09-15

## 2022-09-15 DIAGNOSIS — R00.2 PALPITATIONS: Primary | ICD-10-CM

## 2022-09-20 ENCOUNTER — OFFICE VISIT (OUTPATIENT)
Dept: NEUROLOGY | Age: 72
End: 2022-09-20
Payer: MEDICARE

## 2022-09-20 VITALS
WEIGHT: 234 LBS | SYSTOLIC BLOOD PRESSURE: 180 MMHG | HEART RATE: 66 BPM | OXYGEN SATURATION: 95 % | HEIGHT: 67 IN | DIASTOLIC BLOOD PRESSURE: 100 MMHG | BODY MASS INDEX: 36.73 KG/M2

## 2022-09-20 DIAGNOSIS — E11.21 DIABETIC NEPHROPATHY ASSOCIATED WITH TYPE 2 DIABETES MELLITUS (HCC): ICD-10-CM

## 2022-09-20 DIAGNOSIS — R00.2 PALPITATIONS: ICD-10-CM

## 2022-09-20 DIAGNOSIS — M54.16 LUMBAR RADICULOPATHY: ICD-10-CM

## 2022-09-20 DIAGNOSIS — R47.89 WORD FINDING DIFFICULTY: ICD-10-CM

## 2022-09-20 DIAGNOSIS — G47.33 OBSTRUCTIVE SLEEP APNEA SYNDROME: ICD-10-CM

## 2022-09-20 DIAGNOSIS — E55.9 VITAMIN D DEFICIENCY: ICD-10-CM

## 2022-09-20 DIAGNOSIS — R41.3 MEMORY LOSS: Primary | ICD-10-CM

## 2022-09-20 PROCEDURE — 99214 OFFICE O/P EST MOD 30 MIN: CPT | Performed by: NURSE PRACTITIONER

## 2022-09-20 PROCEDURE — 1123F ACP DISCUSS/DSCN MKR DOCD: CPT | Performed by: NURSE PRACTITIONER

## 2022-09-20 PROCEDURE — 3052F HG A1C>EQUAL 8.0%<EQUAL 9.0%: CPT | Performed by: NURSE PRACTITIONER

## 2022-09-20 NOTE — PROGRESS NOTES
9/20/22    Angelikata Sang  1950    Chief Complaint   Patient presents with    Follow-up     Nerve pain is a little better with the Gabapentin. Is having a hard time finding a CPAP he can tolerate. History of Present Illness  Dar Kong is a 81 y.o. male presenting today for follow-up of: memory loss, word finding diffuculty. MRI brain showed no acute intracranial abnormality, mild global parenchymal volume loss with minimal chronic microvascular ischemic changes, chronic lacunar infarct involving the left frontal corona radiata. His last MMSE was 27/27. He did not have his CNS vital test completed for his neuropsychiatric evaluation. Labs for reversible causes memory loss and peripheral neuropathy were normal except his A1C on 8/18/22 was 8.8. He tells me he was supposed to increase his glipizide to 10 mg twice daily but could not tolerate it so he cut it back to 5 mg twice daily. He also has burning in his toes. He has a history of lower back surgery. His gabapentin was increased to 300 mg three times daily. He states today that his neuropathic pain is much better. In regards to his stroke, Dar Kong tells me he is allergic to statins, he is enrolled in a cholesterol medication injection trial and his LDL is 35 on 8/18/22. He states he cannot take NSAIDS due to his kidney function. His BP was elevated in office today, he is asymptomatic. His recent holter monitor showed an abnormal study and EP was recommended. ECHO did not show stroke precipitants, stree test was normal. He did have a sleep study and needs to wear a CPAP however he tells me he cannot tolerate the masks due to feeling claustrophobic. In regards to his memory, there have been no changes from previous visit except he is not driving due to pattern dystrophy of both eyes. Current Outpatient Medications   Medication Sig Dispense Refill    traMADol (ULTRAM) 50 MG tablet Take 1 tablet by mouth 3 times daily for 30 days.  90 tablet 0 HYDROcodone-acetaminophen (NORCO) 5-325 MG per tablet Take 1 tablet by mouth 2 times daily as needed for Pain for up to 30 days. 60 tablet 0    glipiZIDE (GLUCOTROL) 10 MG tablet Take 1 tablet by mouth 2 times daily (before meals) 60 tablet 2    ondansetron (ZOFRAN) 4 MG tablet Take 1 tablet by mouth every 8 hours as needed for Nausea 40 tablet 1    gabapentin (NEURONTIN) 300 MG capsule Take 1 capsule by mouth 3 times daily for 120 days. Intended supply: 90 days 90 capsule 3    cyclobenzaprine (FLEXERIL) 10 mg tablet TAKE ONE TABLET BY MOUTH THREE TIMES A DAY AS NEEDED FOR MUSCLE SPASMS 90 tablet 1    hydrALAZINE (APRESOLINE) 25 MG tablet TAKE ONE TABLET BY MOUTH TWICE A  tablet 1    metoprolol tartrate (LOPRESSOR) 50 MG tablet TAKE ONE TABLET BY MOUTH TWICE A  tablet 1    amLODIPine (NORVASC) 5 MG tablet TAKE ONE TABLET BY MOUTH DAILY 90 tablet 1    vitamin D3 (CHOLECALCIFEROL) 25 MCG (1000 UT) TABS tablet Take 2 tablets by mouth daily 60 tablet 11    traMADol (ULTRAM) 50 MG tablet       eplerenone (INSPRA) 50 MG tablet Take 1 tablet by mouth 2 times daily 180 tablet 3    furosemide (LASIX) 40 MG tablet Take 1 tablet by mouth See Admin Instructions PRN LEG SWELLING 90 tablet 1    Lancets MISC 1 each by Does not apply route 2 times daily 200 each 1    blood glucose monitor strips Test two times a day & as needed for symptoms of irregular blood glucose. 200 strip 3    butalbital-acetaminophen-caffeine (FIORICET, ESGIC) -40 MG per tablet Take 1 tablet by mouth 3 times daily as needed for Headaches or Migraine 30 tablet 1    lansoprazole (PREVACID) 15 MG delayed release capsule Take 1 capsule by mouth daily 90 capsule 1    meclizine (ANTIVERT) 25 MG tablet Take 1 tablet by mouth every 6 hours. 30 tablet 1    [START ON 10/31/2022] HYDROcodone-acetaminophen (NORCO) 5-325 MG per tablet Take 1 tablet by mouth 2 times daily for 30 days.  (Patient not taking: Reported on 9/20/2022) 60 tablet 0    [START ON 10/1/2022] HYDROcodone-acetaminophen (NORCO) 5-325 MG per tablet Take 1 tablet by mouth 2 times daily for 30 days. (Patient not taking: Reported on 9/20/2022) 60 tablet 0    HYDROcodone-acetaminophen (NORCO) 5-325 MG per tablet Take 1 tablet by mouth 2 times daily as needed for Pain for up to 30 days. 60 tablet 0    HYDROcodone-acetaminophen (NORCO) 5-325 MG per tablet Take 1 tablet by mouth 2 times daily as needed for Pain for up to 30 days. 60 tablet 0     No current facility-administered medications for this visit.        Physical Exam:  Physical Exam:  Also present during visit: None     Constitutional              Weight: well nourished  Mental Status              Orientation: oriented to person, oriented to place, oriented to problem and oriented to time              Mood/Affect: appropriate mood and appropriate affect              Memory/Other: recent memory intact, remote memory intact, fund of knowledge intact, attention span normal and concentration normal  Language              Language: (normal) language, no dysarthria, (normal) articulation and no dysphasia/aphasia  Cranial Nerves              CN II Right: visual fields appear intact              CN II Left: visual fields appear intact              CN III, IV, VI: EOM no nystagmus, normal pursuit and extraocular muscle strength normal              CN III: pupil normal size, pupil reactive to light and dark, pupil accomodates and no ptosis              CN IV: normal              CN VI: normal              CN V Right: normal sensation and muscles of mastication intact              CN V Left: normal sensation and muscles of mastication intact              CN VII Right: normal facial expression              CN VII Left: normal facial expression              CN VIII Right: hearing in tact to normal conversation              CN VIII Left: hearing in tact to normal conversation              CN IX,X: normal palatal movement              CN XI Right: normal sternocleidomastoid and normal trapezius              CN XI Left: normal sternocleidomastoid and normal trapezius              CN XII: no tremors of the tongue, no fasciculation of the tongue, tongue protrudes midline, normal power to left and normal power to right  Gait and Stance              Gait/Posture: station normal, ambulates independently, and gait normal  Motor/Coordination Exam              General: no bradykinesia, no tremors, no chorea, no athetosis, no myoclonus and no dyskinesia              Right Upper Extremity: normal motor strength, normal bulk and normal tone              Left Upper Extremity: normal motor strength, normal bulk and normal tone              Right Lower Extremity: normal motor strength, normal bulk and normal tone              Left Lower Extremity: normal motor strength, normal bulk and normal tone              Coordination: no drift, normal finger-to-nose, normal heel-to-shin and rapid alternating movements normal  Reflexes              Reflexes Right: 2/4 biceps, brachioradialis, 1/4 patellar, and achilles               Reflexes Left: 2/4 biceps, brachioradialis,1/4 patellar, and achilles               Frontal Release Signs: frontal release signs absent  Sensory              Sensation: normal light touch, decreased PP in the left L5 dermatome, normal vibration, normal DSS, and no neglect         27 Point MMSE Screen:    Orientation (Time):    Orientation (Place):    Learning 3 Objects: 3/3   Attention:    Recall 3 Objects: 2/3   Namin/2   Repitition:    3-Step Command: 3/3   TOTAL:      BP (!) 180/100 (Site: Left Upper Arm, Position: Sitting, Cuff Size: Medium Adult)   Pulse 66   Ht 5' 7\" (1.702 m)   Wt 234 lb (106.1 kg)   SpO2 95%   BMI 36.65 kg/m²     Assessment and Plan     Diagnosis Orders   1. Memory loss        2. Word finding difficulty        3. Lumbar radiculopathy        4. Vitamin D deficiency        5.  Obstructive sleep apnea syndrome 6. Diabetic nephropathy associated with type 2 diabetes mellitus ([facility] [address]        7. Palpitations        Delgado Cee was seen in neurological follow up in regards to memory loss, stroke, lumbar radiculopathy. Delgado Cee will remain on gabapentin 300 mg 3 times daily. I recommended aspirin 81 mg daily for secondary stroke prevention and keeping A1C below 7, BP below 130/80. I encouraged Delgado Cee to follow-up with Dr. Vernita Baumgarten office in regards to asleep apnea mask that he can tolerate. I am happy to hear he followed up with cardiology in regards to his palpitations and he will follow-up for results on his Holter monitor. In regards to his memory, he has agreed to CNS vital testing to assess for underlying causes of memory loss. We discussed the importance of modification of stroke risk factors. We discussed the importance of the adherence to secondary stroke preventative medications. We discussed the importance of alerting EMS and presenting to the hospital at the onset of any stroke-like symptoms. We discussed nonpharmacologic interventions including staying active cognitively, socially, and physically to help slow down the progression of memory loss. Return in about 3 months (around 12/20/2022).     MARIZA Mckeon - CNP

## 2022-09-30 ENCOUNTER — NURSE ONLY (OUTPATIENT)
Dept: NEUROLOGY | Age: 72
End: 2022-09-30

## 2022-09-30 NOTE — PROGRESS NOTES
Patient presents for CNS vitals testing. 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 Unfortunately we had to cancel the testing appointment due to us not being aware of this issue and making it almost impossible for him to take the test due to it requiring the patients to be able to read and see small and large print to answer the questions and sections correctly. Canceled patient's follow up 10/11 to go over results, we will keep his regular scheduled follow up appointment in December. I spent a total of 2 minutes with the patient.

## 2022-10-03 DIAGNOSIS — G89.29 CHRONIC MIDLINE LOW BACK PAIN WITHOUT SCIATICA: Primary | ICD-10-CM

## 2022-10-03 DIAGNOSIS — M54.50 CHRONIC MIDLINE LOW BACK PAIN WITHOUT SCIATICA: Primary | ICD-10-CM

## 2022-10-03 DIAGNOSIS — G89.29 CHRONIC MIDLINE LOW BACK PAIN WITHOUT SCIATICA: ICD-10-CM

## 2022-10-03 DIAGNOSIS — M54.50 CHRONIC MIDLINE LOW BACK PAIN WITHOUT SCIATICA: ICD-10-CM

## 2022-10-03 DIAGNOSIS — M54.2 NECK PAIN: ICD-10-CM

## 2022-10-03 RX ORDER — TRAMADOL HYDROCHLORIDE 50 MG/1
50 TABLET ORAL 3 TIMES DAILY
Qty: 90 TABLET | Refills: 0 | Status: SHIPPED | OUTPATIENT
Start: 2022-10-03 | End: 2022-11-01 | Stop reason: SDUPTHER

## 2022-10-03 RX ORDER — TRAMADOL HYDROCHLORIDE 50 MG/1
TABLET ORAL
OUTPATIENT
Start: 2022-10-03

## 2022-10-12 ENCOUNTER — INITIAL CONSULT (OUTPATIENT)
Dept: CARDIOLOGY CLINIC | Age: 72
End: 2022-10-12
Payer: MEDICARE

## 2022-10-12 VITALS
SYSTOLIC BLOOD PRESSURE: 168 MMHG | HEART RATE: 85 BPM | BODY MASS INDEX: 36.98 KG/M2 | DIASTOLIC BLOOD PRESSURE: 80 MMHG | WEIGHT: 235.6 LBS | HEIGHT: 67 IN

## 2022-10-12 DIAGNOSIS — M54.16 LUMBAR RADICULOPATHY: ICD-10-CM

## 2022-10-12 DIAGNOSIS — R06.02 SHORTNESS OF BREATH: ICD-10-CM

## 2022-10-12 DIAGNOSIS — I10 ESSENTIAL HYPERTENSION: ICD-10-CM

## 2022-10-12 DIAGNOSIS — I20.8 OTHER FORMS OF ANGINA PECTORIS (HCC): ICD-10-CM

## 2022-10-12 DIAGNOSIS — I10 PRIMARY HYPERTENSION: ICD-10-CM

## 2022-10-12 DIAGNOSIS — I49.3 PVC (PREMATURE VENTRICULAR CONTRACTION): Primary | ICD-10-CM

## 2022-10-12 PROCEDURE — 3074F SYST BP LT 130 MM HG: CPT | Performed by: INTERNAL MEDICINE

## 2022-10-12 PROCEDURE — 1123F ACP DISCUSS/DSCN MKR DOCD: CPT | Performed by: INTERNAL MEDICINE

## 2022-10-12 PROCEDURE — 3078F DIAST BP <80 MM HG: CPT | Performed by: INTERNAL MEDICINE

## 2022-10-12 PROCEDURE — 93000 ELECTROCARDIOGRAM COMPLETE: CPT | Performed by: INTERNAL MEDICINE

## 2022-10-12 PROCEDURE — 99204 OFFICE O/P NEW MOD 45 MIN: CPT | Performed by: INTERNAL MEDICINE

## 2022-10-12 RX ORDER — HYDRALAZINE HYDROCHLORIDE 25 MG/1
25 TABLET, FILM COATED ORAL 3 TIMES DAILY
Qty: 180 TABLET | Refills: 1 | Status: SHIPPED | OUTPATIENT
Start: 2022-10-12

## 2022-10-12 RX ORDER — GABAPENTIN 300 MG/1
CAPSULE ORAL
Qty: 60 CAPSULE | OUTPATIENT
Start: 2022-10-12

## 2022-10-12 ASSESSMENT — ENCOUNTER SYMPTOMS
DIARRHEA: 0
BLOOD IN STOOL: 0
CONSTIPATION: 0
SHORTNESS OF BREATH: 1
VOMITING: 0
BACK PAIN: 0
CHEST TIGHTNESS: 0
PHOTOPHOBIA: 0
NAUSEA: 0
EYE PAIN: 0
COLOR CHANGE: 0
COUGH: 0
ABDOMINAL PAIN: 0
WHEEZING: 0

## 2022-10-12 NOTE — PATIENT INSTRUCTIONS
Please be informed that if you contact our office outside of normal business hours the physician on call cannot help with any scheduling or rescheduling issues, procedure instruction questions or any type of medication issue. We advise you for any urgent/emergency that you go to the nearest emergency room! PLEASE CALL OUR OFFICE DURING NORMAL BUSINESS HOURS    Monday - Friday   8 am to 5 pm    [place] [address]: [phone]    [place]:  [phone]    **It is YOUR responsibilty to bring medication bottles and/or updated medication list to [address] [place] [address].  This will allow us to better serve you and all your healthcare needs**

## 2022-10-12 NOTE — PROGRESS NOTES
Electrophysiology Consult Note      Reason for consultation: pvc    Chief complaint : Shortness of breath with exertion, Palpitations    Referring physician: Clarissa Mathias      Primary care physician: Ahmet Aldana MD      History of Present Illness:     Patient is a 79-year-old male with a history of hypertension hyperlipidemia diabetes mellitus, CKD, referred by Dr. Jose Burris for PVC management. Patient reports shortness of breath with exertion. Patient also reports palpitations which cause him to have pressure and pausing and a feeling on the pulse. Patient reports dizziness at times. Patient also reports edema in the legs and ankles and feet bilaterally. Patient does not smoke. Patient drinks 1 cup of coffee daily. Patient is trying to get more exercise but is limited with shortness of breath    He was told he had abnormal test results and was told to follow-up with me    Pastmedical history:   Past Medical History:   Diagnosis Date    Blurred vision 04/03/2012    right>left  side --pattern dystrophy/Dr Dye    BPH (benign prostatic hypertrophy) 09/11/2015    Bradycardia 04/18/2019    Hr 30-40 with skipped beats on BP monitor on Labetalol    Crohn's regional enteritis ([address] 01/03/2012    Dr Dong\"dx with Crohns in 18- no recent issues    DM II (diabetes mellitus, type II), controlled ([address] 2008    insurance not cover Jardiance    Erosive esophagitis 03/30/2012    \"had erosive esophagitis in lower area on Prevacid for this\"    GERD (gastroesophageal reflux disease) 04/03/2012    Headache disorder     [place] (hard of hearing) 09/13/2017    HTN (hypertension) 01/03/2021    also comanaged w Dr Teodoro Meredith.     Hyperlipidemia 01/03/2012    CONSIDER HIGH DOSE STATIN    LBP (low back pain) 12/2011    LLL on L spine 12.2011--FILLS NORCO MONTHLY, HAS RFS OF TRAMADOL FOR 3MO    Mesenteric panniculitis ([address] 03/18/2015 March 2015- responded to pred taper and cipro/flagyl    Neck pain 04/21/2014    Dr Addison Joya has evaluated    VAN on CPAP     Dr Clarissa Mccray - sleep study 8/22    Osteoarthritis 04/03/2012    \"hands back knees , hips    Pattern dystrophy of macula 12/11/2015    \"legally blind in both eyes- central field of vision in right eye is missing- follow with Dr Ladi Parker and Dr Connell American    Proteinuria 04/03/2012    PVC (premature ventricular contraction) 11/09/2020    \"have hx of PVC's follow with Dr Apollo Bailey yearly\"    Rash 11/04/2020    per pt on 11/4/2020\"no current rash\"    Research study patient     Repatha vs Placebo    Right cervical radiculopathy 04/03/2012    Right knee pain 12/28/2012    Dr Oliverio Michelle- tib plateau fx.       S/P colonoscopy 01/17/2019    Dr Stan Belle-- ?recheck when?- per pt 2024    Stage 3 chronic kidney disease ([address] 04/18/2019    Dr Kit Comer    Testosterone deficiency 04/03/2012    on andrgel    Vertigo 04/03/2012    \"last used Meclazine since 2/2020\"       Surgical history :   Past Surgical History:   Procedure Laterality Date    CARDIAC CATHETERIZATION      \"had 2 caths done since 1995 but not sure of dates\"    CATARACT REMOVAL WITH IMPLANT Left 03/02/2017    Dr Yuridia Marcial Right 03/16/2017    Dr Julia Daly  2005    Dr Berle Babinski  8/2011    Dr T[facility]    COLONOSCOPY      Multiple colonoscopys, HX: Crohns    ENDOSCOPY, COLON, DIAGNOSTIC  2018    Erosive esophagitis    EYE SURGERY      \"left eye surgery for crosseyed- age 13 months\"    EYE SURGERY Left     cataract    EYE SURGERY Right 03/16/2017    cataract    KNEE ARTHROSCOPY  89    right    KNEE ARTHROSCOPY Left 11/11/2020    LEFT KNEE ARTHROSCOPY WITH PARTIAL MENISCECTOMY CHONDROPLASTY performed by Katiana Ramirez MD at [address] Right 1/16/2013    Dr Oliverio Michelle- R knee tib plateau fx and medial meniscectomy    LAMINECTOMY  75 and 85    SINUS SURGERY  06/29/2020    Dr Nicole Dhillon for chr sinusitis and fungal infection, per pt    TONSILLECTOMY  70    TOTAL KNEE ARTHROPLASTY Right 6/12/13    Dr Sen STREETER        Family history:   Family History   Problem Relation Age of Onset    High Cholesterol Mother     Elevated Lipids Mother     High Blood Pressure Mother     Heart Disease Mother     High Cholesterol Father     Other Father         blind       Social history :  reports that he quit smoking about 27 years ago. His smoking use included cigarettes. He has a 90.00 pack-year smoking history. He has never used smokeless tobacco. He reports that he does not drink alcohol and does not use drugs. Allergies   Allergen Reactions    Glipizide Nausea Only    Aldactone [Spironolactone]      Gynecomastia     Bactrim [Sulfamethoxazole-Trimethoprim]      Nephrology contraindicates-- can cause hyperkalemia    Crestor [Rosuvastatin]      myalgias    Labetalol      Marked bradycardia with frequent symptomatic ectopy    Lisinopril Other (See Comments)     Throat swelling- stopped by Dr. Teodoro Meredith    Metformin And Related [Metformin And Related]      Diarrhea at 500mg daily    Nsaids      W PROTEINURIA    Statins      myalgias    Tegretol [Carbamazepine]      JITTERS, INSOMNIA       Current Outpatient Medications on File Prior to Visit   Medication Sig Dispense Refill    traMADol (ULTRAM) 50 MG tablet Take 1 tablet by mouth 3 times daily for 30 days. 90 tablet 0    HYDROcodone-acetaminophen (NORCO) 5-325 MG per tablet Take 1 tablet by mouth 2 times daily as needed for Pain for up to 30 days. 60 tablet 0    glipiZIDE (GLUCOTROL) 10 MG tablet Take 1 tablet by mouth 2 times daily (before meals) 60 tablet 2    ondansetron (ZOFRAN) 4 MG tablet Take 1 tablet by mouth every 8 hours as needed for Nausea 40 tablet 1    gabapentin (NEURONTIN) 300 MG capsule Take 1 capsule by mouth 3 times daily for 120 days.  Intended supply: 90 days 90 capsule 3    cyclobenzaprine (FLEXERIL) 10 mg tablet TAKE ONE TABLET BY MOUTH THREE TIMES A DAY AS NEEDED FOR MUSCLE SPASMS 90 tablet 1    hydrALAZINE (APRESOLINE) 25 MG tablet TAKE ONE TABLET BY MOUTH TWICE A  tablet 1    metoprolol tartrate (LOPRESSOR) 50 MG tablet TAKE ONE TABLET BY MOUTH TWICE A DAY (Patient taking differently: Take 25 mg by mouth in the morning and at bedtime) 180 tablet 1    amLODIPine (NORVASC) 5 MG tablet TAKE ONE TABLET BY MOUTH DAILY 90 tablet 1    vitamin D3 (CHOLECALCIFEROL) 25 MCG (1000 UT) TABS tablet Take 2 tablets by mouth daily 60 tablet 11    eplerenone (INSPRA) 50 MG tablet Take 1 tablet by mouth 2 times daily 180 tablet 3    furosemide (LASIX) 40 MG tablet Take 1 tablet by mouth See Admin Instructions PRN LEG SWELLING 90 tablet 1    Lancets MISC 1 each by Does not apply route 2 times daily 200 each 1    blood glucose monitor strips Test two times a day & as needed for symptoms of irregular blood glucose. 200 strip 3    butalbital-acetaminophen-caffeine (FIORICET, ESGIC) -40 MG per tablet Take 1 tablet by mouth 3 times daily as needed for Headaches or Migraine 30 tablet 1    lansoprazole (PREVACID) 15 MG delayed release capsule Take 1 capsule by mouth daily 90 capsule 1    meclizine (ANTIVERT) 25 MG tablet Take 1 tablet by mouth every 6 hours. 30 tablet 1     No current facility-administered medications on file prior to visit. Review of Systems:   Review of Systems   Constitutional:  Positive for fatigue. Negative for activity change, chills and fever. HENT:  Negative for congestion, ear pain and tinnitus. Eyes:  Negative for photophobia, pain and visual disturbance. Respiratory:  Positive for shortness of breath. Negative for cough, chest tightness and wheezing. Cardiovascular:  Positive for palpitations. Negative for chest pain and leg swelling. Gastrointestinal:  Negative for abdominal pain, blood in stool, constipation, diarrhea, nausea and vomiting. Endocrine: Negative for cold intolerance and heat intolerance. Genitourinary:  Negative for dysuria, flank pain and hematuria.    Musculoskeletal:  Positive for arthralgias. Negative for back pain, myalgias and neck stiffness. Skin:  Negative for color change and rash. Allergic/Immunologic: Negative for food allergies. Neurological:  Negative for dizziness, light-headedness, numbness and headaches. Hematological:  Does not bruise/bleed easily. Psychiatric/Behavioral:  Negative for agitation, behavioral problems and confusion. Examination:      Vitals:    10/12/22 1454 10/12/22 1507   BP: (!) 154/78 (!) 168/80   Site: Left Upper Arm Left Upper Arm   Position: Sitting Sitting   Cuff Size: Medium Adult Medium Adult   Pulse: 85    Weight: 235 lb 9.6 oz (106.9 kg)    Height: 5' 7\" (1.702 m)         Body mass index is 36.9 kg/m². Physical Exam  Constitutional:       Appearance: Normal appearance. He is obese. He is not ill-appearing. HENT:      Head: Normocephalic and atraumatic. Mouth/Throat:      Mouth: Mucous membranes are moist.   Eyes:      Conjunctiva/sclera: Conjunctivae normal.   Cardiovascular:      Rate and Rhythm: Normal rate. Rhythm irregular. Heart sounds: No murmur heard. Comments: Frequent ectopic beats  Pulmonary:      Effort: Pulmonary effort is normal.      Breath sounds: No rales. Abdominal:      General: Abdomen is flat. Palpations: Abdomen is soft. Musculoskeletal:         General: No tenderness. Normal range of motion. Cervical back: Normal range of motion. Right lower leg: No edema. Left lower leg: No edema. Skin:     General: Skin is warm and dry. Neurological:      General: No focal deficit present. Mental Status: He is alert and oriented to person, place, and time.              CBC:   Lab Results   Component Value Date/Time    WBC 7.2 08/18/2022 07:54 AM    WBC 7.2 06/14/2022 10:10 AM    HGB 15.3 08/18/2022 07:54 AM    HCT 44.8 08/18/2022 07:54 AM     08/18/2022 07:54 AM     Lipids:  Lab Results   Component Value Date    CHOL 116 08/18/2022    TRIG 215 (H) 08/18/2022    HDL 38 08/18/2022    LDLCALC 35 08/18/2022    LDLDIRECT 156 (H) 02/05/2021     PT/INR: No results found for: INR     BMP:    Lab Results   Component Value Date     08/18/2022    K 4.0 08/18/2022    CL 95 (L) 08/18/2022    CO2 27 08/18/2022    BUN 14 08/18/2022     CMP:   Lab Results   Component Value Date    AST 24 08/18/2022    PROT 7.0 08/18/2022    BILITOT 0.4 08/18/2022    ALKPHOS 101 08/18/2022     TSH:    Lab Results   Component Value Date/Time    TSH 1.01 06/14/2022 10:10 AM       EKGINTERPRETATION - EKG Interpretation:  sinus rhythm, PVC    Echo 6/14/2021     Normal left ventricle structure and systolic function with an ejection   fraction of 55-60%. Grade I diastolic dysfunction. Sclerotic, but non-stenotic aortic valve. Normal pulmonary artery pressure, 33 mmHg. No evidence of pericardial effusion. Nuclear imaging 6/14/2021     Normal study suggestive of normal perfusion in distribution of all    coronaries and normal left ventricular size and function, LVEF is 61% . Recommendation    Continue lifestyle and risk modification for primary prevention. IMPRESSION / RECOMMENDATIONS:     PVC  HTN on amlodipine, hydralazine, lasix, eplerenone  HLD  DM  CKD    Patient is having ventricular bigeminy right now. Patient has almost 19 to 20% of PVCs. Patient has already been on metoprolol. Discussed with the patient about options of medical therapy versus ablation. Patient wants to try ablation. Patient feels symptomatic with PVCs palpitations and shortness of breath with exertion  He had work up for PVC previously in 2021 by Travis Garrett and stress and echo were normal. This time PVC burden is increased. Blood pressure is elevated. Patient reports that he is takes hydralazine only 2 times a day usually his blood pressure runs in systolic of 560.   Today it is elevated discussed with the patient to increase the hydralazine to 3 times a day and check his blood pressures at home and it is know if it is consistently above 140    Discussed to avoid caffeine    Will get and echo to assess if LVEF is similar to last year given patient symptoms. The risks including, but not limited to, vascular injury, bleeding, infection, radiation exposure, injury to cardiac and surrounding structures (including esophageal and pulmonary vein injury), injury to the normal conduction system of the heart (possibly requiring a pacemaker), stroke, myocardial infarction and death were all discussed. The patient considered the risks, benefits and alternatives; decided to proceed with the procedure. Thanks again for allowing me to participate in care of this patient. Please call me if you have any questions. With best regards. Marshall Quintanilla MD, 10/12/2022 3:12 PM     Please note this report has been partially produced using speech recognition software and may contain errors related to that system including errors in grammar, punctuation, and spelling, as well as words and phrases that may be inappropriate. If there are any questions or concerns please feel free to contact the dictating provider for clarification.

## 2022-10-12 NOTE — LETTER
Adalberto Arzola      PROCEDURE: Premature Ventricular Contraction Ablation    Date of Procedure: 11/15/2022  Time: ReynaDale Bynumjean  Time: 1030    Patient Name: Jody Faust  : 1950   MRN# [id number]    HOSPITAL: [facility])    Call to Pre-What Cheer at [phone]  2 days before your procedure    x Please have blood work and chest-x-ray done 11/10/222 at [facility], [address] [place] [address] or [facility]    X Please have COVID-19 Test done on 11/10/2022 at the [facility]. You will need to Quarantine yourself until procedure. x Please do not have anything by mouth after midnight prior to or 8 hours  before the procedure.    x You may take your medications with a sip of water in the morning before your procedure or take them with you unless listed below. X  Hold Beta Blocker- Metoprolol 2 days prior to procedure. IMPORTANT NOTICE TO PATIENTS: Prior Authorization  We will contact your insurance for prior authorization for the CPT code related to the procedure it is the patient's responsibility to contact their insurance to ensure they are following their medical plans provisions or requirements! Patient Signature:  _______________________  Staff: Reta Goff MA     Staff Given Instructions:__________________________________                    Adalberto Gregory Liner: Premature Ventricular Contraction Ablation     Date of Procedure: 11/15/2022 Time: Nidhi Nino  Time: 1030    Patient Name: Jody Faust  : 1950   MRN# [id number]    Day of Procedure Cath Lab Holding area Pre op Orders:    ? IV peripheral saline lock x2 (preferred left arm)   ? Type & Screen STAT on arrival.  ? 12-lead EKG STAT on arrival.   ? If taking Coumadin, PT INR STAT on arrival day of procedure. ?  Female patients <=48years of age >> Please do urine HCG test.  ? Diabetic patients >> Accu check Blood sugar check. ? Document home medications in EPIC and include date and time of last dose.  ? NPO  ? Notify Dr. Lois Sheriff of abnormal lab results. ? Chest Prep> Clip hair anterior chest and posterior back. ? Groin Prep> Clip hair bilateral groins. Physician Signature:_____________________Date:_________Time:________                                                 *This consent is applicable for 30 days following patient signature*    Regine Cardenas / PROCEDURE    I (We), Nadege Cardenas authorize, Dr. Lisa Carrion     and such assistants as may be selected by him/her, to perform the following operation/procedures:    Premature Ventricular Contraction Ablation     Note: If unable to obtain consent prior to an emergent procedure, document the emergent reason in the medical record. This procedure has been explained to my (our) satisfaction and included in the explanation was:   A) the intended benefit, nature, and extent of the procedure to be performed;   B) the significant risks involved and the probability of success;   C) alternative procedures and methods of treatment;   D) the dangers and probable consequences of such alternatives (including no procedure or treatment); E) the expected consequences of the procedure on my future health;   F) whether other qualified individuals would be performing important surgical tasks and / or whether  would be present to advise or support the procedure. I (we) understand that there are other risks of infection and other serious complications in the pre-operative/procedural and postoperative/procedural stages of my (our) care. I (we) have asked all of the questions which I (we) thought were important in deciding whether or not to undergo treatment or diagnosis. These questions have been answered to my (our) satisfaction.    I (we) understand that no assurance can be given that the procedure will be a success, and no guarantee or warranty of success has been given to me (us). 2. It has been explained to me (us) that during the course of the operation/procedure, unforeseen conditions may be revealed that necessitate extension of the original procedure(s) or different procedure(s) than those set forth in Paragraph 1. I (we) authorize and request that the above-named physician, his/her assistants or his/her designees, perform procedures as necessary and desirable if deemed to be in my (our) best interest.     3. I acknowledge that other health care personnel may be observing this procedure for the purpose of medical education or other specified purposes as may be necessary as requested and/or approved by my (our) physician. 4. I (we) consent to the disposal by the hospital Pathologist of the removed tissue, parts or organs in accordance with hospital policy. 5. I do_____ do not______ consent to the use of a local infiltration pain blocking agent that will be used by my provider/surgical provider to help alleviate pain during my procedure. 6. I do_____ do not_____ consent to an emergent blood transfusion in the case of a life-threatening situation that requires blood components to be administered. This consent is valid for 24 hours from the beginning of the procedure. 7. This patient does _____ or does not ______currently have a DNR status/order. If DNR order is in place, obtain \"Addendum to the Surgical Consent for ALL Patients with a DNR Order\" to address reinaldo-operative status for limited intervention or DNR suspension.      8. I have read and fully understand the above Consent for Operation/Procedure and that all blanks were completed before I signed the consent.     _______________________________________  _____/____am/pm   Signature of Patient or legal representative Printed Name / Relationship Date / Time     _______________________________________   _____/____ am/pm Witness to Signature Printed Name Date / Time     Informed consent:   I have provided the explanation described above in section 1 to the patient and/or legal representative. I have provided the patient and/or legal representative with an opportunity to ask any questions about the proposed operation/procedure.     __________________________Dr.Pradeep Dale Mancilla  ____/____ am/pm   Provider / Proceduralist Printed Name Date / Time       Revised 2021                       [facility]     Dr. Anisha Marinelli    PATIENT NAME:    Ferne Severs                         : 1950    PROCEDURE: Premature Ventricular Contraction Ablation     DATE OF PROCEDURE: 11/15/2022    DIAGNOSIS:  Z01.810        X MAG     X   PHOS       X   BMP           X  CBC    X    PT        X  PTT           X     Chest -x-Ray PA & Lateral View        ? PLEASE CALL ABNORMAL RESULTS TO THE REQUESTING PHYSICIAN? ATTENTION PATIENTS:  You do not have to fast for the lab work. You must go to the [facility], [address] [place] [address] or [facility]      Physician Signature:__________________Date:_________Time:__________                                    [facility]   Dr. Anisha Marinelli  PROCEDURE: Premature Ventricular Contraction Ablation  Patient Name: Ferne Severs  : 1950   MRN# [id number]    Home Phone Number: [phone]   Weight:    Wt Readings from Last 3 Encounters:   10/12/22 235 lb 9.6 oz (106.9 kg)   22 234 lb (106.1 kg)   22 234 lb (106.1 kg)      Insurance: Payor: Rome Wong / Plan: Saeid Pérez / Product Type: *No Product type* /     Date of Procedure: 11/15/2022 Time: 3405 Arrival Time: 1030    Diagnosis:  Premature Ventricular Contraction  Allergies:    Allergies   Allergen Reactions    Glipizide Nausea Only    Aldactone [Spironolactone]      Gynecomastia     Bactrim [Sulfamethoxazole-Trimethoprim]      Nephrology contraindicates-- can cause hyperkalemia    Crestor [Rosuvastatin]      myalgias    Labetalol      Marked bradycardia with frequent symptomatic ectopy    Lisinopril Other (See Comments)     Throat swelling- stopped by Dr. Inocencio Esparza Metformin And Related [Metformin And Related]      Diarrhea at 500mg daily    Nsaids      W PROTEINURIA    Statins      myalgias    Tegretol [Carbamazepine]      JITTERS, INSOMNIA     1) Call [facility] scheduling ([phone]) or [address] [facility]     PHONE OR   INSTANT MESSAGE  2) PREAUTHORIZATION NUMBER:    Spoke to:      From date:     expiration date:        Carlito Leija, [address] [facility]

## 2022-10-13 RX ORDER — GABAPENTIN 300 MG/1
300 CAPSULE ORAL 3 TIMES DAILY
Qty: 90 CAPSULE | Refills: 3 | Status: SHIPPED | OUTPATIENT
Start: 2022-10-13 | End: 2023-02-10

## 2022-10-13 NOTE — TELEPHONE ENCOUNTER
Patient is due back 12/23 and will need a refill to hold them over until that appointment at least.     Requested Prescriptions     Pending Prescriptions Disp Refills    gabapentin (NEURONTIN) 300 MG capsule 90 capsule 3     Sig: Take 1 capsule by mouth 3 times daily for 120 days.  Intended supply: 90 days

## 2022-10-31 ENCOUNTER — TELEPHONE (OUTPATIENT)
Dept: CARDIOLOGY CLINIC | Age: 72
End: 2022-10-31

## 2022-10-31 DIAGNOSIS — G89.29 CHRONIC MIDLINE LOW BACK PAIN WITHOUT SCIATICA: ICD-10-CM

## 2022-10-31 DIAGNOSIS — M54.50 CHRONIC MIDLINE LOW BACK PAIN WITHOUT SCIATICA: ICD-10-CM

## 2022-10-31 RX ORDER — TRAMADOL HYDROCHLORIDE 50 MG/1
50 TABLET ORAL 3 TIMES DAILY
Qty: 90 TABLET | Refills: 0 | OUTPATIENT
Start: 2022-10-31 | End: 2022-11-30

## 2022-10-31 RX ORDER — TRAMADOL HYDROCHLORIDE 50 MG/1
TABLET ORAL
Qty: 90 TABLET | OUTPATIENT
Start: 2022-10-31

## 2022-10-31 NOTE — TELEPHONE ENCOUNTER
Left message for patient need to schedule an echo this week per Dr Grzegorz Campa. Asked to call us back when available.  Ask for [employer]
never intubated

## 2022-11-01 ENCOUNTER — HOSPITAL ENCOUNTER (OUTPATIENT)
Dept: SLEEP CENTER | Age: 72
Discharge: HOME | End: 2022-11-01

## 2022-11-01 DIAGNOSIS — M54.50 CHRONIC MIDLINE LOW BACK PAIN WITHOUT SCIATICA: ICD-10-CM

## 2022-11-01 DIAGNOSIS — G89.29 CHRONIC MIDLINE LOW BACK PAIN WITHOUT SCIATICA: ICD-10-CM

## 2022-11-01 PROCEDURE — 9990000010 HC NO CHARGE VISIT

## 2022-11-01 RX ORDER — TRAMADOL HYDROCHLORIDE 50 MG/1
50 TABLET ORAL 3 TIMES DAILY
Qty: 90 TABLET | Refills: 0 | Status: SHIPPED | OUTPATIENT
Start: 2022-11-01 | End: 2022-11-29 | Stop reason: SDUPTHER

## 2022-11-01 NOTE — PROGRESS NOTES
Siobhan Page MD, Yudith Duarte MD, Nika Spencer MD, Gabriel Whitney MD, [facility]      [address], [address] [facility]   PH: [phone]  F: [phone]     Subjective:     Patient ID: Ricky Marley is a 81 y.o. male, referred to the sleep center for   Chief Complaint   Patient presents with    Sleep Apnea    3 Month Follow-Up   .     Referring physician:  Rachele Ivy was unable to tolerate autopap and returned equipment    Social History     Socioeconomic History    Marital status:      Spouse name: Not on file    Number of children: Not on file    Years of education: Not on file    Highest education level: Not on file   Occupational History    Occupation: PA     Comment: [employer]     Occupation: disabled     Comment:    Tobacco Use    Smoking status: Former     Packs/day: 3.00     Years: 30.00     Pack years: 90.00     Types: Cigarettes     Quit date: 1995     Years since quittin.8    Smokeless tobacco: Never   Vaping Use    Vaping Use: Never used   Substance and Sexual Activity    Alcohol use: No     Comment: caffeine 1 coffee 1 pop    Drug use: No    Sexual activity: Yes     Partners: Female   Other Topics Concern    Not on file   Social History Narrative    Not on file     Social Determinants of Health     Financial Resource Strain: Low Risk     Difficulty of Paying Living Expenses: Not hard at all   Food Insecurity: No Food Insecurity    Worried About Running Out of Food in the Last Year: Never true    Ran Out of Food in the Last Year: Never true   Transportation Needs: Not on file   Physical Activity: Inactive    Days of Exercise per Week: 0 days    Minutes of Exercise per Session: 0 min   Stress: Not on file   Social Connections: Not on file   Intimate Partner Violence: Not on file   Housing Stability: Not on file       Prior to Admission medications    Medication Sig Start Date End Date Taking? Authorizing Provider   eplerenone (INSPRA) 50 MG tablet Take 1 tablet by mouth 2 times daily 10/21/22   Danielle Cardozo DO   gabapentin (NEURONTIN) 300 MG capsule Take 1 capsule by mouth 3 times daily for 120 days. Intended supply: 90 days 10/13/22 2/10/23  Shauna Willson DO   hydrALAZINE (APRESOLINE) 25 MG tablet Take 1 tablet by mouth 3 times daily 10/12/22   Snow Prince MD   traMADol (ULTRAM) 50 MG tablet Take 1 tablet by mouth 3 times daily for 30 days. 10/3/22 11/2/22  Boogie Tavera MD   HYDROcodone-acetaminophen (NORCO) 5-325 MG per tablet Take 1 tablet by mouth 2 times daily as needed for Pain for up to 30 days. 9/1/22 10/12/22  Boogie Tavera MD   glipiZIDE (GLUCOTROL) 10 MG tablet Take 1 tablet by mouth 2 times daily (before meals) 8/18/22   Boogie Tavera MD   ondansetron [facility]) 4 MG tablet Take 1 tablet by mouth every 8 hours as needed for Nausea 6/30/22   Boogie Tavera MD   cyclobenzaprine (FLEXERIL) 10 mg tablet TAKE ONE TABLET BY MOUTH THREE TIMES A DAY AS NEEDED FOR MUSCLE SPASMS 6/10/22   Boogie Tavera MD   metoprolol tartrate (LOPRESSOR) 50 MG tablet TAKE ONE TABLET BY MOUTH TWICE A DAY  Patient taking differently: Take 25 mg by mouth in the morning and at bedtime 5/31/22   Boogie Tavera MD   amLODIPine (NORVASC) 5 MG tablet TAKE ONE TABLET BY MOUTH DAILY 5/23/22   Boogie Tavera MD   vitamin D3 (CHOLECALCIFEROL) 25 MCG (1000 UT) TABS tablet Take 2 tablets by mouth daily 3/11/22   Danielle Cardozo DO   furosemide (LASIX) 40 MG tablet Take 1 tablet by mouth See Admin Instructions PRN LEG SWELLING 12/1/20   Danielle Cardozo DO   Lancets MISC 1 each by Does not apply route 2 times daily 5/6/20   Boogie Tavera MD   blood glucose monitor strips Test two times a day & as needed for symptoms of irregular blood glucose.  5/6/20   Boogie Tavera MD   butalbital-acetaminophen-caffeine (FIORICET, [address]) -40 MG per tablet Take 1 tablet by mouth 3 times daily as needed for Headaches or Migraine 4/29/20   Phyllis Camacho MD   lansoprazole (PREVACID) 15 MG delayed release capsule Take 1 capsule by mouth daily 9/27/16   Phyllis Camacho MD   meclizine (ANTIVERT) 25 MG tablet Take 1 tablet by mouth every 6 hours.  3/10/15   Phyllis Camacho MD       Allergies as of 11/01/2022 - Fully Reviewed 10/12/2022   Allergen Reaction Noted    Glipizide Nausea Only 10/12/2022    Aldactone [spironolactone]  04/18/2019    Bactrim [sulfamethoxazole-trimethoprim]  02/25/2020    Crestor [rosuvastatin]  02/26/2021    Labetalol  04/18/2019    Lisinopril Other (See Comments) 11/04/2021    Metformin and related [metformin and related]  10/14/2013    Nsaids  12/07/2017    Statins  02/26/2021    Tegretol [carbamazepine]  05/04/2021       Patient Active Problem List   Diagnosis    Primary hypertension    Crohn's regional enteritis ([address]    Hyperlipidemia    Gastroesophageal reflux disease without esophagitis    Testosterone deficiency    Low back pain    Osteoarthritis, localized, shoulder, left    DM II (diabetes mellitus, type II), controlled ([address]    Neck pain    Right cervical radiculopathy    S/P colonoscopy    Blurred vision    Pattern dystrophy of macula    Benign prostatic hyperplasia    Osteoarthritis of hands, bilateral    Dysphagia    Nausea    Crohn's disease of large intestine without complication ([address]    Status post total right knee replacement    Arthrofibrosis of total knee arthroplasty (HCC)    Acute nonintractable headache    Bradycardia    Stage 3a chronic kidney disease    Diabetic nephropathy associated with type 2 diabetes mellitus ([address]    Hypertensive renal disease    Chronic kidney disease-mineral and bone disorder    Other proteinuria    S/P left knee arthroscopy    Palpitations    Statin intolerance    Chronic ethmoidal sinusitis    Left shoulder strain, initial encounter    Impingement syndrome, shoulder, left    Nontraumatic incomplete tear of left rotator cuff    Chronic renal disease, stage III ([address] [[id number]]    VAN on CPAP       Past Medical History:   Diagnosis Date    Blurred vision 04/03/2012    right>left  side --pattern dystrophy/Dr Dye    BPH (benign prostatic hypertrophy) 09/11/2015    Bradycardia 04/18/2019    Hr 30-40 with skipped beats on BP monitor on Labetalol    Crohn's regional enteritis ([facility] 01/03/2012    Dr Dong\"dx with Crohns in [address] [place] [address],[facility]- no recent issues    DM II (diabetes mellitus, type II), controlled ([facility] 2008    insurance not cover Jardiance    Erosive esophagitis 03/30/2012    \"had erosive esophagitis in lower area on Prevacid for this\"    GERD (gastroesophageal reflux disease) 04/03/2012    Headache disorder     [place] (hard of hearing) 09/13/2017    HTN (hypertension) 01/03/2021    also comanaged w Dr Simón Cárdenas. 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       S/P colonoscopy 01/17/2019    Dr Breezy Gr-- ?recheck when?- per pt 2024    Stage 3 chronic kidney disease ([facility] 04/18/2019    Dr Simón Cárdenas    Testosterone deficiency 04/03/2012 on andrgel    Vertigo 04/03/2012    \"last used Meclazine since 2/2020\"       Past Surgical History:   Procedure Laterality Date    CARDIAC CATHETERIZATION      \"had 2 caths done since 1995 but not sure of dates\"    CATARACT REMOVAL WITH IMPLANT Left 03/02/2017    Dr Gary Singh Right 03/16/2017    Dr Kimberli Sorenson  2005    Dr Richard Trejo  8/2011    Dr Rosalba Miller    COLONOSCOPY      Multiple colonoscopys, HX: Crohns    ENDOSCOPY, COLON, DIAGNOSTIC  2018    Erosive esophagitis    EYE SURGERY      \"left eye surgery for crosseyed- age 13 months\"    EYE SURGERY Left     cataract    EYE SURGERY Right 03/16/2017    cataract    KNEE ARTHROSCOPY  89    right    KNEE ARTHROSCOPY Left 11/11/2020    LEFT KNEE ARTHROSCOPY WITH PARTIAL MENISCECTOMY CHONDROPLASTY performed by Lavern Gottron, MD at [address] Right 1/16/2013    Dr Jose STREETER knee tib plateau fx and medial meniscectomy    LAMINECTOMY  75 and 85    SINUS SURGERY  06/29/2020    Dr Elizabeth Martinez for chr sinusitis and fungal infection, per pt    TONSILLECTOMY  70    TOTAL KNEE ARTHROPLASTY Right 6/12/13    Dr Jose STREETER        Family History   Problem Relation Age of Onset    High Cholesterol Mother     Elevated Lipids Mother     High Blood Pressure Mother     Heart Disease Mother     High Cholesterol Father     Other Father         blind         Objective: There were no vitals filed for this visit. Inches  [place] -      Gen: No distress. Eyes: PERRL. No sclera icterus. No conjunctival injection. ENT: No discharge. Pharynx clear. External appearance of ears and nose normal.  Neck: Trachea midline. No obvious mass. Resp: No accessory muscle use. No crackles. No wheezes. No rhonchi. No dullness on percussion. CV: Regular rate. Regular rhythm. No murmur or rub. No edema. GI: Non-tender. Non-distended. No hernia. Skin: Warm, dry, normal texture and turgor.  No nodule on exposed extremities. Lymph: No cervical LAD. No supraclavicular LAD. M/S: No cyanosis. No clubbing. No joint deformity. Psych: Oriented x 3. No anxiety. Awake. Alert. Intact judgement and insight.     Mallampati Airway Classification:   []1 []2 [x]3 []4        Sleep Complaints/Symptoms:    Normal Bedtime:      Normal Wake Time:   Average Sleep Time:      Number of Awakenings:    Duration of Sleep Complaints: 3years    [x] Snoring     [] Improved [x] Not Improved    [] Choking/Gasping for Breath  [] Improved [] Not Improved       [x] Witnessed Apneas              [] Improved [x] Not Improved  [] Daytime Sleepiness             [] Improved [] Not Improved  [] Morning Headaches    [] Improved [] Not Improved  [] Frequent Awakenings       [] Improved [] Not Improved  [] Jerky Movements   [] Improved [] Not Improved   [] Restless Legs   [] Improved [] Not Improved   [] Difficulty Initiating Sleep  [] Improved [] Not Improved   [] Difficulty Maintaining Sleep  [] Improved [] Not Improved   [] Restless Sleep    [] Improved [] Not Improved   [] Sleep Paralysis    [] Improved [] Not Improved   [] Muscle Weakness w/ Emotion  [] Improved [] Not Improved  [] Other :     CPAP Usage:    []  Patient has never worn CPAP  []  Patient has worn CPAP previously but discontinued use  []  Current PAP user,  [years]   []  Patient Tolerates Well   []  Patient Does Not Tolerate     []  Patient Uses CPAP      []  More Than 4 Hours      []  Less Than 4 Hours  []  CPAP/BPAP/ASV Pressure Readings   []  CPAP Pressure      cm H20   []  BPAP Pressure       cm H20   []  ASV Pressure         cm H20      Assessment:      Diagnosis:  mod sosa with ahi 23/hr       Patient Active Problem List    Diagnosis Date Noted    SOSA on CPAP 08/29/2022     Priority: Medium     Overview Note:     Dr Miguel Paulino - sleep study 8/22      Chronic renal disease, stage III [facility]) [[id number]] 05/03/2022     Priority: Medium    Nontraumatic incomplete tear of left rotator cuff 03/14/2022    Left shoulder strain, initial encounter 02/15/2022    Impingement syndrome, shoulder, left 02/15/2022    Statin intolerance 06/23/2021    Palpitations 05/20/2021    S/P left knee arthroscopy 02/05/2021    Diabetic nephropathy associated with type 2 diabetes mellitus ([facility] 12/01/2020    Hypertensive renal disease 12/01/2020    Chronic kidney disease-mineral and bone disorder 12/01/2020    Other proteinuria 12/01/2020    Chronic ethmoidal sinusitis 07/28/2020    Bradycardia 04/18/2019     Overview Note:     Hr 30-40 with skipped beats on BP monitor on Labetalol      Stage 3a chronic kidney disease 04/18/2019     Overview Note:     Dr Vita Buenrostro      Acute nonintractable headache     Status post total right knee replacement 09/13/2017    Arthrofibrosis of total knee arthroplasty ([facility] 09/13/2017    Dysphagia     Nausea     Crohn's disease of large intestine without complication (HCC)     Benign prostatic hyperplasia      Overview Note:     Updating Deprecated Diagnoses      Osteoarthritis of hands, bilateral     Pattern dystrophy of macula      Overview Note:     Dr Castrejon Rashad vision      Overview Note:     right>left  side pattern dystrophy/Dr Dye      Neck pain      Overview Note:     Dr Jl Valdez has evaluated      Right cervical radiculopathy     DM II (diabetes mellitus, type II), controlled ([address]     S/P colonoscopy 08/24/2012     Overview Note:     Dr Rosemarie Odom, recheck 1-2 yrs due to polyp      Gastroesophageal reflux disease without esophagitis     Testosterone deficiency      Overview Note:     on andrgel      Low back pain      Overview Note:     replace inactive diagnosis      Osteoarthritis, localized, shoulder, left     Primary hypertension      Overview Note:     1 Lopressor 50 twice a day Zestril 40 mg a day amlodipine 5 mg a day and hydralazine 25 mg 3 times a day      Crohn's regional enteritis ([facility]     Hyperlipidemia      Overview Note:     Borderline       Plan:        Sleep Study:     []  Sleep hygiene/ relaxation methods & CBTi principles review with patient     []  HST - Home Sleep Study   []  PSG - Overnight Diagnostic Polysomnogram     []  CPAP Titration    [] Split Night Study    [] BiLevel Titration    [] ASV - Auto-Servo Ventilation Titration       []  MSLT - Multiple Sleep Latency Test   []  MWT - Maintenance of Wakefulness Test    CPAP Therapy:     []  Patient to be seen for new mask fitting/desensitization   []  AutoPAP Titration    []  CPAP supplies and equipment at ________cmH2O    []  Continue same CPAP pressure   []  Change CPAP pressure to _______cm H2O   []  CPAP supplies only, no pressure change   []  Refer for an oral appliance       Medications:       [x]  Continue current medication    []  Add Medication:  ________________    Follow-Up:     []  No follow up required. Patient to return as needed. []  2 weeks   []  4 weeks   []  2 months   []  4 months   []  6 months   []  1 year for CPAP compliance evaluation. Patient to return sooner, as needed. []  Follow up after sleep study   [x]  Other: __pt is given option to either have dental device or inspire surgery. He will think about it and would let us know. Rtc 3 months____________    No orders of the defined types were placed in this encounter.          Electronically signed by Jacey Le MD on 11/1/2022 at 10:13 AM

## 2022-11-02 ENCOUNTER — PROCEDURE VISIT (OUTPATIENT)
Dept: CARDIOLOGY CLINIC | Age: 72
End: 2022-11-02
Payer: MEDICARE

## 2022-11-02 DIAGNOSIS — I10 ESSENTIAL HYPERTENSION: ICD-10-CM

## 2022-11-02 DIAGNOSIS — I49.3 PVC (PREMATURE VENTRICULAR CONTRACTION): ICD-10-CM

## 2022-11-02 DIAGNOSIS — I10 PRIMARY HYPERTENSION: ICD-10-CM

## 2022-11-02 DIAGNOSIS — R06.02 SHORTNESS OF BREATH: ICD-10-CM

## 2022-11-02 DIAGNOSIS — I20.8 OTHER FORMS OF ANGINA PECTORIS (HCC): ICD-10-CM

## 2022-11-02 LAB
LV EF: 53 %
LVEF MODALITY: NORMAL

## 2022-11-02 PROCEDURE — 93306 TTE W/DOPPLER COMPLETE: CPT | Performed by: INTERNAL MEDICINE

## 2022-11-10 ENCOUNTER — HOSPITAL ENCOUNTER (OUTPATIENT)
Age: 72
Setting detail: SPECIMEN
Discharge: HOME | End: 2022-11-10
Payer: MEDICARE

## 2022-11-10 ENCOUNTER — HOSPITAL ENCOUNTER (OUTPATIENT)
Age: 72
Discharge: HOME | End: 2022-11-10
Payer: MEDICARE

## 2022-11-10 ENCOUNTER — NURSE ONLY (OUTPATIENT)
Dept: CARDIOLOGY CLINIC | Age: 72
End: 2022-11-10

## 2022-11-10 ENCOUNTER — HOSPITAL ENCOUNTER (OUTPATIENT)
Dept: GENERAL RADIOLOGY | Age: 72
Discharge: HOME | End: 2022-11-10
Payer: MEDICARE

## 2022-11-10 DIAGNOSIS — Z01.810 PRE-OPERATIVE CARDIOVASCULAR EXAMINATION: ICD-10-CM

## 2022-11-10 DIAGNOSIS — I49.3 PVC (PREMATURE VENTRICULAR CONTRACTION): Primary | ICD-10-CM

## 2022-11-10 LAB
ANION GAP SERPL CALCULATED.3IONS-SCNC: 12 MMOL/L (ref 4–16)
APTT: 26.1 SECONDS (ref 25.1–37.1)
BASOPHILS ABSOLUTE: 0.1 K/CU MM
BASOPHILS RELATIVE PERCENT: 1.1 % (ref 0–1)
BUN BLDV-MCNC: 16 MG/DL (ref 6–23)
CALCIUM SERPL-MCNC: 9.4 MG/DL (ref 8.3–10.6)
CHLORIDE BLD-SCNC: 100 MMOL/L (ref 99–110)
CO2: 26 MMOL/L (ref 21–32)
CREAT SERPL-MCNC: 1.2 MG/DL (ref 0.9–1.3)
DIFFERENTIAL TYPE: ABNORMAL
EOSINOPHILS ABSOLUTE: 0.3 K/CU MM
EOSINOPHILS RELATIVE PERCENT: 4.2 % (ref 0–3)
GFR SERPL CREATININE-BSD FRML MDRD: >60 ML/MIN/1.73M2
GLUCOSE BLD-MCNC: 213 MG/DL (ref 70–99)
HCT VFR BLD CALC: 44.7 % (ref 42–52)
HEMOGLOBIN: 14.3 GM/DL (ref 13.5–18)
IMMATURE NEUTROPHIL %: 0.4 % (ref 0–0.43)
INR BLD: 0.96 INDEX
LYMPHOCYTES ABSOLUTE: 2 K/CU MM
LYMPHOCYTES RELATIVE PERCENT: 26.1 % (ref 24–44)
MAGNESIUM: 1.6 MG/DL (ref 1.8–2.4)
MCH RBC QN AUTO: 26.3 PG (ref 27–31)
MCHC RBC AUTO-ENTMCNC: 32 % (ref 32–36)
MCV RBC AUTO: 82.3 FL (ref 78–100)
MONOCYTES ABSOLUTE: 0.4 K/CU MM
MONOCYTES RELATIVE PERCENT: 5 % (ref 0–4)
NUCLEATED RBC %: 0 %
PDW BLD-RTO: 14.2 % (ref 11.7–14.9)
PHOSPHORUS: 3.3 MG/DL (ref 2.5–4.9)
PLATELET # BLD: 253 K/CU MM (ref 140–440)
PMV BLD AUTO: 9.6 FL (ref 7.5–11.1)
POTASSIUM SERPL-SCNC: 4.3 MMOL/L (ref 3.5–5.1)
PROTHROMBIN TIME: 12.4 SECONDS (ref 11.7–14.5)
RBC # BLD: 5.43 M/CU MM (ref 4.6–6.2)
SEGMENTED NEUTROPHILS ABSOLUTE COUNT: 4.8 K/CU MM
SEGMENTED NEUTROPHILS RELATIVE PERCENT: 63.2 % (ref 36–66)
SODIUM BLD-SCNC: 138 MMOL/L (ref 135–145)
TOTAL IMMATURE NEUTOROPHIL: 0.03 K/CU MM
TOTAL NUCLEATED RBC: 0 K/CU MM
WBC # BLD: 7.6 K/CU MM (ref 4–10.5)

## 2022-11-10 PROCEDURE — 71046 X-RAY EXAM CHEST 2 VIEWS: CPT

## 2022-11-10 PROCEDURE — U0005 INFEC AGEN DETEC AMPLI PROBE: HCPCS

## 2022-11-10 PROCEDURE — 85730 THROMBOPLASTIN TIME PARTIAL: CPT

## 2022-11-10 PROCEDURE — U0003 INFECTIOUS AGENT DETECTION BY NUCLEIC ACID (DNA OR RNA); SEVERE ACUTE RESPIRATORY SYNDROME CORONAVIRUS 2 (SARS-COV-2) (CORONAVIRUS DISEASE [COVID-19]), AMPLIFIED PROBE TECHNIQUE, MAKING USE OF HIGH THROUGHPUT TECHNOLOGIES AS DESCRIBED BY CMS-2020-01-R: HCPCS

## 2022-11-10 PROCEDURE — 85025 COMPLETE CBC W/AUTO DIFF WBC: CPT

## 2022-11-10 PROCEDURE — 83735 ASSAY OF MAGNESIUM: CPT

## 2022-11-10 PROCEDURE — 80048 BASIC METABOLIC PNL TOTAL CA: CPT

## 2022-11-10 PROCEDURE — 84100 ASSAY OF PHOSPHORUS: CPT

## 2022-11-10 PROCEDURE — 85610 PROTHROMBIN TIME: CPT

## 2022-11-10 PROCEDURE — 36415 COLL VENOUS BLD VENIPUNCTURE: CPT

## 2022-11-11 LAB
SARS-COV-2: NOT DETECTED
SOURCE: NORMAL

## 2022-11-15 ENCOUNTER — HOSPITAL ENCOUNTER (INPATIENT)
Dept: CARDIAC CATH/INVASIVE PROCEDURES | Age: 72
LOS: 1 days | Discharge: HOME | DRG: 274 | End: 2022-11-18
Attending: INTERNAL MEDICINE | Admitting: INTERNAL MEDICINE
Payer: MEDICARE

## 2022-11-15 PROBLEM — Z86.79 S/P ABLATION OF VENTRICULAR ARRHYTHMIA: Status: ACTIVE | Noted: 2022-11-15

## 2022-11-15 PROBLEM — Z98.890 S/P ABLATION OF VENTRICULAR ARRHYTHMIA: Status: ACTIVE | Noted: 2022-11-15

## 2022-11-15 LAB
ABO/RH: NORMAL
ABO/RH: NORMAL
ACTIVATED CLOTTING TIME, LOW RANGE: 175 SEC
ANTIBODY SCREEN: NEGATIVE
ANTIBODY SCREEN: NEGATIVE
EKG ATRIAL RATE: 110 BPM
EKG DIAGNOSIS: NORMAL
EKG P AXIS: 64 DEGREES
EKG P-R INTERVAL: 140 MS
EKG Q-T INTERVAL: 360 MS
EKG QRS DURATION: 84 MS
EKG QTC CALCULATION (BAZETT): 487 MS
EKG R AXIS: 24 DEGREES
EKG T AXIS: 43 DEGREES
EKG VENTRICULAR RATE: 110 BPM
GLUCOSE BLD-MCNC: 171 MG/DL (ref 70–99)
GLUCOSE BLD-MCNC: 196 MG/DL (ref 70–99)
GLUCOSE BLD-MCNC: 196 MG/DL (ref 70–99)
LV EF: 53 %
LVEF MODALITY: NORMAL

## 2022-11-15 PROCEDURE — 86850 RBC ANTIBODY SCREEN: CPT

## 2022-11-15 PROCEDURE — 93005 ELECTROCARDIOGRAM TRACING: CPT | Performed by: NURSE PRACTITIONER

## 2022-11-15 PROCEDURE — 2580000003 HC RX 258: Performed by: NURSE PRACTITIONER

## 2022-11-15 PROCEDURE — G0378 HOSPITAL OBSERVATION PER HR: HCPCS

## 2022-11-15 PROCEDURE — 82962 GLUCOSE BLOOD TEST: CPT

## 2022-11-15 PROCEDURE — 93010 ELECTROCARDIOGRAM REPORT: CPT | Performed by: INTERNAL MEDICINE

## 2022-11-15 PROCEDURE — 02583ZZ DESTRUCTION OF CONDUCTION MECHANISM, PERCUTANEOUS APPROACH: ICD-10-PCS | Performed by: INTERNAL MEDICINE

## 2022-11-15 PROCEDURE — 86901 BLOOD TYPING SEROLOGIC RH(D): CPT

## 2022-11-15 PROCEDURE — 2580000003 HC RX 258: Performed by: INTERNAL MEDICINE

## 2022-11-15 PROCEDURE — 2709999900 HC NON-CHARGEABLE SUPPLY

## 2022-11-15 PROCEDURE — 6370000000 HC RX 637 (ALT 250 FOR IP): Performed by: NURSE PRACTITIONER

## 2022-11-15 PROCEDURE — C1733 CATH, EP, OTHR THAN COOL-TIP: HCPCS

## 2022-11-15 PROCEDURE — 2580000003 HC RX 258

## 2022-11-15 PROCEDURE — 6370000000 HC RX 637 (ALT 250 FOR IP): Performed by: INTERNAL MEDICINE

## 2022-11-15 PROCEDURE — 93654 COMPRE EP EVAL TX VT: CPT

## 2022-11-15 PROCEDURE — 94761 N-INVAS EAR/PLS OXIMETRY MLT: CPT

## 2022-11-15 PROCEDURE — 6360000002 HC RX W HCPCS

## 2022-11-15 PROCEDURE — 6360000004 HC RX CONTRAST MEDICATION

## 2022-11-15 PROCEDURE — 2500000003 HC RX 250 WO HCPCS

## 2022-11-15 PROCEDURE — 85347 COAGULATION TIME ACTIVATED: CPT

## 2022-11-15 PROCEDURE — 86900 BLOOD TYPING SEROLOGIC ABO: CPT

## 2022-11-15 PROCEDURE — 02K83ZZ MAP CONDUCTION MECHANISM, PERCUTANEOUS APPROACH: ICD-10-PCS | Performed by: INTERNAL MEDICINE

## 2022-11-15 PROCEDURE — 93005 ELECTROCARDIOGRAM TRACING: CPT | Performed by: INTERNAL MEDICINE

## 2022-11-15 PROCEDURE — 6360000002 HC RX W HCPCS: Performed by: INTERNAL MEDICINE

## 2022-11-15 PROCEDURE — 93654 COMPRE EP EVAL TX VT: CPT | Performed by: INTERNAL MEDICINE

## 2022-11-15 PROCEDURE — C1732 CATH, EP, DIAG/ABL, 3D/VECT: HCPCS

## 2022-11-15 PROCEDURE — C1893 INTRO/SHEATH, FIXED,NON-PEEL: HCPCS

## 2022-11-15 PROCEDURE — C1730 CATH, EP, 19 OR FEW ELECT: HCPCS

## 2022-11-15 PROCEDURE — 93308 TTE F-UP OR LMTD: CPT

## 2022-11-15 RX ORDER — SODIUM CHLORIDE 9 MG/ML
INJECTION, SOLUTION INTRAVENOUS PRN
Status: DISCONTINUED | OUTPATIENT
Start: 2022-11-15 | End: 2022-11-18 | Stop reason: HOSPADM

## 2022-11-15 RX ORDER — DEXTROSE MONOHYDRATE 100 MG/ML
INJECTION, SOLUTION INTRAVENOUS CONTINUOUS PRN
Status: DISCONTINUED | OUTPATIENT
Start: 2022-11-15 | End: 2022-11-18 | Stop reason: HOSPADM

## 2022-11-15 RX ORDER — HYDROCODONE BITARTRATE AND ACETAMINOPHEN 5; 325 MG/1; MG/1
1 TABLET ORAL 2 TIMES DAILY PRN
Status: DISCONTINUED | OUTPATIENT
Start: 2022-11-15 | End: 2022-11-15

## 2022-11-15 RX ORDER — PROMETHAZINE HYDROCHLORIDE 25 MG/ML
25 INJECTION, SOLUTION INTRAMUSCULAR; INTRAVENOUS ONCE
Status: DISCONTINUED | OUTPATIENT
Start: 2022-11-15 | End: 2022-11-18 | Stop reason: HOSPADM

## 2022-11-15 RX ORDER — GLIPIZIDE 5 MG/1
10 TABLET ORAL
Status: DISCONTINUED | OUTPATIENT
Start: 2022-11-15 | End: 2022-11-16

## 2022-11-15 RX ORDER — SOTALOL HYDROCHLORIDE 80 MG/1
80 TABLET ORAL 2 TIMES DAILY
Status: DISCONTINUED | OUTPATIENT
Start: 2022-11-15 | End: 2022-11-18 | Stop reason: HOSPADM

## 2022-11-15 RX ORDER — AMLODIPINE BESYLATE 5 MG/1
5 TABLET ORAL DAILY
Status: DISCONTINUED | OUTPATIENT
Start: 2022-11-16 | End: 2022-11-18 | Stop reason: HOSPADM

## 2022-11-15 RX ORDER — PANTOPRAZOLE SODIUM 40 MG/1
40 TABLET, DELAYED RELEASE ORAL
Status: DISCONTINUED | OUTPATIENT
Start: 2022-11-16 | End: 2022-11-18 | Stop reason: HOSPADM

## 2022-11-15 RX ORDER — SODIUM CHLORIDE 0.9 % (FLUSH) 0.9 %
5-40 SYRINGE (ML) INJECTION EVERY 12 HOURS SCHEDULED
Status: DISCONTINUED | OUTPATIENT
Start: 2022-11-15 | End: 2022-11-18 | Stop reason: HOSPADM

## 2022-11-15 RX ORDER — EPLERENONE 25 MG/1
50 TABLET, FILM COATED ORAL 2 TIMES DAILY
Status: DISCONTINUED | OUTPATIENT
Start: 2022-11-15 | End: 2022-11-18 | Stop reason: HOSPADM

## 2022-11-15 RX ORDER — GABAPENTIN 300 MG/1
300 CAPSULE ORAL 3 TIMES DAILY
Status: DISCONTINUED | OUTPATIENT
Start: 2022-11-15 | End: 2022-11-18 | Stop reason: HOSPADM

## 2022-11-15 RX ORDER — ONDANSETRON 2 MG/ML
4 INJECTION INTRAMUSCULAR; INTRAVENOUS ONCE
Status: DISCONTINUED | OUTPATIENT
Start: 2022-11-15 | End: 2022-11-15

## 2022-11-15 RX ORDER — CYCLOBENZAPRINE HCL 10 MG
10 TABLET ORAL 3 TIMES DAILY PRN
Status: DISCONTINUED | OUTPATIENT
Start: 2022-11-15 | End: 2022-11-18 | Stop reason: HOSPADM

## 2022-11-15 RX ORDER — ACETAMINOPHEN 325 MG/1
650 TABLET ORAL EVERY 4 HOURS PRN
Status: DISCONTINUED | OUTPATIENT
Start: 2022-11-15 | End: 2022-11-18 | Stop reason: HOSPADM

## 2022-11-15 RX ORDER — SODIUM CHLORIDE 0.9 % (FLUSH) 0.9 %
5-40 SYRINGE (ML) INJECTION PRN
Status: DISCONTINUED | OUTPATIENT
Start: 2022-11-15 | End: 2022-11-18 | Stop reason: HOSPADM

## 2022-11-15 RX ORDER — HYDRALAZINE HYDROCHLORIDE 25 MG/1
25 TABLET, FILM COATED ORAL 3 TIMES DAILY
Status: DISCONTINUED | OUTPATIENT
Start: 2022-11-15 | End: 2022-11-18 | Stop reason: HOSPADM

## 2022-11-15 RX ORDER — TRAMADOL HYDROCHLORIDE 50 MG/1
50 TABLET ORAL 3 TIMES DAILY PRN
Status: COMPLETED | OUTPATIENT
Start: 2022-11-15 | End: 2022-11-16

## 2022-11-15 RX ADMIN — SOTALOL HYDROCHLORIDE 80 MG: 80 TABLET ORAL at 21:36

## 2022-11-15 RX ADMIN — SODIUM CHLORIDE, PRESERVATIVE FREE 10 ML: 5 INJECTION INTRAVENOUS at 21:38

## 2022-11-15 RX ADMIN — MAGNESIUM SULFATE HEPTAHYDRATE 3000 MG: 500 INJECTION, SOLUTION INTRAMUSCULAR; INTRAVENOUS at 10:10

## 2022-11-15 RX ADMIN — CYCLOBENZAPRINE 10 MG: 10 TABLET, FILM COATED ORAL at 16:22

## 2022-11-15 RX ADMIN — GLIPIZIDE 10 MG: 5 TABLET ORAL at 16:22

## 2022-11-15 RX ADMIN — HYDROCODONE BITARTRATE AND ACETAMINOPHEN 1 TABLET: 5; 325 TABLET ORAL at 18:37

## 2022-11-15 RX ADMIN — EPLERENONE 50 MG: 25 TABLET, FILM COATED ORAL at 21:32

## 2022-11-15 RX ADMIN — GABAPENTIN 300 MG: 300 CAPSULE ORAL at 21:36

## 2022-11-15 RX ADMIN — TRAMADOL HYDROCHLORIDE 50 MG: 50 TABLET, COATED ORAL at 21:33

## 2022-11-15 RX ADMIN — GABAPENTIN 300 MG: 300 CAPSULE ORAL at 16:22

## 2022-11-15 RX ADMIN — HYDRALAZINE HYDROCHLORIDE 25 MG: 25 TABLET, FILM COATED ORAL at 21:33

## 2022-11-15 RX ADMIN — HYDRALAZINE HYDROCHLORIDE 25 MG: 25 TABLET, FILM COATED ORAL at 16:22

## 2022-11-15 ASSESSMENT — ENCOUNTER SYMPTOMS
COLOR CHANGE: 0
DIARRHEA: 0
PHOTOPHOBIA: 0
BACK PAIN: 0
COUGH: 0
VOMITING: 0
WHEEZING: 0
CONSTIPATION: 0
SHORTNESS OF BREATH: 1
NAUSEA: 0
BLOOD IN STOOL: 0
CHEST TIGHTNESS: 0
ABDOMINAL PAIN: 0
EYE PAIN: 0

## 2022-11-15 ASSESSMENT — PAIN DESCRIPTION - ORIENTATION
ORIENTATION: LOWER;MID
ORIENTATION: LOWER;MID

## 2022-11-15 ASSESSMENT — PAIN SCALES - GENERAL
PAINLEVEL_OUTOF10: 0
PAINLEVEL_OUTOF10: 7
PAINLEVEL_OUTOF10: 8

## 2022-11-15 ASSESSMENT — PAIN DESCRIPTION - DESCRIPTORS
DESCRIPTORS: ACHING;BURNING
DESCRIPTORS: DISCOMFORT;ACHING;SORE

## 2022-11-15 ASSESSMENT — PAIN DESCRIPTION - LOCATION
LOCATION: BACK
LOCATION: BACK

## 2022-11-15 ASSESSMENT — PAIN - FUNCTIONAL ASSESSMENT
PAIN_FUNCTIONAL_ASSESSMENT: ACTIVITIES ARE NOT PREVENTED
PAIN_FUNCTIONAL_ASSESSMENT: ACTIVITIES ARE NOT PREVENTED

## 2022-11-15 ASSESSMENT — PAIN DESCRIPTION - PAIN TYPE: TYPE: CHRONIC PAIN

## 2022-11-15 NOTE — CARE COORDINATION
.CM has reviewed pt's chart for needs. CM screening shows that pt has PCP, insurance and is independent PTA. If needs arise please contact MAR[facility]EL.   TE

## 2022-11-15 NOTE — H&P
Electrophysiology H&p Note      Reason for consultation: pvc    Chief complaint : Shortness of breath with exertion, Palpitations    Referring physician: Brian Rodriguez      Primary care physician: Mukesh Arriaza MD      History of Present Illness: Today visit (11/15/22)    Patient here for PVC ablation. No change in H&P noted from previous clinic visit. Previous visit:     Patient is a 62-year-old male with a history of hypertension hyperlipidemia diabetes mellitus, CKD, referred by Dr. Jennifer Norton for PVC management. Patient reports shortness of breath with exertion. Patient also reports palpitations which cause him to have pressure and pausing and a feeling on the pulse. Patient reports dizziness at times. Patient also reports edema in the legs and ankles and feet bilaterally. Patient does not smoke. Patient drinks 1 cup of coffee daily. Patient is trying to get more exercise but is limited with shortness of breath    He was told he had abnormal test results and was told to follow-up with me    Pastmedical history:   Past Medical History:   Diagnosis Date    Blurred vision 04/03/2012    right>left  side --pattern dystrophy/Dr Dye    BPH (benign prostatic hypertrophy) 09/11/2015    Bradycardia 04/18/2019    Hr 30-40 with skipped beats on BP monitor on Labetalol    Crohn's regional enteritis ([address] 01/03/2012    Dr Dong\"dx with Crohns in [address] [place] [address]- no recent issues    DM II (diabetes mellitus, type II), controlled ([address] 2008    insurance not cover Jardiance    Erosive esophagitis 03/30/2012    \"had erosive esophagitis in lower area on Prevacid for this\"    GERD (gastroesophageal reflux disease) 04/03/2012    Headache disorder     [place] De[place] (hard of hearing) 09/13/2017    HTN (hypertension) 01/03/2021    also comanaged w Dr Earline Humphrey.     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       S/P colonoscopy 01/17/2019    Dr Orlando Ormond-- ?recheck when?- per pt 2024    Stage 3 chronic kidney disease ([facility] 04/18/2019    Dr Teodoro Meredith    Testosterone deficiency 04/03/2012    on andrgel    Vertigo 04/03/2012    \"last used Meclazine since 2/2020\"       Surgical history :   Past Surgical History:   Procedure Laterality Date    CARDIAC CATHETERIZATION      \"had 2 caths done since 1995 but not sure of dates\"    CATARACT REMOVAL WITH IMPLANT Left 03/02/2017    Dr Christopher Chow Right 03/16/2017    Dr Milo Snow  2005    Dr Nisha Slater  8/2011    Dr Lanna Dandy    COLONOSCOPY      Multiple colonoscopys, HX: Crohns    ENDOSCOPY, COLON, DIAGNOSTIC  2018    Erosive esophagitis    EYE SURGERY      \"left eye surgery for crosseyed- age 13 months\"    EYE SURGERY Left     cataract    EYE SURGERY Right 03/16/2017    cataract    KNEE ARTHROSCOPY  89    right    KNEE ARTHROSCOPY Left 11/11/2020    LEFT KNEE ARTHROSCOPY WITH PARTIAL MENISCECTOMY CHONDROPLASTY performed by Salomon Bridges MD at [address] Right 1/16/2013    Dr Sen Marte- R knee tib plateau fx and medial meniscectomy    LAMINECTOMY  75 and 82    SINUS SURGERY  06/29/2020    Dr Tony Hernandez for chr sinusitis and fungal infection, per pt    TONSILLECTOMY  70    TOTAL KNEE ARTHROPLASTY Right 6/12/13    Dr Nestor STREETER        Family history:   Family History   Problem Relation Age of Onset    High Cholesterol Mother     Elevated Lipids Mother     High Blood Pressure Mother     Heart Disease Mother     High Cholesterol Father     Other Father         blind       Social history :  reports that he quit smoking about 27 years ago. His smoking use included cigarettes. He has a 90.00 pack-year smoking history. He has never used smokeless tobacco. He reports that he does not drink alcohol and does not use drugs. Allergies   Allergen Reactions    Aldactone [Spironolactone]      Gynecomastia     Bactrim [Sulfamethoxazole-Trimethoprim]      Nephrology contraindicates-- can cause hyperkalemia    Crestor [Rosuvastatin]      myalgias    Labetalol      Marked bradycardia with frequent symptomatic ectopy    Lisinopril Other (See Comments)     Throat swelling- stopped by Dr. Madelyn Javier    Metformin And Related [Metformin And Related]      Diarrhea at 500mg daily    Nsaids      W PROTEINURIA    Statins      myalgias    Tegretol [Carbamazepine]      JITTERS, INSOMNIA       No current facility-administered medications on file prior to encounter. Current Outpatient Medications on File Prior to Encounter   Medication Sig Dispense Refill    traMADol (ULTRAM) 50 MG tablet Take 1 tablet by mouth 3 times daily for 30 days. 90 tablet 0    eplerenone (INSPRA) 50 MG tablet Take 1 tablet by mouth 2 times daily 60 tablet 5    gabapentin (NEURONTIN) 300 MG capsule Take 1 capsule by mouth 3 times daily for 120 days. Intended supply: 90 days 90 capsule 3    hydrALAZINE (APRESOLINE) 25 MG tablet Take 1 tablet by mouth 3 times daily 180 tablet 1    HYDROcodone-acetaminophen (NORCO) 5-325 MG per tablet Take 1 tablet by mouth 2 times daily as needed for Pain for up to 30 days.  60 tablet 0 glipiZIDE (GLUCOTROL) 10 MG tablet Take 1 tablet by mouth 2 times daily (before meals) 60 tablet 2    ondansetron (ZOFRAN) 4 MG tablet Take 1 tablet by mouth every 8 hours as needed for Nausea 40 tablet 1    cyclobenzaprine (FLEXERIL) 10 mg tablet TAKE ONE TABLET BY MOUTH THREE TIMES A DAY AS NEEDED FOR MUSCLE SPASMS 90 tablet 1    metoprolol tartrate (LOPRESSOR) 50 MG tablet TAKE ONE TABLET BY MOUTH TWICE A DAY (Patient taking differently: Take 25 mg by mouth in the morning and at bedtime) 180 tablet 1    amLODIPine (NORVASC) 5 MG tablet TAKE ONE TABLET BY MOUTH DAILY 90 tablet 1    vitamin D3 (CHOLECALCIFEROL) 25 MCG (1000 UT) TABS tablet Take 2 tablets by mouth daily 60 tablet 11    furosemide (LASIX) 40 MG tablet Take 1 tablet by mouth See Admin Instructions PRN LEG SWELLING 90 tablet 1    Lancets MISC 1 each by Does not apply route 2 times daily 200 each 1    blood glucose monitor strips Test two times a day & as needed for symptoms of irregular blood glucose. 200 strip 3    butalbital-acetaminophen-caffeine (FIORICET, ESGIC) -40 MG per tablet Take 1 tablet by mouth 3 times daily as needed for Headaches or Migraine (Patient not taking: Reported on 11/15/2022) 30 tablet 1    lansoprazole (PREVACID) 15 MG delayed release capsule Take 1 capsule by mouth daily 90 capsule 1    meclizine (ANTIVERT) 25 MG tablet Take 1 tablet by mouth every 6 hours. 30 tablet 1       Review of Systems:   Review of Systems   Constitutional:  Positive for fatigue. Negative for activity change, chills and fever. HENT:  Negative for congestion, ear pain and tinnitus. Eyes:  Negative for photophobia, pain and visual disturbance. Respiratory:  Positive for shortness of breath. Negative for cough, chest tightness and wheezing. Cardiovascular:  Positive for palpitations. Negative for chest pain and leg swelling. Gastrointestinal:  Negative for abdominal pain, blood in stool, constipation, diarrhea, nausea and vomiting. Endocrine: Negative for cold intolerance and heat intolerance. Genitourinary:  Negative for dysuria, flank pain and hematuria. Musculoskeletal:  Positive for arthralgias. Negative for back pain, myalgias and neck stiffness. Skin:  Negative for color change and rash. Allergic/Immunologic: Negative for food allergies. Neurological:  Negative for dizziness, light-headedness, numbness and headaches. Hematological:  Does not bruise/bleed easily. Psychiatric/Behavioral:  Negative for agitation, behavioral problems and confusion. Examination:      Vitals:    11/15/22 0940 11/15/22 0951   BP:  (!) 182/79   Pulse: (!) 110    Resp: 19    Temp: (!) 96.3 °F (35.7 °C)    TempSrc: Temporal    SpO2:  98%   Weight: 230 lb (104.3 kg)    Height: 5' 7\" (1.702 m)         Body mass index is 36.02 kg/m². Physical Exam  Constitutional:       Appearance: Normal appearance. He is obese. He is not ill-appearing. HENT:      Head: Normocephalic and atraumatic. Mouth/Throat:      Mouth: Mucous membranes are moist.   Eyes:      Conjunctiva/sclera: Conjunctivae normal.   Cardiovascular:      Rate and Rhythm: Normal rate. Rhythm irregular. Heart sounds: No murmur heard. Comments: Frequent ectopic beats  Pulmonary:      Effort: Pulmonary effort is normal.      Breath sounds: No rales. Abdominal:      General: Abdomen is flat. Palpations: Abdomen is soft. Musculoskeletal:         General: No tenderness. Normal range of motion. Cervical back: Normal range of motion. Right lower leg: No edema. Left lower leg: No edema. Skin:     General: Skin is warm and dry. Neurological:      General: No focal deficit present. Mental Status: He is alert and oriented to person, place, and time.              CBC:   Lab Results   Component Value Date/Time    WBC 7.6 11/10/2022 10:24 AM    HGB 14.3 11/10/2022 10:24 AM    HCT 44.7 11/10/2022 10:24 AM     11/10/2022 10:24 AM Lipids:  Lab Results   Component Value Date    CHOL 116 08/18/2022    TRIG 215 (H) 08/18/2022    HDL 38 08/18/2022    LDLCALC 35 08/18/2022    LDLDIRECT 156 (H) 02/05/2021     PT/INR:   Lab Results   Component Value Date/Time    INR 0.96 11/10/2022 10:24 AM        BMP:    Lab Results   Component Value Date     11/10/2022    K 4.3 11/10/2022     11/10/2022    CO2 26 11/10/2022    BUN 16 11/10/2022     CMP:   Lab Results   Component Value Date    AST 24 08/18/2022    PROT 7.0 08/18/2022    BILITOT 0.4 08/18/2022    ALKPHOS 101 08/18/2022     TSH:    Lab Results   Component Value Date/Time    TSH 1.01 06/14/2022 10:10 AM       EKGINTERPRETATION - EKG Interpretation:  sinus rhythm, PVC    Echo 6/14/2021     Normal left ventricle structure and systolic function with an ejection   fraction of 55-60%. Grade I diastolic dysfunction. Sclerotic, but non-stenotic aortic valve. Normal pulmonary artery pressure, 33 mmHg. No evidence of pericardial effusion. Nuclear imaging 6/14/2021     Normal study suggestive of normal perfusion in distribution of all    coronaries and normal left ventricular size and function, LVEF is 61% . Recommendation    Continue lifestyle and risk modification for primary prevention. IMPRESSION / RECOMMENDATIONS:     PVC  HTN on amlodipine, hydralazine, lasix, eplerenone  HLD  DM  CKD    Patient is having ventricular bigeminy right now. Patient has almost 19 to 20% of PVCs. Patient has already been on metoprolol. Discussed with the patient about options of medical therapy versus ablation. Patient wants to try ablation. Patient feels symptomatic with PVCs palpitations and shortness of breath with exertion  He had work up for PVC previously in 2021 by Chip Gordon and stress and echo were normal. This time PVC burden is increased. Blood pressure is elevated.   Patient reports that he is takes hydralazine only 2 times a day usually his blood pressure runs in systolic of 620. Today it is elevated discussed with the patient to increase the hydralazine to 3 times a day and check his blood pressures at home and it is know if it is consistently above 140    Discussed to avoid caffeine    Will get an echo to assess if LVEF is similar to last year given patient symptoms. Patient LVEF on echo was 50-55% with no wall motion abnormalities    The risks including, but not limited to, vascular injury, bleeding, infection, radiation exposure, injury to cardiac and surrounding structures (including esophageal and pulmonary vein injury), injury to the normal conduction system of the heart (possibly requiring a pacemaker), stroke, myocardial infarction and death were all discussed. The patient considered the risks, benefits and alternatives; decided to proceed with the procedure. Thanks again for allowing me to participate in care of this patient. Please call me if you have any questions. With best regards. Madelyn Sanchez MD, 11/15/2022 12:04 PM     Please note this report has been partially produced using speech recognition software and may contain errors related to that system including errors in grammar, punctuation, and spelling, as well as words and phrases that may be inappropriate. If there are any questions or concerns please feel free to contact the dictating provider for clarification.

## 2022-11-15 NOTE — OP NOTE
PROCEDURES PERFORMED:    1. Comprehensive electrophysiologic evaluation including insertion and repositioning of multiple electrode catheters with induction or attempted induction of an arrhythmia with right atrial pacing and recording, right ventricular pacing and recording, His bundle recording with intracardiac catheter ablation of arrhythmogenic focus of ventricular  ectopy  (VT)    2.  Left atrial recording and pacing from coronary sinus    3.. Intracardiac electrophysiologic three-dimensional mapping     Sedation : Moderate sedation     Blood loss : 30cc    INDICATIONS FOR PROCEDURE:     Patient with hx of high burden of sympotmatic frequent PVCs  with failed medical therapy referred for PVC ablation    DETAILS OF PROCEDURE:     The patient was brought to the electrophysiology laboratory in stable condition. Patient  was in  a fasting, non-sedated state. The risks, benefits and alternatives of the procedure were discussed with the patient in details. The risks including, but not limited to, vascular injury, bleeding, infection, radiation exposure, injury to cardiac and surrounding structures, injury to the normal conduction system of the heart, stroke, myocardial infarction and death were all discussed. Written consent was obtained. A time-out protocol was then completed to confirm the identity of the patient and the procedure to be performed. The patient was prepped and draped in a sterile fashion. Lidocaine was administered to the right and left groin. Using a modified Seldinger technique, venous access was obtained and sheaths were placed as detailed below.  Catheters were then placed under fluoroscopic guidance as detailed below:      Groin Sheath  Catheter Location   Left femoral Vein 7F Decapolar catheter Coronary sinus   Left femoral vein 6F Quadripolar catheter Right ventricle   Right femoral vein 9F BiosABC Liveter D-F curve Thermocool smart touch catheter RVOT             The patient had frequent PVCs that had one  basic morphology appearing to originate from parahisian area based on the morphology. Baseline parameters     ms  QRS 91 ms  QT  391 ms  Cycle length was 669ms    Patient presented in ventricular bigeminy    AH was 76 ms  HV was 45 ms    Concentric activation was noted in sinus rhythm. 3-Dimentional Electroanatomical Mapping: The bidirectional ablation catheter was use in the right ventricle. A shell map of the right ventricle was created during sinus rhythm using the CARTO 3  electroanatomical mapping system. Important right ventricular structures were noted on the 3-D electroanatomical map. LA pacing and recording was performed using CS catheter    Activation Mapping and Ablation:    Patient presented to the lab in sinus rhythm and frequent PVCs. Using both the 3-D electroanatomical mapping system and catheter mapping, activation mapping was conducted to locate the area of first PVC activation with the earliest local electrogram activity using the ablation catheter. Significantly early area was found in the parahisian area 2-3 cm below and septally. Fractionated signals were noted in the area with very early signals with good pace match with original PVC. Isochronic mapping was done also to assess the focal area of PVC. QS complex was noted on the unipolar signal.  A lot of fractionated area was noted around the same area so this was marked for ablation    At this area, ablation lesions was given at 35W for  60-90 seconds based on the contact force obtained from the catheter. Termination of PVCs noted post ablation with recurrence of Efra with slightly changed morphology. We ablated with insurance lesions around the fractionated area. Termination of PVC were noted. Patient did have non clinical PVC vs changed PVC exit PVC post ablation but much reduced than before.  We were concerned if this was epicardial exit vs edema of the ablation area not providing deeper lesion. Also patient was uncomfortable and was taking heavy breath and catheter moved up towards the his area so I was worried about causing damage to the his area so we stopped the procedure here. PVC burden is significant reduced post ablation. Then EP study was performed. Sinus node function was normal       Atrioventricular hussein function:   Incremental pacing was performed from the proximal coronary sinus and right ventricular apex and the antegrade and retrograde atrioventricular (AV) Wenckebach cycle length was determined. Antegrade AV Wenckebach was 530 ms. Retrograde AV Wenckebach was  450 ms. Single atrial extrastimuli were delivered following drivetrain of 792MW from the proximal coronary sinus and the AV hussein effective refractory period (ERP) was determined. Evidence of dual AV hussein pathways was seen. AVN ERP:  500/ 360  The atrial ERP: 500 / 260ms    Ventricular function   Single ventricular extrastimuli were delivered following drivetrain of 679AF from the right ventricular apex and the ventricular ERP was determined. Ventricular /210ms  VAERP : 500/310ms    Attempted Arrhythmia Induction   The patient presented to the electrophysiology laboratory in sinus rhythm. We attempted induction of tachyarrhythmia with programmed stimulation. No tachyarrhythmia could be induced. After a waiting period,  we did not see any clinical PVCs anymore. The catheters and sheaths were removed after the waiting period and hemostasis was achieved using manual compression    The patient remained stable throughout the procedure. CONCLUSION:    1. Successful ablation of RV PVC ablation from the parahisian area    PLAN:    1. The patient will be admitted to the hospital and will be observed Overnight. 2. Limited echo was performed which did not show any pericardial effusion post ablation  3. Will start sotalol transiently for the isolated non clinical PVC  4. Avoiding amiodarone as patient already having issues with vision  - with some retinal issues           Electronically signed by Radha Naranjo MD on 11/15/2022 at 2:16 PM

## 2022-11-16 LAB
EKG ATRIAL RATE: 65 BPM
EKG ATRIAL RATE: 96 BPM
EKG DIAGNOSIS: NORMAL
EKG DIAGNOSIS: NORMAL
EKG P AXIS: 22 DEGREES
EKG P AXIS: 68 DEGREES
EKG P-R INTERVAL: 146 MS
EKG P-R INTERVAL: 158 MS
EKG Q-T INTERVAL: 378 MS
EKG Q-T INTERVAL: 454 MS
EKG QRS DURATION: 84 MS
EKG QRS DURATION: 90 MS
EKG QTC CALCULATION (BAZETT): 472 MS
EKG QTC CALCULATION (BAZETT): 477 MS
EKG R AXIS: 0 DEGREES
EKG R AXIS: 6 DEGREES
EKG T AXIS: 54 DEGREES
EKG T AXIS: 64 DEGREES
EKG VENTRICULAR RATE: 65 BPM
EKG VENTRICULAR RATE: 96 BPM
GLUCOSE BLD-MCNC: 141 MG/DL (ref 70–99)

## 2022-11-16 PROCEDURE — G0378 HOSPITAL OBSERVATION PER HR: HCPCS

## 2022-11-16 PROCEDURE — 93010 ELECTROCARDIOGRAM REPORT: CPT | Performed by: INTERNAL MEDICINE

## 2022-11-16 PROCEDURE — 93005 ELECTROCARDIOGRAM TRACING: CPT | Performed by: NURSE PRACTITIONER

## 2022-11-16 PROCEDURE — 2580000003 HC RX 258: Performed by: NURSE PRACTITIONER

## 2022-11-16 PROCEDURE — 6370000000 HC RX 637 (ALT 250 FOR IP): Performed by: INTERNAL MEDICINE

## 2022-11-16 PROCEDURE — 82962 GLUCOSE BLOOD TEST: CPT

## 2022-11-16 PROCEDURE — 6370000000 HC RX 637 (ALT 250 FOR IP): Performed by: NURSE PRACTITIONER

## 2022-11-16 PROCEDURE — 94761 N-INVAS EAR/PLS OXIMETRY MLT: CPT

## 2022-11-16 PROCEDURE — 99214 OFFICE O/P EST MOD 30 MIN: CPT | Performed by: NURSE PRACTITIONER

## 2022-11-16 RX ORDER — DIPHENHYDRAMINE HCL 25 MG
25 TABLET ORAL NIGHTLY PRN
Status: DISCONTINUED | OUTPATIENT
Start: 2022-11-16 | End: 2022-11-18 | Stop reason: HOSPADM

## 2022-11-16 RX ORDER — GLIPIZIDE 5 MG/1
5 TABLET ORAL
Status: DISCONTINUED | OUTPATIENT
Start: 2022-11-16 | End: 2022-11-18 | Stop reason: HOSPADM

## 2022-11-16 RX ORDER — TRAMADOL HYDROCHLORIDE 50 MG/1
50 TABLET ORAL 3 TIMES DAILY PRN
Status: COMPLETED | OUTPATIENT
Start: 2022-11-16 | End: 2022-11-17

## 2022-11-16 RX ADMIN — PANTOPRAZOLE SODIUM 40 MG: 40 TABLET, DELAYED RELEASE ORAL at 05:58

## 2022-11-16 RX ADMIN — CYCLOBENZAPRINE 10 MG: 10 TABLET, FILM COATED ORAL at 00:45

## 2022-11-16 RX ADMIN — GLIPIZIDE 10 MG: 5 TABLET ORAL at 05:58

## 2022-11-16 RX ADMIN — GABAPENTIN 300 MG: 300 CAPSULE ORAL at 15:05

## 2022-11-16 RX ADMIN — SOTALOL HYDROCHLORIDE 80 MG: 80 TABLET ORAL at 09:54

## 2022-11-16 RX ADMIN — HYDRALAZINE HYDROCHLORIDE 25 MG: 25 TABLET, FILM COATED ORAL at 15:05

## 2022-11-16 RX ADMIN — HYDRALAZINE HYDROCHLORIDE 25 MG: 25 TABLET, FILM COATED ORAL at 09:54

## 2022-11-16 RX ADMIN — GABAPENTIN 300 MG: 300 CAPSULE ORAL at 09:54

## 2022-11-16 RX ADMIN — TRAMADOL HYDROCHLORIDE 50 MG: 50 TABLET, COATED ORAL at 15:04

## 2022-11-16 RX ADMIN — TRAMADOL HYDROCHLORIDE 50 MG: 50 TABLET, COATED ORAL at 05:57

## 2022-11-16 RX ADMIN — SOTALOL HYDROCHLORIDE 80 MG: 80 TABLET ORAL at 20:42

## 2022-11-16 RX ADMIN — SODIUM CHLORIDE, PRESERVATIVE FREE 10 ML: 5 INJECTION INTRAVENOUS at 20:44

## 2022-11-16 RX ADMIN — DIPHENHYDRAMINE HYDROCHLORIDE 25 MG: 25 TABLET ORAL at 21:36

## 2022-11-16 RX ADMIN — EPLERENONE 50 MG: 25 TABLET, FILM COATED ORAL at 20:43

## 2022-11-16 RX ADMIN — AMLODIPINE BESYLATE 5 MG: 5 TABLET ORAL at 09:54

## 2022-11-16 RX ADMIN — EPLERENONE 50 MG: 25 TABLET, FILM COATED ORAL at 09:54

## 2022-11-16 RX ADMIN — ACETAMINOPHEN 650 MG: 325 TABLET ORAL at 17:10

## 2022-11-16 RX ADMIN — SODIUM CHLORIDE, PRESERVATIVE FREE 10 ML: 5 INJECTION INTRAVENOUS at 09:54

## 2022-11-16 RX ADMIN — GLIPIZIDE 5 MG: 5 TABLET ORAL at 17:06

## 2022-11-16 RX ADMIN — GABAPENTIN 300 MG: 300 CAPSULE ORAL at 20:43

## 2022-11-16 RX ADMIN — HYDRALAZINE HYDROCHLORIDE 25 MG: 25 TABLET, FILM COATED ORAL at 20:43

## 2022-11-16 ASSESSMENT — PAIN - FUNCTIONAL ASSESSMENT
PAIN_FUNCTIONAL_ASSESSMENT: ACTIVITIES ARE NOT PREVENTED

## 2022-11-16 ASSESSMENT — PAIN SCALES - GENERAL
PAINLEVEL_OUTOF10: 5
PAINLEVEL_OUTOF10: 5
PAINLEVEL_OUTOF10: 3
PAINLEVEL_OUTOF10: 4
PAINLEVEL_OUTOF10: 5
PAINLEVEL_OUTOF10: 5

## 2022-11-16 ASSESSMENT — PAIN DESCRIPTION - ORIENTATION
ORIENTATION: LEFT;RIGHT;LOWER
ORIENTATION: RIGHT;LEFT;MID;LOWER

## 2022-11-16 ASSESSMENT — PAIN DESCRIPTION - LOCATION
LOCATION: BACK

## 2022-11-16 ASSESSMENT — PAIN DESCRIPTION - PAIN TYPE
TYPE: CHRONIC PAIN

## 2022-11-16 ASSESSMENT — PAIN DESCRIPTION - DESCRIPTORS
DESCRIPTORS: ACHING;DISCOMFORT
DESCRIPTORS: ACHING
DESCRIPTORS: ACHING

## 2022-11-16 NOTE — PROGRESS NOTES
Admit Date:  11/15/2022    Admission diagnosis / Complaint: PVC/sotalol monitoring therapy      Subjective:  Mr. Arelis Luque is resting in bed. Denies cardiac complaints. Patient reports that he has noticed less PVCs. Objective:   /71   Pulse 61   Temp 97.7 °F (36.5 °C) (Oral)   Resp 23   Ht 5' 7\" (1.702 m)   Wt 230 lb (104.3 kg)   SpO2 97%   BMI 36.02 kg/m²     Intake/Output Summary (Last 24 hours) at 11/16/2022 1503  Last data filed at 11/15/2022 2110  Gross per 24 hour   Intake --   Output 900 ml   Net -900 ml       TELEMETRY: Sinus    has a past medical history of Blurred vision, BPH (benign prostatic hypertrophy), Bradycardia, Crohn's regional enteritis ([address] DM II (diabetes mellitus, type II), controlled ([address] Erosive esophagitis, GERD (gastroesophageal reflux disease), Headache disorder, [place] (hard of hearing), HTN (hypertension), Hyperlipidemia, LBP (low back pain), Mesenteric panniculitis ([address] Neck pain, VAN on CPAP, Osteoarthritis, Pattern dystrophy of macula, Proteinuria, PVC (premature ventricular contraction), Rash, Research study patient, Right cervical radiculopathy, Right knee pain, S/P colonoscopy, Stage 3 chronic kidney disease ([address] Testosterone deficiency, and Vertigo. has a past surgical history that includes Tonsillectomy (1968); laminectomy (75 and 85); Knee arthroscopy (1994); cervical fusion (2005); Cholecystectomy, laparoscopic (08/2011); knee surgery (Right, 01/16/2013); Total knee arthroplasty (Right, 06/12/2013); Cataract removal with implant (Left, 03/02/2017); Cataract removal with implant (Right, 03/16/2017); Colonoscopy; sinus surgery (06/29/2020); Endoscopy, colon, diagnostic (2018); Cardiac catheterization; Eye surgery; eye surgery (Left); eye surgery (Right, 03/16/2017); Knee arthroscopy (Left, 11/11/2020); and ablation of dysrhythmic focus (11/15/2022). Physical Exam:  General:  Awake, alert, NAD  Skin:  Warm and dry.   Bilateral femoral groin site soft nontender no hematoma  Neck:  JVD not appreciated  Chest:  Clear to auscultation, respiration easy  Cardiovascular:  RRR S1S2  Abdomen:  Soft nontender  Extremities:  no edema    Medications:    amLODIPine  5 mg Oral Daily    eplerenone  50 mg Oral BID    gabapentin  300 mg Oral TID    glipiZIDE  10 mg Oral BID AC    hydrALAZINE  25 mg Oral TID    pantoprazole  40 mg Oral QAM AC    sodium chloride flush  5-40 mL IntraVENous 2 times per day    sotalol  80 mg Oral BID    promethazine  25 mg IntraMUSCular Once      sodium chloride      dextrose       diphenhydrAMINE, cyclobenzaprine, sodium chloride flush, sodium chloride, acetaminophen, glucose, dextrose bolus **OR** dextrose bolus, glucagon (rDNA), dextrose, traMADol    Lab Data:      Assessment:    Sotalol monitoring therapy  PVC  Hypertension  Hyperlipidemia  Diabetes type 2  Chronic kidney disease    Patient had successful ablation of RV PVC yesterday. It was decided to start patient on sotalol as he was having isolated nonclinical PVCs. Patient will require 72-hour hospital admission for sotalol monitoring therapy  EKG reviewed patient QTC within range to continue sotalol  Sotalol will be dosed according to the QTc  Patient will have an EKG 1 hour before each scheduled dose of sotalol is to be given  At this time we will continue sotalol 80 mg twice daily    Vitals:    11/16/22 1500   BP: 136/71   Pulse: 61   Resp: 23   Temp: 98.3 °F (36.8 °C)   SpO2:      Blood pressure stable continue Apresoline 25 mg 3 times daily            MARIZA Richey - JARVIS, 11/16/2022 3:03 PM     Please note this report has been partially produced using speech recognition software and may contain errors related to that system including errors in grammar, punctuation, and spelling, as well as words and phrases that may be inappropriate. If there are any questions or concerns please feel free to contact the dictating provider for clarification.

## 2022-11-16 NOTE — PROGRESS NOTES
Patient complaining of intermittent dull chest pain. Currently he is not having chest pain.  Cardiology notified

## 2022-11-17 PROBLEM — Z79.899 ENCOUNTER FOR MONITORING SOTALOL THERAPY: Status: ACTIVE | Noted: 2022-11-17

## 2022-11-17 PROBLEM — Z51.81 ENCOUNTER FOR MONITORING SOTALOL THERAPY: Status: ACTIVE | Noted: 2022-11-17

## 2022-11-17 LAB
EKG ATRIAL RATE: 56 BPM
EKG ATRIAL RATE: 61 BPM
EKG DIAGNOSIS: NORMAL
EKG DIAGNOSIS: NORMAL
EKG P AXIS: 12 DEGREES
EKG P AXIS: 15 DEGREES
EKG P-R INTERVAL: 154 MS
EKG P-R INTERVAL: 160 MS
EKG Q-T INTERVAL: 468 MS
EKG Q-T INTERVAL: 474 MS
EKG QRS DURATION: 86 MS
EKG QRS DURATION: 92 MS
EKG QTC CALCULATION (BAZETT): 451 MS
EKG QTC CALCULATION (BAZETT): 477 MS
EKG R AXIS: 14 DEGREES
EKG R AXIS: 7 DEGREES
EKG T AXIS: 23 DEGREES
EKG T AXIS: 33 DEGREES
EKG VENTRICULAR RATE: 56 BPM
EKG VENTRICULAR RATE: 61 BPM
GLUCOSE BLD-MCNC: 121 MG/DL (ref 70–99)
GLUCOSE BLD-MCNC: 159 MG/DL (ref 70–99)
GLUCOSE BLD-MCNC: 170 MG/DL (ref 70–99)
GLUCOSE BLD-MCNC: 172 MG/DL (ref 70–99)

## 2022-11-17 PROCEDURE — 1200000000 HC SEMI PRIVATE

## 2022-11-17 PROCEDURE — 93010 ELECTROCARDIOGRAM REPORT: CPT | Performed by: INTERNAL MEDICINE

## 2022-11-17 PROCEDURE — G0378 HOSPITAL OBSERVATION PER HR: HCPCS

## 2022-11-17 PROCEDURE — 82962 GLUCOSE BLOOD TEST: CPT

## 2022-11-17 PROCEDURE — 99214 OFFICE O/P EST MOD 30 MIN: CPT | Performed by: NURSE PRACTITIONER

## 2022-11-17 PROCEDURE — 6370000000 HC RX 637 (ALT 250 FOR IP): Performed by: INTERNAL MEDICINE

## 2022-11-17 PROCEDURE — 93005 ELECTROCARDIOGRAM TRACING: CPT | Performed by: NURSE PRACTITIONER

## 2022-11-17 PROCEDURE — 94761 N-INVAS EAR/PLS OXIMETRY MLT: CPT

## 2022-11-17 PROCEDURE — 2580000003 HC RX 258: Performed by: NURSE PRACTITIONER

## 2022-11-17 PROCEDURE — 6370000000 HC RX 637 (ALT 250 FOR IP): Performed by: NURSE PRACTITIONER

## 2022-11-17 RX ORDER — SOTALOL HYDROCHLORIDE 80 MG/1
80 TABLET ORAL 2 TIMES DAILY
Qty: 60 TABLET | Refills: 3 | Status: SHIPPED | OUTPATIENT
Start: 2022-11-17

## 2022-11-17 RX ADMIN — HYDRALAZINE HYDROCHLORIDE 25 MG: 25 TABLET, FILM COATED ORAL at 14:40

## 2022-11-17 RX ADMIN — EPLERENONE 50 MG: 25 TABLET, FILM COATED ORAL at 09:04

## 2022-11-17 RX ADMIN — GLIPIZIDE 5 MG: 5 TABLET ORAL at 09:03

## 2022-11-17 RX ADMIN — ACETAMINOPHEN 650 MG: 325 TABLET ORAL at 20:57

## 2022-11-17 RX ADMIN — HYDRALAZINE HYDROCHLORIDE 25 MG: 25 TABLET, FILM COATED ORAL at 09:03

## 2022-11-17 RX ADMIN — SODIUM CHLORIDE, PRESERVATIVE FREE 10 ML: 5 INJECTION INTRAVENOUS at 21:05

## 2022-11-17 RX ADMIN — SOTALOL HYDROCHLORIDE 80 MG: 80 TABLET ORAL at 20:57

## 2022-11-17 RX ADMIN — GLIPIZIDE 5 MG: 5 TABLET ORAL at 16:36

## 2022-11-17 RX ADMIN — TRAMADOL HYDROCHLORIDE 50 MG: 50 TABLET, COATED ORAL at 09:03

## 2022-11-17 RX ADMIN — SODIUM CHLORIDE, PRESERVATIVE FREE 10 ML: 5 INJECTION INTRAVENOUS at 09:02

## 2022-11-17 RX ADMIN — TRAMADOL HYDROCHLORIDE 50 MG: 50 TABLET, COATED ORAL at 14:41

## 2022-11-17 RX ADMIN — SOTALOL HYDROCHLORIDE 80 MG: 80 TABLET ORAL at 09:04

## 2022-11-17 RX ADMIN — HYDRALAZINE HYDROCHLORIDE 25 MG: 25 TABLET, FILM COATED ORAL at 20:57

## 2022-11-17 RX ADMIN — GABAPENTIN 300 MG: 300 CAPSULE ORAL at 20:57

## 2022-11-17 RX ADMIN — PANTOPRAZOLE SODIUM 40 MG: 40 TABLET, DELAYED RELEASE ORAL at 09:14

## 2022-11-17 RX ADMIN — GABAPENTIN 300 MG: 300 CAPSULE ORAL at 09:03

## 2022-11-17 RX ADMIN — GABAPENTIN 300 MG: 300 CAPSULE ORAL at 14:40

## 2022-11-17 RX ADMIN — AMLODIPINE BESYLATE 5 MG: 5 TABLET ORAL at 09:02

## 2022-11-17 RX ADMIN — TRAMADOL HYDROCHLORIDE 50 MG: 50 TABLET, COATED ORAL at 20:57

## 2022-11-17 RX ADMIN — EPLERENONE 50 MG: 25 TABLET, FILM COATED ORAL at 20:57

## 2022-11-17 ASSESSMENT — PAIN SCALES - GENERAL
PAINLEVEL_OUTOF10: 5
PAINLEVEL_OUTOF10: 6

## 2022-11-17 ASSESSMENT — PAIN DESCRIPTION - LOCATION
LOCATION: BACK
LOCATION: BACK

## 2022-11-17 ASSESSMENT — PAIN DESCRIPTION - DESCRIPTORS
DESCRIPTORS: ACHING;DISCOMFORT
DESCRIPTORS: ACHING

## 2022-11-17 NOTE — PROGRESS NOTES
Admit Date:  11/15/2022    Admission diagnosis / Complaint: PVC/sotalol monitoring therapy      Subjective:  Mr. Pamela Thacker is resting in bed. Denies cardiac complaints. Patient continues to notice an improvement in PVC's since starting Sotaolol. Objective:   BP (!) 121/95   Pulse 62   Temp 98.6 °F (37 °C) (Oral)   Resp 20   Ht 5' 7\" (1.702 m)   Wt 230 lb (104.3 kg)   SpO2 96%   BMI 36.02 kg/m²   No intake or output data in the 24 hours ending 11/17/22 0835      TELEMETRY: Sinus    has a past medical history of Blurred vision, BPH (benign prostatic hypertrophy), Bradycardia, Crohn's regional enteritis ([address] DM II (diabetes mellitus, type II), controlled ([address] Erosive esophagitis, GERD (gastroesophageal reflux disease), Headache disorder, [place] (hard of hearing), HTN (hypertension), Hyperlipidemia, LBP (low back pain), Mesenteric panniculitis ([address] Neck pain, VAN on CPAP, Osteoarthritis, Pattern dystrophy of macula, Proteinuria, PVC (premature ventricular contraction), Rash, Research study patient, Right cervical radiculopathy, Right knee pain, S/P colonoscopy, Stage 3 chronic kidney disease ([address] Testosterone deficiency, and Vertigo. has a past surgical history that includes Tonsillectomy (1968); laminectomy (75 and 85); Knee arthroscopy (1994); cervical fusion (2005); Cholecystectomy, laparoscopic (08/2011); knee surgery (Right, 01/16/2013); Total knee arthroplasty (Right, 06/12/2013); Cataract removal with implant (Left, 03/02/2017); Cataract removal with implant (Right, 03/16/2017); Colonoscopy; sinus surgery (06/29/2020); Endoscopy, colon, diagnostic (2018); Cardiac catheterization; Eye surgery; eye surgery (Left); eye surgery (Right, 03/16/2017); Knee arthroscopy (Left, 11/11/2020); and ablation of dysrhythmic focus (11/15/2022). Physical Exam:  General:  Awake, alert, NAD  Skin:  Warm and dry.   Bilateral femoral groin site soft nontender no hematoma  Neck:  JVD not appreciated  Chest:  Clear to auscultation, respiration easy  Cardiovascular:  RRR S1S2  Abdomen:  Soft nontender  Extremities:  no edema    Medications:    glipiZIDE  5 mg Oral BID AC    amLODIPine  5 mg Oral Daily    eplerenone  50 mg Oral BID    gabapentin  300 mg Oral TID    hydrALAZINE  25 mg Oral TID    pantoprazole  40 mg Oral QAM AC    sodium chloride flush  5-40 mL IntraVENous 2 times per day    sotalol  80 mg Oral BID    promethazine  25 mg IntraMUSCular Once      sodium chloride      dextrose       diphenhydrAMINE, traMADol, cyclobenzaprine, sodium chloride flush, sodium chloride, acetaminophen, glucose, dextrose bolus **OR** dextrose bolus, glucagon (rDNA), dextrose    Lab Data:      Assessment:    Sotalol monitoring therapy  PVC  Hypertension  Hyperlipidemia  Diabetes type 2  Chronic kidney disease    Patient had successful ablation of RV PVC     EKG reviewed patient QTC within range to continue sotalol  Sotalol will be dosed according to the QTc  Patient will have an EKG 1 hour before each scheduled dose of sotalol is to be given  At this time we will continue sotalol 80 mg twice daily  At QTc is within range to continue at current dose. Vitals:    11/16/22 2030   BP: (!) 121/95   Pulse: 62   Resp: 20   Temp: 98.6 °F (37 °C)   SpO2: 96%     Blood pressure stable continue Apresoline 25 mg 3 times daily    Plan for discharge tomorrow. MARIZA Chacon CNP, 11/17/2022 8:35 AM     Please note this report has been partially produced using speech recognition software and may contain errors related to that system including errors in grammar, punctuation, and spelling, as well as words and phrases that may be inappropriate. If there are any questions or concerns please feel free to contact the dictating provider for clarification.

## 2022-11-17 NOTE — PLAN OF CARE
Problem: Chronic Conditions and Co-morbidities  Goal: Patient's chronic conditions and co-morbidity symptoms are monitored and maintained or improved  11/16/2022 2231 by Jeff Barr RN  Outcome: Progressing  11/16/2022 1035 by Arley Braun RN  Outcome: Progressing     Problem: Discharge Planning  Goal: Discharge to home or other facility with appropriate resources  11/16/2022 2231 by Jeff Barr RN  Outcome: Progressing  11/16/2022 1035 by Arley Braun RN  Outcome: Progressing     Problem: Safety - Adult  Goal: Free from fall injury  11/16/2022 2231 by Jeff Barr RN  Outcome: Progressing  11/16/2022 1035 by Arley Braun RN  Outcome: Progressing     Problem: Pain  Goal: Verbalizes/displays adequate comfort level or baseline comfort level  11/16/2022 2231 by Jeff Barr RN  Outcome: Progressing  11/16/2022 1035 by Arley Braun RN  Outcome: Progressing

## 2022-11-18 VITALS
SYSTOLIC BLOOD PRESSURE: 154 MMHG | HEIGHT: 67 IN | DIASTOLIC BLOOD PRESSURE: 73 MMHG | TEMPERATURE: 98 F | WEIGHT: 231 LBS | HEART RATE: 61 BPM | OXYGEN SATURATION: 98 % | RESPIRATION RATE: 18 BRPM | BODY MASS INDEX: 36.26 KG/M2

## 2022-11-18 LAB
EKG ATRIAL RATE: 58 BPM
EKG ATRIAL RATE: 59 BPM
EKG DIAGNOSIS: NORMAL
EKG DIAGNOSIS: NORMAL
EKG P AXIS: 160 DEGREES
EKG P AXIS: 20 DEGREES
EKG P-R INTERVAL: 164 MS
EKG P-R INTERVAL: 166 MS
EKG Q-T INTERVAL: 452 MS
EKG Q-T INTERVAL: 466 MS
EKG QRS DURATION: 86 MS
EKG QRS DURATION: 90 MS
EKG QTC CALCULATION (BAZETT): 443 MS
EKG QTC CALCULATION (BAZETT): 461 MS
EKG R AXIS: 0 DEGREES
EKG R AXIS: 171 DEGREES
EKG T AXIS: 139 DEGREES
EKG T AXIS: 24 DEGREES
EKG VENTRICULAR RATE: 58 BPM
EKG VENTRICULAR RATE: 59 BPM
GLUCOSE BLD-MCNC: 153 MG/DL (ref 70–99)

## 2022-11-18 PROCEDURE — G0378 HOSPITAL OBSERVATION PER HR: HCPCS

## 2022-11-18 PROCEDURE — 99238 HOSP IP/OBS DSCHRG MGMT 30/<: CPT | Performed by: NURSE PRACTITIONER

## 2022-11-18 PROCEDURE — 93005 ELECTROCARDIOGRAM TRACING: CPT | Performed by: NURSE PRACTITIONER

## 2022-11-18 PROCEDURE — 93010 ELECTROCARDIOGRAM REPORT: CPT | Performed by: INTERNAL MEDICINE

## 2022-11-18 PROCEDURE — 2580000003 HC RX 258: Performed by: NURSE PRACTITIONER

## 2022-11-18 PROCEDURE — 6370000000 HC RX 637 (ALT 250 FOR IP): Performed by: NURSE PRACTITIONER

## 2022-11-18 PROCEDURE — 94761 N-INVAS EAR/PLS OXIMETRY MLT: CPT

## 2022-11-18 PROCEDURE — 82962 GLUCOSE BLOOD TEST: CPT

## 2022-11-18 RX ADMIN — SODIUM CHLORIDE, PRESERVATIVE FREE 10 ML: 5 INJECTION INTRAVENOUS at 10:25

## 2022-11-18 RX ADMIN — GABAPENTIN 300 MG: 300 CAPSULE ORAL at 10:24

## 2022-11-18 RX ADMIN — PANTOPRAZOLE SODIUM 40 MG: 40 TABLET, DELAYED RELEASE ORAL at 06:39

## 2022-11-18 RX ADMIN — AMLODIPINE BESYLATE 5 MG: 5 TABLET ORAL at 10:24

## 2022-11-18 RX ADMIN — EPLERENONE 50 MG: 25 TABLET, FILM COATED ORAL at 10:25

## 2022-11-18 RX ADMIN — SOTALOL HYDROCHLORIDE 80 MG: 80 TABLET ORAL at 10:24

## 2022-11-18 RX ADMIN — GLIPIZIDE 5 MG: 5 TABLET ORAL at 06:39

## 2022-11-18 RX ADMIN — HYDRALAZINE HYDROCHLORIDE 25 MG: 25 TABLET, FILM COATED ORAL at 10:24

## 2022-11-18 ASSESSMENT — PAIN SCALES - GENERAL: PAINLEVEL_OUTOF10: 0

## 2022-11-18 NOTE — PROGRESS NOTES
[facility]  Discharge Summary    Praful Mackenzie  :  1950  MRN:  [id number]    Admit date:  11/15/2022  Discharge date:      Admitting Physician:  Layne Ha MD    Discharge Diagnoses:      S/P Sotalol monitoring therapy  S/P RV PVC ablation from [place]      Admission Condition:  fair    Discharged Condition:  good    Hospital Course:    Patient has a history of PVC's. Patient was brought to the EP lab for EP study with possible PVC ablation. Patient has found to have multifocal PVC's. Patient did have successful ablation of RV PVC. It was then decided to start Sotalol monitoring therapy for management of PVC's. An EKG was obtained 1 hour before each scheduled dose of Sotalol was to be given. The QTc was then called to the physician. The Sotalol was dose according to the QTc. After 72 hours the patient is stable for discharge. Current Discharge Medication List             Details   sotalol (BETAPACE) 80 MG tablet Take 1 tablet by mouth 2 times daily  Qty: 60 tablet, Refills: 3                Details   traMADol (ULTRAM) 50 MG tablet Take 1 tablet by mouth 3 times daily for 30 days. Qty: 90 tablet, Refills: 0    Comments: Reduce doses taken as pain becomes manageable  Associated Diagnoses: Chronic midline low back pain without sciatica      eplerenone (INSPRA) 50 MG tablet Take 1 tablet by mouth 2 times daily  Qty: 60 tablet, Refills: 5    Associated Diagnoses: Essential hypertension      gabapentin (NEURONTIN) 300 MG capsule Take 1 capsule by mouth 3 times daily for 120 days. Intended supply: 90 days  Qty: 90 capsule, Refills: 3    Associated Diagnoses: Lumbar radiculopathy      hydrALAZINE (APRESOLINE) 25 MG tablet Take 1 tablet by mouth 3 times daily  Qty: 180 tablet, Refills: 1    Associated Diagnoses: Essential hypertension      HYDROcodone-acetaminophen (NORCO) 5-325 MG per tablet Take 1 tablet by mouth 2 times daily as needed for Pain for up to 30 days.   Qty: 60 tablet, Refills: 0    Comments: Reduce doses taken as pain becomes manageable  Associated Diagnoses: Chronic midline low back pain without sciatica; Right cervical radiculopathy      glipiZIDE (GLUCOTROL) 10 MG tablet Take 1 tablet by mouth 2 times daily (before meals)  Qty: 60 tablet, Refills: 2    Associated Diagnoses: Controlled type 2 diabetes mellitus with diabetic nephropathy, without long-term current use of insulin ([facility])      ondansetron (ZOFRAN) 4 MG tablet Take 1 tablet by mouth every 8 hours as needed for Nausea  Qty: 40 tablet, Refills: 1    Associated Diagnoses: Crohn's disease with complication, unspecified gastrointestinal tract location ([facility])      cyclobenzaprine (FLEXERIL) 10 mg tablet TAKE ONE TABLET BY MOUTH THREE TIMES A DAY AS NEEDED FOR MUSCLE SPASMS  Qty: 90 tablet, Refills: 1    Associated Diagnoses: Right cervical radiculopathy; Chronic midline low back pain without sciatica      amLODIPine (NORVASC) 5 MG tablet TAKE ONE TABLET BY MOUTH DAILY  Qty: 90 tablet, Refills: 1    Associated Diagnoses: Essential hypertension      vitamin D3 (CHOLECALCIFEROL) 25 MCG (1000 UT) TABS tablet Take 2 tablets by mouth daily  Qty: 60 tablet, Refills: 11      furosemide (LASIX) 40 MG tablet Take 1 tablet by mouth See Admin Instructions PRN LEG SWELLING  Qty: 90 tablet, Refills: 1    Associated Diagnoses: Essential hypertension      Lancets MISC 1 each by Does not apply route 2 times daily  Qty: 200 each, Refills: 1    Associated Diagnoses: Controlled type 2 diabetes mellitus with diabetic nephropathy, without long-term current use of insulin ([facility])      blood glucose monitor strips Test two times a day & as needed for symptoms of irregular blood glucose. Qty: 200 strip, Refills: 3    Comments: Brand per patient preference. May round up to next available package size.   Associated Diagnoses: Controlled type 2 diabetes mellitus with diabetic nephropathy, without long-term current use of insulin ([address] butalbital-acetaminophen-caffeine (FIORICET, ESGIC) -40 MG per tablet Take 1 tablet by mouth 3 times daily as needed for Headaches or Migraine  Qty: 30 tablet, Refills: 1    Associated Diagnoses: Acute nonintractable headache, unspecified headache type      lansoprazole (PREVACID) 15 MG delayed release capsule Take 1 capsule by mouth daily  Qty: 90 capsule, Refills: 1    Associated Diagnoses: Gastroesophageal reflux disease without esophagitis      meclizine (ANTIVERT) 25 MG tablet Take 1 tablet by mouth every 6 hours. Qty: 30 tablet, Refills: 1    Associated Diagnoses: Vertigo             Consults:  none    Discharge Exam:  /67   Pulse 53   Temp 98.2 °F (36.8 °C) (Oral)   Resp 14   Ht 5' 7\" (1.702 m)   Wt 231 lb (104.8 kg)   SpO2 93%   BMI 36.18 kg/m²   General appearance: alert, appears stated age, and cooperative  Head: Normocephalic, without obvious abnormality, atraumatic  Lungs: clear to auscultation bilaterally  Heart: regular rate and rhythm, S1, S2 normal, no murmur, click, rub or gallop  Extremities: extremities normal, atraumatic, no cyanosis or edema  Pulses: 2+ and symmetric  Skin: Skin color, texture, turgor normal. No rashes or lesions. Bilateral femoral groin sites soft, nontender. No hematoma.    Neurologic: Grossly normal    Disposition:   home    Signed:  MARIZA Madison CNP  11/18/2022, 8:22 AM

## 2022-11-18 NOTE — PROGRESS NOTES
Outpatient Pharmacy Progress Note for Meds-to-Beds    Total number of Prescriptions Filled: 1  The following medications were dispensed to the patient during the discharge process:  Sotalol 80mg    Additional Documentation:  Patient picked-up the medication(s) in the OP Pharmacy      Thank you for letting us serve your patients.   [address]    [address], [address] [facility]    Phone: [phone]    Fax: [phone]

## 2022-11-18 NOTE — PROGRESS NOTES
Discharge education reviewed. Questions answered. Medications clarified. Belongings gathered. Discharge instructions signed. All belongings accounted for. Patient discharged to home via pov.

## 2022-11-20 DIAGNOSIS — I10 ESSENTIAL HYPERTENSION: ICD-10-CM

## 2022-11-20 DIAGNOSIS — E11.21 CONTROLLED TYPE 2 DIABETES MELLITUS WITH DIABETIC NEPHROPATHY, WITHOUT LONG-TERM CURRENT USE OF INSULIN (HCC): ICD-10-CM

## 2022-11-21 ENCOUNTER — CARE COORDINATION (OUTPATIENT)
Dept: CASE MANAGEMENT | Age: 72
End: 2022-11-21

## 2022-11-21 RX ORDER — GLIPIZIDE 10 MG/1
TABLET ORAL
Qty: 180 TABLET | Refills: 1 | Status: SHIPPED | OUTPATIENT
Start: 2022-11-21

## 2022-11-21 RX ORDER — AMLODIPINE BESYLATE 5 MG/1
TABLET ORAL
Qty: 90 TABLET | Refills: 1 | Status: SHIPPED | OUTPATIENT
Start: 2022-11-21

## 2022-11-21 NOTE — CARE COORDINATION
Care Transitions Outreach Attempt    Call within 2 business days of discharge: Yes     1st attempt to reach for Care Transition discharge call unsuccessful. HIPAA compliant message left requesting call back to [phone]    Patient: Juan David Martinez Patient : 1950 MRN: [id number]    Last Discharge  Street       Date Complaint Diagnosis Description Type Department Provider    11/15/22   Admission (Discharged) from [facility] Wood Guzman MD        PMH: DM2, HTN, GERD, BPH, VAN on CPAP      Was this an external facility discharge?  No Discharge Facility: [place]    Noted following upcoming appointments from discharge chart review:   [facility] follow up appointment(s):   Future Appointments   Date Time Provider Mitchell Ames   2022  9:45 AM Hilda Michelle MD [facility] E S IM MMA   2022  9:15 AM MARIZA Jones -  [facility] Neurology -   2023  9:20 AM Kylee Corea MD [facility] Heart MMA   2023  9:00 AM [facility]EARCH [address] Heart MMA   2023 10:15 AM Danielle Cardozo DO [facility]     [facility] follow up appointment(s): ROJELIO Johnson RN [phone]

## 2022-11-22 ENCOUNTER — CARE COORDINATION (OUTPATIENT)
Dept: CASE MANAGEMENT | Age: 72
End: 2022-11-22

## 2022-11-22 NOTE — CARE COORDINATION
Care Transitions Outreach Attempt    Call within 2 business days of discharge: Yes     2nd unsuccessful attempt to reach for Care Transition discharge call. HIPAA compliant message left requesting call back. Episode closed per protocol, no further outreach scheduled.       Unable to reach letter sent out    Patient: Randa Wilson Patient : 1950 MRN: [id number]    Last Discharge  [address]       Date Complaint Diagnosis Description Type Department Provider    11/15/22 Arrythmia Ablation of ventricular Arrythmia, s/p Sotalol therapy Admission (Discharged) from [facility] Wood Guzman MD         Discharge Facility: [address] [place] [address] following upcoming appointments from discharge chart review:   [facility] follow up appointment(s):   Future Appointments   Date Time Provider Mitchell Ames   2022  9:45 AM Tessy Hector MD [facility] E S IM MMA   2022  9:15 AM MARIZA Moraes - CNP [facility] Heart MMA   2022  9:15 AM MARIZA Sanchez - CNP [address] [place] [address] Neurology -   2023  9:20 AM Izzy Allen MD [facility] Heart MMA   2023  9:00 AM RESEARCH [address] Heart MMA   2023 10:15 AM Danielle Cardozo DO [facility]     [facility] follow up appointment(s): ROJELIO Alfred RN -[phone]

## 2022-11-26 DIAGNOSIS — I10 ESSENTIAL HYPERTENSION: ICD-10-CM

## 2022-11-28 DIAGNOSIS — E11.21 CONTROLLED TYPE 2 DIABETES MELLITUS WITH DIABETIC NEPHROPATHY, WITHOUT LONG-TERM CURRENT USE OF INSULIN (HCC): ICD-10-CM

## 2022-11-28 DIAGNOSIS — G89.29 CHRONIC MIDLINE LOW BACK PAIN WITHOUT SCIATICA: ICD-10-CM

## 2022-11-28 DIAGNOSIS — I10 PRIMARY HYPERTENSION: ICD-10-CM

## 2022-11-28 DIAGNOSIS — M54.50 CHRONIC MIDLINE LOW BACK PAIN WITHOUT SCIATICA: ICD-10-CM

## 2022-11-28 LAB
A/G RATIO: 1.3 (ref 1.1–2.2)
ALBUMIN SERPL-MCNC: 4 G/DL (ref 3.4–5)
ALP BLD-CCNC: 105 U/L (ref 40–129)
ALT SERPL-CCNC: 18 U/L (ref 10–40)
ANION GAP SERPL CALCULATED.3IONS-SCNC: 15 MMOL/L (ref 3–16)
AST SERPL-CCNC: 14 U/L (ref 15–37)
BASOPHILS ABSOLUTE: 0.1 K/UL (ref 0–0.2)
BASOPHILS RELATIVE PERCENT: 1.3 %
BILIRUB SERPL-MCNC: <0.2 MG/DL (ref 0–1)
BUN BLDV-MCNC: 14 MG/DL (ref 7–20)
CALCIUM SERPL-MCNC: 9.7 MG/DL (ref 8.3–10.6)
CHLORIDE BLD-SCNC: 100 MMOL/L (ref 99–110)
CHOLESTEROL, TOTAL: 104 MG/DL (ref 0–199)
CO2: 25 MMOL/L (ref 21–32)
CREAT SERPL-MCNC: 1.2 MG/DL (ref 0.8–1.3)
EOSINOPHILS ABSOLUTE: 0.3 K/UL (ref 0–0.6)
EOSINOPHILS RELATIVE PERCENT: 3.8 %
GFR SERPL CREATININE-BSD FRML MDRD: >60 ML/MIN/{1.73_M2}
GLUCOSE BLD-MCNC: 194 MG/DL (ref 70–99)
HCT VFR BLD CALC: 41.9 % (ref 40.5–52.5)
HDLC SERPL-MCNC: 32 MG/DL (ref 40–60)
HEMOGLOBIN: 13.7 G/DL (ref 13.5–17.5)
LDL CHOLESTEROL CALCULATED: 25 MG/DL
LYMPHOCYTES ABSOLUTE: 1.9 K/UL (ref 1–5.1)
LYMPHOCYTES RELATIVE PERCENT: 22.1 %
MCH RBC QN AUTO: 26.4 PG (ref 26–34)
MCHC RBC AUTO-ENTMCNC: 32.8 G/DL (ref 31–36)
MCV RBC AUTO: 80.5 FL (ref 80–100)
MONOCYTES ABSOLUTE: 0.5 K/UL (ref 0–1.3)
MONOCYTES RELATIVE PERCENT: 6 %
NEUTROPHILS ABSOLUTE: 5.7 K/UL (ref 1.7–7.7)
NEUTROPHILS RELATIVE PERCENT: 66.8 %
PDW BLD-RTO: 15.7 % (ref 12.4–15.4)
PLATELET # BLD: 236 K/UL (ref 135–450)
PMV BLD AUTO: 8 FL (ref 5–10.5)
POTASSIUM SERPL-SCNC: 4.7 MMOL/L (ref 3.5–5.1)
RBC # BLD: 5.21 M/UL (ref 4.2–5.9)
SODIUM BLD-SCNC: 140 MMOL/L (ref 136–145)
TOTAL PROTEIN: 7 G/DL (ref 6.4–8.2)
TRIGL SERPL-MCNC: 233 MG/DL (ref 0–150)
VLDLC SERPL CALC-MCNC: 47 MG/DL
WBC # BLD: 8.6 K/UL (ref 4–11)

## 2022-11-28 RX ORDER — HYDRALAZINE HYDROCHLORIDE 25 MG/1
TABLET, FILM COATED ORAL
Qty: 180 TABLET | Refills: 1 | OUTPATIENT
Start: 2022-11-28

## 2022-11-28 RX ORDER — METOPROLOL TARTRATE 50 MG/1
TABLET, FILM COATED ORAL
Qty: 180 TABLET | OUTPATIENT
Start: 2022-11-28

## 2022-11-29 ENCOUNTER — TELEPHONE (OUTPATIENT)
Dept: PHARMACY | Facility: CLINIC | Age: 72
End: 2022-11-29

## 2022-11-29 ENCOUNTER — OFFICE VISIT (OUTPATIENT)
Dept: INTERNAL MEDICINE CLINIC | Age: 72
End: 2022-11-29
Payer: MEDICARE

## 2022-11-29 VITALS
SYSTOLIC BLOOD PRESSURE: 138 MMHG | OXYGEN SATURATION: 97 % | BODY MASS INDEX: 36.49 KG/M2 | RESPIRATION RATE: 16 BRPM | HEART RATE: 50 BPM | DIASTOLIC BLOOD PRESSURE: 72 MMHG | WEIGHT: 233 LBS

## 2022-11-29 DIAGNOSIS — N40.0 BENIGN PROSTATIC HYPERPLASIA WITHOUT LOWER URINARY TRACT SYMPTOMS: ICD-10-CM

## 2022-11-29 DIAGNOSIS — I12.9 HYPERTENSIVE RENAL DISEASE: ICD-10-CM

## 2022-11-29 DIAGNOSIS — M54.12 RIGHT CERVICAL RADICULOPATHY: ICD-10-CM

## 2022-11-29 DIAGNOSIS — N18.30 STAGE 3 CHRONIC KIDNEY DISEASE, UNSPECIFIED WHETHER STAGE 3A OR 3B CKD (HCC): ICD-10-CM

## 2022-11-29 DIAGNOSIS — Z86.79 S/P ABLATION OF VENTRICULAR ARRHYTHMIA: ICD-10-CM

## 2022-11-29 DIAGNOSIS — M54.50 CHRONIC MIDLINE LOW BACK PAIN WITHOUT SCIATICA: ICD-10-CM

## 2022-11-29 DIAGNOSIS — Z23 NEED FOR INFLUENZA VACCINATION: ICD-10-CM

## 2022-11-29 DIAGNOSIS — M79.10 MYALGIA DUE TO STATIN: ICD-10-CM

## 2022-11-29 DIAGNOSIS — G89.29 CHRONIC MIDLINE LOW BACK PAIN WITHOUT SCIATICA: ICD-10-CM

## 2022-11-29 DIAGNOSIS — E11.21 CONTROLLED TYPE 2 DIABETES MELLITUS WITH DIABETIC NEPHROPATHY, WITHOUT LONG-TERM CURRENT USE OF INSULIN (HCC): Primary | ICD-10-CM

## 2022-11-29 DIAGNOSIS — T46.6X5A MYALGIA DUE TO STATIN: ICD-10-CM

## 2022-11-29 DIAGNOSIS — Z98.890 S/P ABLATION OF VENTRICULAR ARRHYTHMIA: ICD-10-CM

## 2022-11-29 LAB
ESTIMATED AVERAGE GLUCOSE: 191.5 MG/DL
HBA1C MFR BLD: 8.3 %

## 2022-11-29 PROCEDURE — 3078F DIAST BP <80 MM HG: CPT | Performed by: INTERNAL MEDICINE

## 2022-11-29 PROCEDURE — 3074F SYST BP LT 130 MM HG: CPT | Performed by: INTERNAL MEDICINE

## 2022-11-29 PROCEDURE — G0008 ADMIN INFLUENZA VIRUS VAC: HCPCS | Performed by: INTERNAL MEDICINE

## 2022-11-29 PROCEDURE — 3052F HG A1C>EQUAL 8.0%<EQUAL 9.0%: CPT | Performed by: INTERNAL MEDICINE

## 2022-11-29 PROCEDURE — 90694 VACC AIIV4 NO PRSRV 0.5ML IM: CPT | Performed by: INTERNAL MEDICINE

## 2022-11-29 PROCEDURE — 99214 OFFICE O/P EST MOD 30 MIN: CPT | Performed by: INTERNAL MEDICINE

## 2022-11-29 PROCEDURE — 1124F ACP DISCUSS-NO DSCNMKR DOCD: CPT | Performed by: INTERNAL MEDICINE

## 2022-11-29 RX ORDER — PIOGLITAZONEHYDROCHLORIDE 30 MG/1
30 TABLET ORAL DAILY
Qty: 90 TABLET | Refills: 1 | Status: SHIPPED | OUTPATIENT
Start: 2022-11-29

## 2022-11-29 RX ORDER — HYDROCODONE BITARTRATE AND ACETAMINOPHEN 5; 325 MG/1; MG/1
1 TABLET ORAL 2 TIMES DAILY
Qty: 60 TABLET | Refills: 0 | Status: SHIPPED | OUTPATIENT
Start: 2022-12-30 | End: 2023-01-29

## 2022-11-29 RX ORDER — HYDROCODONE BITARTRATE AND ACETAMINOPHEN 5; 325 MG/1; MG/1
1 TABLET ORAL 2 TIMES DAILY
Qty: 60 TABLET | Refills: 0 | Status: SHIPPED | OUTPATIENT
Start: 2022-11-30 | End: 2022-12-30

## 2022-11-29 RX ORDER — HYDROCODONE BITARTRATE AND ACETAMINOPHEN 5; 325 MG/1; MG/1
1 TABLET ORAL 2 TIMES DAILY PRN
Qty: 60 TABLET | Refills: 0 | Status: SHIPPED | OUTPATIENT
Start: 2023-01-29 | End: 2023-02-28

## 2022-11-29 RX ORDER — TRAMADOL HYDROCHLORIDE 50 MG/1
50 TABLET ORAL 3 TIMES DAILY
Qty: 90 TABLET | Refills: 0 | Status: SHIPPED | OUTPATIENT
Start: 2022-11-29 | End: 2022-12-29

## 2022-11-29 NOTE — TELEPHONE ENCOUNTER
POPULATION HEALTH CLINICAL PHARMACY REVIEW: STATIN THERAPY    Routing to PharmD for statin follow-up.      Future Appointments   Date Time Provider Mitchell Ames   12/5/2022  9:15 AM MARIZA Adames - CNP [facility] Heart MMA   12/23/2022  9:15 AM MARIZA Anderson - CNP [address] [place] [address] Neurology -   1/4/2023  9:20 AM Kanika Augustine MD [facility] Heart MMA   1/9/2023  9:00 AM RESEARCH [address] Heart MMA   2/13/2023 10:15 AM Danielle Kurtz DO [facility]       Tony Alonzo, HOLLIEN  Population Health   [address] [facility] Clinical Pharmacy  Phone: toll free [phone]

## 2022-11-29 NOTE — TELEPHONE ENCOUNTER
Dr. Sebastian Orta MD,    Patient's insurance has identified statin use in persons with diabetes (SUPD) care gap:   As patient cannot tolerate statins, would you be able to add the diagnosis code M79.10, T46.6X5A (myalgia due to statin) to yesterday's visit so insurance is aware patient is unable to tolerate statins? This code would close the care gap for patient. (Code needs to be added in documentation of a billable visit to count for [employer])    Last visit: 11/29/2022 yesterday, Next visit: 02/24/2022     See encounter note(s) below for complete details. Please let me know if you have any questions. Thank you,  Shantelle Lawton, PharmD, BCACP, [address] [place] [address], toll free: [phone], option 1     =======================================================    POPULATION HEALTH CLINICAL PHARMACY REVIEW: STATIN THERAPY  Identified statin use in persons with diabetes (SUPD) care gap per [place]; Records dated: 11/20/2022    Last Office Visit: 11/29/2022  Pharmacy: [facility]    Patient also appears to be taking: glipizide, pioglitazone    Allergies   Allergen Reactions    Aldactone [Spironolactone] Gynecomastia     Bactrim [Sulfamethoxazole-Trimethoprim] Nephrology contraindicates-- can cause hyperkalemia    Crestor [Rosuvastatin] myalgias    Labetalol Marked bradycardia with frequent symptomatic ectopy    Lisinopril Throat swelling- stopped by Dr. Eddie Pinzon    Metformin And Related [Metformin And Related] Diarrhea at 500mg daily    Nsaids W PROTEINURIA    Statins myalgias    Tegretol [Carbamazepine] [facility] [address] Records claims through 11/20/2022 ([facility] =  98%; Passed in 2022):   Glipizide 10mg BID last filled on 08/18/2022 for 90 day supply.  Next refill due: 11/16/2022    Per [employer] Portal:  glipizide last filled on 11/21/2022 for 90 day supply     Lab Results   Component Value Date    LABA1C 8.3 11/28/2022    LABA1C 8.8 (H) 08/18/2022    LABA1C 7.8 08/09/2021     NOTE A1c <9% - started pioglitazone at yesterday's visit    STATIN [facility] [place] [facility]    Per Reconciled Dispense Report as of 11/29/2022:  Rosuvastatin filled in 2021    Lab Results   Component Value Date    CHOL 104 11/28/2022    TRIG 233 (H) 11/28/2022    HDL 32 (L) 11/28/2022    LDLCALC 25 11/28/2022     ALT   Date Value Ref Range Status   11/28/2022 18 10 - 40 U/L Final     AST   Date Value Ref Range Status   11/28/2022 14 (L) 15 - 37 U/L Final     The ASCVD Risk score (Keshawn HALE, et al., 2019) failed to calculate for the following reasons: The valid total cholesterol range is 130 to 320 mg/dL     2022 ADA Guidelines - Primary Prevention in patients aged 40-75 years:   No history of ASCVD: Moderate-intensity statin is recommended. History of ASCVD or 10-year ASCVD risk >20%: High-intensity statin is recommended. Multiple ASCVD risk factors (dyslipidemia, obesity, hypertension, smoking, family history of premature coronary disease, chronic kidney disease, presence of albuminuria, duration of diabetes) or aged 50-70 years: Consider high-intensity statin. Per chart review, patient has documented myalgias with statin therapy, which qualifies the patient for an exclusion from this care gap. M79.10, T46.6X5A (myalgia due to statin) has not been coded correctly in the patient's chart. PLAN  The following are interventions that have been identified:   - Patient identified as having SUPD gap    Patient's LDL 25, unknown ASCVD risk score, age 43-69 years, LFTs WNL, has DM. Patient has documented in chart that he experienced myalgias while taking statin therapy.       According to the 2018 AHA/ACC Guideline on the Management of Blood Cholesterol, in patients who experienced statin associated side effects that were not severe, it is recommended to reassess and rechallenge to achieve maximal LDL-C reduction using a modified dosing regimen, an alternate statin, or a combination of a statin with non-statin therapy. Additionally, some strategies to help tolerate statins could be three time weekly dosing and while the 2018 AHA/ACC Guideline on the Management of Blood Cholesterol makes no recommendation for using coenzyme Q10 with statins or ensuring patient is not vitamin D deficient, there is some data that suggest these could be beneficial.    Statin intolerance is documented within patient's problem list, appears added in 2021. Will need code accepted by insurance to close care gap. Will send request to provider.      Future Appointments   Date Time Provider Mitchell Ames   12/5/2022  9:15 AM MARIZA Melchor - CNP [facility] Heart MMA   12/23/2022  9:15 AM MARIZA Lozoya - CNP [address] [place] [address] Neurology -   1/4/2023  9:20 AM Corky Chen MD [facility] Heart MMA   1/9/2023  9:00 AM RESEARCH [address] Heart MMA   2/13/2023 10:15 AM Danielle Rodriguez, DO [facility]   2/24/2023 10:00 AM Shirley Alarcon MD [facility]     Rekha Sacnhez, PharmD, BCACP, [facility]  Department, toll free: [phone], option 1    =======================================================    For Pharmacy Admin Tracking Only  Recommendation Provided To: Provider: 1 via Note to Provider  Gap Closed?: Yes   Intervention Accepted By: Provider: 1  Time Spent (min): 45

## 2022-11-29 NOTE — PROGRESS NOTES
Karie Palumbo  1950 11/29/22    SUBJECTIVE:  Hypertension: Stable. Denies CP, SOB, cough, visual changes, dizziness, palpitations or HA. Dm-  - glc 170-215, a1c still sl high. Ins not cover jardiance so less likely to cover trulicity, we'll try actos. Lab Results   Component Value Date    LABA1C 8.3 11/28/2022    LABA1C 8.8 (H) 08/18/2022    LABA1C 7.8 08/09/2021     Lab Results   Component Value Date    GLUF 195 (H) 03/20/2018    LABMICR 5820.0 03/09/2022    LDLCALC 25 11/28/2022    CREATININE 1.2 11/28/2022     Pvcs, had ablation w some improvement in sx, Dr Alban Gates. Chr LBP stable on pain meds, rf due. Controlled substances monitoring: possible medication side effects, risk of tolerance and/or dependence, and alternative treatments discussed, no signs of potential drug abuse or diversion identified and OARRS report reviewed today- activity consistent with treatment plan. Lipids:  Is continuing STUDY DRUG, PRESUMABLY PRALUENT-  therapy and low fat diet. Tolerating medications w/o myalgias or GI upset. Ckd seeing Dr Simón Crádenas and renal fxn has been stable    OBJECTIVE:    BP (!) 142/74   Pulse 50   Resp 16   Wt 233 lb (105.7 kg)   SpO2 97%   BMI 36.49 kg/m²     Physical Exam  Vitals reviewed. Constitutional:       Appearance: He is well-developed. Cardiovascular:      Rate and Rhythm: Normal rate and regular rhythm. Heart sounds: Normal heart sounds. No murmur heard. No friction rub. No gallop. Pulmonary:      Effort: Pulmonary effort is normal. No respiratory distress. Breath sounds: Normal breath sounds. No wheezing or rales. Abdominal:      General: Bowel sounds are normal. There is no distension. Palpations: Abdomen is soft. Tenderness: There is no abdominal tenderness. Musculoskeletal:      Cervical back: Neck supple. Right lower leg: No edema. Left lower leg: No edema. Neurological:      Mental Status: He is alert. ASSESSMENT:    1. Controlled type 2 diabetes mellitus with diabetic nephropathy, without long-term current use of insulin ([address]    2. Chronic midline low back pain without sciatica    3. Right cervical radiculopathy    4. Hypertensive renal disease    5. S/P ablation of ventricular arrhythmia    6. Stage 3 chronic kidney disease, unspecified whether stage 3a or 3b CKD ([address]    7. Benign prostatic hyperplasia without lower urinary tract symptoms    8. Need for influenza vaccination    9. Myalgia due to statin        PLAN:    Lucila Bocanegra was seen today for hypertension. Diagnoses and all orders for this visit:    Controlled type 2 diabetes mellitus with diabetic nephropathy, without long-term current use of insulin ([address]- a1c better but still 8 range so we'll try actos too then f/u lab  -     pioglitazone (ACTOS) 30 MG tablet; Take 1 tablet by mouth daily  -     Comprehensive Metabolic Panel; Future  -     CBC with Auto Differential; Future  -     Lipid Panel; Future  -     Microalbumin / Creatinine Urine Ratio; Future  -     Hemoglobin A1C; Future    Chronic midline low back pain without sciatica  - LBP stable on pain regimen, RFs given as noted and will monitor.    -     HYDROcodone-acetaminophen (NORCO) 5-325 MG per tablet; Take 1 tablet by mouth 2 times daily as needed for Pain for up to 30 days. -     traMADol (ULTRAM) 50 MG tablet; Take 1 tablet by mouth 3 times daily for 30 days.  -     HYDROcodone-acetaminophen (NORCO) 5-325 MG per tablet; Take 1 tablet by mouth 2 times daily for 30 days.  -     HYDROcodone-acetaminophen (NORCO) 5-325 MG per tablet; Take 1 tablet by mouth 2 times daily for 30 days. Right cervical radiculopathy - The current medical regimen is effective;  continue present plan and medications.    -     HYDROcodone-acetaminophen (NORCO) 5-325 MG per tablet;  Take 1 tablet by mouth 2 times daily as needed for Pain for up to 30 days.  -     HYDROcodone-acetaminophen (NORCO) 5-325 MG per tablet; Take 1 tablet by mouth 2 times daily for 30 days.  -     HYDROcodone-acetaminophen (NORCO) 5-325 MG per tablet; Take 1 tablet by mouth 2 times daily for 30 days. Hypertensive renal disease- bp stable and cont monitor along w Dr Charly Mendoza  -     Magnesium; Future  -     Comprehensive Metabolic Panel; Future    S/P ablation of ventricular arrhythmia- some improvement in palpitations fr freq PVCs  -     Comprehensive Metabolic Panel; Future    Stage 3 chronic kidney disease, unspecified whether stage 3a or 3b CKD ([facility]- for cont f/u DR Cardozo    Benign prostatic hyperplasia without lower urinary tract symptoms- monitor psa  -     PSA, Prostatic Specific Antigen;  Future    Need for influenza vaccination  -     Influenza, FLUAD, (age 72 y+), IM, Preservative Free, 0.5 mL    **addendum, documenting myalgias due to statin so pt is intolerant** 11/30/22

## 2022-11-30 PROBLEM — M79.10 MYALGIA DUE TO STATIN: Status: ACTIVE | Noted: 2022-11-30

## 2022-11-30 PROBLEM — T46.6X5A MYALGIA DUE TO STATIN: Status: ACTIVE | Noted: 2022-11-30

## 2022-12-05 ENCOUNTER — OFFICE VISIT (OUTPATIENT)
Dept: CARDIOLOGY CLINIC | Age: 72
End: 2022-12-05

## 2022-12-05 VITALS
BODY MASS INDEX: 36.44 KG/M2 | SYSTOLIC BLOOD PRESSURE: 130 MMHG | WEIGHT: 232.2 LBS | DIASTOLIC BLOOD PRESSURE: 72 MMHG | HEART RATE: 66 BPM | HEIGHT: 67 IN

## 2022-12-05 DIAGNOSIS — Z98.890 S/P ABLATION OF VENTRICULAR ARRHYTHMIA: Primary | ICD-10-CM

## 2022-12-05 DIAGNOSIS — I10 PRIMARY HYPERTENSION: ICD-10-CM

## 2022-12-05 DIAGNOSIS — E83.42 HYPOMAGNESEMIA: ICD-10-CM

## 2022-12-05 DIAGNOSIS — Z86.79 S/P ABLATION OF VENTRICULAR ARRHYTHMIA: Primary | ICD-10-CM

## 2022-12-05 RX ORDER — MAGNESIUM OXIDE 400 MG/1
400 TABLET ORAL DAILY
Qty: 30 TABLET | Refills: 1 | Status: SHIPPED | OUTPATIENT
Start: 2022-12-05

## 2022-12-05 ASSESSMENT — ENCOUNTER SYMPTOMS
CHEST TIGHTNESS: 0
DIARRHEA: 0
CONSTIPATION: 0
NAUSEA: 0
WHEEZING: 0
EYE PAIN: 0
COUGH: 0
COLOR CHANGE: 0
SINUS PAIN: 0
VOMITING: 0
SINUS PRESSURE: 0
SHORTNESS OF BREATH: 0
BLOOD IN STOOL: 0
PHOTOPHOBIA: 0
BACK PAIN: 0
ABDOMINAL PAIN: 0

## 2022-12-05 NOTE — PATIENT INSTRUCTIONS
Please be informed that if you contact our office outside of normal business hours the physician on call cannot help with any scheduling or rescheduling issues, procedure instruction questions or any type of medication issue. We advise you for any urgent/emergency that you go to the nearest emergency room! PLEASE CALL OUR OFFICE DURING NORMAL BUSINESS HOURS    Monday - Friday   8 am to 5 pm    [place]: [phone]    [place]:  [phone]    **It is YOUR responsibilty to bring medication bottles and/or updated medication list to [address] [place] [address]. This will allow us to better serve you and all your healthcare needs**    Thank you for allowing us to care for you today! We want to ensure we can follow your treatment plan and we strive to give you the best outcomes and experience possible. If you ever have a life threatening emergency and call 911 - for an ambulance (EMS)   Our providers can only care for you at:   [facility] or [facility]. Even if you have someone take you or you drive yourself we can only care for you in a [address] facility. Our providers are not setup at the other healthcare locations!

## 2022-12-05 NOTE — PROGRESS NOTES
Electrophysiology Follow Up Note      Reason for consultation: pvc    Chief complaint: Intermittent episodes of palpitations. This is improved since ablation    Referring physician: Chio Gibson      Primary care physician: Elif Hardin MD      History of Present Illness: This visit 12/5/2022  Patient here today for follow-up on sotalol monitoring therapy  And PVCs. Patient had ablation of RV PVCs in November. Patient was started on sotalol monitoring therapy post procedure. Patient reports that he continues to have skipped beats. He states that these are improved since the ablation. He states that he will feel them with exertion and they will resolve with rest.  He is taking his medications as prescribed. He denies chest pain, shortness of breath, lightheadedness, dizziness, edema or syncope      Previous visit  Patient is a 77-year-old male with a history of hypertension hyperlipidemia diabetes mellitus, CKD, referred by Dr. Tayo Traore for PVC management. Patient reports shortness of breath with exertion. Patient also reports palpitations which cause him to have pressure and pausing and a feeling on the pulse. Patient reports dizziness at times. Patient also reports edema in the legs and ankles and feet bilaterally. Patient does not smoke. Patient drinks 1 cup of coffee daily.   Patient is trying to get more exercise but is limited with shortness of breath    He was told he had abnormal test results and was told to follow-up with me    Pastmedical history:   Past Medical History:   Diagnosis Date    Blurred vision 04/03/2012    right>left  side --pattern dystrophy/Dr Dye    BPH (benign prostatic hypertrophy) 09/11/2015    Bradycardia 04/18/2019    Hr 30-40 with skipped beats on BP monitor on Labetalol    Crohn's regional enteritis ([facility] 01/03/2012    Dr Dong\"dx with Crohns in [address] [place] [address],[facility]- no recent issues    DM II (diabetes mellitus, type II), controlled ([facility] [address] [facility] 2008 insurance not cover Jardiance    Erosive esophagitis 03/30/2012    \"had erosive esophagitis in lower area on Prevacid for this\"    GERD (gastroesophageal reflux disease) 04/03/2012    Headache disorder     [place] (hard of hearing) 09/13/2017    HTN (hypertension) 01/03/2021    also comanaged w Dr Savanna Mroales. 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       S/P colonoscopy 01/17/2019    Dr Izabella An-- ?recheck when?- per pt 2024    Stage 3 chronic kidney disease ([facility] 04/18/2019    Dr Savanna Morales    Testosterone deficiency 04/03/2012    on andrgel    Vertigo 04/03/2012    \"last used Meclazine since 2/2020\"       Surgical history :   Past Surgical History:   Procedure Laterality Date    ABLATION OF DYSRHYTHMIC FOCUS  11/15/2022    PVC Ablation    CARDIAC CATHETERIZATION      \"had 2 caths done since 1995 but not sure of dates\"    CATARACT REMOVAL WITH IMPLANT Left 03/02/2017    Dr Татьяна Guerra Right 03/16/2017    Dr Jarrell Logan  2005    Dr Lam Teague  08/2011    Dr Gaston Mahoney Multiple colonoscopys, HX: Crohns    ENDOSCOPY, COLON, DIAGNOSTIC  2018    Erosive esophagitis    EYE SURGERY      \"left eye surgery for crosseyed- age 13 months\"    EYE SURGERY Left     cataract    EYE SURGERY Right 03/16/2017    cataract    KNEE ARTHROSCOPY  1994    right    KNEE ARTHROSCOPY Left 11/11/2020    LEFT KNEE ARTHROSCOPY WITH PARTIAL MENISCECTOMY CHONDROPLASTY performed by Lucila Jeong MD at [address] Right 01/16/2013    Dr Rodriguez STREETER knee tib plateau fx and medial meniscectomy    LAMINECTOMY  75 and 85    SINUS SURGERY  06/29/2020    Dr Urszula Newsome for chr sinusitis and fungal infection, per pt    TONSILLECTOMY  1968    TOTAL KNEE ARTHROPLASTY Right 06/12/2013    Dr Rodriguez STREETER        Family history:   Family History   Problem Relation Age of Onset    High Cholesterol Mother     Elevated Lipids Mother     High Blood Pressure Mother     Heart Disease Mother     High Cholesterol Father     Other Father         blind       Social history :  reports that he quit smoking about 27 years ago. His smoking use included cigarettes. He has a 90.00 pack-year smoking history. He has never used smokeless tobacco. He reports that he does not drink alcohol and does not use drugs.     Allergies   Allergen Reactions    Aldactone [Spironolactone]      Gynecomastia     Bactrim [Sulfamethoxazole-Trimethoprim]      Nephrology contraindicates-- can cause hyperkalemia    Crestor [Rosuvastatin]      myalgias    Labetalol      Marked bradycardia with frequent symptomatic ectopy    Lisinopril Other (See Comments)     Throat swelling- stopped by Dr. Mariya Edmonds    Metformin And Related [Metformin And Related]      Diarrhea at 500mg daily    Nsaids      W PROTEINURIA    Statins      myalgias    Tegretol [Carbamazepine]      JITTERS, INSOMNIA       Current Outpatient Medications on File Prior to Visit   Medication Sig Dispense Refill    [START ON 1/29/2023] HYDROcodone-acetaminophen (NORCO) 5-325 MG per tablet Take 1 tablet by mouth 2 times daily as needed for Pain for up to 30 days. 60 tablet 0    traMADol (ULTRAM) 50 MG tablet Take 1 tablet by mouth 3 times daily for 30 days. 90 tablet 0    [START ON 12/30/2022] HYDROcodone-acetaminophen (NORCO) 5-325 MG per tablet Take 1 tablet by mouth 2 times daily for 30 days. 60 tablet 0    HYDROcodone-acetaminophen (NORCO) 5-325 MG per tablet Take 1 tablet by mouth 2 times daily for 30 days. 60 tablet 0    pioglitazone (ACTOS) 30 MG tablet Take 1 tablet by mouth daily 90 tablet 1    amLODIPine (NORVASC) 5 MG tablet TAKE ONE TABLET BY MOUTH DAILY 90 tablet 1    glipiZIDE (GLUCOTROL) 10 MG tablet TAKE ONE TABLET BY MOUTH TWICE A DAY BEFORE MEALS (Patient taking differently: 5 mg) 180 tablet 1    sotalol (BETAPACE) 80 MG tablet Take 1 tablet by mouth 2 times daily 60 tablet 3    eplerenone (INSPRA) 50 MG tablet Take 1 tablet by mouth 2 times daily 60 tablet 5    gabapentin (NEURONTIN) 300 MG capsule Take 1 capsule by mouth 3 times daily for 120 days. Intended supply: 90 days 90 capsule 3    hydrALAZINE (APRESOLINE) 25 MG tablet Take 1 tablet by mouth 3 times daily 180 tablet 1    ondansetron (ZOFRAN) 4 MG tablet Take 1 tablet by mouth every 8 hours as needed for Nausea 40 tablet 1    cyclobenzaprine (FLEXERIL) 10 mg tablet TAKE ONE TABLET BY MOUTH THREE TIMES A DAY AS NEEDED FOR MUSCLE SPASMS 90 tablet 1    vitamin D3 (CHOLECALCIFEROL) 25 MCG (1000 UT) TABS tablet Take 2 tablets by mouth daily 60 tablet 11    furosemide (LASIX) 40 MG tablet Take 1 tablet by mouth See Admin Instructions PRN LEG SWELLING 90 tablet 1    Lancets MISC 1 each by Does not apply route 2 times daily 200 each 1    blood glucose monitor strips Test two times a day & as needed for symptoms of irregular blood glucose. 200 strip 3    lansoprazole (PREVACID) 15 MG delayed release capsule Take 1 capsule by mouth daily 90 capsule 1    meclizine (ANTIVERT) 25 MG tablet Take 1 tablet by mouth every 6 hours.  30 tablet 1     No current facility-administered medications on file prior to visit. Review of Systems:   Review of Systems   Constitutional:  Negative for activity change, chills, fatigue and fever. HENT:  Negative for congestion, sinus pressure and sinus pain. Eyes:  Negative for photophobia, pain and visual disturbance. Respiratory:  Negative for cough, chest tightness, shortness of breath and wheezing. Cardiovascular:  Positive for palpitations. Negative for chest pain and leg swelling. Gastrointestinal:  Negative for abdominal pain, blood in stool, constipation, diarrhea, nausea and vomiting. Endocrine: Negative for cold intolerance and heat intolerance. Genitourinary:  Negative for dysuria, flank pain and hematuria. Musculoskeletal:  Positive for arthralgias. Negative for back pain, myalgias and neck stiffness. Skin:  Negative for color change and rash. Allergic/Immunologic: Negative for food allergies. Neurological:  Negative for dizziness, light-headedness, numbness and headaches. Hematological:  Does not bruise/bleed easily. Psychiatric/Behavioral:  Negative for agitation, behavioral problems and confusion. Examination:      Vitals:    12/05/22 0927   BP: 130/72   Site: Left Upper Arm   Position: Sitting   Cuff Size: Large Adult   Pulse: 66   Weight: 232 lb 3.2 oz (105.3 kg)   Height: 5' 7\" (1.702 m)        Body mass index is 36.37 kg/m². Physical Exam  Constitutional:       Appearance: Normal appearance. He is obese. HENT:      Head: Normocephalic and atraumatic. Right Ear: External ear normal.      Left Ear: External ear normal.      Mouth/Throat:      Mouth: Mucous membranes are moist.      Pharynx: Oropharynx is clear. Eyes:      General:         Right eye: No discharge. Left eye: No discharge. Conjunctiva/sclera: Conjunctivae normal.   Cardiovascular:      Rate and Rhythm: Normal rate. Rhythm irregular. Heart sounds: No murmur heard.      Comments: Frequent ectopic beats  Pulmonary:      Effort: Pulmonary effort is normal.      Breath sounds: No wheezing or rhonchi. Abdominal:      General: Abdomen is flat. Bowel sounds are normal.      Palpations: Abdomen is soft. Musculoskeletal:         General: Normal range of motion. Cervical back: Normal range of motion. Right lower leg: No edema. Left lower leg: No edema. Skin:     General: Skin is warm and dry. Neurological:      General: No focal deficit present. Mental Status: He is alert and oriented to person, place, and time. Psychiatric:         Mood and Affect: Mood normal.         Thought Content: Thought content normal.             CBC:   Lab Results   Component Value Date/Time    WBC 8.6 11/28/2022 08:03 AM    HGB 13.7 11/28/2022 08:03 AM    HCT 41.9 11/28/2022 08:03 AM     11/28/2022 08:03 AM     Lipids:  Lab Results   Component Value Date    CHOL 104 11/28/2022    TRIG 233 (H) 11/28/2022    HDL 32 (L) 11/28/2022    LDLCALC 25 11/28/2022    LDLDIRECT 156 (H) 02/05/2021     PT/INR:   Lab Results   Component Value Date/Time    INR 0.96 11/10/2022 10:24 AM        BMP:    Lab Results   Component Value Date     11/28/2022    K 4.7 11/28/2022     11/28/2022    CO2 25 11/28/2022    BUN 14 11/28/2022     CMP:   Lab Results   Component Value Date    AST 14 (L) 11/28/2022    PROT 7.0 11/28/2022    BILITOT <0.2 11/28/2022    ALKPHOS 105 11/28/2022     TSH:    Lab Results   Component Value Date/Time    TSH 1.01 06/14/2022 10:10 AM       EKGINTERPRETATION - EKG Interpretation:  sinus rhythm, PVC    Echo 6/14/2021     Normal left ventricle structure and systolic function with an ejection   fraction of 55-60%. Grade I diastolic dysfunction. Sclerotic, but non-stenotic aortic valve. Normal pulmonary artery pressure, 33 mmHg. No evidence of pericardial effusion.     Nuclear imaging 6/14/2021     Normal study suggestive of normal perfusion in distribution of all coronaries and normal left ventricular size and function, LVEF is 61% . Recommendation    Continue lifestyle and risk modification for primary prevention. IMPRESSION / RECOMMENDATIONS:     Status post ablation of RV PVCs  PVC  HTN   HLD  DM  CKD  History of hypomagnesemia    EKG obtained and reviewed. Patient noted to have PVCs every fourth beat. Patient reports that he does have palpitations with exertion that will resolve with rest.  He states that the palpitations have improved significantly since the ablation. EKG obtained and reviewed. QTC within range to continue sotalol 80 mg twice daily    We will start patient on 400 mg of magnesium daily  Patient to get magnesium labs in 1 week  Patient has history of hypomagnesemia and received repletion while in the hospital    Vitals:    12/05/22 0927   BP: 130/72   Pulse: 66     Blood pressure is stable, we will continue Norvasc 5 mg daily, Apresoline 25 mg 3 times daily    Patient to follow-up in 1 month or sooner if needed    With best regards. Nas Mora, MARIZA - CNP, 12/5/2022 10:02 AM     Please note this report has been partially produced using speech recognition software and may contain errors related to that system including errors in grammar, punctuation, and spelling, as well as words and phrases that may be inappropriate. If there are any questions or concerns please feel free to contact the dictating provider for clarification.

## 2022-12-14 ENCOUNTER — OFFICE VISIT (OUTPATIENT)
Dept: INTERNAL MEDICINE CLINIC | Age: 72
End: 2022-12-14
Payer: MEDICARE

## 2022-12-14 VITALS
OXYGEN SATURATION: 95 % | SYSTOLIC BLOOD PRESSURE: 138 MMHG | HEART RATE: 72 BPM | WEIGHT: 231.6 LBS | BODY MASS INDEX: 36.27 KG/M2 | DIASTOLIC BLOOD PRESSURE: 82 MMHG

## 2022-12-14 DIAGNOSIS — J02.9 ACUTE PHARYNGITIS, UNSPECIFIED ETIOLOGY: Primary | ICD-10-CM

## 2022-12-14 DIAGNOSIS — J02.9 EXUDATIVE PHARYNGITIS: ICD-10-CM

## 2022-12-14 LAB — S PYO AG THROAT QL: NORMAL

## 2022-12-14 PROCEDURE — 99213 OFFICE O/P EST LOW 20 MIN: CPT | Performed by: INTERNAL MEDICINE

## 2022-12-14 PROCEDURE — 3078F DIAST BP <80 MM HG: CPT | Performed by: INTERNAL MEDICINE

## 2022-12-14 PROCEDURE — 87880 STREP A ASSAY W/OPTIC: CPT | Performed by: INTERNAL MEDICINE

## 2022-12-14 PROCEDURE — 3074F SYST BP LT 130 MM HG: CPT | Performed by: INTERNAL MEDICINE

## 2022-12-14 PROCEDURE — 1124F ACP DISCUSS-NO DSCNMKR DOCD: CPT | Performed by: INTERNAL MEDICINE

## 2022-12-14 RX ORDER — AMOXICILLIN 500 MG/1
500 CAPSULE ORAL 3 TIMES DAILY
Qty: 30 CAPSULE | Refills: 0 | Status: SHIPPED | OUTPATIENT
Start: 2022-12-14 | End: 2022-12-24

## 2022-12-14 NOTE — PROGRESS NOTES
Ferne Severs  1950 12/14/22    SUBJECTIVE:  L sided posterior pharyngeal pain noted 3d, wife [address] noticed some yellowish discoloration which does not wipe off. No fever or chills, swollen glands. No ill contacts. Remote smoker but max was up to 3ppd. OBJECTIVE:    /82 (Site: Left Upper Arm, Position: Sitting, Cuff Size: Medium Adult)   Pulse 72   Wt 231 lb 9.6 oz (105.1 kg)   SpO2 95%   BMI 36.27 kg/m²     Physical Exam  Constitutional:       Appearance: Normal appearance. HENT:      Mouth/Throat:      Pharynx: Oropharyngeal exudate and posterior oropharyngeal erythema present. Comments: Yellowish patch  noted, tender L posterior pharyngeal area. Lymphadenopathy:      Cervical: No cervical adenopathy. Neurological:      Mental Status: He is alert. ASSESSMENT:    1. Acute pharyngitis, unspecified etiology    2. Exudative pharyngitis        PLAN:  Strep test negative, we'll still treat empirically for infection, but also if not responsive in next 1-2 weeks then consider for ENT eval, also to r/o any abnl cells w prior extensive hx of smoking. Jenn Scales was seen today for pharyngitis. Diagnoses and all orders for this visit:    Acute pharyngitis, unspecified etiology  -     POCT rapid strep A  -     amoxicillin (AMOXIL) 500 MG capsule; Take 1 capsule by mouth 3 times daily for 10 days    Exudative pharyngitis  -     POCT rapid strep A  -     amoxicillin (AMOXIL) 500 MG capsule;  Take 1 capsule by mouth 3 times daily for 10 days

## 2022-12-30 DIAGNOSIS — G89.29 CHRONIC MIDLINE LOW BACK PAIN WITHOUT SCIATICA: ICD-10-CM

## 2022-12-30 DIAGNOSIS — M54.50 CHRONIC MIDLINE LOW BACK PAIN WITHOUT SCIATICA: ICD-10-CM

## 2022-12-30 RX ORDER — TRAMADOL HYDROCHLORIDE 50 MG/1
50 TABLET ORAL 3 TIMES DAILY
Qty: 90 TABLET | Refills: 0 | Status: SHIPPED | OUTPATIENT
Start: 2022-12-30 | End: 2023-01-29

## 2023-01-04 ENCOUNTER — NURSE ONLY (OUTPATIENT)
Dept: CARDIOLOGY CLINIC | Age: 73
End: 2023-01-04

## 2023-01-04 ENCOUNTER — OFFICE VISIT (OUTPATIENT)
Dept: CARDIOLOGY CLINIC | Age: 73
End: 2023-01-04

## 2023-01-04 VITALS
DIASTOLIC BLOOD PRESSURE: 60 MMHG | BODY MASS INDEX: 36.26 KG/M2 | SYSTOLIC BLOOD PRESSURE: 140 MMHG | WEIGHT: 231 LBS | HEART RATE: 42 BPM | HEIGHT: 67 IN

## 2023-01-04 DIAGNOSIS — E78.00 PURE HYPERCHOLESTEROLEMIA: ICD-10-CM

## 2023-01-04 DIAGNOSIS — E66.01 SEVERE OBESITY (BMI 35.0-39.9) WITH COMORBIDITY (HCC): ICD-10-CM

## 2023-01-04 DIAGNOSIS — Z51.81 ENCOUNTER FOR MONITORING SOTALOL THERAPY: ICD-10-CM

## 2023-01-04 DIAGNOSIS — Z98.890 S/P ABLATION OF VENTRICULAR ARRHYTHMIA: Primary | ICD-10-CM

## 2023-01-04 DIAGNOSIS — Z78.9 STATIN INTOLERANCE: ICD-10-CM

## 2023-01-04 DIAGNOSIS — G47.33 OSA ON CPAP: ICD-10-CM

## 2023-01-04 DIAGNOSIS — I10 PRIMARY HYPERTENSION: ICD-10-CM

## 2023-01-04 DIAGNOSIS — I49.3 FREQUENT UNIFOCAL PVCS: ICD-10-CM

## 2023-01-04 DIAGNOSIS — Z86.79 S/P ABLATION OF VENTRICULAR ARRHYTHMIA: Primary | ICD-10-CM

## 2023-01-04 DIAGNOSIS — Z99.89 OSA ON CPAP: ICD-10-CM

## 2023-01-04 DIAGNOSIS — Z79.899 ENCOUNTER FOR MONITORING SOTALOL THERAPY: ICD-10-CM

## 2023-01-04 DIAGNOSIS — I20.8 OTHER FORMS OF ANGINA PECTORIS (HCC): ICD-10-CM

## 2023-01-04 DIAGNOSIS — N18.31 STAGE 3A CHRONIC KIDNEY DISEASE (HCC): ICD-10-CM

## 2023-01-04 PROBLEM — I20.89 OTHER FORMS OF ANGINA PECTORIS: Status: ACTIVE | Noted: 2023-01-04

## 2023-01-04 RX ORDER — SOTALOL HYDROCHLORIDE 80 MG/1
80 TABLET ORAL 2 TIMES DAILY
Qty: 180 TABLET | Refills: 3 | Status: SHIPPED | OUTPATIENT
Start: 2023-01-04

## 2023-01-04 RX ORDER — MAGNESIUM OXIDE 400 MG/1
400 TABLET ORAL DAILY
Qty: 90 TABLET | Refills: 3 | Status: SHIPPED | OUTPATIENT
Start: 2023-01-04

## 2023-01-04 NOTE — PATIENT INSTRUCTIONS
**It is YOUR responsibilty to bring medication bottles and/or updated medication list to [address] [place] [address]. This will allow us to better serve you and all your healthcare needs**[place] - [facility] Locations - No appointment necessary. Sites open Monday to Friday. Call your preferred location for test preparation, business hours and other information you need. [employer] accepts [employer]. [facility] [address] Anita Vogt, [address] [facility]  Phone: [phone] [facility] [address] [facility]  [address]. Nina Janet, [address]  Phone: [phone]   Please be informed that if you contact our office outside of normal business hours the physician on call cannot help with any scheduling or rescheduling issues, procedure instruction questions or any type of medication issue. We advise you for any urgent/emergency that you go to the nearest emergency room! PLEASE CALL OUR OFFICE DURING NORMAL BUSINESS HOURS    Monday - Friday   8 am to 5 pm    [place] [facility] [address]: [phone]    [place]:  [phone] you for allowing us to care for you today! We want to ensure we can follow your treatment plan and we strive to give you the best outcomes and experience possible. If you ever have a life threatening emergency and call 911 - for an ambulance (EMS)   Our providers can only care for you at:   [facility] or [facility]. Even if you have someone take you or you drive yourself we can only care for you in a [place] facility. Our providers are not setup at the other healthcare locations!

## 2023-01-04 NOTE — ASSESSMENT & PLAN NOTE
Patient is to have repeat lipids done and LDL goal is less than 100. He has a follow-up with PCP for this. Currently not on statins.

## 2023-01-04 NOTE — PROGRESS NOTES
Bella Dill  1950  Jethro Handley MD      Chief Complaint   Patient presents with    1 Year Follow Up     Pt denies any new cardiac sx  Pt does not exercise b/c a lot of health issues  Eye sight is going. Pt does not smoke or drink alcohol  Pt occasionally drinks caffeine       Chief complaint and HPI:  Bella Dill  is a 81 y.o. male following up for symptomatic frequent PVCs for which he was referred for ablation. Patient had PVC ablation done on 11/15/2022 by Dr. Keerthi Melton. He says since then he does not have the chest tightness and pressure he was having before with frequent PVCs. He denies palpitations. He does not use any caffeine or alcohol and he does not smoke. He is a former smoker. He has been a part of research with PCSK9 therapy for primary prevention and has been participating for the last 1 year. Rest of the Cardiovascular system review is otherwise unchanged from prior encounter. Past medical history:  has a past medical history of Blurred vision, BPH (benign prostatic hypertrophy), Bradycardia, Crohn's regional enteritis ([address] DM II (diabetes mellitus, type II), controlled ([address] Erosive esophagitis, GERD (gastroesophageal reflux disease), Headache disorder, [place] (hard of hearing), HTN (hypertension), Hyperlipidemia, LBP (low back pain), Mesenteric panniculitis ([address] Neck pain, VAN on CPAP, Osteoarthritis, Pattern dystrophy of macula, Proteinuria, PVC (premature ventricular contraction), Rash, Research study patient, Right cervical radiculopathy, Right knee pain, S/P colonoscopy, Stage 3 chronic kidney disease ([address] Testosterone deficiency, and Vertigo. Past surgical history:  has a past surgical history that includes Tonsillectomy (1968); laminectomy (75 and 85); Knee arthroscopy (1994); cervical fusion (2005); Cholecystectomy, laparoscopic (08/2011); knee surgery (Right, 01/16/2013); Total knee arthroplasty (Right, 06/12/2013);  Cataract removal with implant (Left, 2017); Cataract removal with implant (Right, 2017); Colonoscopy; sinus surgery (2020); Endoscopy, colon, diagnostic (2018); Cardiac catheterization; Eye surgery; eye surgery (Left); eye surgery (Right, 2017); Knee arthroscopy (Left, 2020); and ablation of dysrhythmic focus (11/15/2022). Social History:   Social History     Tobacco Use    Smoking status: Former     Packs/day: 3.00     Years: 30.00     Pack years: 90.00     Types: Cigarettes     Quit date: 1995     Years since quittin.0    Smokeless tobacco: Never   Substance Use Topics    Alcohol use: No     Comment: caffeine 1 coffee 1 pop     Family history: family history includes Elevated Lipids in his mother; Heart Disease in his mother; High Blood Pressure in his mother; High Cholesterol in his father and mother; Other in his father. ALLERGIES:  Aldactone [spironolactone], Bactrim [sulfamethoxazole-trimethoprim], Crestor [rosuvastatin], Labetalol, Lisinopril, Metformin and related [metformin and related], Nsaids, Statins, and Tegretol [carbamazepine]  Prior to Admission medications    Medication Sig Start Date End Date Taking? Authorizing Provider   magnesium oxide (MAG-OX) 400 MG tablet Take 1 tablet by mouth daily 23  Yes Kely Zepeda MD   sotalol (BETAPACE) 80 MG tablet Take 1 tablet by mouth 2 times daily 23  Yes Kely Zepeda MD   traMADol (ULTRAM) 50 MG tablet Take 1 tablet by mouth 3 times daily for 30 days. 22 Yes Robin Ruelas MD   HYDROcodone-acetaminophen (NORCO) 5-325 MG per tablet Take 1 tablet by mouth 2 times daily as needed for Pain for up to 30 days. 23 Yes Robin Ruelas MD   HYDROcodone-acetaminophen (NORCO) 5-325 MG per tablet Take 1 tablet by mouth 2 times daily for 30 days.  22 Yes Robin Ruelas MD   pioglitazone (ACTOS) 30 MG tablet Take 1 tablet by mouth daily 22  Yes Robin Ruelas MD   amLODIPine (NORVASC) 5 MG tablet TAKE ONE TABLET BY MOUTH DAILY 11/21/22  Yes Kendra Barajas MD   glipiZIDE (GLUCOTROL) 10 MG tablet TAKE ONE TABLET BY MOUTH TWICE A DAY BEFORE MEALS  Patient taking differently: 5 mg 11/21/22  Yes Kendra Barajas MD   eplerenone (INSPRA) 50 MG tablet Take 1 tablet by mouth 2 times daily 10/21/22  Yes Danielle Cardozo DO   gabapentin (NEURONTIN) 300 MG capsule Take 1 capsule by mouth 3 times daily for 120 days. Intended supply: 90 days 10/13/22 2/10/23 Yes Lolita Ocampo DO   hydrALAZINE (APRESOLINE) 25 MG tablet Take 1 tablet by mouth 3 times daily 10/12/22  Yes Elina Szymanski MD   ondansetron [facility]) 4 MG tablet Take 1 tablet by mouth every 8 hours as needed for Nausea 6/30/22  Yes Kendra Barajas MD   cyclobenzaprine (FLEXERIL) 10 mg tablet TAKE ONE TABLET BY MOUTH THREE TIMES A DAY AS NEEDED FOR MUSCLE SPASMS 6/10/22  Yes Kenrda Barajas MD   vitamin D3 (CHOLECALCIFEROL) 25 MCG (1000 UT) TABS tablet Take 2 tablets by mouth daily 3/11/22  Yes Danielle Cardozo DO   furosemide (LASIX) 40 MG tablet Take 1 tablet by mouth See Admin Instructions PRN LEG SWELLING 12/1/20  Yes Danielle Cardozo DO   Lancets MISC 1 each by Does not apply route 2 times daily 5/6/20  Yes Kendra Barajas MD   lansoprazole (PREVACID) 15 MG delayed release capsule Take 1 capsule by mouth daily 9/27/16  Yes Kendra Barajas MD   meclizine (ANTIVERT) 25 MG tablet Take 1 tablet by mouth every 6 hours. 3/10/15  Yes Kendra Barajas MD   HYDROcodone-acetaminophen (NORCO) 5-325 MG per tablet Take 1 tablet by mouth 2 times daily for 30 days. 11/30/22 12/30/22  Kendra Barajas MD   blood glucose monitor strips Test two times a day & as needed for symptoms of irregular blood glucose.  5/6/20   Kendra Barajas MD     Vitals:    01/04/23 2349 01/04/23 0933   BP: (!) 140/60 (!) 140/60   Site: Left Upper Arm Left Upper Arm   Position: Sitting Sitting   Cuff Size: Large Adult Large Adult Pulse: (!) 42    Weight: 231 lb (104.8 kg)    Height: 5' 7\" (1.702 m)       Body mass index is 36.18 kg/m². Wt Readings from Last 3 Encounters:   01/04/23 231 lb (104.8 kg)   12/14/22 231 lb 9.6 oz (105.1 kg)   12/05/22 232 lb 3.2 oz (105.3 kg)     Constitutional:  Patient is moderately overweight male in no apparent distress. He gained 6 pounds since last visit with me in December of last year. Eyes: He is blind in his right eye and has diminished vision on the left side also and he has been following up with retina specialist.  NECK: No JVP or thyromegaly  Cardiovascular: Auscultation: Normal S1 and S2. No significant murmurs or gallops noted  Carotids are negative for bruits. Peripheral pulses: Pedal pulses are 1+ and equal.   Respiratory:  Respiratory effort is normal. Breath sounds are clear to auscultation. Extremities:   No edema, clubbing,  Cyanosis, petechiae. SKIN: Warm and well perfused, no pallor or cyanosis  Neurologic:  Oriented to time, place, and person and non-anxious. No focal neurological deficit noted. Psychiatric: Normal mood and effect.      EKG from December 5, 2022 is reviewed was consistent with normal sinus rhythm with ventricular trigeminy rate was 66 bpm.    LAB REVIEW:  CBC:   Lab Results   Component Value Date/Time    WBC 8.6 11/28/2022 08:03 AM    HGB 13.7 11/28/2022 08:03 AM    HCT 41.9 11/28/2022 08:03 AM     11/28/2022 08:03 AM     Lipids:   Lab Results   Component Value Date    CHOL 104 11/28/2022    TRIG 233 (H) 11/28/2022    HDL 32 (L) 11/28/2022    LDLCALC 25 11/28/2022     Renal:   Lab Results   Component Value Date/Time    BUN 14 11/28/2022 08:03 AM    CREATININE 1.2 11/28/2022 08:03 AM     11/28/2022 08:03 AM    K 4.7 11/28/2022 08:03 AM     PT/INR:   Lab Results   Component Value Date/Time    INR 0.96 11/10/2022 10:24 AM     Lab Results   Component Value Date    LABA1C 8.3 11/28/2022       IMPRESSION and RECOMMENDATIONS:      1. S/P ablation of ventricular arrhythmia  Assessment & Plan:  Patient continues to have frequent PVCs however his symptoms of chest tightness have resolved. We will obtain a 24-hour Holter monitor for further quantification of arrhythmia. Patient is now on sotalol 80 twice daily and he was not tolerating metoprolol due to extreme fatigue. Orders:  -     Holter Monitor 24 Hour; Future  2. Other forms of angina pectoris [facility])  Assessment & Plan: This has resolved since PVC ablation. 3. Severe obesity (BMI 35.0-39. 9) with comorbidity ([address]  4. Primary hypertension  Assessment & Plan:  Systolic pressure is borderline high today on amlodipine 5 mg daily and hydralazine continue both. Patient is off of lisinopril. 5. VAN on CPAP  Assessment & Plan:  Continue compliance to CPAP therapy and follow-up with pulmonary. 6. Pure hypercholesterolemia  Assessment & Plan:  Patient is to have repeat lipids done and LDL goal is less than 100. He has a follow-up with PCP for this. Currently not on statins. 7. Encounter for monitoring sotalol therapy  8. Frequent unifocal PVCs  -     Holter Monitor 24 Hour; Future  9. Stage 3a chronic kidney disease  Assessment & Plan:  Recent creatinine was 1.2 when he follows up with nephrologist Dr. Hector Pryor. 10. Statin intolerance  Assessment & Plan:  Patient is volunteering in a  Double blinded randomized study called with Cialis with PCSK9 therapy and he should not have any lipids done as it is being done by the protocol he is following up and discharged. Continue current cardiovascular medications which have been reviewed and discussed individually with you. 24 hour Holter. Appropriate prescriptions if needed on this visit are addressed. After visit summery is provided. Questions answered and patient verbalizes understanding. Follow up in 3 months with ECG,  sooner if needed.     Megan Mei MD, 1/4/2023 10:25 AM     Please note this report has been partially produced using speech recognition software and may contain errors related to that system including errors in grammar, punctuation, and spelling, as well as words and phrases that may be inappropriate. If there are any questions or concerns please feel free to contact the dictating provider for clarification.

## 2023-01-04 NOTE — ASSESSMENT & PLAN NOTE
Systolic pressure is borderline high today on amlodipine 5 mg daily and hydralazine continue both. Patient is off of lisinopril.

## 2023-01-04 NOTE — ASSESSMENT & PLAN NOTE
Patient is volunteering in a  Double blinded randomized study called with Cialis with PCSK9 therapy and he should not have any lipids done as it is being done by the protocol he is following up and discharged.

## 2023-01-04 NOTE — PROGRESS NOTES
Applied @ 10:00 a.m., 24 hr holter w/monitor# [id number]/card#  for Dx of Frequent unifocal PVC's. Educated pt on proper holter usage; how to keep sx diary; & when to bring monitor back to office. Pt voiced understanding. Holter order,including monitor & card#, & time started, to front nurse's station in 's in-box.

## 2023-01-04 NOTE — ASSESSMENT & PLAN NOTE
Patient continues to have frequent PVCs however his symptoms of chest tightness have resolved. We will obtain a 24-hour Holter monitor for further quantification of arrhythmia. Patient is now on sotalol 80 twice daily and he was not tolerating metoprolol due to extreme fatigue.

## 2023-01-16 DIAGNOSIS — M54.12 RIGHT CERVICAL RADICULOPATHY: ICD-10-CM

## 2023-01-16 DIAGNOSIS — G89.29 CHRONIC MIDLINE LOW BACK PAIN WITHOUT SCIATICA: ICD-10-CM

## 2023-01-16 DIAGNOSIS — M54.50 CHRONIC MIDLINE LOW BACK PAIN WITHOUT SCIATICA: ICD-10-CM

## 2023-01-16 RX ORDER — CYCLOBENZAPRINE HCL 10 MG
TABLET ORAL
Qty: 90 TABLET | Refills: 1 | Status: SHIPPED | OUTPATIENT
Start: 2023-01-16

## 2023-01-18 ENCOUNTER — TELEPHONE (OUTPATIENT)
Dept: CARDIOLOGY CLINIC | Age: 73
End: 2023-01-18

## 2023-01-18 NOTE — TELEPHONE ENCOUNTER
----- Message from Romulo Garcia MD sent at 1/16/2023  4:13 PM EST -----  2 days of ambulatory cardiac monitoring done to quantify frequent PVCs. Predominantly rhythm is normal sinus average rate is 59 bpm.  Minimum rate of 46 bpm occurred at 5:29 AM and the maximum rate was 111 bpm occurred at 9:17 PM.  14,626 PVCs are noted comprising of 17% of total QRS complexes with infrequent bigeminy and in pairs. 3 different morphologies of PVCs are identified. Isolated 3 beat run of wide-complex tachycardia occurred at the 9:17 AM on January 5, 2023. Rate was 111 bpm.  Patient submitted the diary did not do any activities or symptoms for correlation. Conclusion: Abnormal Holter findings suggesting normal sinus rhythm with frequent complex multiform PVCs. May speak to pt or wife Colette Swenson. Left message on voicemail for pt to return call regarding results of holter X2    Spoke to pt regarding results. Patient advised and voices understanding.

## 2023-01-20 ENCOUNTER — TELEPHONE (OUTPATIENT)
Dept: CARDIOLOGY CLINIC | Age: 73
End: 2023-01-20

## 2023-01-23 ENCOUNTER — TELEPHONE (OUTPATIENT)
Dept: CARDIOLOGY CLINIC | Age: 73
End: 2023-01-23

## 2023-01-23 NOTE — TELEPHONE ENCOUNTER
Patient called requesting to speak with Flavia Beauchamp in Research, please call him back at ph# [phone].

## 2023-01-28 DIAGNOSIS — G89.29 CHRONIC MIDLINE LOW BACK PAIN WITHOUT SCIATICA: ICD-10-CM

## 2023-01-28 DIAGNOSIS — M54.50 CHRONIC MIDLINE LOW BACK PAIN WITHOUT SCIATICA: ICD-10-CM

## 2023-01-30 RX ORDER — TRAMADOL HYDROCHLORIDE 50 MG/1
50 TABLET ORAL 3 TIMES DAILY
Qty: 90 TABLET | Refills: 0 | Status: SHIPPED | OUTPATIENT
Start: 2023-01-30 | End: 2023-03-01

## 2023-02-12 ENCOUNTER — TELEPHONE (OUTPATIENT)
Dept: INTERNAL MEDICINE CLINIC | Age: 73
End: 2023-02-12

## 2023-02-24 ENCOUNTER — OFFICE VISIT (OUTPATIENT)
Dept: INTERNAL MEDICINE CLINIC | Age: 73
End: 2023-02-24

## 2023-02-24 VITALS
HEIGHT: 68 IN | OXYGEN SATURATION: 97 % | SYSTOLIC BLOOD PRESSURE: 142 MMHG | WEIGHT: 236.6 LBS | BODY MASS INDEX: 35.86 KG/M2 | HEART RATE: 60 BPM | DIASTOLIC BLOOD PRESSURE: 90 MMHG

## 2023-02-24 DIAGNOSIS — M54.50 CHRONIC MIDLINE LOW BACK PAIN WITHOUT SCIATICA: ICD-10-CM

## 2023-02-24 DIAGNOSIS — K50.10 CROHN'S DISEASE OF LARGE INTESTINE WITHOUT COMPLICATION (HCC): ICD-10-CM

## 2023-02-24 DIAGNOSIS — M54.12 RIGHT CERVICAL RADICULOPATHY: ICD-10-CM

## 2023-02-24 DIAGNOSIS — E11.21 CONTROLLED TYPE 2 DIABETES MELLITUS WITH DIABETIC NEPHROPATHY, WITHOUT LONG-TERM CURRENT USE OF INSULIN (HCC): Primary | ICD-10-CM

## 2023-02-24 DIAGNOSIS — G89.29 CHRONIC MIDLINE LOW BACK PAIN WITHOUT SCIATICA: ICD-10-CM

## 2023-02-24 DIAGNOSIS — H35.54 PATTERN DYSTROPHY OF MACULA: ICD-10-CM

## 2023-02-24 RX ORDER — GLIPIZIDE 5 MG/1
5 TABLET ORAL
Qty: 180 TABLET | Refills: 1 | Status: SHIPPED | OUTPATIENT
Start: 2023-02-24

## 2023-02-24 RX ORDER — TRAMADOL HYDROCHLORIDE 50 MG/1
50 TABLET ORAL 3 TIMES DAILY
Qty: 90 TABLET | Refills: 0 | Status: SHIPPED | OUTPATIENT
Start: 2023-02-24 | End: 2023-03-26

## 2023-02-24 RX ORDER — HYDROCODONE BITARTRATE AND ACETAMINOPHEN 5; 325 MG/1; MG/1
1 TABLET ORAL 2 TIMES DAILY
Qty: 60 TABLET | Refills: 0 | Status: SHIPPED | OUTPATIENT
Start: 2023-03-31 | End: 2023-04-30

## 2023-02-24 RX ORDER — HYDROCODONE BITARTRATE AND ACETAMINOPHEN 5; 325 MG/1; MG/1
1 TABLET ORAL 2 TIMES DAILY PRN
Qty: 60 TABLET | Refills: 0 | Status: SHIPPED | OUTPATIENT
Start: 2023-04-30 | End: 2023-05-30

## 2023-02-24 RX ORDER — HYDROCODONE BITARTRATE AND ACETAMINOPHEN 5; 325 MG/1; MG/1
1 TABLET ORAL 2 TIMES DAILY
Qty: 60 TABLET | Refills: 0 | Status: SHIPPED | OUTPATIENT
Start: 2023-03-01 | End: 2023-03-31

## 2023-02-24 NOTE — PROGRESS NOTES
Nadege Cardenas  1950 02/24/23    SUBJECTIVE:    Lipids:  Is continuing study drug, POSSIBLY REPATHA- and low fat diet. Tolerating medications w/o myalgias or GI upset. CHOL AND TGS ESPEC BETTER    DM-   LAST GLC VERY GOOD, IS ON GLIPIZIDE 5MG BID, 10MG CAUSES GI UPSET  Lab Results   Component Value Date    LABA1C 6.7 02/10/2023    LABA1C 8.3 11/28/2022    LABA1C 8.8 (H) 08/18/2022     Lab Results   Component Value Date    GLUF 195 (H) 03/20/2018    LABMICR 3.10 (H) 02/10/2023    LDLCALC 67 02/10/2023    CREATININE 1.4 (H) 02/10/2023     Hypertension: Stable. Denies CP, SOB, cough, visual changes, dizziness, palpitations or HA.      CROHNS DZ, NO CURRENT FLARES AND SEEING DR HERNANDEZ. CHR LBP STABLE W BOTH PRN NORCO AND TRAMADOL W RF DUE. Controlled substances monitoring: possible medication side effects, risk of tolerance and/or dependence, and alternative treatments discussed, no signs of potential drug abuse or diversion identified and OARRS report reviewed today- activity consistent with treatment plan. PATTERN DYSTROPHY GRADUALLY WORSENING, KEEPING CLOSE F/U W DR CHILEL        OBJECTIVE:    BP (!) 142/90   Pulse 60   Ht 5' 8\" (1.727 m)   Wt 236 lb 9.6 oz (107.3 kg)   SpO2 97%   BMI 35.97 kg/m²     Physical Exam  Constitutional:       Appearance: Normal appearance. Cardiovascular:      Rate and Rhythm: Normal rate and regular rhythm. Heart sounds: No murmur heard. No gallop. Pulmonary:      Effort: No respiratory distress. Breath sounds: No wheezing. Abdominal:      General: Abdomen is flat. Bowel sounds are normal. There is no distension. Palpations: Abdomen is soft. There is no mass. Tenderness: There is no abdominal tenderness. Hernia: No hernia is present. Musculoskeletal:      Right lower leg: No edema. Left lower leg: No edema. Neurological:      Mental Status: He is alert.    Psychiatric:         Mood and Affect: Mood normal. ASSESSMENT:    1. Controlled type 2 diabetes mellitus with diabetic nephropathy, without long-term current use of insulin ([address]    2. Chronic midline low back pain without sciatica    3. Right cervical radiculopathy    4. Crohn's disease of large intestine without complication ([address]    5. Pattern dystrophy of macula        PLAN:    Lucila Bocanegra was seen today for 3 month follow-up. Diagnoses and all orders for this visit:    Controlled type 2 diabetes mellitus with diabetic nephropathy, without long-term current use of insulin ([address] - DM relatively well controlled and will continue current regimen. Screening reviewed. See orders.    -     glipiZIDE (GLUCOTROL) 5 MG tablet; Take 1 tablet by mouth 2 times daily (before meals)  -     Comprehensive Metabolic Panel; Future  -     Hemoglobin A1C; Future    Chronic midline low back pain without sciatica  LBP stable on pain regimen, RFs given as noted and will monitor.    -     HYDROcodone-acetaminophen (NORCO) 5-325 MG per tablet; Take 1 tablet by mouth 2 times daily as needed for Pain for up to 30 days. -     traMADol (ULTRAM) 50 MG tablet; Take 1 tablet by mouth 3 times daily for 30 days.  -     HYDROcodone-acetaminophen (NORCO) 5-325 MG per tablet; Take 1 tablet by mouth 2 times daily for 30 days.  -     HYDROcodone-acetaminophen (NORCO) 5-325 MG per tablet; Take 1 tablet by mouth 2 times daily for 30 days. Right cervical radiculopathy  -     HYDROcodone-acetaminophen (NORCO) 5-325 MG per tablet; Take 1 tablet by mouth 2 times daily as needed for Pain for up to 30 days.  -     HYDROcodone-acetaminophen (NORCO) 5-325 MG per tablet; Take 1 tablet by mouth 2 times daily for 30 days.  -     HYDROcodone-acetaminophen (NORCO) 5-325 MG per tablet; Take 1 tablet by mouth 2 times daily for 30 days. Crohn's disease of large intestine without complication ([address]- CLINICALLY IN REMISSION, CONT F/U GI AND ALSO SCR/MONITOR CRP  -     C-Reactive Protein;  Future    Pattern dystrophy of macula- CONT F/U [facility]  [facility]

## 2023-03-01 DIAGNOSIS — E11.21 CONTROLLED TYPE 2 DIABETES MELLITUS WITH DIABETIC NEPHROPATHY, WITHOUT LONG-TERM CURRENT USE OF INSULIN (HCC): ICD-10-CM

## 2023-03-02 RX ORDER — PIOGLITAZONEHYDROCHLORIDE 30 MG/1
TABLET ORAL
Qty: 90 TABLET | Refills: 1 | Status: SHIPPED | OUTPATIENT
Start: 2023-03-02

## 2023-03-08 ENCOUNTER — HOSPITAL ENCOUNTER (OUTPATIENT)
Age: 73
Discharge: HOME | End: 2023-03-08
Payer: MEDICARE

## 2023-03-08 DIAGNOSIS — E11.21 DIABETIC NEPHROPATHY ASSOCIATED WITH TYPE 2 DIABETES MELLITUS (HCC): ICD-10-CM

## 2023-03-08 DIAGNOSIS — N18.31 STAGE 3A CHRONIC KIDNEY DISEASE (HCC): ICD-10-CM

## 2023-03-08 LAB
ANION GAP SERPL CALCULATED.3IONS-SCNC: 10 MMOL/L (ref 4–16)
BUN SERPL-MCNC: 16 MG/DL (ref 6–23)
CALCIUM SERPL-MCNC: 9 MG/DL (ref 8.3–10.6)
CHLORIDE BLD-SCNC: 100 MMOL/L (ref 99–110)
CO2: 29 MMOL/L (ref 21–32)
CREAT SERPL-MCNC: 1.3 MG/DL (ref 0.9–1.3)
GFR SERPL CREATININE-BSD FRML MDRD: 59 ML/MIN/1.73M2
GLUCOSE SERPL-MCNC: 108 MG/DL (ref 70–99)
MAGNESIUM: 2.2 MG/DL (ref 1.8–2.4)
POTASSIUM SERPL-SCNC: 4.2 MMOL/L (ref 3.5–5.1)
SODIUM BLD-SCNC: 139 MMOL/L (ref 135–145)

## 2023-03-08 PROCEDURE — 36415 COLL VENOUS BLD VENIPUNCTURE: CPT

## 2023-03-08 PROCEDURE — 80048 BASIC METABOLIC PNL TOTAL CA: CPT

## 2023-03-08 PROCEDURE — 83735 ASSAY OF MAGNESIUM: CPT

## 2023-03-18 DIAGNOSIS — I10 ESSENTIAL HYPERTENSION: ICD-10-CM

## 2023-03-20 RX ORDER — HYDRALAZINE HYDROCHLORIDE 25 MG/1
TABLET, FILM COATED ORAL
Qty: 180 TABLET | Refills: 1 | Status: SHIPPED | OUTPATIENT
Start: 2023-03-20

## 2023-03-29 DIAGNOSIS — G89.29 CHRONIC MIDLINE LOW BACK PAIN WITHOUT SCIATICA: ICD-10-CM

## 2023-03-29 DIAGNOSIS — M54.50 CHRONIC MIDLINE LOW BACK PAIN WITHOUT SCIATICA: ICD-10-CM

## 2023-03-29 RX ORDER — TRAMADOL HYDROCHLORIDE 50 MG/1
50 TABLET ORAL 3 TIMES DAILY
Qty: 90 TABLET | Refills: 0 | Status: SHIPPED | OUTPATIENT
Start: 2023-03-29 | End: 2023-04-28

## 2023-04-27 DIAGNOSIS — M54.50 CHRONIC MIDLINE LOW BACK PAIN WITHOUT SCIATICA: ICD-10-CM

## 2023-04-27 DIAGNOSIS — G89.29 CHRONIC MIDLINE LOW BACK PAIN WITHOUT SCIATICA: ICD-10-CM

## 2023-04-28 RX ORDER — TRAMADOL HYDROCHLORIDE 50 MG/1
TABLET ORAL
Qty: 90 TABLET | OUTPATIENT
Start: 2023-04-28

## 2023-04-28 RX ORDER — TRAMADOL HYDROCHLORIDE 50 MG/1
50 TABLET ORAL 3 TIMES DAILY
Qty: 90 TABLET | Refills: 0 | OUTPATIENT
Start: 2023-04-28 | End: 2023-05-28

## 2023-05-02 DIAGNOSIS — G89.29 CHRONIC MIDLINE LOW BACK PAIN WITHOUT SCIATICA: ICD-10-CM

## 2023-05-02 DIAGNOSIS — M54.50 CHRONIC MIDLINE LOW BACK PAIN WITHOUT SCIATICA: ICD-10-CM

## 2023-05-02 RX ORDER — TRAMADOL HYDROCHLORIDE 50 MG/1
50 TABLET ORAL 3 TIMES DAILY
Qty: 90 TABLET | Refills: 0 | Status: SHIPPED | OUTPATIENT
Start: 2023-05-02 | End: 2023-06-01

## 2023-05-18 DIAGNOSIS — I10 ESSENTIAL HYPERTENSION: ICD-10-CM

## 2023-05-18 RX ORDER — AMLODIPINE BESYLATE 5 MG/1
TABLET ORAL
Qty: 90 TABLET | Refills: 1 | Status: SHIPPED | OUTPATIENT
Start: 2023-05-18

## 2023-05-18 RX ORDER — GLIPIZIDE 10 MG/1
TABLET ORAL
Qty: 180 TABLET | OUTPATIENT
Start: 2023-05-18

## 2023-05-21 SDOH — ECONOMIC STABILITY: FOOD INSECURITY: WITHIN THE PAST 12 MONTHS, YOU WORRIED THAT YOUR FOOD WOULD RUN OUT BEFORE YOU GOT MONEY TO BUY MORE.: SOMETIMES TRUE

## 2023-05-21 SDOH — ECONOMIC STABILITY: FOOD INSECURITY: WITHIN THE PAST 12 MONTHS, THE FOOD YOU BOUGHT JUST DIDN'T LAST AND YOU DIDN'T HAVE MONEY TO GET MORE.: SOMETIMES TRUE

## 2023-05-21 SDOH — ECONOMIC STABILITY: INCOME INSECURITY: HOW HARD IS IT FOR YOU TO PAY FOR THE VERY BASICS LIKE FOOD, HOUSING, MEDICAL CARE, AND HEATING?: SOMEWHAT HARD

## 2023-05-21 SDOH — ECONOMIC STABILITY: HOUSING INSECURITY
IN THE LAST 12 MONTHS, WAS THERE A TIME WHEN YOU DID NOT HAVE A STEADY PLACE TO SLEEP OR SLEPT IN A SHELTER (INCLUDING NOW)?: NO

## 2023-05-21 SDOH — ECONOMIC STABILITY: TRANSPORTATION INSECURITY
IN THE PAST 12 MONTHS, HAS LACK OF TRANSPORTATION KEPT YOU FROM MEETINGS, WORK, OR FROM GETTING THINGS NEEDED FOR DAILY LIVING?: NO

## 2023-05-22 DIAGNOSIS — K50.10 CROHN'S DISEASE OF LARGE INTESTINE WITHOUT COMPLICATION (HCC): ICD-10-CM

## 2023-05-22 DIAGNOSIS — N18.31 STAGE 3A CHRONIC KIDNEY DISEASE (HCC): ICD-10-CM

## 2023-05-22 DIAGNOSIS — E11.21 CONTROLLED TYPE 2 DIABETES MELLITUS WITH DIABETIC NEPHROPATHY, WITHOUT LONG-TERM CURRENT USE OF INSULIN (HCC): ICD-10-CM

## 2023-05-22 LAB
ALBUMIN SERPL-MCNC: 4.2 G/DL (ref 3.4–5)
ALBUMIN/GLOB SERPL: 1.6 {RATIO} (ref 1.1–2.2)
ALP SERPL-CCNC: 95 U/L (ref 40–129)
ALT SERPL-CCNC: 9 U/L (ref 10–40)
ANION GAP SERPL CALCULATED.3IONS-SCNC: 11 MMOL/L (ref 3–16)
AST SERPL-CCNC: 12 U/L (ref 15–37)
BILIRUB SERPL-MCNC: 0.3 MG/DL (ref 0–1)
BUN SERPL-MCNC: 20 MG/DL (ref 7–20)
CALCIUM SERPL-MCNC: 9.7 MG/DL (ref 8.3–10.6)
CHLORIDE SERPL-SCNC: 100 MMOL/L (ref 99–110)
CO2 SERPL-SCNC: 29 MMOL/L (ref 21–32)
CREAT SERPL-MCNC: 1.2 MG/DL (ref 0.8–1.3)
CRP SERPL-MCNC: 13.9 MG/L (ref 0–5.1)
EST. AVERAGE GLUCOSE BLD GHB EST-MCNC: 134.1 MG/DL
GFR SERPLBLD CREATININE-BSD FMLA CKD-EPI: >60 ML/MIN/{1.73_M2}
GLUCOSE SERPL-MCNC: 95 MG/DL (ref 70–99)
HBA1C MFR BLD: 6.3 %
POTASSIUM SERPL-SCNC: 4.6 MMOL/L (ref 3.5–5.1)
PROT SERPL-MCNC: 6.9 G/DL (ref 6.4–8.2)
SODIUM SERPL-SCNC: 140 MMOL/L (ref 136–145)

## 2023-05-23 ENCOUNTER — TELEPHONE (OUTPATIENT)
Dept: PHARMACY | Facility: CLINIC | Age: 73
End: 2023-05-23

## 2023-05-23 DIAGNOSIS — M54.16 LUMBAR RADICULOPATHY: ICD-10-CM

## 2023-05-23 DIAGNOSIS — E11.21 CONTROLLED TYPE 2 DIABETES MELLITUS WITH DIABETIC NEPHROPATHY, WITHOUT LONG-TERM CURRENT USE OF INSULIN (HCC): ICD-10-CM

## 2023-05-23 DIAGNOSIS — I10 ESSENTIAL HYPERTENSION: ICD-10-CM

## 2023-05-23 RX ORDER — PIOGLITAZONEHYDROCHLORIDE 30 MG/1
30 TABLET ORAL DAILY
Qty: 90 TABLET | Refills: 1 | OUTPATIENT
Start: 2023-05-23

## 2023-05-23 RX ORDER — AMLODIPINE BESYLATE 5 MG/1
5 TABLET ORAL DAILY
Qty: 90 TABLET | Refills: 1 | OUTPATIENT
Start: 2023-05-23

## 2023-05-23 NOTE — TELEPHONE ENCOUNTER
Dr. Matilda Fairchild MD,     Patient's insurance has identified statin use in persons with diabetes (SUPD) care gap: To close this care gap, you can add an eligible diagnosis code to a billable visit that would make insurance aware that patient is unable to tolerate statins. Options include:   Statin myopathy: G72.0, T46.6X5A  Drug-induced myopathy: G72.0  Myopathy, unspecified: G72.89  Other specified myopathies: G72.9  Other myositis, unspecified site: M60.80  Myositis, unspecified: M60.9     Last visit: 02/24/2023, Next visit: 05/24/2023     See encounter note(s) below for complete details. Please let me know if you have any questions. Thank you,  Anahy Howell, PharmD, BCACP, [address] [place] [address], toll free: [phone], option 1    =======================================================    POPULATION [facility]ALTH CLINICAL PHARMACY: STATIN THERAPY REVIEW  Identified statin use in persons with diabetes (SUPD) care gap per [facility].  Records dated: 05/15/2023     Last Visit: 02/24/2023  Next Visit: 05/24/2023    Pharmacy: [address]    Allergies   Allergen Reactions    Aldactone [Spironolactone] Gynecomastia     Bactrim [Sulfamethoxazole-Trimethoprim] Nephrology contraindicates-- can cause hyperkalemia    Crestor [Rosuvastatin] myalgias    Labetalol Marked bradycardia with frequent symptomatic ectopy    Lisinopril Throat swelling- stopped by Dr. Joana Guzman    Metformin And Related [Metformin And Related] Diarrhea at 500mg daily    Nsaids W PROTEINURIA    Statins myalgias    Tegretol [Carbamazepine] JITTERS, INSOMNIA     STATIN GAP IDENTIFIED    Per Reconcile Dispense History as of 05/23/2023:   Rosuvastatin filled once in 2021    Lipid Panel   Component Value Date    CHOL 136 02/10/2023    TRIG 118 02/10/2023    HDL 45 02/10/2023    LDLCALC 67 02/10/2023     ALT   Date Value Ref Range Status   05/22/2023 9 (L) 10 - 40 U/L Final     AST   Date Value Ref Range

## 2023-05-24 ENCOUNTER — OFFICE VISIT (OUTPATIENT)
Dept: INTERNAL MEDICINE CLINIC | Age: 73
End: 2023-05-24
Payer: MEDICARE

## 2023-05-24 VITALS
DIASTOLIC BLOOD PRESSURE: 78 MMHG | OXYGEN SATURATION: 94 % | WEIGHT: 250 LBS | SYSTOLIC BLOOD PRESSURE: 128 MMHG | BODY MASS INDEX: 39.16 KG/M2 | RESPIRATION RATE: 14 BRPM | HEART RATE: 57 BPM

## 2023-05-24 DIAGNOSIS — M54.12 RIGHT CERVICAL RADICULOPATHY: ICD-10-CM

## 2023-05-24 DIAGNOSIS — T46.6X5A MYALGIA DUE TO STATIN: ICD-10-CM

## 2023-05-24 DIAGNOSIS — G89.29 CHRONIC MIDLINE LOW BACK PAIN WITHOUT SCIATICA: ICD-10-CM

## 2023-05-24 DIAGNOSIS — M54.50 CHRONIC MIDLINE LOW BACK PAIN WITHOUT SCIATICA: ICD-10-CM

## 2023-05-24 DIAGNOSIS — I10 ESSENTIAL HYPERTENSION: ICD-10-CM

## 2023-05-24 DIAGNOSIS — E11.21 CONTROLLED TYPE 2 DIABETES MELLITUS WITH DIABETIC NEPHROPATHY, WITHOUT LONG-TERM CURRENT USE OF INSULIN (HCC): Primary | ICD-10-CM

## 2023-05-24 DIAGNOSIS — K50.10 CROHN'S DISEASE OF LARGE INTESTINE WITHOUT COMPLICATION (HCC): ICD-10-CM

## 2023-05-24 DIAGNOSIS — M79.10 MYALGIA DUE TO STATIN: ICD-10-CM

## 2023-05-24 PROBLEM — G72.0 STATIN MYOPATHY: Status: ACTIVE | Noted: 2023-05-24

## 2023-05-24 PROCEDURE — 99214 OFFICE O/P EST MOD 30 MIN: CPT | Performed by: INTERNAL MEDICINE

## 2023-05-24 PROCEDURE — 3074F SYST BP LT 130 MM HG: CPT | Performed by: INTERNAL MEDICINE

## 2023-05-24 PROCEDURE — 3044F HG A1C LEVEL LT 7.0%: CPT | Performed by: INTERNAL MEDICINE

## 2023-05-24 PROCEDURE — 3078F DIAST BP <80 MM HG: CPT | Performed by: INTERNAL MEDICINE

## 2023-05-24 PROCEDURE — 1124F ACP DISCUSS-NO DSCNMKR DOCD: CPT | Performed by: INTERNAL MEDICINE

## 2023-05-24 RX ORDER — GABAPENTIN 300 MG/1
300 CAPSULE ORAL 3 TIMES DAILY
Qty: 90 CAPSULE | Refills: 3 | OUTPATIENT
Start: 2023-05-24 | End: 2023-09-21

## 2023-05-24 RX ORDER — TRAMADOL HYDROCHLORIDE 50 MG/1
50 TABLET ORAL 3 TIMES DAILY
Qty: 90 TABLET | Refills: 2 | Status: SHIPPED | OUTPATIENT
Start: 2023-05-24 | End: 2023-06-23

## 2023-05-24 RX ORDER — AMLODIPINE BESYLATE 5 MG/1
5 TABLET ORAL DAILY
Qty: 90 TABLET | Refills: 1 | Status: SHIPPED | OUTPATIENT
Start: 2023-05-24

## 2023-05-24 RX ORDER — HYDROCODONE BITARTRATE AND ACETAMINOPHEN 5; 325 MG/1; MG/1
1 TABLET ORAL 2 TIMES DAILY
Qty: 60 TABLET | Refills: 0 | Status: SHIPPED | OUTPATIENT
Start: 2023-05-30 | End: 2023-06-29

## 2023-05-24 RX ORDER — PIOGLITAZONEHYDROCHLORIDE 30 MG/1
30 TABLET ORAL DAILY
Qty: 90 TABLET | Refills: 1 | Status: SHIPPED | OUTPATIENT
Start: 2023-05-24

## 2023-05-24 RX ORDER — DULAGLUTIDE 0.75 MG/.5ML
0.75 INJECTION, SOLUTION SUBCUTANEOUS WEEKLY
Qty: 4 ADJUSTABLE DOSE PRE-FILLED PEN SYRINGE | Refills: 3 | Status: SHIPPED | OUTPATIENT
Start: 2023-05-24

## 2023-05-24 RX ORDER — HYDROCODONE BITARTRATE AND ACETAMINOPHEN 5; 325 MG/1; MG/1
1 TABLET ORAL 2 TIMES DAILY PRN
Qty: 60 TABLET | Refills: 0 | Status: SHIPPED | OUTPATIENT
Start: 2023-07-29 | End: 2023-08-28

## 2023-05-24 RX ORDER — HYDROCODONE BITARTRATE AND ACETAMINOPHEN 5; 325 MG/1; MG/1
1 TABLET ORAL 2 TIMES DAILY
Qty: 60 TABLET | Refills: 0 | Status: SHIPPED | OUTPATIENT
Start: 2023-06-29 | End: 2023-07-29

## 2023-05-24 ASSESSMENT — PATIENT HEALTH QUESTIONNAIRE - PHQ9
2. FEELING DOWN, DEPRESSED OR HOPELESS: 0
SUM OF ALL RESPONSES TO PHQ QUESTIONS 1-9: 0
SUM OF ALL RESPONSES TO PHQ QUESTIONS 1-9: 0
SUM OF ALL RESPONSES TO PHQ9 QUESTIONS 1 & 2: 0
SUM OF ALL RESPONSES TO PHQ QUESTIONS 1-9: 0
1. LITTLE INTEREST OR PLEASURE IN DOING THINGS: 0
SUM OF ALL RESPONSES TO PHQ QUESTIONS 1-9: 0

## 2023-05-24 NOTE — PROGRESS NOTES
Damaris Orozco  1950 05/24/23    SUBJECTIVE:    Hypertension: Stable. Denies CP, SOB, cough, visual changes, dizziness, palpitations or HA. DM- sugars stable and improved, a1c lower to 6.3. SEEING  [facility] FOR PATTERN DYSTROPHY  Lab Results   Component Value Date    LABA1C 6.3 05/22/2023    LABA1C 6.7 02/10/2023    LABA1C 8.3 11/28/2022     Lab Results   Component Value Date    GLUF 195 (H) 03/20/2018    LABMICR 3.10 (H) 02/10/2023    LDLCALC 67 02/10/2023    CREATININE 1.2 05/22/2023     Lipids:  Has history of high cholesterol, not on medication but trying to continue regular low fat diet and maintenance of weight, regular exercise. IS INTOL OF STATINS- on ?repathy vs placebo? For     ELEVATED CRP, NO ABD PAIN, DIARRHEA, BLOOD IN STOOLS. COLONOSCOPY LAST 2019 AND IS DUE NEXT YR. HAS POOR DENTITION W NEED FOR SOME EXTRACTIONS. CROHNS DZ, DUE FOR COLONOSCOPY NEXT YR. Arthritis and chr LBP, on norco w rf due, also on tramadol TID. Controlled substances monitoring: possible medication side effects, risk of tolerance and/or dependence, and alternative treatments discussed, no signs of potential drug abuse or diversion identified and OARRS report reviewed today- activity consistent with treatment plan. OBJECTIVE:    /78   Pulse 57   Resp 14   Wt 250 lb (113.4 kg)   SpO2 94%   BMI 39.16 kg/m²     Physical Exam  Constitutional:       Appearance: Normal appearance. Cardiovascular:      Rate and Rhythm: Normal rate and regular rhythm. Heart sounds: No murmur heard. No gallop. Pulmonary:      Effort: No respiratory distress. Breath sounds: No wheezing. Abdominal:      General: Abdomen is flat. Bowel sounds are normal. There is no distension. Palpations: Abdomen is soft. There is no mass. Tenderness: There is no abdominal tenderness. Hernia: No hernia is present. Musculoskeletal:      Right lower leg: No edema. Left lower leg: No edema.

## 2023-05-24 NOTE — TELEPHONE ENCOUNTER
[facility] CLINICAL PHARMACY REVIEW: STATIN THERAPY    Myalgia due to statin M79.10, T46.6X5A dropped at 05/24/2023 visit, which is not accepted by CMS/insurance. Appreciate provider's acknowledgement of outreach and clarification that patient only had myalgia rather than myopathy or myositis.      Anahy Howell, PharmD, BCACP, [facility]  Department, toll free: [phone], option 1    =======================================================    For Pharmacy Admin Tracking Only  Program: [address] in place:  No  Recommendation Provided To: Provider: 1 via Note to Provider  Intervention Accepted By: Provider: 1  Gap Closed?: No   Time Spent (min): 30

## 2023-05-30 DIAGNOSIS — M54.16 LUMBAR RADICULOPATHY: ICD-10-CM

## 2023-05-30 RX ORDER — GABAPENTIN 300 MG/1
300 CAPSULE ORAL 3 TIMES DAILY
Qty: 90 CAPSULE | Refills: 2 | Status: SHIPPED | OUTPATIENT
Start: 2023-05-30 | End: 2023-06-29

## 2023-05-30 NOTE — TELEPHONE ENCOUNTER
Patient called asking for RF Gabapentin. It was last prescribed by Dr. Katelyn Hooper and patient states he does not have any follow up with her since he couldn't do any of the CNS testing because of his vision. Please advise.

## 2023-07-18 DIAGNOSIS — M54.50 CHRONIC MIDLINE LOW BACK PAIN WITHOUT SCIATICA: ICD-10-CM

## 2023-07-18 DIAGNOSIS — M54.12 RIGHT CERVICAL RADICULOPATHY: ICD-10-CM

## 2023-07-18 DIAGNOSIS — G89.29 CHRONIC MIDLINE LOW BACK PAIN WITHOUT SCIATICA: ICD-10-CM

## 2023-07-18 RX ORDER — CYCLOBENZAPRINE HCL 10 MG
TABLET ORAL
Qty: 90 TABLET | Refills: 1 | Status: SHIPPED | OUTPATIENT
Start: 2023-07-18

## 2023-07-19 ENCOUNTER — TELEPHONE (OUTPATIENT)
Dept: INTERNAL MEDICINE CLINIC | Age: 73
End: 2023-07-19

## 2023-07-19 NOTE — TELEPHONE ENCOUNTER
PT STATES HE HAS BEEN ON TRULICITY 1.02 FOR ABOUT 2 MONTHS NOW. HE HAS HAD EXTREME NAUSEA ON THIS MEDICATION. HE IS DUE TO  HIS REFILL THIS WKND AND WONDERS IF HE SHOULD HOLD IT FOR A BIT TO SEE IF THE NAUSEA SUBSIDES? PLEASE ADVISE.

## 2023-08-04 DIAGNOSIS — I10 ESSENTIAL HYPERTENSION: ICD-10-CM

## 2023-08-04 RX ORDER — HYDRALAZINE HYDROCHLORIDE 25 MG/1
25 TABLET, FILM COATED ORAL 3 TIMES DAILY
Qty: 180 TABLET | Refills: 1 | Status: SHIPPED | OUTPATIENT
Start: 2023-08-04

## 2023-08-22 DIAGNOSIS — K50.10 CROHN'S DISEASE OF LARGE INTESTINE WITHOUT COMPLICATION (HCC): ICD-10-CM

## 2023-08-22 DIAGNOSIS — M79.10 MYALGIA DUE TO STATIN: ICD-10-CM

## 2023-08-22 DIAGNOSIS — T46.6X5A MYALGIA DUE TO STATIN: ICD-10-CM

## 2023-08-22 DIAGNOSIS — E11.21 CONTROLLED TYPE 2 DIABETES MELLITUS WITH DIABETIC NEPHROPATHY, WITHOUT LONG-TERM CURRENT USE OF INSULIN (HCC): ICD-10-CM

## 2023-08-22 DIAGNOSIS — I10 ESSENTIAL HYPERTENSION: ICD-10-CM

## 2023-08-22 LAB
ALBUMIN SERPL-MCNC: 4.1 G/DL (ref 3.4–5)
ALBUMIN/GLOB SERPL: 1.5 {RATIO} (ref 1.1–2.2)
ALP SERPL-CCNC: 95 U/L (ref 40–129)
ALT SERPL-CCNC: 8 U/L (ref 10–40)
ANION GAP SERPL CALCULATED.3IONS-SCNC: 14 MMOL/L (ref 3–16)
AST SERPL-CCNC: 11 U/L (ref 15–37)
BILIRUB SERPL-MCNC: 0.3 MG/DL (ref 0–1)
BUN SERPL-MCNC: 22 MG/DL (ref 7–20)
CALCIUM SERPL-MCNC: 9.7 MG/DL (ref 8.3–10.6)
CHLORIDE SERPL-SCNC: 97 MMOL/L (ref 99–110)
CHOLEST SERPL-MCNC: 115 MG/DL (ref 0–199)
CO2 SERPL-SCNC: 27 MMOL/L (ref 21–32)
CREAT SERPL-MCNC: 1.5 MG/DL (ref 0.8–1.3)
CRP SERPL-MCNC: 13.3 MG/L (ref 0–5.1)
DEPRECATED RDW RBC AUTO: 15.9 % (ref 12.4–15.4)
GFR SERPLBLD CREATININE-BSD FMLA CKD-EPI: 49 ML/MIN/{1.73_M2}
GLUCOSE SERPL-MCNC: 131 MG/DL (ref 70–99)
HCT VFR BLD AUTO: 42.9 % (ref 40.5–52.5)
HDLC SERPL-MCNC: 34 MG/DL (ref 40–60)
HGB BLD-MCNC: 14.3 G/DL (ref 13.5–17.5)
LDLC SERPL CALC-MCNC: 39 MG/DL
MCH RBC QN AUTO: 26.8 PG (ref 26–34)
MCHC RBC AUTO-ENTMCNC: 33.2 G/DL (ref 31–36)
MCV RBC AUTO: 80.7 FL (ref 80–100)
PLATELET # BLD AUTO: 261 K/UL (ref 135–450)
PMV BLD AUTO: 8 FL (ref 5–10.5)
POTASSIUM SERPL-SCNC: 4.2 MMOL/L (ref 3.5–5.1)
PROT SERPL-MCNC: 6.9 G/DL (ref 6.4–8.2)
RBC # BLD AUTO: 5.32 M/UL (ref 4.2–5.9)
SODIUM SERPL-SCNC: 138 MMOL/L (ref 136–145)
TRIGL SERPL-MCNC: 208 MG/DL (ref 0–150)
VLDLC SERPL CALC-MCNC: 42 MG/DL
WBC # BLD AUTO: 6.6 K/UL (ref 4–11)

## 2023-08-23 DIAGNOSIS — K50.10 CROHN'S DISEASE OF LARGE INTESTINE WITHOUT COMPLICATION (HCC): Primary | ICD-10-CM

## 2023-08-23 DIAGNOSIS — R70.0 ELEVATED ERYTHROCYTE SEDIMENTATION RATE: ICD-10-CM

## 2023-08-23 DIAGNOSIS — K50.10 CROHN'S DISEASE OF LARGE INTESTINE WITHOUT COMPLICATION (HCC): ICD-10-CM

## 2023-08-23 LAB
ERYTHROCYTE [SEDIMENTATION RATE] IN BLOOD BY WESTERGREN METHOD: 43 MM/HR (ref 0–20)
EST. AVERAGE GLUCOSE BLD GHB EST-MCNC: 122.6 MG/DL
HBA1C MFR BLD: 5.9 %

## 2023-08-23 NOTE — RESULT ENCOUNTER NOTE
Chol, sugars, labs appear in stable range, DM is controlled- except for two issues. 1- kidney fxn is down slightly so is very important that Kait Taveras consistently drink 3-4 glasses of water daily, 2, his inflammation level is still high, and could be due to multiple reasons so we'll d/w him at appt tomorrow. notify pt please.   Lastly, add esr/sed rate to lab pls, dx elevated CRP and crohn's dz. thx

## 2023-08-24 ENCOUNTER — OFFICE VISIT (OUTPATIENT)
Dept: INTERNAL MEDICINE CLINIC | Age: 73
End: 2023-08-24
Payer: MEDICARE

## 2023-08-24 VITALS
DIASTOLIC BLOOD PRESSURE: 76 MMHG | BODY MASS INDEX: 39.78 KG/M2 | OXYGEN SATURATION: 97 % | SYSTOLIC BLOOD PRESSURE: 132 MMHG | RESPIRATION RATE: 16 BRPM | HEART RATE: 73 BPM | WEIGHT: 254 LBS

## 2023-08-24 DIAGNOSIS — K50.10 CROHN'S DISEASE OF LARGE INTESTINE WITHOUT COMPLICATION (HCC): ICD-10-CM

## 2023-08-24 DIAGNOSIS — N18.31 STAGE 3A CHRONIC KIDNEY DISEASE (HCC): ICD-10-CM

## 2023-08-24 DIAGNOSIS — M54.16 LUMBAR RADICULOPATHY: ICD-10-CM

## 2023-08-24 DIAGNOSIS — G89.29 CHRONIC MIDLINE LOW BACK PAIN WITHOUT SCIATICA: ICD-10-CM

## 2023-08-24 DIAGNOSIS — I10 PRIMARY HYPERTENSION: ICD-10-CM

## 2023-08-24 DIAGNOSIS — M54.50 CHRONIC MIDLINE LOW BACK PAIN WITHOUT SCIATICA: ICD-10-CM

## 2023-08-24 DIAGNOSIS — M54.12 RIGHT CERVICAL RADICULOPATHY: ICD-10-CM

## 2023-08-24 DIAGNOSIS — E11.21 CONTROLLED TYPE 2 DIABETES MELLITUS WITH DIABETIC NEPHROPATHY, WITHOUT LONG-TERM CURRENT USE OF INSULIN (HCC): Primary | ICD-10-CM

## 2023-08-24 PROCEDURE — 3044F HG A1C LEVEL LT 7.0%: CPT | Performed by: INTERNAL MEDICINE

## 2023-08-24 PROCEDURE — 1124F ACP DISCUSS-NO DSCNMKR DOCD: CPT | Performed by: INTERNAL MEDICINE

## 2023-08-24 PROCEDURE — 99214 OFFICE O/P EST MOD 30 MIN: CPT | Performed by: INTERNAL MEDICINE

## 2023-08-24 PROCEDURE — 3078F DIAST BP <80 MM HG: CPT | Performed by: INTERNAL MEDICINE

## 2023-08-24 PROCEDURE — 3075F SYST BP GE 130 - 139MM HG: CPT | Performed by: INTERNAL MEDICINE

## 2023-08-24 RX ORDER — TRAMADOL HYDROCHLORIDE 50 MG/1
50 TABLET ORAL 3 TIMES DAILY
Qty: 90 TABLET | Refills: 2 | Status: SHIPPED | OUTPATIENT
Start: 2023-08-24 | End: 2023-11-22

## 2023-08-24 RX ORDER — HYDROCODONE BITARTRATE AND ACETAMINOPHEN 5; 325 MG/1; MG/1
1 TABLET ORAL 2 TIMES DAILY
Qty: 60 TABLET | Refills: 0 | Status: SHIPPED | OUTPATIENT
Start: 2023-09-28 | End: 2023-10-28

## 2023-08-24 RX ORDER — HYDROCODONE BITARTRATE AND ACETAMINOPHEN 5; 325 MG/1; MG/1
1 TABLET ORAL 2 TIMES DAILY
Qty: 60 TABLET | Refills: 0 | Status: SHIPPED | OUTPATIENT
Start: 2023-08-29 | End: 2023-09-28

## 2023-08-24 RX ORDER — GABAPENTIN 300 MG/1
300 CAPSULE ORAL 3 TIMES DAILY
Qty: 90 CAPSULE | Refills: 2 | Status: SHIPPED | OUTPATIENT
Start: 2023-08-24 | End: 2023-11-22

## 2023-08-24 RX ORDER — HYDROCODONE BITARTRATE AND ACETAMINOPHEN 5; 325 MG/1; MG/1
1 TABLET ORAL 2 TIMES DAILY PRN
Qty: 60 TABLET | Refills: 0 | Status: SHIPPED | OUTPATIENT
Start: 2023-10-28 | End: 2023-11-27

## 2023-08-24 NOTE — PROGRESS NOTES
([address])    2. Stage 3a chronic kidney disease    3. Primary hypertension    4. Crohn's disease of large intestine without complication ([address])    5. Chronic midline low back pain without sciatica    6. Right cervical radiculopathy    7. Lumbar radiculopathy        PLAN:    Ga Sams was seen today for diabetes. Diagnoses and all orders for this visit:    Controlled type 2 diabetes mellitus with diabetic nephropathy, without long-term current use of insulin ([address]) - DM relatively well controlled and will continue current regimen. Screening reviewed. See orders. -     Comprehensive Metabolic Panel; Future  -     Hemoglobin A1C; Future  -     Magnesium; Future    Stage 3a chronic kidney disease- SL INCR BUN/CR, HE'LL PUSH MORE FLUIDS AND RECHECK LAB PRIOR TO APPT W DR RAMOS NEXT MONTH  -     Basic Metabolic Panel; Future  -     Magnesium; Future  -     CBC; Future    Primary hypertension - Blood pressure stable and will continue current regimen. Will plan periodic monitoring of renal function, electrolytes, lipid profile. -     Basic Metabolic Panel; Future  -     Magnesium; Future  -     Magnesium; Future  -     CBC; Future    Crohn's disease of large intestine without complication ([address])- CONT F/U W GI, STABLE CLINICALLY AT THIS TIME    Chronic midline low back pain without sciatica- LBP stable on pain regimen, RFs given as noted and will monitor.    -     HYDROcodone-acetaminophen (NORCO) 5-325 MG per tablet; Take 1 tablet by mouth 2 times daily as needed for Pain for up to 30 days.  -     HYDROcodone-acetaminophen (NORCO) 5-325 MG per tablet; Take 1 tablet by mouth 2 times daily for 30 days.  -     HYDROcodone-acetaminophen (NORCO) 5-325 MG per tablet; Take 1 tablet by mouth 2 times daily for 30 days. -     traMADol (ULTRAM) 50 MG tablet; Take 1 tablet by mouth in the morning, at noon, and at bedtime for 90 days. Max Daily Amount: 150 mg  -     DRUG SCREEN MULTI URINE;  Future    Right cervical radiculopathy -

## 2023-08-25 ENCOUNTER — TELEMEDICINE (OUTPATIENT)
Dept: INTERNAL MEDICINE CLINIC | Age: 73
End: 2023-08-25

## 2023-08-25 DIAGNOSIS — K52.9 ACUTE GASTROENTERITIS: Primary | ICD-10-CM

## 2023-08-25 RX ORDER — PROMETHAZINE HYDROCHLORIDE 25 MG/1
25 TABLET ORAL 3 TIMES DAILY PRN
Qty: 12 TABLET | Refills: 0 | Status: SHIPPED | OUTPATIENT
Start: 2023-08-25 | End: 2023-09-01

## 2023-08-25 RX ORDER — AMOXICILLIN AND CLAVULANATE POTASSIUM 875; 125 MG/1; MG/1
1 TABLET, FILM COATED ORAL 2 TIMES DAILY
Qty: 14 TABLET | Refills: 0 | Status: SHIPPED | OUTPATIENT
Start: 2023-08-25 | End: 2023-09-01

## 2023-08-25 NOTE — PROGRESS NOTES
registered, or certified to deliver care in the state where the patient is located as indicated above. If you are not or unsure, please re-schedule the visit. Are you appropriately licensed in the patient's state?: Yes        Total time spent on this encounter: Not billed by time    --Sylvia Cazares MD on 8/25/2023 at 3:10 PM    An electronic signature was used to authenticate this note.

## 2023-09-15 DIAGNOSIS — N18.31 STAGE 3A CHRONIC KIDNEY DISEASE (HCC): ICD-10-CM

## 2023-09-15 DIAGNOSIS — I10 PRIMARY HYPERTENSION: ICD-10-CM

## 2023-09-15 DIAGNOSIS — M54.50 CHRONIC MIDLINE LOW BACK PAIN WITHOUT SCIATICA: ICD-10-CM

## 2023-09-15 DIAGNOSIS — M54.16 LUMBAR RADICULOPATHY: ICD-10-CM

## 2023-09-15 DIAGNOSIS — G89.29 CHRONIC MIDLINE LOW BACK PAIN WITHOUT SCIATICA: ICD-10-CM

## 2023-09-15 DIAGNOSIS — M54.12 RIGHT CERVICAL RADICULOPATHY: ICD-10-CM

## 2023-09-15 LAB
AMPHETAMINES UR QL SCN>1000 NG/ML: ABNORMAL
ANION GAP SERPL CALCULATED.3IONS-SCNC: 11 MMOL/L (ref 3–16)
BARBITURATES UR QL SCN>200 NG/ML: ABNORMAL
BENZODIAZ UR QL SCN>200 NG/ML: ABNORMAL
BUN SERPL-MCNC: 14 MG/DL (ref 7–20)
CALCIUM SERPL-MCNC: 9.5 MG/DL (ref 8.3–10.6)
CANNABINOIDS UR QL SCN>50 NG/ML: ABNORMAL
CHLORIDE SERPL-SCNC: 100 MMOL/L (ref 99–110)
CO2 SERPL-SCNC: 29 MMOL/L (ref 21–32)
COCAINE UR QL SCN: ABNORMAL
CREAT SERPL-MCNC: 1.2 MG/DL (ref 0.8–1.3)
DRUG SCREEN COMMENT UR-IMP: ABNORMAL
FENTANYL SCREEN, URINE: ABNORMAL
GFR SERPLBLD CREATININE-BSD FMLA CKD-EPI: >60 ML/MIN/{1.73_M2}
GLUCOSE SERPL-MCNC: 135 MG/DL (ref 70–99)
MAGNESIUM SERPL-MCNC: 1.8 MG/DL (ref 1.8–2.4)
METHADONE UR QL SCN>300 NG/ML: ABNORMAL
OPIATES UR QL SCN>300 NG/ML: POSITIVE
OXYCODONE UR QL SCN: ABNORMAL
PCP UR QL SCN>25 NG/ML: ABNORMAL
PH UR STRIP: 6 [PH]
POTASSIUM SERPL-SCNC: 3.8 MMOL/L (ref 3.5–5.1)
SODIUM SERPL-SCNC: 140 MMOL/L (ref 136–145)

## 2023-09-18 DIAGNOSIS — E11.21 CONTROLLED TYPE 2 DIABETES MELLITUS WITH DIABETIC NEPHROPATHY, WITHOUT LONG-TERM CURRENT USE OF INSULIN (HCC): ICD-10-CM

## 2023-09-18 RX ORDER — GLIPIZIDE 5 MG/1
TABLET ORAL
Qty: 180 TABLET | Refills: 1 | Status: SHIPPED | OUTPATIENT
Start: 2023-09-18

## 2023-11-13 ENCOUNTER — TELEMEDICINE (OUTPATIENT)
Dept: INTERNAL MEDICINE CLINIC | Age: 73
End: 2023-11-13
Payer: MEDICARE

## 2023-11-13 DIAGNOSIS — R06.2 WHEEZING: ICD-10-CM

## 2023-11-13 DIAGNOSIS — J01.00 ACUTE NON-RECURRENT MAXILLARY SINUSITIS: Primary | ICD-10-CM

## 2023-11-13 PROCEDURE — 1124F ACP DISCUSS-NO DSCNMKR DOCD: CPT | Performed by: INTERNAL MEDICINE

## 2023-11-13 PROCEDURE — 99213 OFFICE O/P EST LOW 20 MIN: CPT | Performed by: INTERNAL MEDICINE

## 2023-11-13 RX ORDER — PREDNISONE 5 MG/1
TABLET ORAL
Qty: 21 TABLET | Refills: 0 | Status: SHIPPED | OUTPATIENT
Start: 2023-11-13

## 2023-11-13 RX ORDER — ALBUTEROL SULFATE 90 UG/1
2 AEROSOL, METERED RESPIRATORY (INHALATION) EVERY 6 HOURS PRN
Qty: 18 G | Refills: 3 | Status: SHIPPED | OUTPATIENT
Start: 2023-11-13

## 2023-11-13 RX ORDER — DOXYCYCLINE HYCLATE 100 MG
100 TABLET ORAL 2 TIMES DAILY
Qty: 20 TABLET | Refills: 0 | Status: SHIPPED | OUTPATIENT
Start: 2023-11-13 | End: 2023-11-23

## 2023-11-13 NOTE — PROGRESS NOTES
Abnormal -    HENT:   [] Normocephalic, atraumatic. [] Abnormal   [] Mouth/Throat: Mucous membranes are moist.     External Ears [] Normal  [] Abnormal-     Neck: [] No visualized mass     Pulmonary/Chest: [x] Respiratory effort normal.  [] No visualized signs of difficulty breathing or respiratory distress        [x] Abnormal- some coughing and wheezing noted     Musculoskeletal:   [] Normal gait with no signs of ataxia         [] Normal range of motion of neck        [] Abnormal-       Neurological:        [] No Facial Asymmetry (Cranial nerve 7 motor function) (limited exam to video visit)          [] No gaze palsy        [] Abnormal-         Skin:        [] No significant exanthematous lesions or discoloration noted on facial skin         [] Abnormal-            Psychiatric:       [] Normal Affect [] No Hallucinations        [] Abnormal-     Other pertinent observable physical exam findings-     ASSESSMENT/PLAN:  1. Acute non-recurrent maxillary sinusitis  Consistent w presentation, rec rx as below and fluids, rest.  Pt to call back one week if not improving.      - doxycycline hyclate (VIBRA-TABS) 100 MG tablet; Take 1 tablet by mouth 2 times daily for 10 days  Dispense: 20 tablet; Refill: 0  - predniSONE (DELTASONE) 5 MG tablet; Take 6 tablets by mouth on day 1, 5 on day 2, 4 on day 3, 3 on day 4, 2 on day 5, 1 on day 6. Dispense: 21 tablet; Refill: 0    2. Wheezing  Prior extensive hx of smoking, may have COPD. Trial prn in aler and check PFTs. - Full PFT Study With Bronchodilator; Future  - predniSONE (DELTASONE) 5 MG tablet; Take 6 tablets by mouth on day 1, 5 on day 2, 4 on day 3, 3 on day 4, 2 on day 5, 1 on day 6. Dispense: 21 tablet; Refill: 0  - albuterol sulfate HFA (PROVENTIL HFA) 108 (90 Base) MCG/ACT inhaler; Inhale 2 puffs into the lungs every 6 hours as needed for Wheezing  Dispense: 18 g; Refill: 3      Return for keep next as scheduled.     Samantha Sheehan was evaluated through a

## 2023-11-14 ENCOUNTER — TELEPHONE (OUTPATIENT)
Dept: INTERNAL MEDICINE CLINIC | Age: 73
End: 2023-11-14

## 2023-11-14 NOTE — TELEPHONE ENCOUNTER
Patient called and left voicemail that yesterday he switched up prednisone and doxycyline bottles and accidentally took 600mg of doxycycline. States he did call the pharmacy who advised him to call posion control and he called them as well who also advised him to call here. States he was nauseas some after. Patient wanting to know know when to take more of the doxycycline?

## 2023-11-28 ENCOUNTER — OFFICE VISIT (OUTPATIENT)
Dept: INTERNAL MEDICINE CLINIC | Age: 73
End: 2023-11-28
Payer: MEDICARE

## 2023-11-28 VITALS
RESPIRATION RATE: 18 BRPM | BODY MASS INDEX: 40.65 KG/M2 | OXYGEN SATURATION: 97 % | WEIGHT: 259 LBS | DIASTOLIC BLOOD PRESSURE: 76 MMHG | SYSTOLIC BLOOD PRESSURE: 132 MMHG | HEART RATE: 62 BPM | HEIGHT: 67 IN

## 2023-11-28 DIAGNOSIS — E11.21 CONTROLLED TYPE 2 DIABETES MELLITUS WITH DIABETIC NEPHROPATHY, WITHOUT LONG-TERM CURRENT USE OF INSULIN (HCC): ICD-10-CM

## 2023-11-28 DIAGNOSIS — Z98.890 S/P ABLATION OF VENTRICULAR ARRHYTHMIA: ICD-10-CM

## 2023-11-28 DIAGNOSIS — M54.50 CHRONIC MIDLINE LOW BACK PAIN WITHOUT SCIATICA: ICD-10-CM

## 2023-11-28 DIAGNOSIS — M54.12 RIGHT CERVICAL RADICULOPATHY: ICD-10-CM

## 2023-11-28 DIAGNOSIS — G89.29 CHRONIC MIDLINE LOW BACK PAIN WITHOUT SCIATICA: ICD-10-CM

## 2023-11-28 DIAGNOSIS — M25.572 ACUTE LEFT ANKLE PAIN: Primary | ICD-10-CM

## 2023-11-28 DIAGNOSIS — Z86.79 S/P ABLATION OF VENTRICULAR ARRHYTHMIA: ICD-10-CM

## 2023-11-28 DIAGNOSIS — I10 PRIMARY HYPERTENSION: ICD-10-CM

## 2023-11-28 DIAGNOSIS — M25.572 ACUTE LEFT ANKLE PAIN: ICD-10-CM

## 2023-11-28 LAB
ALBUMIN SERPL-MCNC: 4.1 G/DL (ref 3.4–5)
ALBUMIN/GLOB SERPL: 1.2 {RATIO} (ref 1.1–2.2)
ALP SERPL-CCNC: 96 U/L (ref 40–129)
ALT SERPL-CCNC: 9 U/L (ref 10–40)
ANION GAP SERPL CALCULATED.3IONS-SCNC: 17 MMOL/L (ref 3–16)
AST SERPL-CCNC: 11 U/L (ref 15–37)
BILIRUB SERPL-MCNC: 0.3 MG/DL (ref 0–1)
BUN SERPL-MCNC: 14 MG/DL (ref 7–20)
CALCIUM SERPL-MCNC: 9.8 MG/DL (ref 8.3–10.6)
CHLORIDE SERPL-SCNC: 97 MMOL/L (ref 99–110)
CO2 SERPL-SCNC: 24 MMOL/L (ref 21–32)
CREAT SERPL-MCNC: 1.2 MG/DL (ref 0.8–1.3)
DEPRECATED RDW RBC AUTO: 16 % (ref 12.4–15.4)
ERYTHROCYTE [SEDIMENTATION RATE] IN BLOOD BY WESTERGREN METHOD: 68 MM/HR (ref 0–20)
GFR SERPLBLD CREATININE-BSD FMLA CKD-EPI: >60 ML/MIN/{1.73_M2}
GLUCOSE SERPL-MCNC: 104 MG/DL (ref 70–99)
HCT VFR BLD AUTO: 43.6 % (ref 40.5–52.5)
HGB BLD-MCNC: 14.5 G/DL (ref 13.5–17.5)
MAGNESIUM SERPL-MCNC: 1.9 MG/DL (ref 1.8–2.4)
MCH RBC QN AUTO: 26.7 PG (ref 26–34)
MCHC RBC AUTO-ENTMCNC: 33.2 G/DL (ref 31–36)
MCV RBC AUTO: 80.5 FL (ref 80–100)
PLATELET # BLD AUTO: 219 K/UL (ref 135–450)
PMV BLD AUTO: 7.8 FL (ref 5–10.5)
POTASSIUM SERPL-SCNC: 4.7 MMOL/L (ref 3.5–5.1)
PROT SERPL-MCNC: 7.4 G/DL (ref 6.4–8.2)
RBC # BLD AUTO: 5.41 M/UL (ref 4.2–5.9)
SODIUM SERPL-SCNC: 138 MMOL/L (ref 136–145)
WBC # BLD AUTO: 8 K/UL (ref 4–11)

## 2023-11-28 PROCEDURE — 1124F ACP DISCUSS-NO DSCNMKR DOCD: CPT | Performed by: INTERNAL MEDICINE

## 2023-11-28 PROCEDURE — 99214 OFFICE O/P EST MOD 30 MIN: CPT | Performed by: INTERNAL MEDICINE

## 2023-11-28 PROCEDURE — 3078F DIAST BP <80 MM HG: CPT | Performed by: INTERNAL MEDICINE

## 2023-11-28 PROCEDURE — 3075F SYST BP GE 130 - 139MM HG: CPT | Performed by: INTERNAL MEDICINE

## 2023-11-28 PROCEDURE — 3044F HG A1C LEVEL LT 7.0%: CPT | Performed by: INTERNAL MEDICINE

## 2023-11-28 RX ORDER — PREDNISONE 5 MG/1
TABLET ORAL
Qty: 21 TABLET | Refills: 0 | Status: SHIPPED | OUTPATIENT
Start: 2023-11-28

## 2023-11-28 RX ORDER — PIOGLITAZONEHYDROCHLORIDE 30 MG/1
30 TABLET ORAL DAILY
Qty: 90 TABLET | Refills: 1 | Status: SHIPPED | OUTPATIENT
Start: 2023-11-28

## 2023-11-28 RX ORDER — TRAMADOL HYDROCHLORIDE 50 MG/1
50 TABLET ORAL 3 TIMES DAILY
Qty: 90 TABLET | Refills: 2 | Status: SHIPPED | OUTPATIENT
Start: 2023-11-28 | End: 2024-02-26

## 2023-11-28 RX ORDER — HYDROCODONE BITARTRATE AND ACETAMINOPHEN 5; 325 MG/1; MG/1
1 TABLET ORAL 2 TIMES DAILY PRN
Qty: 60 TABLET | Refills: 0 | Status: SHIPPED | OUTPATIENT
Start: 2023-11-28 | End: 2023-12-28

## 2023-11-28 NOTE — PROGRESS NOTES
ASSESSMENT:    1. Acute left ankle pain    2. Chronic midline low back pain without sciatica    3. Right cervical radiculopathy    4. Controlled type 2 diabetes mellitus with diabetic nephropathy, without long-term current use of insulin ([address])    5. Primary hypertension    6. S/P ablation of ventricular arrhythmia        PLAN:    Sydnee Mancilla was seen today for 3 month follow-up and pain. Diagnoses and all orders for this visit:    Acute left ankle pain- CHECK XRAY AND SCR INFLAMMATION LEVELS FOR ACUTE CHANGE, ALSO UR ACID LEVEL FOR GOUT. REC REST AND PRED TAPER, CONT PAIN MEDS, IF SX NO BETTER LATER THIS WEEK AND TESTING NEGATIVE, THEN CONSIDER PODIATRY REFERRAL  -     Uric Acid; Future  -     C-Reactive Protein; Future  -     XR ANKLE LEFT LIMITED; Future  -     Sedimentation Rate; Future  -     predniSONE (DELTASONE) 5 MG tablet; Take 6 tablets by mouth on day 1, 5 on day 2, 4 on day 3, 3 on day 4, 2 on day 5, 1 on day 6. Chronic midline low back pain without sciatica- CONT PAIN REGIMEN WHICH HAS OTHERWISE BEEN EFFECTIVE  -     HYDROcodone-acetaminophen (NORCO) 5-325 MG per tablet; Take 1 tablet by mouth 2 times daily as needed for Pain for up to 30 days. -     traMADol (ULTRAM) 50 MG tablet; Take 1 tablet by mouth in the morning, at noon, and at bedtime for 90 days. Max Daily Amount: 150 mg    Right cervical radiculopathy  -     HYDROcodone-acetaminophen (NORCO) 5-325 MG per tablet; Take 1 tablet by mouth 2 times daily as needed for Pain for up to 30 days. Controlled type 2 diabetes mellitus with diabetic nephropathy, without long-term current use of insulin ([address]) - DM relatively well controlled and will continue current regimen. Screening reviewed. See orders. -     pioglitazone (ACTOS) 30 MG tablet; Take 1 tablet by mouth daily  -     Comprehensive Metabolic Panel; Future  -     Hemoglobin A1C; Future  -     CBC;  Future    Primary hypertension - Blood pressure stable and will continue current

## 2023-11-29 ENCOUNTER — TELEPHONE (OUTPATIENT)
Dept: INTERNAL MEDICINE CLINIC | Age: 73
End: 2023-11-29

## 2023-11-29 DIAGNOSIS — M25.579 ANKLE PAIN, UNSPECIFIED CHRONICITY, UNSPECIFIED LATERALITY: Primary | ICD-10-CM

## 2023-11-29 DIAGNOSIS — M25.572 ACUTE LEFT ANKLE PAIN: ICD-10-CM

## 2023-11-29 DIAGNOSIS — I10 ESSENTIAL HYPERTENSION: ICD-10-CM

## 2023-11-29 LAB
EST. AVERAGE GLUCOSE BLD GHB EST-MCNC: 137 MG/DL
HBA1C MFR BLD: 6.4 %
URATE SERPL-MCNC: 6.8 MG/DL (ref 3.5–7.2)

## 2023-11-29 PROCEDURE — 36415 COLL VENOUS BLD VENIPUNCTURE: CPT | Performed by: INTERNAL MEDICINE

## 2023-11-29 RX ORDER — HYDRALAZINE HYDROCHLORIDE 25 MG/1
25 TABLET, FILM COATED ORAL 3 TIMES DAILY
Qty: 180 TABLET | Refills: 5 | Status: SHIPPED | OUTPATIENT
Start: 2023-11-29

## 2023-11-29 NOTE — TELEPHONE ENCOUNTER
----- Message from Kasie Puente MD sent at 11/29/2023 12:14 PM EST -----  Chol, sugars, labs appear in stable range, DM is controlled- ADD URIC ACID LEVEL PLS, DX ANKLE PAIN. HIS INFLAMMATION LEVEL IS HIGHER LIKELY FROM ANKLE INFLAMMATION. ASK IF PAIN IS ANY BETTER ON THE PREDNISONE. IF NOT WE SHOULD REFER TO PODIATRY, BUT IF IS BETTER THEN COMPLETE THE PRED TAPER AND THEN LET'S RECHECK ESR IN ONE WEEK. .   notify pt please.

## 2023-11-29 NOTE — RESULT ENCOUNTER NOTE
Chol, sugars, labs appear in stable range, DM is controlled- ADD URIC ACID LEVEL PLS, DX ANKLE PAIN.  HIS INFLAMMATION LEVEL IS HIGHER LIKELY FROM ANKLE INFLAMMATION.  ASK IF PAIN IS ANY BETTER ON THE PREDNISONE.  IF NOT WE SHOULD REFER TO PODIATRY, BUT IF IS BETTER THEN COMPLETE THE PRED TAPER AND THEN LET'S RECHECK ESR IN ONE WEEK.  .   notify pt please.

## 2023-12-12 ENCOUNTER — TELEPHONE (OUTPATIENT)
Dept: PHARMACY | Facility: CLINIC | Age: 73
End: 2023-12-12

## 2023-12-12 NOTE — TELEPHONE ENCOUNTER
08/22/2023    TRIG 208 (H) 08/22/2023    HDL 34 (L) 08/22/2023    LDLCALC 39 08/22/2023     ALT   Date Value Ref Range Status   11/28/2023 9 (L) 10 - 40 U/L Final     AST   Date Value Ref Range Status   11/28/2023 11 (L) 15 - 37 U/L Final     The ASCVD Risk score (Keshawn HALE, et al., 2019) failed to calculate for the following reasons: The valid total cholesterol range is 130 to 320 mg/dL     BP Readings from Last 3 Encounters:   11/28/23 132/76   09/18/23 130/80   08/24/23 132/76     Lab Results   Component Value Date    LABA1C 6.4 11/28/2023    LABA1C 5.9 08/22/2023    LABA1C 6.3 05/22/2023     Lab Results   Component Value Date/Time    LABGLOM >60 11/28/2023 09:13 AM    LABGLOM >60 09/15/2023 07:53 AM    LABGLOM 49 08/22/2023 08:03 AM    LABGLOM >60 05/22/2023 08:06 AM     Lab Results   Component Value Date    CREATININE 1.2 11/28/2023     Estimated Creatinine Clearance: 69 mL/min (based on SCr of 1.2 mg/dL).      Latest Reference Range & Units 02/10/23 07:45   Creatinine, Ur 39.0 - 259.0 mg/dL 68.9   Microalb/Creat Ratio POC 0.0 - 30.0 mg/g 45.0 (H)   Microalbumin, Random Urine <2.0 mg/dL 3.10 (H)   (H): Data is abnormally high    Patient has the following risk enhancing factors that favor statin initiation per ACC:   Metabolic syndrome (at least 3 of the following: increased waist circumference, elevated TGs [>/=150 mg/dL], elevated BP, elevated BG, and low HDL-C [men <40 mg/dL; women <50mg/dL])     Patient has the following diabetes-specific risk enhancers independent of other risk factors in DM per ACC:   Long duration (>/=10 years for type 2 DM)   Albuminuria (>/=30 mcg of albumin creatinine ratio)  Retinopathy   Neuropathy     2018 ACC/AHA and/or 2023 ADA guidelines:  Adults 40-74yo with DM, regardless of estimated 10-year ASCVD risk, moderate-intensity statin therapy is indicated if LDL >70 mg/dL  Patients with DM who have multiple ASCVD risk factors, it is reasonable to prescribe high-intensity statin

## 2023-12-13 ENCOUNTER — OFFICE VISIT (OUTPATIENT)
Dept: CARDIOLOGY CLINIC | Age: 73
End: 2023-12-13

## 2023-12-13 VITALS
HEIGHT: 67 IN | BODY MASS INDEX: 40.34 KG/M2 | OXYGEN SATURATION: 98 % | DIASTOLIC BLOOD PRESSURE: 86 MMHG | SYSTOLIC BLOOD PRESSURE: 160 MMHG | HEART RATE: 64 BPM | WEIGHT: 257 LBS

## 2023-12-13 DIAGNOSIS — E11.21 CONTROLLED TYPE 2 DIABETES MELLITUS WITH DIABETIC NEPHROPATHY, WITHOUT LONG-TERM CURRENT USE OF INSULIN (HCC): ICD-10-CM

## 2023-12-13 DIAGNOSIS — G47.33 OSA ON CPAP: ICD-10-CM

## 2023-12-13 DIAGNOSIS — I10 PRIMARY HYPERTENSION: ICD-10-CM

## 2023-12-13 DIAGNOSIS — Z98.890 S/P ABLATION OF VENTRICULAR ARRHYTHMIA: Primary | ICD-10-CM

## 2023-12-13 DIAGNOSIS — Z86.79 S/P ABLATION OF VENTRICULAR ARRHYTHMIA: Primary | ICD-10-CM

## 2023-12-13 DIAGNOSIS — Z78.9 STATIN INTOLERANCE: ICD-10-CM

## 2023-12-13 DIAGNOSIS — N18.30 STAGE 3 CHRONIC KIDNEY DISEASE, UNSPECIFIED WHETHER STAGE 3A OR 3B CKD (HCC): ICD-10-CM

## 2023-12-13 RX ORDER — SOTALOL HYDROCHLORIDE 80 MG/1
80 TABLET ORAL 2 TIMES DAILY
Qty: 180 TABLET | Refills: 3 | Status: SHIPPED | OUTPATIENT
Start: 2023-12-13

## 2023-12-13 NOTE — ASSESSMENT & PLAN NOTE
Patient continues to have frequent PVCs however his symptoms of chest tightness have resolved. Patient is now on sotalol 80 twice daily which she tolerates well and he was not tolerating metoprolol due to extreme fatigue.

## 2023-12-13 NOTE — PATIENT INSTRUCTIONS
Please be informed that if you contact our office outside of normal business hours the physician on call cannot help with any scheduling or rescheduling issues, procedure instruction questions or any type of medication issue. We advise you for any urgent/emergency that you go to the nearest emergency room! PLEASE CALL OUR OFFICE DURING NORMAL BUSINESS HOURS    Monday - Friday   8 am to 5 pm    [place]: [address]: [phone]    [place]:  [phone]    **It is YOUR responsibilty to bring medication bottles and/or updated medication list to [facility]. This will allow us to better serve you and all your healthcare needs**    Thank you for allowing us to care for you today! We want to ensure we can follow your treatment plan and we strive to give you the best outcomes and experience possible. If you ever have a life threatening emergency and call 911 - for an ambulance (EMS)   Our providers can only care for you at:   [facility] or [facility]. Even if you have someone take you or you drive yourself we can only care for you in a [address] [place] [address] facility. Our providers are not setup at the other healthcare locations! We are committed to providing you the best care possible. If you receive a survey after visiting one of our offices, please take time to share your experience concerning your physician office visit. These surveys are confidential and no health information about you is shared. We are eager to improve for you and we are counting on your feedback to help make that happen. Continue current cardiovascular medications which have been reviewed and discussed individually with you. Counseled for calorie counting, reduction in serving size and exercise and lifestyle modification for weight loss. Counseled for 30 minutes a day of moderate intensity aerobic exercise like activity. Appropriate prescriptions if needed on this visit are addressed.

## 2023-12-13 NOTE — ASSESSMENT & PLAN NOTE
Today his blood pressure is higher than it has been in the last several encounters with PCP. I have asked him to start monitoring blood pressure closely and lose weight. If his pressure continues to be more than 140/90 we need to uptitrate hydralazine from 25 mg 3 times a day to 50 mg 3 times a day. Patient has a follow-up with PCP and nephrology.

## 2023-12-13 NOTE — ASSESSMENT & PLAN NOTE
Patient is volunteering in a  Double blinded randomized study called with PCSK9 therapy and he should not have any lipids done.

## 2023-12-28 DIAGNOSIS — M54.50 CHRONIC MIDLINE LOW BACK PAIN WITHOUT SCIATICA: ICD-10-CM

## 2023-12-28 DIAGNOSIS — G89.29 CHRONIC MIDLINE LOW BACK PAIN WITHOUT SCIATICA: ICD-10-CM

## 2023-12-28 DIAGNOSIS — M54.12 RIGHT CERVICAL RADICULOPATHY: ICD-10-CM

## 2023-12-28 RX ORDER — HYDROCODONE BITARTRATE AND ACETAMINOPHEN 5; 325 MG/1; MG/1
1 TABLET ORAL 2 TIMES DAILY PRN
Qty: 60 TABLET | Refills: 0 | Status: SHIPPED | OUTPATIENT
Start: 2023-12-28 | End: 2024-01-27

## 2024-01-09 ENCOUNTER — HOSPITAL ENCOUNTER (OUTPATIENT)
Dept: PULMONOLOGY | Age: 74
Discharge: HOME | End: 2024-01-09
Payer: MEDICARE

## 2024-01-09 DIAGNOSIS — R06.2 WHEEZING: ICD-10-CM

## 2024-01-09 LAB
DLCO %PRED: 99 %
DLCO PRED: NORMAL
DLCO/VA %PRED: NORMAL
DLCO/VA PRED: NORMAL
DLCO/VA: NORMAL
DLCO: NORMAL
EXPIRATORY TIME-POST: NORMAL
EXPIRATORY TIME: NORMAL
FEF 25-75% %CHNG: NORMAL
FEF 25-75% %PRED-POST: NORMAL
FEF 25-75% %PRED-PRE: NORMAL
FEF 25-75% PRED: NORMAL
FEF 25-75%-POST: NORMAL
FEF 25-75%-PRE: NORMAL
FEV1 %PRED-POST: 103 %
FEV1 %PRED-PRE: 104 %
FEV1 PRED: NORMAL
FEV1-POST: NORMAL
FEV1-PRE: NORMAL
FEV1/FVC %PRED-POST: 103 %
FEV1/FVC %PRED-PRE: 100 %
FEV1/FVC PRED: NORMAL
FEV1/FVC-POST: NORMAL
FEV1/FVC-PRE: NORMAL
FVC %PRED-POST: 99 L
FVC %PRED-PRE: 103 %
FVC PRED: NORMAL
FVC-POST: NORMAL
FVC-PRE: NORMAL
GAW %PRED: NORMAL
GAW PRED: NORMAL
GAW: NORMAL
IC %PRED: NORMAL
IC PRED: NORMAL
IC: NORMAL
MEP: NORMAL
MIP: NORMAL
MVV %PRED-PRE: NORMAL
MVV PRED: NORMAL
MVV-PRE: NORMAL
PEF %PRED-POST: NORMAL
PEF %PRED-PRE: NORMAL
PEF PRED: NORMAL
PEF%CHNG: NORMAL
PEF-POST: NORMAL
PEF-PRE: NORMAL
RAW %PRED: NORMAL
RAW PRED: NORMAL
RAW: NORMAL
RV %PRED: NORMAL
RV PRED: NORMAL
RV: NORMAL
SVC %PRED: NORMAL
SVC PRED: NORMAL
SVC: NORMAL
TLC %PRED: 94 %
TLC PRED: NORMAL
TLC: NORMAL
VA %PRED: NORMAL
VA PRED: NORMAL
VA: NORMAL
VTG %PRED: NORMAL
VTG PRED: NORMAL
VTG: NORMAL

## 2024-01-09 PROCEDURE — 94726 PLETHYSMOGRAPHY LUNG VOLUMES: CPT

## 2024-01-09 PROCEDURE — 94060 EVALUATION OF WHEEZING: CPT

## 2024-01-09 PROCEDURE — 94729 DIFFUSING CAPACITY: CPT

## 2024-01-09 ASSESSMENT — PULMONARY FUNCTION TESTS
FEV1/FVC_PERCENT_PREDICTED_PRE: 100
FEV1_PERCENT_PREDICTED_POST: 103
FVC_PERCENT_PREDICTED_PRE: 103
FVC_PERCENT_PREDICTED_POST: 99
FEV1/FVC_PERCENT_PREDICTED_POST: 103
FEV1_PERCENT_PREDICTED_PRE: 104

## 2024-01-18 DIAGNOSIS — M54.12 RIGHT CERVICAL RADICULOPATHY: ICD-10-CM

## 2024-01-18 DIAGNOSIS — M54.50 CHRONIC MIDLINE LOW BACK PAIN WITHOUT SCIATICA: ICD-10-CM

## 2024-01-18 DIAGNOSIS — G89.29 CHRONIC MIDLINE LOW BACK PAIN WITHOUT SCIATICA: ICD-10-CM

## 2024-01-19 RX ORDER — CYCLOBENZAPRINE HCL 10 MG
TABLET ORAL
Qty: 90 TABLET | Refills: 1 | Status: SHIPPED | OUTPATIENT
Start: 2024-01-19

## 2024-01-26 DIAGNOSIS — M54.12 RIGHT CERVICAL RADICULOPATHY: ICD-10-CM

## 2024-01-26 DIAGNOSIS — M54.16 LUMBAR RADICULOPATHY: ICD-10-CM

## 2024-01-26 DIAGNOSIS — M54.50 CHRONIC MIDLINE LOW BACK PAIN WITHOUT SCIATICA: ICD-10-CM

## 2024-01-26 DIAGNOSIS — G89.29 CHRONIC MIDLINE LOW BACK PAIN WITHOUT SCIATICA: ICD-10-CM

## 2024-01-26 RX ORDER — GABAPENTIN 300 MG/1
300 CAPSULE ORAL 3 TIMES DAILY
Qty: 90 CAPSULE | Refills: 2 | Status: SHIPPED | OUTPATIENT
Start: 2024-01-26 | End: 2024-04-25

## 2024-01-26 RX ORDER — HYDROCODONE BITARTRATE AND ACETAMINOPHEN 5; 325 MG/1; MG/1
1 TABLET ORAL 2 TIMES DAILY PRN
Qty: 60 TABLET | Refills: 0 | Status: SHIPPED | OUTPATIENT
Start: 2024-01-26 | End: 2024-02-25

## 2024-02-28 ENCOUNTER — OFFICE VISIT (OUTPATIENT)
Dept: INTERNAL MEDICINE CLINIC | Age: 74
End: 2024-02-28
Payer: MEDICARE

## 2024-02-28 VITALS
DIASTOLIC BLOOD PRESSURE: 70 MMHG | RESPIRATION RATE: 16 BRPM | BODY MASS INDEX: 40.78 KG/M2 | SYSTOLIC BLOOD PRESSURE: 130 MMHG | HEIGHT: 67 IN | WEIGHT: 259.8 LBS | HEART RATE: 61 BPM | OXYGEN SATURATION: 96 %

## 2024-02-28 DIAGNOSIS — E11.21 CONTROLLED TYPE 2 DIABETES MELLITUS WITH DIABETIC NEPHROPATHY, WITHOUT LONG-TERM CURRENT USE OF INSULIN (HCC): Primary | ICD-10-CM

## 2024-02-28 DIAGNOSIS — M54.12 RIGHT CERVICAL RADICULOPATHY: ICD-10-CM

## 2024-02-28 DIAGNOSIS — K50.10 CROHN'S DISEASE OF LARGE INTESTINE WITHOUT COMPLICATION (HCC): ICD-10-CM

## 2024-02-28 DIAGNOSIS — E55.9 VITAMIN D DEFICIENCY: ICD-10-CM

## 2024-02-28 DIAGNOSIS — N18.30 STAGE 3 CHRONIC KIDNEY DISEASE, UNSPECIFIED WHETHER STAGE 3A OR 3B CKD (HCC): ICD-10-CM

## 2024-02-28 DIAGNOSIS — M54.50 CHRONIC MIDLINE LOW BACK PAIN WITHOUT SCIATICA: ICD-10-CM

## 2024-02-28 DIAGNOSIS — E78.2 HYPERLIPEMIA, MIXED: ICD-10-CM

## 2024-02-28 DIAGNOSIS — N40.0 BENIGN PROSTATIC HYPERPLASIA WITHOUT LOWER URINARY TRACT SYMPTOMS: ICD-10-CM

## 2024-02-28 DIAGNOSIS — G89.29 CHRONIC MIDLINE LOW BACK PAIN WITHOUT SCIATICA: ICD-10-CM

## 2024-02-28 DIAGNOSIS — E66.01 OBESITY, CLASS III, BMI 40-49.9 (MORBID OBESITY) (HCC): ICD-10-CM

## 2024-02-28 PROCEDURE — 3078F DIAST BP <80 MM HG: CPT | Performed by: INTERNAL MEDICINE

## 2024-02-28 PROCEDURE — 3075F SYST BP GE 130 - 139MM HG: CPT | Performed by: INTERNAL MEDICINE

## 2024-02-28 PROCEDURE — 99214 OFFICE O/P EST MOD 30 MIN: CPT | Performed by: INTERNAL MEDICINE

## 2024-02-28 PROCEDURE — 1124F ACP DISCUSS-NO DSCNMKR DOCD: CPT | Performed by: INTERNAL MEDICINE

## 2024-02-28 RX ORDER — HYDROCODONE BITARTRATE AND ACETAMINOPHEN 5; 325 MG/1; MG/1
1 TABLET ORAL 2 TIMES DAILY PRN
Qty: 60 TABLET | Refills: 0 | Status: SHIPPED | OUTPATIENT
Start: 2024-04-08 | End: 2024-05-08

## 2024-02-28 RX ORDER — HYDROCODONE BITARTRATE AND ACETAMINOPHEN 5; 325 MG/1; MG/1
1 TABLET ORAL 2 TIMES DAILY PRN
Qty: 60 TABLET | Refills: 0 | Status: SHIPPED | OUTPATIENT
Start: 2024-02-28 | End: 2024-03-29

## 2024-02-28 RX ORDER — TRAMADOL HYDROCHLORIDE 50 MG/1
50 TABLET ORAL 3 TIMES DAILY
Qty: 90 TABLET | Refills: 2 | Status: SHIPPED | OUTPATIENT
Start: 2024-02-28 | End: 2024-05-28

## 2024-02-28 RX ORDER — MELATONIN
2000 DAILY
Qty: 60 TABLET | Refills: 11 | Status: SHIPPED | OUTPATIENT
Start: 2024-02-28

## 2024-02-28 RX ORDER — HYDROCODONE BITARTRATE AND ACETAMINOPHEN 5; 325 MG/1; MG/1
1 TABLET ORAL 2 TIMES DAILY PRN
Qty: 60 TABLET | Refills: 0 | Status: SHIPPED | OUTPATIENT
Start: 2024-03-29 | End: 2024-04-28

## 2024-02-28 ASSESSMENT — PATIENT HEALTH QUESTIONNAIRE - PHQ9
2. FEELING DOWN, DEPRESSED OR HOPELESS: 0
SUM OF ALL RESPONSES TO PHQ QUESTIONS 1-9: 0
SUM OF ALL RESPONSES TO PHQ9 QUESTIONS 1 & 2: 0
1. LITTLE INTEREST OR PLEASURE IN DOING THINGS: 0
SUM OF ALL RESPONSES TO PHQ QUESTIONS 1-9: 0

## 2024-02-28 NOTE — PROGRESS NOTES
Jaden Ochoa  1950 02/28/24    SUBJECTIVE:    R hip pain is worsening, w apparent slippage, is seeing Dr Manzanares for eval.    Chr LBP, on norco and tramadol, Controlled substances monitoring: possible medication side effects, risk of tolerance and/or dependence, and alternative treatments discussed, no signs of potential drug abuse or diversion identified and OARRS report reviewed today- activity consistent with treatment plan.     Crohns dz, due for f/u Dr Dong colonoscopy this yr.  Pt states he'll call Dr Dong's office.  No GI sx at this time.    Hypertension: Stable. Denies CP, SOB, cough, visual changes, dizziness, palpitations or HA.      DM- last A1c ok, due for f/u lab.  Lab Results   Component Value Date    LABA1C 6.4 11/28/2023    LABA1C 5.9 08/22/2023    LABA1C 6.3 05/22/2023     Lab Results   Component Value Date    GLUF 195 (H) 03/20/2018    MALBCR 45.0 (H) 02/10/2023    LDLCALC 39 08/22/2023    CREATININE 1.2 11/28/2023     Lipids, on research study    OBJECTIVE:    /70 (Site: Left Upper Arm, Position: Sitting, Cuff Size: Large Adult)   Pulse 61   Resp 16   Ht 1.702 m (5' 7\")   Wt 117.8 kg (259 lb 12.8 oz)   SpO2 96%   BMI 40.69 kg/m²     Physical Exam  Constitutional:       Appearance: Normal appearance.   Cardiovascular:      Rate and Rhythm: Normal rate and regular rhythm.      Heart sounds: No murmur heard.     No gallop.   Pulmonary:      Effort: No respiratory distress.      Breath sounds: No wheezing.   Abdominal:      General: Abdomen is flat. Bowel sounds are normal. There is no distension.      Palpations: Abdomen is soft. There is no mass.      Tenderness: There is no abdominal tenderness.      Hernia: No hernia is present.   Musculoskeletal:      Right lower leg: No edema.      Left lower leg: No edema.   Neurological:      Mental Status: He is alert.   Psychiatric:         Mood and Affect: Mood normal.         ASSESSMENT:    1. Controlled type 2 diabetes mellitus with

## 2024-03-16 DIAGNOSIS — E11.21 CONTROLLED TYPE 2 DIABETES MELLITUS WITH DIABETIC NEPHROPATHY, WITHOUT LONG-TERM CURRENT USE OF INSULIN (HCC): ICD-10-CM

## 2024-03-18 RX ORDER — GLIPIZIDE 5 MG/1
10 TABLET ORAL
Qty: 180 TABLET | Refills: 1 | Status: SHIPPED | OUTPATIENT
Start: 2024-03-18

## 2024-04-01 DIAGNOSIS — M54.12 RIGHT CERVICAL RADICULOPATHY: ICD-10-CM

## 2024-04-01 DIAGNOSIS — G89.29 CHRONIC MIDLINE LOW BACK PAIN WITHOUT SCIATICA: ICD-10-CM

## 2024-04-01 DIAGNOSIS — M54.50 CHRONIC MIDLINE LOW BACK PAIN WITHOUT SCIATICA: ICD-10-CM

## 2024-04-01 RX ORDER — HYDROCODONE BITARTRATE AND ACETAMINOPHEN 5; 325 MG/1; MG/1
1 TABLET ORAL 2 TIMES DAILY PRN
Qty: 60 TABLET | Refills: 0 | Status: SHIPPED | OUTPATIENT
Start: 2024-04-01 | End: 2024-05-01

## 2024-04-29 DIAGNOSIS — M54.12 RIGHT CERVICAL RADICULOPATHY: ICD-10-CM

## 2024-04-29 DIAGNOSIS — G89.29 CHRONIC MIDLINE LOW BACK PAIN WITHOUT SCIATICA: ICD-10-CM

## 2024-04-29 DIAGNOSIS — M54.50 CHRONIC MIDLINE LOW BACK PAIN WITHOUT SCIATICA: ICD-10-CM

## 2024-04-29 RX ORDER — HYDROCODONE BITARTRATE AND ACETAMINOPHEN 5; 325 MG/1; MG/1
1 TABLET ORAL 2 TIMES DAILY PRN
Qty: 60 TABLET | Refills: 0 | Status: SHIPPED | OUTPATIENT
Start: 2024-04-29 | End: 2024-05-29

## 2024-05-17 DIAGNOSIS — I10 ESSENTIAL HYPERTENSION: ICD-10-CM

## 2024-05-17 RX ORDER — AMLODIPINE BESYLATE 5 MG/1
5 TABLET ORAL DAILY
Qty: 90 TABLET | Refills: 1 | Status: SHIPPED | OUTPATIENT
Start: 2024-05-17

## 2024-05-22 DIAGNOSIS — E11.21 CONTROLLED TYPE 2 DIABETES MELLITUS WITH DIABETIC NEPHROPATHY, WITHOUT LONG-TERM CURRENT USE OF INSULIN (HCC): ICD-10-CM

## 2024-05-22 RX ORDER — PIOGLITAZONEHYDROCHLORIDE 30 MG/1
30 TABLET ORAL DAILY
Qty: 90 TABLET | Refills: 1 | Status: SHIPPED | OUTPATIENT
Start: 2024-05-22 | End: 2024-05-23 | Stop reason: SDUPTHER

## 2024-05-23 ENCOUNTER — OFFICE VISIT (OUTPATIENT)
Dept: INTERNAL MEDICINE CLINIC | Age: 74
End: 2024-05-23

## 2024-05-23 VITALS
HEART RATE: 60 BPM | SYSTOLIC BLOOD PRESSURE: 132 MMHG | OXYGEN SATURATION: 97 % | HEIGHT: 67 IN | BODY MASS INDEX: 40.84 KG/M2 | DIASTOLIC BLOOD PRESSURE: 82 MMHG | WEIGHT: 260.2 LBS

## 2024-05-23 DIAGNOSIS — M54.50 CHRONIC MIDLINE LOW BACK PAIN WITHOUT SCIATICA: ICD-10-CM

## 2024-05-23 DIAGNOSIS — N40.0 BENIGN PROSTATIC HYPERPLASIA WITHOUT LOWER URINARY TRACT SYMPTOMS: ICD-10-CM

## 2024-05-23 DIAGNOSIS — Z00.00 PREVENTATIVE HEALTH CARE: ICD-10-CM

## 2024-05-23 DIAGNOSIS — M54.12 RIGHT CERVICAL RADICULOPATHY: ICD-10-CM

## 2024-05-23 DIAGNOSIS — E78.00 PURE HYPERCHOLESTEROLEMIA: ICD-10-CM

## 2024-05-23 DIAGNOSIS — E11.21 CONTROLLED TYPE 2 DIABETES MELLITUS WITH DIABETIC NEPHROPATHY, WITHOUT LONG-TERM CURRENT USE OF INSULIN (HCC): ICD-10-CM

## 2024-05-23 DIAGNOSIS — I10 PRIMARY HYPERTENSION: ICD-10-CM

## 2024-05-23 DIAGNOSIS — M48.062 SPINAL STENOSIS OF LUMBAR REGION WITH NEUROGENIC CLAUDICATION: Primary | ICD-10-CM

## 2024-05-23 DIAGNOSIS — G89.29 CHRONIC MIDLINE LOW BACK PAIN WITHOUT SCIATICA: ICD-10-CM

## 2024-05-23 DIAGNOSIS — M54.16 LUMBAR RADICULOPATHY: ICD-10-CM

## 2024-05-23 RX ORDER — PIOGLITAZONEHYDROCHLORIDE 30 MG/1
30 TABLET ORAL DAILY
Qty: 90 TABLET | Refills: 1 | Status: SHIPPED | OUTPATIENT
Start: 2024-05-23

## 2024-05-23 RX ORDER — HYDROCODONE BITARTRATE AND ACETAMINOPHEN 5; 325 MG/1; MG/1
1 TABLET ORAL 2 TIMES DAILY PRN
Qty: 60 TABLET | Refills: 0 | Status: SHIPPED | OUTPATIENT
Start: 2024-06-29 | End: 2024-07-29

## 2024-05-23 RX ORDER — TRAMADOL HYDROCHLORIDE 50 MG/1
50 TABLET ORAL 3 TIMES DAILY
Qty: 90 TABLET | Refills: 2 | Status: SHIPPED | OUTPATIENT
Start: 2024-05-23 | End: 2024-08-21

## 2024-05-23 RX ORDER — HYDROCODONE BITARTRATE AND ACETAMINOPHEN 5; 325 MG/1; MG/1
1 TABLET ORAL 2 TIMES DAILY PRN
Qty: 60 TABLET | Refills: 0 | Status: SHIPPED | OUTPATIENT
Start: 2024-07-29 | End: 2024-08-28

## 2024-05-23 RX ORDER — HYDROCODONE BITARTRATE AND ACETAMINOPHEN 5; 325 MG/1; MG/1
1 TABLET ORAL 2 TIMES DAILY PRN
Qty: 60 TABLET | Refills: 0 | Status: SHIPPED | OUTPATIENT
Start: 2024-05-30 | End: 2024-06-29

## 2024-05-23 SDOH — ECONOMIC STABILITY: FOOD INSECURITY: WITHIN THE PAST 12 MONTHS, YOU WORRIED THAT YOUR FOOD WOULD RUN OUT BEFORE YOU GOT MONEY TO BUY MORE.: NEVER TRUE

## 2024-05-23 SDOH — ECONOMIC STABILITY: INCOME INSECURITY: HOW HARD IS IT FOR YOU TO PAY FOR THE VERY BASICS LIKE FOOD, HOUSING, MEDICAL CARE, AND HEATING?: NOT HARD AT ALL

## 2024-05-23 SDOH — ECONOMIC STABILITY: FOOD INSECURITY: WITHIN THE PAST 12 MONTHS, THE FOOD YOU BOUGHT JUST DIDN'T LAST AND YOU DIDN'T HAVE MONEY TO GET MORE.: NEVER TRUE

## 2024-05-23 NOTE — PROGRESS NOTES
Jaden Ochoa  1950 05/23/24    SUBJECTIVE:    DM- recent NP visit for ins had A1c at 6.5, his scr was reviewed.  Can consider shingrix.   Lab Results   Component Value Date    LABA1C 6.4 11/28/2023    LABA1C 5.9 08/22/2023    LABA1C 6.3 05/22/2023     Lab Results   Component Value Date    GLUF 195 (H) 03/20/2018    MALBCR 45 (H) 03/14/2024    CREATININE 1.2 03/14/2024     Hypertension: Stable. Denies CP, SOB, cough, visual changes, dizziness, palpitations or HA.     Chr LBP on meds, feels has some difficulty w climbing stairs.  Feels legs can be sl weak, has pain rad to R groin, xray hips 2022 w no significant OA.    OBJECTIVE:    /80 (Site: Left Upper Arm, Position: Sitting, Cuff Size: Medium Adult)   Pulse 60   Ht 1.702 m (5' 7.01\")   Wt 118 kg (260 lb 3.2 oz)   SpO2 97%   BMI 40.74 kg/m²     Physical Exam  Constitutional:       Appearance: Normal appearance.   Cardiovascular:      Rate and Rhythm: Normal rate and regular rhythm.      Heart sounds: No murmur heard.     No gallop.   Pulmonary:      Effort: No respiratory distress.      Breath sounds: No wheezing.   Abdominal:      General: Abdomen is flat. Bowel sounds are normal. There is no distension.      Palpations: Abdomen is soft. There is no mass.      Tenderness: There is no abdominal tenderness.      Hernia: No hernia is present.   Musculoskeletal:      Right lower leg: No edema.      Left lower leg: No edema.   Neurological:      Mental Status: He is alert.      Motor: Weakness (WEAK BILAT HIP FLEXION.) present.   Psychiatric:         Mood and Affect: Mood normal.         ASSESSMENT:    1. Spinal stenosis of lumbar region with neurogenic claudication    2. Lumbar radiculopathy    3. Chronic midline low back pain without sciatica    4. Right cervical radiculopathy    5. Controlled type 2 diabetes mellitus with diabetic nephropathy, without long-term current use of insulin (HCC)    6. Primary hypertension    7. Pure

## 2024-06-03 ENCOUNTER — HOSPITAL ENCOUNTER (OUTPATIENT)
Dept: MRI IMAGING | Age: 74
Discharge: HOME | End: 2024-06-03
Attending: INTERNAL MEDICINE
Payer: MEDICARE

## 2024-06-03 DIAGNOSIS — M48.062 SPINAL STENOSIS OF LUMBAR REGION WITH NEUROGENIC CLAUDICATION: ICD-10-CM

## 2024-06-03 PROCEDURE — 72148 MRI LUMBAR SPINE W/O DYE: CPT

## 2024-06-06 ENCOUNTER — TELEPHONE (OUTPATIENT)
Dept: INTERNAL MEDICINE CLINIC | Age: 74
End: 2024-06-06

## 2024-06-06 DIAGNOSIS — M25.572 ACUTE LEFT ANKLE PAIN: ICD-10-CM

## 2024-06-06 NOTE — RESULT ENCOUNTER NOTE
Please call pt, ANKLE XRAY IS OK, DOES HAVE SOME SPURRING PRESENT BUT NO FRACTURES OR ARTHRITIS.  SOME SOFT TISSUE SWELLING IS PRESENT.    IOF ANKLE PAIN IS PERSISTENT WE CAN NEXT REFER TO PODIATRIST AT HIS DISCRETION OR MONITOR FOR NOW

## 2024-06-06 NOTE — TELEPHONE ENCOUNTER
Pt. Called for MRI results. I let him know they are still in process.    Also requesting X-ray from November. I called over to [facility] and requested imaging. Margareth will send report over.     He feels he may have infection on broken tooth. I offered him an appt. He said he would call back next week if he feels needed.

## 2024-06-07 ENCOUNTER — TELEPHONE (OUTPATIENT)
Dept: INTERNAL MEDICINE CLINIC | Age: 74
End: 2024-06-07

## 2024-06-07 DIAGNOSIS — M48.00 SPINAL STENOSIS, UNSPECIFIED SPINAL REGION: Primary | ICD-10-CM

## 2024-06-07 DIAGNOSIS — G89.29 CHRONIC MIDLINE LOW BACK PAIN WITHOUT SCIATICA: ICD-10-CM

## 2024-06-07 DIAGNOSIS — M54.50 CHRONIC MIDLINE LOW BACK PAIN WITHOUT SCIATICA: ICD-10-CM

## 2024-06-07 NOTE — TELEPHONE ENCOUNTER
----- Message from Huber East MD sent at 6/7/2024  7:23 AM EDT -----  Please call pt, HIS MRI INDICATES SOME PROGRESSION OF SPINAL STENOSIS, ALSO A HERNIATED DISK.  REC 1, WE SET UP FOR PT EVAL AT LOCATION OF HIS CHOICE- [facility], AND 2, ALSO REFERRAL TO DR GILL IF CANDIDATE FOR EPIDURAL INJECTIONS.

## 2024-06-07 NOTE — RESULT ENCOUNTER NOTE
Please call pt, HIS MRI INDICATES SOME PROGRESSION OF SPINAL STENOSIS, ALSO A HERNIATED DISK.  REC 1, WE SET UP FOR PT EVAL AT LOCATION OF HIS CHOICE- [facility], AND 2, ALSO REFERRAL TO DR GILL IF CANDIDATE FOR EPIDURAL INJECTIONS.

## 2024-06-10 DIAGNOSIS — M54.16 LUMBAR RADICULOPATHY: ICD-10-CM

## 2024-06-10 DIAGNOSIS — K50.919 CROHN'S DISEASE WITH COMPLICATION, UNSPECIFIED GASTROINTESTINAL TRACT LOCATION (HCC): ICD-10-CM

## 2024-06-10 RX ORDER — GABAPENTIN 300 MG/1
300 CAPSULE ORAL 3 TIMES DAILY
Qty: 90 CAPSULE | Refills: 2 | Status: SHIPPED | OUTPATIENT
Start: 2024-06-10 | End: 2024-09-08

## 2024-06-10 RX ORDER — ONDANSETRON 4 MG/1
4 TABLET, FILM COATED ORAL EVERY 8 HOURS PRN
Qty: 40 TABLET | Refills: 1 | Status: SHIPPED | OUTPATIENT
Start: 2024-06-10

## 2024-07-11 ENCOUNTER — OFFICE VISIT (OUTPATIENT)
Dept: INTERNAL MEDICINE CLINIC | Age: 74
End: 2024-07-11

## 2024-07-11 VITALS
SYSTOLIC BLOOD PRESSURE: 128 MMHG | WEIGHT: 263.8 LBS | DIASTOLIC BLOOD PRESSURE: 80 MMHG | BODY MASS INDEX: 41.4 KG/M2 | HEART RATE: 58 BPM | HEIGHT: 67 IN | OXYGEN SATURATION: 94 %

## 2024-07-11 DIAGNOSIS — K05.10 GINGIVITIS: Primary | ICD-10-CM

## 2024-07-11 PROBLEM — I20.89 OTHER FORMS OF ANGINA PECTORIS (HCC): Status: RESOLVED | Noted: 2023-01-04 | Resolved: 2024-07-11

## 2024-07-11 RX ORDER — AMOXICILLIN AND CLAVULANATE POTASSIUM 875; 125 MG/1; MG/1
1 TABLET, FILM COATED ORAL 2 TIMES DAILY
Qty: 20 TABLET | Refills: 0 | Status: SHIPPED | OUTPATIENT
Start: 2024-07-11 | End: 2024-07-21

## 2024-07-11 NOTE — PROGRESS NOTES
Jaden Ochoa  1950 07/11/24    SUBJECTIVE:  has apparently 4 impacted teeth that have broken off so present below gum line and needs extractions but cannot find oral surgeon on his ins.        OBJECTIVE:    /80 (Site: Left Upper Arm, Position: Sitting, Cuff Size: Medium Adult)   Pulse 58   Ht 1.702 m (5' 7.01\")   Wt 119.7 kg (263 lb 12.8 oz)   SpO2 94%   BMI 41.31 kg/m²     Physical Exam  Constitutional:       Appearance: Normal appearance.   HENT:      Mouth/Throat:      Mouth: Mucous membranes are moist.      Pharynx: Oropharynx is clear. Posterior oropharyngeal erythema present. No oropharyngeal exudate.        Comments: Swollen nodules noted upper gumes  Neurological:      Mental Status: He is alert.         ASSESSMENT:    1. Gingivitis        PLAN:    Jaden was seen today for other.    Diagnoses and all orders for this visit:    Gingivitis- I was very clear w Jaden the only true effective treatment is extraction and this would not be good long term answer, script given and encouraged f/u w dentist  -     amoxicillin-clavulanate (AUGMENTIN) 875-125 MG per tablet; Take 1 tablet by mouth 2 times daily for 10 days

## 2024-07-30 DIAGNOSIS — G89.29 CHRONIC MIDLINE LOW BACK PAIN WITHOUT SCIATICA: ICD-10-CM

## 2024-07-30 DIAGNOSIS — M54.12 RIGHT CERVICAL RADICULOPATHY: ICD-10-CM

## 2024-07-30 DIAGNOSIS — M54.50 CHRONIC MIDLINE LOW BACK PAIN WITHOUT SCIATICA: ICD-10-CM

## 2024-07-30 RX ORDER — CYCLOBENZAPRINE HCL 10 MG
TABLET ORAL
Qty: 90 TABLET | Refills: 1 | Status: SHIPPED | OUTPATIENT
Start: 2024-07-30

## 2024-08-20 DIAGNOSIS — E78.00 PURE HYPERCHOLESTEROLEMIA: ICD-10-CM

## 2024-08-20 DIAGNOSIS — I10 PRIMARY HYPERTENSION: ICD-10-CM

## 2024-08-20 DIAGNOSIS — E11.21 CONTROLLED TYPE 2 DIABETES MELLITUS WITH DIABETIC NEPHROPATHY, WITHOUT LONG-TERM CURRENT USE OF INSULIN (HCC): ICD-10-CM

## 2024-08-20 DIAGNOSIS — N40.0 BENIGN PROSTATIC HYPERPLASIA WITHOUT LOWER URINARY TRACT SYMPTOMS: ICD-10-CM

## 2024-08-20 LAB
ALBUMIN SERPL-MCNC: 4 G/DL (ref 3.4–5)
ALBUMIN/GLOB SERPL: 1.4 {RATIO} (ref 1.1–2.2)
ALP SERPL-CCNC: 106 U/L (ref 40–129)
ALT SERPL-CCNC: 11 U/L (ref 10–40)
ANION GAP SERPL CALCULATED.3IONS-SCNC: 13 MMOL/L (ref 3–16)
AST SERPL-CCNC: 13 U/L (ref 15–37)
BILIRUB SERPL-MCNC: <0.2 MG/DL (ref 0–1)
BUN SERPL-MCNC: 19 MG/DL (ref 7–20)
CALCIUM SERPL-MCNC: 9.2 MG/DL (ref 8.3–10.6)
CHLORIDE SERPL-SCNC: 99 MMOL/L (ref 99–110)
CHOLEST SERPL-MCNC: 121 MG/DL (ref 0–199)
CO2 SERPL-SCNC: 26 MMOL/L (ref 21–32)
CREAT SERPL-MCNC: 1.1 MG/DL (ref 0.8–1.3)
DEPRECATED RDW RBC AUTO: 15.7 % (ref 12.4–15.4)
GFR SERPLBLD CREATININE-BSD FMLA CKD-EPI: 71 ML/MIN/{1.73_M2}
GLUCOSE SERPL-MCNC: 106 MG/DL (ref 70–99)
HCT VFR BLD AUTO: 40.9 % (ref 40.5–52.5)
HDLC SERPL-MCNC: 34 MG/DL (ref 40–60)
HGB BLD-MCNC: 13.7 G/DL (ref 13.5–17.5)
LDLC SERPL CALC-MCNC: 38 MG/DL
MCH RBC QN AUTO: 26.5 PG (ref 26–34)
MCHC RBC AUTO-ENTMCNC: 33.5 G/DL (ref 31–36)
MCV RBC AUTO: 79.2 FL (ref 80–100)
PLATELET # BLD AUTO: 230 K/UL (ref 135–450)
PMV BLD AUTO: 7.7 FL (ref 5–10.5)
POTASSIUM SERPL-SCNC: 4 MMOL/L (ref 3.5–5.1)
PROT SERPL-MCNC: 6.9 G/DL (ref 6.4–8.2)
PSA SERPL DL<=0.01 NG/ML-MCNC: 1.01 NG/ML (ref 0–4)
RBC # BLD AUTO: 5.17 M/UL (ref 4.2–5.9)
SODIUM SERPL-SCNC: 138 MMOL/L (ref 136–145)
TRIGL SERPL-MCNC: 247 MG/DL (ref 0–150)
TSH SERPL DL<=0.005 MIU/L-ACNC: 1.41 UIU/ML (ref 0.27–4.2)
VLDLC SERPL CALC-MCNC: 49 MG/DL
WBC # BLD AUTO: 6.4 K/UL (ref 4–11)

## 2024-08-21 LAB
EST. AVERAGE GLUCOSE BLD GHB EST-MCNC: 134.1 MG/DL
HBA1C MFR BLD: 6.3 %

## 2024-08-21 NOTE — RESULT ENCOUNTER NOTE
Chol, sugars, labs appear in stable range, DM is controlled, psa and thyroid fxn are also nl range.   notify pt please.

## 2024-08-22 ENCOUNTER — TELEPHONE (OUTPATIENT)
Dept: PHARMACY | Facility: CLINIC | Age: 74
End: 2024-08-22

## 2024-08-22 NOTE — TELEPHONE ENCOUNTER
Dr. Huber East MD,      Patient's insurance has identified statin use in persons with diabetes (SUPD) care gap:   An alternative to close this care gap is to add an eligible diagnosis code to a billable visit that would make insurance aware that patient is unable to tolerate statins. Options include*:   Statin myopathy: G72.0, T46.6X5A  Drug-induced myopathy: G72.0  Myopathy, unspecified: G72.89  *The condition the code refers to does not necessarily need to occur in the same year the code was billed. The member’s medical chart should reflect ‘history of’.     Please let me know if you have any questions.      Thank you,  Anahy Howell, PharmD, BCACP, [facility]  Population Health Pharmacist   [facility]  Department, toll free: [phone], option 1    =======================================================    For Pharmacy Admin Tracking Only  Program: [facility] [employer]  CPA in place:  No  Recommendation Provided To: Provider: 1 via Note to Provider  Intervention Accepted By: Provider: 1  Gap Closed?: Yes   Time Spent (min): 15

## 2024-08-23 ENCOUNTER — OFFICE VISIT (OUTPATIENT)
Dept: INTERNAL MEDICINE CLINIC | Age: 74
End: 2024-08-23

## 2024-08-23 VITALS
BODY MASS INDEX: 41.09 KG/M2 | WEIGHT: 261.8 LBS | HEART RATE: 61 BPM | OXYGEN SATURATION: 92 % | SYSTOLIC BLOOD PRESSURE: 142 MMHG | DIASTOLIC BLOOD PRESSURE: 80 MMHG | HEIGHT: 67 IN

## 2024-08-23 VITALS
WEIGHT: 261.91 LBS | HEART RATE: 61 BPM | HEIGHT: 67 IN | RESPIRATION RATE: 16 BRPM | OXYGEN SATURATION: 92 % | BODY MASS INDEX: 41.11 KG/M2 | DIASTOLIC BLOOD PRESSURE: 80 MMHG | SYSTOLIC BLOOD PRESSURE: 142 MMHG

## 2024-08-23 DIAGNOSIS — E11.21 CONTROLLED TYPE 2 DIABETES MELLITUS WITH DIABETIC NEPHROPATHY, WITHOUT LONG-TERM CURRENT USE OF INSULIN (HCC): Primary | ICD-10-CM

## 2024-08-23 DIAGNOSIS — G89.29 CHRONIC MIDLINE LOW BACK PAIN WITHOUT SCIATICA: ICD-10-CM

## 2024-08-23 DIAGNOSIS — Z00.00 MEDICARE ANNUAL WELLNESS VISIT, SUBSEQUENT: Primary | ICD-10-CM

## 2024-08-23 DIAGNOSIS — G72.0 STATIN MYOPATHY: ICD-10-CM

## 2024-08-23 DIAGNOSIS — M54.12 RIGHT CERVICAL RADICULOPATHY: ICD-10-CM

## 2024-08-23 DIAGNOSIS — M54.50 CHRONIC MIDLINE LOW BACK PAIN WITHOUT SCIATICA: ICD-10-CM

## 2024-08-23 DIAGNOSIS — I10 PRIMARY HYPERTENSION: ICD-10-CM

## 2024-08-23 DIAGNOSIS — T46.6X5A STATIN MYOPATHY: ICD-10-CM

## 2024-08-23 DIAGNOSIS — N40.0 BENIGN PROSTATIC HYPERPLASIA WITHOUT LOWER URINARY TRACT SYMPTOMS: ICD-10-CM

## 2024-08-23 RX ORDER — TRAMADOL HYDROCHLORIDE 50 MG/1
50 TABLET ORAL 3 TIMES DAILY
Qty: 90 TABLET | Refills: 2 | Status: SHIPPED | OUTPATIENT
Start: 2024-08-23 | End: 2024-11-21

## 2024-08-23 RX ORDER — HYDROCODONE BITARTRATE AND ACETAMINOPHEN 5; 325 MG/1; MG/1
1 TABLET ORAL 2 TIMES DAILY PRN
Qty: 60 TABLET | Refills: 0 | Status: SHIPPED | OUTPATIENT
Start: 2024-10-29 | End: 2024-11-28

## 2024-08-23 RX ORDER — HYDROCODONE BITARTRATE AND ACETAMINOPHEN 5; 325 MG/1; MG/1
1 TABLET ORAL 2 TIMES DAILY PRN
Qty: 60 TABLET | Refills: 0 | Status: SHIPPED | OUTPATIENT
Start: 2024-08-30 | End: 2024-09-29

## 2024-08-23 RX ORDER — HYDROCODONE BITARTRATE AND ACETAMINOPHEN 5; 325 MG/1; MG/1
1 TABLET ORAL 2 TIMES DAILY PRN
Qty: 60 TABLET | Refills: 0 | Status: SHIPPED | OUTPATIENT
Start: 2024-09-29 | End: 2024-10-29

## 2024-08-23 ASSESSMENT — PATIENT HEALTH QUESTIONNAIRE - PHQ9
1. LITTLE INTEREST OR PLEASURE IN DOING THINGS: NOT AT ALL
2. FEELING DOWN, DEPRESSED OR HOPELESS: NOT AT ALL
SUM OF ALL RESPONSES TO PHQ9 QUESTIONS 1 & 2: 0
SUM OF ALL RESPONSES TO PHQ QUESTIONS 1-9: 0

## 2024-08-23 ASSESSMENT — LIFESTYLE VARIABLES
HOW OFTEN DO YOU HAVE A DRINK CONTAINING ALCOHOL: NEVER
HOW MANY STANDARD DRINKS CONTAINING ALCOHOL DO YOU HAVE ON A TYPICAL DAY: PATIENT DOES NOT DRINK

## 2024-08-23 NOTE — PATIENT INSTRUCTIONS
To lose wt ,rec to try the phone ap [employer] OR THE AP LOSE IT, keeping intake to ~1500cal/day

## 2024-08-23 NOTE — PROGRESS NOTES
Medicare Annual Wellness Visit    Jaden Ochoa is here for Medicare AWV    Assessment & Plan   Medicare annual wellness visit, subsequent  Recommendations for Preventive Services Due: see orders and patient instructions/AVS.  Recommended screening schedule for the next 5-10 years is provided to the patient in written form: see Patient Instructions/AVS.     Return in 1 year (on 8/23/2025) for Medicare AWV.     Subjective       Patient's complete Health Risk Assessment and screening values have been reviewed and are found in Flowsheets. The following problems were reviewed today and where indicated follow up appointments were made and/or referrals ordered.    Positive Risk Factor Screenings with Interventions:    Fall Risk:  Do you feel unsteady or are you worried about falling? : (!) yes (Pt ambulates with a cane for safety)  2 or more falls in past year?: no  Fall with injury in past year?: no     Interventions:    Reviewed medications, home hazards, visual acuity, and co-morbidities that can increase risk for falls         Controlled Medication Review:    Today's Pain Level: No data recorded   Opioid Risk: (Low risk score <55) Opioid risk score: 18    Patient is low risk for opioid use disorder or overdose.    Last PDMP Tj as Reviewed:  Review User Review Instant Review Result   BLAKEFIDEL JHA 8/23/2024  9:45 AM     Reviewed PDMP [1]     Last Controlled Substance Monitoring Documentation      Flowsheet Row Office Visit from 9/7/2017 in [facility]   Attestation The Prescription Monitoring Report for this patient was reviewed today. filed at 09/07/2017 0951   Periodic Controlled Substance Monitoring Possible medication side effects, risk of tolerance and/or dependence, and alternative treatments discussed, No signs of potential drug abuse or diversion identified. filed at 09/07/2017 0951            Opioid Risk: (Low risk score ? 55)  Opioid risk score: 18    Any opioid medication

## 2024-08-23 NOTE — PATIENT INSTRUCTIONS
cataracts, macular degeneration, and other eye disorders.  A preventive dental visit is recommended every 6 months.  Try to get at least 150 minutes of exercise per week or 10,000 steps per day on a pedometer .  Order or download the FREE \"Exercise & Physical Activity: Your Everyday Guide\" from [employer]. Call 1-207.510.4945 or search [employer] [place] [employer] online.  You need 6180-9500 mg of calcium and 4662-9698 IU of vitamin D per day. It is possible to meet your calcium requirement with diet alone, but a vitamin D supplement is usually necessary to meet this goal.  When exposed to the sun, use a sunscreen that protects against both UVA and UVB radiation with an SPF of 30 or greater. Reapply every 2 to 3 hours or after sweating, drying off with a towel, or swimming.  Always wear a seat belt when traveling in a car. Always wear a helmet when riding a bicycle or motorcycle.

## 2024-08-23 NOTE — PROGRESS NOTES
1 tablet by mouth 2 times daily as needed for Pain for up to 30 days.    Primary hypertension - Blood pressure stable and will continue current regimen.  Will plan periodic monitoring of renal function, electrolytes, lipid profile.     -     Comprehensive Metabolic Panel; Future  -     Lipid Panel; Future  -     CBC; Future  -     Magnesium; Future    Benign prostatic hyperplasia without lower urinary tract symptoms- PSA RECENTLY NL AND CONT MONITOR    Statin myopathy- INTOL OF STATINS

## 2024-09-04 NOTE — PROGRESS NOTES
Jaden Ochoa  1950 09/04/24    SUBJECTIVE:    5d hx of sinus congestion and ST, chest congestion.  Had low gr fevers now resolved.  Did not test for covid.   Sputum greenish.  Olc cold/sinus meds not helpful.    DM- last A1c was ok below.  Lab Results   Component Value Date    LABA1C 6.3 08/20/2024    LABA1C 6.4 11/28/2023    LABA1C 5.9 08/22/2023     Lab Results   Component Value Date    GLUF 195 (H) 03/20/2018    MALBCR 45 (H) 03/14/2024    CREATININE 1.1 08/20/2024       OBJECTIVE:    BP (!) 140/80 (Site: Left Upper Arm, Position: Sitting, Cuff Size: Medium Adult)   Pulse 64   Ht 1.702 m (5' 7.01\")   Wt 118.1 kg (260 lb 6.4 oz)   SpO2 97%   BMI 40.77 kg/m²     Physical Exam  Vitals reviewed.   Constitutional:       General: He is not in acute distress.     Appearance: He is well-developed.   HENT:      Head: Normocephalic and atraumatic.      Nose: Mucosal edema, congestion and rhinorrhea present. No nasal deformity.      Mouth/Throat:      Pharynx: Oropharynx is clear. No oropharyngeal exudate or posterior oropharyngeal erythema.   Eyes:      General: No scleral icterus.     Conjunctiva/sclera: Conjunctivae normal.   Cardiovascular:      Rate and Rhythm: Normal rate and regular rhythm.      Heart sounds: Normal heart sounds. No murmur heard.  Pulmonary:      Effort: Pulmonary effort is normal. No respiratory distress.      Breath sounds: Normal breath sounds. No wheezing or rales.   Abdominal:      General: Bowel sounds are normal. There is no distension.      Palpations: Abdomen is soft.      Tenderness: There is no abdominal tenderness.   Musculoskeletal:      Cervical back: Neck supple.   Lymphadenopathy:      Cervical: No cervical adenopathy.         ASSESSMENT:    1. Acute non-recurrent maxillary sinusitis    2. Controlled type 2 diabetes mellitus with diabetic nephropathy, without long-term current use of insulin (HCC)        PLAN:    Jaden was seen today for other.    Diagnoses and all

## 2024-09-05 ENCOUNTER — OFFICE VISIT (OUTPATIENT)
Dept: INTERNAL MEDICINE CLINIC | Age: 74
End: 2024-09-05

## 2024-09-05 VITALS
WEIGHT: 260.4 LBS | SYSTOLIC BLOOD PRESSURE: 140 MMHG | HEIGHT: 67 IN | DIASTOLIC BLOOD PRESSURE: 80 MMHG | HEART RATE: 64 BPM | BODY MASS INDEX: 40.87 KG/M2 | OXYGEN SATURATION: 97 %

## 2024-09-05 DIAGNOSIS — E11.21 CONTROLLED TYPE 2 DIABETES MELLITUS WITH DIABETIC NEPHROPATHY, WITHOUT LONG-TERM CURRENT USE OF INSULIN (HCC): ICD-10-CM

## 2024-09-05 DIAGNOSIS — J01.00 ACUTE NON-RECURRENT MAXILLARY SINUSITIS: Primary | ICD-10-CM

## 2024-09-05 RX ORDER — PREDNISONE 5 MG/1
TABLET ORAL
Qty: 36 TABLET | Refills: 0 | Status: SHIPPED | OUTPATIENT
Start: 2024-09-05

## 2024-09-20 DIAGNOSIS — E11.21 CONTROLLED TYPE 2 DIABETES MELLITUS WITH DIABETIC NEPHROPATHY, WITHOUT LONG-TERM CURRENT USE OF INSULIN (HCC): ICD-10-CM

## 2024-09-20 RX ORDER — GLIPIZIDE 5 MG/1
TABLET ORAL
Qty: 180 TABLET | Refills: 1 | Status: SHIPPED | OUTPATIENT
Start: 2024-09-20

## 2024-09-30 ENCOUNTER — TELEPHONE (OUTPATIENT)
Dept: INTERNAL MEDICINE CLINIC | Age: 74
End: 2024-09-30

## 2024-09-30 DIAGNOSIS — M54.12 RIGHT CERVICAL RADICULOPATHY: ICD-10-CM

## 2024-09-30 DIAGNOSIS — M54.50 CHRONIC MIDLINE LOW BACK PAIN WITHOUT SCIATICA: ICD-10-CM

## 2024-09-30 DIAGNOSIS — G89.29 CHRONIC MIDLINE LOW BACK PAIN WITHOUT SCIATICA: ICD-10-CM

## 2024-09-30 RX ORDER — HYDROCODONE BITARTRATE AND ACETAMINOPHEN 5; 325 MG/1; MG/1
1 TABLET ORAL 2 TIMES DAILY PRN
Qty: 60 TABLET | Refills: 0 | Status: SHIPPED | OUTPATIENT
Start: 2024-10-29 | End: 2024-11-28

## 2024-09-30 NOTE — TELEPHONE ENCOUNTER
Called and notified pt. Two [facility]'s sent in. Both had October by mistake. One should be ok to fill today and the other October 29th. Also called and notified patient that I spoke to a woman at [facility].

## 2024-09-30 NOTE — TELEPHONE ENCOUNTER
Jaden came in office he will be out of meds tomorrow. It looks like someone at another office cancelled by mistake for September.

## 2024-10-24 DIAGNOSIS — M54.16 LUMBAR RADICULOPATHY: ICD-10-CM

## 2024-10-25 RX ORDER — GABAPENTIN 300 MG/1
300 CAPSULE ORAL 3 TIMES DAILY
Qty: 90 CAPSULE | Refills: 2 | Status: SHIPPED | OUTPATIENT
Start: 2024-10-25 | End: 2025-01-23

## 2024-11-12 RX ORDER — FINASTERIDE 5 MG/1
5 TABLET, FILM COATED ORAL DAILY
Qty: 30 TABLET | Refills: 11 | Status: SHIPPED | OUTPATIENT
Start: 2024-11-12

## 2024-11-14 DIAGNOSIS — E11.21 CONTROLLED TYPE 2 DIABETES MELLITUS WITH DIABETIC NEPHROPATHY, WITHOUT LONG-TERM CURRENT USE OF INSULIN (HCC): ICD-10-CM

## 2024-11-14 DIAGNOSIS — I10 ESSENTIAL HYPERTENSION: ICD-10-CM

## 2024-11-14 RX ORDER — PIOGLITAZONEHYDROCHLORIDE 30 MG/1
30 TABLET ORAL DAILY
Qty: 90 TABLET | Refills: 1 | Status: SHIPPED | OUTPATIENT
Start: 2024-11-14

## 2024-11-14 RX ORDER — AMLODIPINE BESYLATE 5 MG/1
5 TABLET ORAL DAILY
Qty: 90 TABLET | Refills: 1 | Status: SHIPPED | OUTPATIENT
Start: 2024-11-14

## 2024-11-25 NOTE — PROGRESS NOTES
Jaden Ochoa  1950 11/25/24    SUBJECTIVE:   Lipids, on repatha, may finish in the next yr, we thus do not check lipids.      DM- wt stable, we discussed try alt GLP like ozempic   Lab Results   Component Value Date    LABA1C 6.3 08/20/2024    LABA1C 6.4 11/28/2023    LABA1C 5.9 08/22/2023     Lab Results   Component Value Date    GLUF 195 (H) 03/20/2018    MALBCR 45 (H) 03/14/2024    CREATININE 1.1 08/20/2024     Chr lbp stable w pain regimen, on tramadol and norco.  Controlled substances monitoring: possible medication side effects, risk of tolerance and/or dependence, and alternative treatments discussed, no signs of potential drug abuse or diversion identified and OARRS report reviewed today- activity consistent with treatment plan.    OBJECTIVE:    /80 (Site: Left Upper Arm, Position: Sitting, Cuff Size: Large Adult)   Pulse 65   Ht 1.702 m (5' 7.01\")   Wt 120.6 kg (265 lb 12.8 oz)   SpO2 94%   BMI 41.62 kg/m²     Physical Exam  Constitutional:       Appearance: Normal appearance.   Cardiovascular:      Rate and Rhythm: Normal rate and regular rhythm.      Heart sounds: No murmur heard.     No gallop.   Pulmonary:      Effort: No respiratory distress.      Breath sounds: No wheezing.   Abdominal:      General: Abdomen is flat. Bowel sounds are normal. There is no distension.      Palpations: Abdomen is soft. There is no mass.      Tenderness: There is no abdominal tenderness.      Hernia: No hernia is present.   Musculoskeletal:      Right lower leg: No edema.      Left lower leg: No edema.   Neurological:      Mental Status: He is alert.   Psychiatric:         Mood and Affect: Mood normal.         ASSESSMENT:    1. Controlled type 2 diabetes mellitus with diabetic nephropathy, without long-term current use of insulin (HCC)    2. Chronic midline low back pain without sciatica    3. Right cervical radiculopathy    4. Essential hypertension        PLAN:  DM relatively well controlled and will

## 2024-11-26 ENCOUNTER — OFFICE VISIT (OUTPATIENT)
Dept: INTERNAL MEDICINE CLINIC | Age: 74
End: 2024-11-26

## 2024-11-26 VITALS
DIASTOLIC BLOOD PRESSURE: 80 MMHG | BODY MASS INDEX: 41.72 KG/M2 | HEART RATE: 65 BPM | HEIGHT: 67 IN | SYSTOLIC BLOOD PRESSURE: 138 MMHG | WEIGHT: 265.8 LBS | OXYGEN SATURATION: 94 %

## 2024-11-26 DIAGNOSIS — G89.29 CHRONIC MIDLINE LOW BACK PAIN WITHOUT SCIATICA: ICD-10-CM

## 2024-11-26 DIAGNOSIS — E11.21 CONTROLLED TYPE 2 DIABETES MELLITUS WITH DIABETIC NEPHROPATHY, WITHOUT LONG-TERM CURRENT USE OF INSULIN (HCC): Primary | ICD-10-CM

## 2024-11-26 DIAGNOSIS — I10 ESSENTIAL HYPERTENSION: ICD-10-CM

## 2024-11-26 DIAGNOSIS — M54.50 CHRONIC MIDLINE LOW BACK PAIN WITHOUT SCIATICA: ICD-10-CM

## 2024-11-26 DIAGNOSIS — M54.12 RIGHT CERVICAL RADICULOPATHY: ICD-10-CM

## 2024-11-26 RX ORDER — TRAMADOL HYDROCHLORIDE 50 MG/1
50 TABLET ORAL 3 TIMES DAILY
Qty: 90 TABLET | Refills: 2 | Status: SHIPPED | OUTPATIENT
Start: 2024-11-26 | End: 2025-02-24

## 2024-11-26 RX ORDER — HYDROCODONE BITARTRATE AND ACETAMINOPHEN 5; 325 MG/1; MG/1
1 TABLET ORAL 2 TIMES DAILY PRN
Qty: 60 TABLET | Refills: 0 | Status: SHIPPED | OUTPATIENT
Start: 2024-12-28 | End: 2025-01-27

## 2024-11-26 RX ORDER — SEMAGLUTIDE 0.68 MG/ML
0.5 INJECTION, SOLUTION SUBCUTANEOUS WEEKLY
Qty: 3 ML | Refills: 2 | Status: SHIPPED | OUTPATIENT
Start: 2024-11-26

## 2024-11-26 RX ORDER — HYDROCODONE BITARTRATE AND ACETAMINOPHEN 5; 325 MG/1; MG/1
1 TABLET ORAL 2 TIMES DAILY PRN
Qty: 60 TABLET | Refills: 0 | Status: SHIPPED | OUTPATIENT
Start: 2024-11-28 | End: 2024-12-28

## 2024-11-26 RX ORDER — HYDROCODONE BITARTRATE AND ACETAMINOPHEN 5; 325 MG/1; MG/1
1 TABLET ORAL 2 TIMES DAILY PRN
Qty: 60 TABLET | Refills: 0 | Status: SHIPPED | OUTPATIENT
Start: 2025-01-27 | End: 2025-02-26

## 2024-12-11 ENCOUNTER — OFFICE VISIT (OUTPATIENT)
Dept: CARDIOLOGY CLINIC | Age: 74
End: 2024-12-11
Payer: MEDICARE

## 2024-12-11 VITALS
OXYGEN SATURATION: 98 % | WEIGHT: 262 LBS | BODY MASS INDEX: 41.12 KG/M2 | DIASTOLIC BLOOD PRESSURE: 78 MMHG | HEIGHT: 67 IN | SYSTOLIC BLOOD PRESSURE: 136 MMHG | HEART RATE: 63 BPM

## 2024-12-11 DIAGNOSIS — N18.31 STAGE 3A CHRONIC KIDNEY DISEASE (HCC): ICD-10-CM

## 2024-12-11 DIAGNOSIS — Z51.81 ENCOUNTER FOR MONITORING SOTALOL THERAPY: ICD-10-CM

## 2024-12-11 DIAGNOSIS — I10 PRIMARY HYPERTENSION: ICD-10-CM

## 2024-12-11 DIAGNOSIS — Z79.899 ENCOUNTER FOR MONITORING SOTALOL THERAPY: ICD-10-CM

## 2024-12-11 DIAGNOSIS — Z98.890 S/P ABLATION OF VENTRICULAR ARRHYTHMIA: Primary | ICD-10-CM

## 2024-12-11 DIAGNOSIS — G47.33 OSA ON CPAP: ICD-10-CM

## 2024-12-11 DIAGNOSIS — I49.3 FREQUENT UNIFOCAL PVCS: ICD-10-CM

## 2024-12-11 DIAGNOSIS — I10 ESSENTIAL HYPERTENSION: ICD-10-CM

## 2024-12-11 DIAGNOSIS — Z86.79 S/P ABLATION OF VENTRICULAR ARRHYTHMIA: Primary | ICD-10-CM

## 2024-12-11 DIAGNOSIS — E11.21 CONTROLLED TYPE 2 DIABETES MELLITUS WITH DIABETIC NEPHROPATHY, WITHOUT LONG-TERM CURRENT USE OF INSULIN (HCC): ICD-10-CM

## 2024-12-11 PROBLEM — E83.9 CHRONIC KIDNEY DISEASE-MINERAL AND BONE DISORDER: Status: RESOLVED | Noted: 2020-12-01 | Resolved: 2024-12-11

## 2024-12-11 PROBLEM — M89.9 CHRONIC KIDNEY DISEASE-MINERAL AND BONE DISORDER: Status: RESOLVED | Noted: 2020-12-01 | Resolved: 2024-12-11

## 2024-12-11 PROBLEM — N18.9 CHRONIC KIDNEY DISEASE-MINERAL AND BONE DISORDER: Status: RESOLVED | Noted: 2020-12-01 | Resolved: 2024-12-11

## 2024-12-11 PROCEDURE — 3075F SYST BP GE 130 - 139MM HG: CPT | Performed by: INTERNAL MEDICINE

## 2024-12-11 PROCEDURE — 1124F ACP DISCUSS-NO DSCNMKR DOCD: CPT | Performed by: INTERNAL MEDICINE

## 2024-12-11 PROCEDURE — 99214 OFFICE O/P EST MOD 30 MIN: CPT | Performed by: INTERNAL MEDICINE

## 2024-12-11 PROCEDURE — 93000 ELECTROCARDIOGRAM COMPLETE: CPT | Performed by: INTERNAL MEDICINE

## 2024-12-11 PROCEDURE — 3044F HG A1C LEVEL LT 7.0%: CPT | Performed by: INTERNAL MEDICINE

## 2024-12-11 PROCEDURE — 3078F DIAST BP <80 MM HG: CPT | Performed by: INTERNAL MEDICINE

## 2024-12-11 RX ORDER — HYDRALAZINE HYDROCHLORIDE 25 MG/1
25 TABLET, FILM COATED ORAL 3 TIMES DAILY
Qty: 180 TABLET | Refills: 3 | Status: SHIPPED | OUTPATIENT
Start: 2024-12-11

## 2024-12-11 RX ORDER — MAGNESIUM OXIDE 400 MG/1
400 TABLET ORAL DAILY
Qty: 90 TABLET | Refills: 3 | Status: SHIPPED | OUTPATIENT
Start: 2024-12-11

## 2024-12-11 NOTE — ASSESSMENT & PLAN NOTE
Patient continues to have frequent PVCs after ablation and now controlled on sotalol which she seems to be tolerating it well.

## 2024-12-11 NOTE — ASSESSMENT & PLAN NOTE
Most recent creatinine was 1.0 on 8/20/2024 which is a significant improvement from 1.5, 1-year ago.

## 2024-12-11 NOTE — PATIENT INSTRUCTIONS
Continue current cardiovascular medications which have been reviewed and discussed individually with you.  Counseled for calorie counting, reduction in serving size and exercise and lifestyle modification for weight loss.  Appropriate prescriptions if needed on this visit are addressed. After visit summery is provided.   Questions answered and patient verbalizes understanding. Follow up in 12 months with repeat ECG,  sooner if needed.

## 2024-12-11 NOTE — PROGRESS NOTES
route 2 times daily 5/6/20  Yes Huber East MD   blood glucose monitor strips Test two times a day & as needed for symptoms of irregular blood glucose. 5/6/20  Yes Huber East MD   lansoprazole (PREVACID) 15 MG delayed release capsule Take 1 capsule by mouth daily 9/27/16  Yes Huber East MD   meclizine (ANTIVERT) 25 MG tablet Take 1 tablet by mouth every 6 hours.  Patient taking differently: Take 1 tablet by mouth as needed 3/10/15  Yes Huber East MD         12/11/2024     9:46 AM 11/26/2024     8:51 AM 9/16/2024    10:05 AM 9/5/2024    10:22 AM 8/23/2024     9:54 AM 8/23/2024     9:26 AM 7/11/2024    11:19 AM   Vitals   SYSTOLIC 136 138 140 140 142 142 128   DIASTOLIC 78 80 80 80 80 80 80   Site Left Upper Arm Left Upper Arm  Left Upper Arm  Left Upper Arm Left Upper Arm   Position Sitting Sitting  Sitting  Sitting Sitting   Cuff Size Large Adult Large Adult  Medium Adult  Large Adult Medium Adult   Pulse 63 65  64 61 61 58   Respirations     16     SpO2 98 % 94 %  97 % 92 % 92 % 94 %   Weight - Scale 262 lb 265 lb 12.8 oz 262 lb 260 lb 6.4 oz 261 lb 14.5 oz 261 lb 12.8 oz 263 lb 12.8 oz   Height 5' 7\" 5' 7.008\" 5' 7.008\" 5' 7.008\" 5' 7\" 5' 7.008\" 5' 7.008\"   Body Mass Index 41.04 kg/m2 41.62 kg/m2 41.03 kg/m2 40.77 kg/m2 41.02 kg/m2 40.99 kg/m2 41.31 kg/m2     Constitutional:  Patient is obese male has gained 5 pounds since last visit with me on 12/13/2023.  Eyes: Partially blind no conjunctival pallor noted.  NECK: No JVP or thyromegaly  Cardiovascular:  Auscultation: Normal S1 and S2.  No significant murmurs or gallops noted. Carotids are negative for.   Respiratory:  Respiratory effort is normal. Breath sounds are clear to auscultation.  Extremities: No pitting edema noted.   Neurologic:  Oriented to time, place, and person and non-anxious. No focal neurological deficit noted.  Psychiatric: Normal mood and effect.     EKG today is consistent with sinus bradycardia 59 bpm

## 2025-01-28 DIAGNOSIS — M54.12 RIGHT CERVICAL RADICULOPATHY: ICD-10-CM

## 2025-01-28 DIAGNOSIS — G89.29 CHRONIC MIDLINE LOW BACK PAIN WITHOUT SCIATICA: ICD-10-CM

## 2025-01-28 DIAGNOSIS — M54.50 CHRONIC MIDLINE LOW BACK PAIN WITHOUT SCIATICA: ICD-10-CM

## 2025-01-28 RX ORDER — CYCLOBENZAPRINE HCL 10 MG
TABLET ORAL
Qty: 90 TABLET | Refills: 1 | Status: SHIPPED | OUTPATIENT
Start: 2025-01-28

## 2025-01-28 RX ORDER — CYCLOBENZAPRINE HCL 10 MG
TABLET ORAL
Qty: 90 TABLET | Refills: 1 | OUTPATIENT
Start: 2025-01-28

## 2025-02-23 SDOH — ECONOMIC STABILITY: FOOD INSECURITY: WITHIN THE PAST 12 MONTHS, THE FOOD YOU BOUGHT JUST DIDN'T LAST AND YOU DIDN'T HAVE MONEY TO GET MORE.: NEVER TRUE

## 2025-02-23 SDOH — ECONOMIC STABILITY: FOOD INSECURITY: WITHIN THE PAST 12 MONTHS, YOU WORRIED THAT YOUR FOOD WOULD RUN OUT BEFORE YOU GOT MONEY TO BUY MORE.: SOMETIMES TRUE

## 2025-02-23 SDOH — ECONOMIC STABILITY: TRANSPORTATION INSECURITY
IN THE PAST 12 MONTHS, HAS THE LACK OF TRANSPORTATION KEPT YOU FROM MEDICAL APPOINTMENTS OR FROM GETTING MEDICATIONS?: NO

## 2025-02-23 SDOH — ECONOMIC STABILITY: INCOME INSECURITY: IN THE LAST 12 MONTHS, WAS THERE A TIME WHEN YOU WERE NOT ABLE TO PAY THE MORTGAGE OR RENT ON TIME?: NO

## 2025-02-23 ASSESSMENT — PATIENT HEALTH QUESTIONNAIRE - PHQ9
SUM OF ALL RESPONSES TO PHQ9 QUESTIONS 1 & 2: 2
2. FEELING DOWN, DEPRESSED OR HOPELESS: SEVERAL DAYS
SUM OF ALL RESPONSES TO PHQ QUESTIONS 1-9: 2
1. LITTLE INTEREST OR PLEASURE IN DOING THINGS: SEVERAL DAYS
SUM OF ALL RESPONSES TO PHQ9 QUESTIONS 1 & 2: 2
2. FEELING DOWN, DEPRESSED OR HOPELESS: SEVERAL DAYS
SUM OF ALL RESPONSES TO PHQ QUESTIONS 1-9: 2
SUM OF ALL RESPONSES TO PHQ QUESTIONS 1-9: 2
1. LITTLE INTEREST OR PLEASURE IN DOING THINGS: SEVERAL DAYS
SUM OF ALL RESPONSES TO PHQ QUESTIONS 1-9: 2

## 2025-02-25 NOTE — PROGRESS NOTES
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  Lab Results   Component Value Date    LABA1C 6.3 08/20/2024    LABA1C 6.4 11/28/2023    LABA1C 5.9 08/22/2023     Lab Results   Component Value Date    GLUF 195 (H) 03/20/2018    CREATININE 1.1 08/20/2024     Crohn's dz, Dr Dong, due for f/u last yr colonoscopy.    Hypertension: Stable. Denies CP, SOB, cough, visual changes, dizziness, palpitations or HA.      Lipids:  Is continuing statin therapy and low fat diet.  Tolerating medications w/o myalgias or GI upset.  - ON REPATHA STUDY, SO WE DO NOT CHECK LIPIDS.    Chr lbp stable on meds, Controlled substances monitoring: possible medication side effects, risk of tolerance and/or dependence, and alternative treatments discussed, no signs of potential drug abuse or diversion identified and OARRS report reviewed today- activity consistent with treatment plan.    CKD, ALSO SEEING DR RAMOS    OBJECTIVE:    BP (!) 142/80 (Site: Left Upper Arm, Position: Sitting, Cuff Size: Medium Adult)   Pulse 66   Ht 1.702 m (5' 7.01\")   Wt 117.5 kg (259 lb)   SpO2 96%   BMI 40.56 kg/m²     Physical Exam  Constitutional:       Appearance: Normal appearance.   Cardiovascular:      Rate and Rhythm: Normal rate and regular rhythm.      Heart sounds: No murmur heard.     No gallop.   Pulmonary:      Effort: No respiratory distress.      Breath sounds: No wheezing.   Abdominal:      General: Abdomen is flat. Bowel sounds are normal. There is no distension.      Palpations: Abdomen is soft. There is no mass.      Tenderness: There is no abdominal tenderness.      Hernia: No hernia is present.   Musculoskeletal:      Right lower leg: No edema.      Left lower leg: No edema.   Neurological:      Mental Status: He is alert.   Psychiatric:         Mood and Affect:

## 2025-02-26 ENCOUNTER — OFFICE VISIT (OUTPATIENT)
Dept: INTERNAL MEDICINE CLINIC | Age: 75
End: 2025-02-26

## 2025-02-26 VITALS
HEIGHT: 67 IN | HEART RATE: 66 BPM | WEIGHT: 259 LBS | DIASTOLIC BLOOD PRESSURE: 80 MMHG | SYSTOLIC BLOOD PRESSURE: 142 MMHG | BODY MASS INDEX: 40.65 KG/M2 | OXYGEN SATURATION: 96 %

## 2025-02-26 DIAGNOSIS — E11.21 CONTROLLED TYPE 2 DIABETES MELLITUS WITH DIABETIC NEPHROPATHY, WITHOUT LONG-TERM CURRENT USE OF INSULIN (HCC): Primary | ICD-10-CM

## 2025-02-26 DIAGNOSIS — M54.12 RIGHT CERVICAL RADICULOPATHY: ICD-10-CM

## 2025-02-26 DIAGNOSIS — N18.31 STAGE 3A CHRONIC KIDNEY DISEASE (HCC): ICD-10-CM

## 2025-02-26 DIAGNOSIS — K50.10 CROHN'S DISEASE OF LARGE INTESTINE WITHOUT COMPLICATION (HCC): ICD-10-CM

## 2025-02-26 DIAGNOSIS — I10 PRIMARY HYPERTENSION: ICD-10-CM

## 2025-02-26 DIAGNOSIS — M54.50 CHRONIC MIDLINE LOW BACK PAIN WITHOUT SCIATICA: ICD-10-CM

## 2025-02-26 DIAGNOSIS — G89.29 CHRONIC MIDLINE LOW BACK PAIN WITHOUT SCIATICA: ICD-10-CM

## 2025-02-26 DIAGNOSIS — E66.813 OBESITY, CLASS 3: ICD-10-CM

## 2025-02-26 RX ORDER — HYDROCODONE BITARTRATE AND ACETAMINOPHEN 5; 325 MG/1; MG/1
1 TABLET ORAL 2 TIMES DAILY PRN
Qty: 60 TABLET | Refills: 0 | Status: SHIPPED | OUTPATIENT
Start: 2025-03-29 | End: 2025-04-28

## 2025-02-26 RX ORDER — HYDROCODONE BITARTRATE AND ACETAMINOPHEN 5; 325 MG/1; MG/1
1 TABLET ORAL 2 TIMES DAILY PRN
Qty: 60 TABLET | Refills: 0 | Status: SHIPPED | OUTPATIENT
Start: 2025-02-27 | End: 2025-03-29

## 2025-02-26 RX ORDER — HYDROCODONE BITARTRATE AND ACETAMINOPHEN 5; 325 MG/1; MG/1
1 TABLET ORAL 2 TIMES DAILY PRN
Qty: 60 TABLET | Refills: 0 | Status: SHIPPED | OUTPATIENT
Start: 2025-04-28 | End: 2025-05-28

## 2025-02-26 RX ORDER — TRAMADOL HYDROCHLORIDE 50 MG/1
50 TABLET ORAL 3 TIMES DAILY
Qty: 90 TABLET | Refills: 2 | Status: SHIPPED | OUTPATIENT
Start: 2025-02-26 | End: 2025-05-27

## 2025-03-07 RX ORDER — SOTALOL HYDROCHLORIDE 80 MG/1
80 TABLET ORAL 2 TIMES DAILY
Qty: 180 TABLET | Refills: 3 | Status: SHIPPED | OUTPATIENT
Start: 2025-03-07

## 2025-03-25 DIAGNOSIS — M54.16 LUMBAR RADICULOPATHY: ICD-10-CM

## 2025-03-25 RX ORDER — GABAPENTIN 300 MG/1
300 CAPSULE ORAL 3 TIMES DAILY
Qty: 90 CAPSULE | Refills: 2 | Status: SHIPPED | OUTPATIENT
Start: 2025-03-25 | End: 2025-06-23

## 2025-03-27 LAB
A/G RATIO: 1.4 RATIO (ref 0.8–2.6)
ALBUMIN: 4.3 G/DL (ref 3.5–5.2)
ALP BLD-CCNC: 106 U/L (ref 23–144)
ALT SERPL-CCNC: 12 U/L (ref 0–60)
AST SERPL-CCNC: 13 U/L (ref 0–55)
BASOPHILS ABSOLUTE: 0.1 K/UL (ref 0–0.3)
BASOPHILS RELATIVE PERCENT: 1 % (ref 0–2)
BILIRUB SERPL-MCNC: 0.4 MG/DL (ref 0–1.2)
BUN / CREAT RATIO: 13 (ref 7–25)
BUN BLDV-MCNC: 15 MG/DL (ref 3–29)
CALCIUM SERPL-MCNC: 9.4 MG/DL (ref 8.5–10.5)
CHLORIDE BLD-SCNC: 99 MEQ/L (ref 96–110)
CO2: 27 MEQ/L (ref 19–32)
CREAT SERPL-MCNC: 1.2 MG/DL (ref 0.5–1.2)
CREATININE URINE: 84.8 MG/DL
DIFFERENTIAL COUNT: ABNORMAL
EOSINOPHILS ABSOLUTE: 0.3 K/UL (ref 0–0.5)
EOSINOPHILS RELATIVE PERCENT: 3.7 % (ref 0–5)
ESTIMATED GLOMERULAR FILTRATION RATE CREATININE EQUATION: 64 MLS/MIN/1.73M2
FASTING STATUS: ABNORMAL
GLOBULIN: 3.1 G/DL (ref 1.9–3.6)
GLUCOSE BLD-MCNC: 142 MG/DL (ref 70–99)
HBA1C MFR BLD: 6.2 %
HCT VFR BLD CALC: 45.2 % (ref 37.5–51)
HEMOGLOBIN: 14.3 G/DL (ref 13–17.7)
IMMATURE GRANS (ABS): 0 K/UL (ref 0–0.1)
IMMATURE GRANULOCYTES %: 0.4 %
LYMPHOCYTES ABSOLUTE: 1.7 K/UL (ref 0.9–4.1)
LYMPHOCYTES RELATIVE PERCENT: 21.3 % (ref 14–51)
MCH RBC QN AUTO: 25.7 PG (ref 26–34)
MCHC RBC AUTO-ENTMCNC: 31.6 G/DL (ref 30.7–35.5)
MCV RBC AUTO: 81.1 FL (ref 80–100)
MICROALBUMIN/CREAT 24H UR: ABNORMAL MCG/DL
MICROALBUMIN/CREAT UR-RTO: 202 MCG/MG CREAT.
MONOCYTES ABSOLUTE: 0.4 K/UL (ref 0.2–1)
MONOCYTES RELATIVE PERCENT: 4.6 % (ref 4–12)
NEUTROPHILS ABSOLUTE: 5.4 K/UL (ref 1.8–7.5)
NEUTROPHILS RELATIVE PERCENT: 69 % (ref 42–80)
PDW BLD-RTO: 15.5 %
PLATELET # BLD: 271 K/UL (ref 140–400)
PMV BLD AUTO: 9.3 FL (ref 7.2–11.7)
POTASSIUM SERPL-SCNC: 4.3 MEQ/L (ref 3.4–5.3)
RBC # BLD: 5.57 M/UL (ref 4.14–5.8)
RETICULOCYTE ABSOLUTE COUNT: 0 /100 WBC
SODIUM BLD-SCNC: 140 MEQ/L (ref 135–148)
TOTAL PROTEIN: 7.4 G/DL (ref 6–8.3)
WBC # BLD: 7.9 K/UL (ref 3.5–10.9)

## 2025-03-29 ENCOUNTER — RESULTS FOLLOW-UP (OUTPATIENT)
Dept: INTERNAL MEDICINE CLINIC | Age: 75
End: 2025-03-29

## 2025-03-29 NOTE — RESULT ENCOUNTER NOTE
Chol, sugars, labs appear in stable range, DM is controlled- BUT DOES HAVE INCR PROTEIN IN URINE.  ELISA AND CLINT, PLS LET ARI KNOW.    I'LL FORWARD COPY TO DR RAMOS, HE MAY BE A CANDIDATE FOR A DM MED LIKE JARDIANCE OR FARXIGA, BUT WE'LL SEE WHAT NEPHROLOGY RECOMMENDS.

## 2025-04-30 ENCOUNTER — OFFICE VISIT (OUTPATIENT)
Dept: INTERNAL MEDICINE CLINIC | Age: 75
End: 2025-04-30

## 2025-04-30 VITALS
HEIGHT: 67 IN | BODY MASS INDEX: 41.75 KG/M2 | SYSTOLIC BLOOD PRESSURE: 142 MMHG | WEIGHT: 266 LBS | DIASTOLIC BLOOD PRESSURE: 82 MMHG | HEART RATE: 63 BPM | OXYGEN SATURATION: 96 % | RESPIRATION RATE: 18 BRPM

## 2025-04-30 DIAGNOSIS — E11.21 CONTROLLED TYPE 2 DIABETES MELLITUS WITH DIABETIC NEPHROPATHY, WITHOUT LONG-TERM CURRENT USE OF INSULIN (HCC): ICD-10-CM

## 2025-04-30 DIAGNOSIS — I10 PRIMARY HYPERTENSION: ICD-10-CM

## 2025-04-30 DIAGNOSIS — R55 SYNCOPE AND COLLAPSE: Primary | ICD-10-CM

## 2025-04-30 DIAGNOSIS — G44.1 VASCULAR HEADACHE: ICD-10-CM

## 2025-04-30 NOTE — PROGRESS NOTES
Jaden Ochoa  1950 04/29/25    SUBJECTIVE:    Two episodes 4 and 5d ago while sitting w onset of L sided temporal sharp pain w tenderness, followed by lt headedness, vision fading then passing out and slipping down to the floor.  Out for approx 10sec then regained consciousness ,no post episode confusion, no noted seizure activity, b/b/ incontinence.  Had no sx of palpitations, cp or sob.      Has been on lasix prn but took some doses AFTER the syncopal episodes.      DM- glc ~140s at home and no hypoglycemia.  EPISODE WAS AFTER BREAKFAST  Lab Results   Component Value Date    LABA1C 6.2 (H) 03/27/2025    LABA1C 6.3 08/20/2024    LABA1C 6.4 11/28/2023     Lab Results   Component Value Date    GLUF 195 (H) 03/20/2018    CREATININE 1.2 03/27/2025     SIT ,L ARM BP /88 AND STAND /80.      HX OF CKD, WE'LL ALSO TRY TO CHECK W DR RAMOS IF OK FOR CTA      OBJECTIVE:    BP (!) 142/82   Pulse 63   Resp 18   Ht 1.702 m (5' 7.01\")   Wt 120.7 kg (266 lb)   SpO2 96%   BMI 41.65 kg/m²     Physical Exam  Constitutional:       Appearance: Normal appearance.   HENT:      Head:      Comments: NONTENDER L TEMPORAL REGION  Cardiovascular:      Rate and Rhythm: Normal rate and regular rhythm.      Heart sounds: No murmur heard.     No gallop.   Pulmonary:      Effort: No respiratory distress.      Breath sounds: No wheezing.   Abdominal:      General: Abdomen is flat. Bowel sounds are normal. There is no distension.      Palpations: Abdomen is soft. There is no mass.      Tenderness: There is no abdominal tenderness.      Hernia: No hernia is present.   Musculoskeletal:      Cervical back: Neck supple.      Right lower leg: No edema.      Left lower leg: No edema.   Neurological:      General: No focal deficit present.      Mental Status: He is alert and oriented to person, place, and time. Mental status is at baseline.      Cranial Nerves: No cranial nerve deficit.      Sensory: No sensory deficit.

## 2025-05-01 ENCOUNTER — HOSPITAL ENCOUNTER (OUTPATIENT)
Dept: CT IMAGING | Age: 75
Discharge: HOME | End: 2025-05-01
Attending: INTERNAL MEDICINE
Payer: MEDICARE

## 2025-05-01 ENCOUNTER — TELEPHONE (OUTPATIENT)
Dept: CARDIOLOGY CLINIC | Age: 75
End: 2025-05-01

## 2025-05-01 ENCOUNTER — PATIENT MESSAGE (OUTPATIENT)
Dept: CARDIOLOGY CLINIC | Age: 75
End: 2025-05-01

## 2025-05-01 DIAGNOSIS — R55 SYNCOPE AND COLLAPSE: Primary | ICD-10-CM

## 2025-05-01 DIAGNOSIS — G44.1 VASCULAR HEADACHE: ICD-10-CM

## 2025-05-01 PROCEDURE — 70496 CT ANGIOGRAPHY HEAD: CPT

## 2025-05-01 PROCEDURE — 6360000004 HC RX CONTRAST MEDICATION: Performed by: INTERNAL MEDICINE

## 2025-05-01 RX ORDER — IOPAMIDOL 755 MG/ML
85 INJECTION, SOLUTION INTRAVASCULAR
Status: COMPLETED | OUTPATIENT
Start: 2025-05-01 | End: 2025-05-01

## 2025-05-01 RX ADMIN — IOPAMIDOL 85 ML: 755 INJECTION, SOLUTION INTRAVENOUS at 09:03

## 2025-05-01 NOTE — TELEPHONE ENCOUNTER
Per Dr. Mohan, called pt and scheduled appt for 30-day monitor and a f/u up with Dr. Mohan for results.     Joanna Mohan MD Smith, Amada ALEMAN, LPN  Patient needs a follow-up for recurrent syncope.  30 days of cardiac event monitoring to evaluate symptoms prior to office visit.

## 2025-05-01 NOTE — TELEPHONE ENCOUNTER
Left a Vm for pt to call back and schedule an OV with . Last ov was on 12/11/2024.     Referral by Dr. East  DX: Syncope and collapse and Primary hypertension     First attempt at scheduling patient.   My Chart message sent as well.

## 2025-05-02 ENCOUNTER — RESULTS FOLLOW-UP (OUTPATIENT)
Dept: INTERNAL MEDICINE CLINIC | Age: 75
End: 2025-05-02

## 2025-05-02 DIAGNOSIS — E01.0 THYROMEGALY: Primary | ICD-10-CM

## 2025-05-02 NOTE — RESULT ENCOUNTER NOTE
Please CALL - head CT angiogram is benign w no mass or bleed, aneurysm noted.  However he does have some thyroid enlargement noted so rec we follow up w thyroid ultrasound, dx thyromegaly.  thx

## 2025-05-08 ENCOUNTER — HOSPITAL ENCOUNTER (OUTPATIENT)
Dept: ULTRASOUND IMAGING | Age: 75
Discharge: HOME | End: 2025-05-08
Payer: MEDICARE

## 2025-05-08 ENCOUNTER — HOSPITAL ENCOUNTER (OUTPATIENT)
Age: 75
Discharge: HOME | End: 2025-05-08
Payer: MEDICARE

## 2025-05-08 DIAGNOSIS — I10 PRIMARY HYPERTENSION: ICD-10-CM

## 2025-05-08 DIAGNOSIS — G44.1 VASCULAR HEADACHE: ICD-10-CM

## 2025-05-08 DIAGNOSIS — E01.0 THYROMEGALY: ICD-10-CM

## 2025-05-08 LAB
ANION GAP SERPL CALCULATED.3IONS-SCNC: 11 MMOL/L (ref 9–17)
BASOPHILS # BLD: 0.08 K/UL
BASOPHILS NFR BLD: 1 % (ref 0–1)
BUN SERPL-MCNC: 15 MG/DL (ref 7–20)
CALCIUM SERPL-MCNC: 9.7 MG/DL (ref 8.3–10.6)
CHLORIDE SERPL-SCNC: 99 MMOL/L (ref 99–110)
CO2 SERPL-SCNC: 27 MMOL/L (ref 21–32)
CREAT SERPL-MCNC: 1.2 MG/DL (ref 0.8–1.3)
CRP SERPL HS-MCNC: 22.3 MG/L (ref 0–5)
EOSINOPHIL # BLD: 0.31 K/UL
EOSINOPHILS RELATIVE PERCENT: 4 % (ref 0–3)
ERYTHROCYTE [DISTWIDTH] IN BLOOD BY AUTOMATED COUNT: 15 % (ref 11.7–14.9)
ERYTHROCYTE [SEDIMENTATION RATE] IN BLOOD BY WESTERGREN METHOD: 25 MM/HR (ref 0–20)
GFR, ESTIMATED: 62 ML/MIN/1.73M2
GLUCOSE SERPL-MCNC: 92 MG/DL (ref 74–99)
HCT VFR BLD AUTO: 43.1 % (ref 42–52)
HGB BLD-MCNC: 13.5 G/DL (ref 13.5–18)
IMM GRANULOCYTES # BLD AUTO: 0.02 K/UL
IMM GRANULOCYTES NFR BLD: 0 %
LYMPHOCYTES NFR BLD: 1.7 K/UL
LYMPHOCYTES RELATIVE PERCENT: 23 % (ref 24–44)
MCH RBC QN AUTO: 26 PG (ref 27–31)
MCHC RBC AUTO-ENTMCNC: 31.3 G/DL (ref 32–36)
MCV RBC AUTO: 83 FL (ref 78–100)
MONOCYTES NFR BLD: 0.47 K/UL
MONOCYTES NFR BLD: 6 % (ref 0–5)
NEUTROPHILS NFR BLD: 65 % (ref 36–66)
NEUTS SEG NFR BLD: 4.82 K/UL
PLATELET # BLD AUTO: 259 K/UL (ref 140–440)
PMV BLD AUTO: 9.4 FL (ref 7.5–11.1)
POTASSIUM SERPL-SCNC: 3.9 MMOL/L (ref 3.5–5.1)
RBC # BLD AUTO: 5.19 M/UL (ref 4.6–6.2)
SODIUM SERPL-SCNC: 138 MMOL/L (ref 136–145)
WBC OTHER # BLD: 7.4 K/UL (ref 4–10.5)

## 2025-05-08 PROCEDURE — 36415 COLL VENOUS BLD VENIPUNCTURE: CPT

## 2025-05-08 PROCEDURE — 86140 C-REACTIVE PROTEIN: CPT

## 2025-05-08 PROCEDURE — 85652 RBC SED RATE AUTOMATED: CPT

## 2025-05-08 PROCEDURE — 80048 BASIC METABOLIC PNL TOTAL CA: CPT

## 2025-05-08 PROCEDURE — 76536 US EXAM OF HEAD AND NECK: CPT

## 2025-05-08 PROCEDURE — 85025 COMPLETE CBC W/AUTO DIFF WBC: CPT

## 2025-05-09 ENCOUNTER — RESULTS FOLLOW-UP (OUTPATIENT)
Dept: INTERNAL MEDICINE CLINIC | Age: 75
End: 2025-05-09

## 2025-05-09 DIAGNOSIS — G44.1 VASCULAR HEADACHE: ICD-10-CM

## 2025-05-09 DIAGNOSIS — E11.21 CONTROLLED TYPE 2 DIABETES MELLITUS WITH DIABETIC NEPHROPATHY, WITHOUT LONG-TERM CURRENT USE OF INSULIN (HCC): ICD-10-CM

## 2025-05-09 DIAGNOSIS — I10 ESSENTIAL HYPERTENSION: ICD-10-CM

## 2025-05-09 DIAGNOSIS — E04.1 THYROID NODULE: Primary | ICD-10-CM

## 2025-05-09 RX ORDER — PIOGLITAZONE 30 MG/1
30 TABLET ORAL DAILY
Qty: 90 TABLET | Refills: 1 | Status: SHIPPED | OUTPATIENT
Start: 2025-05-09

## 2025-05-09 RX ORDER — PREDNISONE 5 MG/1
TABLET ORAL
Qty: 21 TABLET | Refills: 0 | Status: SHIPPED | OUTPATIENT
Start: 2025-05-09

## 2025-05-09 RX ORDER — AMLODIPINE BESYLATE 5 MG/1
5 TABLET ORAL DAILY
Qty: 90 TABLET | Refills: 1 | Status: SHIPPED | OUTPATIENT
Start: 2025-05-09

## 2025-05-09 NOTE — RESULT ENCOUNTER NOTE
Please CALL - ARI'S ELECTROLYTES AND KIDNEY FXN ARE STABLE BUT DOES HAVE SL ELEVATED INFLAMMATION LEVELS.  **IMPORTANT, PLS ASK IF IS STILL HAVING ANY L SIDED HEADACHES?  IF SO WE'LL NEED TO START PREDNISONE AND DECIDE NEXT WEEK IF WE NEED A TEMPORAL ARTERY BIOPSY- SO PLEASE LET ME KNOW.  IF HEADACHES ARE RESOLVED I MAY STILL GIVE A SHORT PREDNISONE TAPER TILL HIS APPT NEXT WEEK 5/15.

## 2025-05-11 DIAGNOSIS — I10 ESSENTIAL HYPERTENSION: ICD-10-CM

## 2025-05-11 DIAGNOSIS — E11.21 CONTROLLED TYPE 2 DIABETES MELLITUS WITH DIABETIC NEPHROPATHY, WITHOUT LONG-TERM CURRENT USE OF INSULIN (HCC): ICD-10-CM

## 2025-05-12 RX ORDER — PIOGLITAZONE 30 MG/1
30 TABLET ORAL DAILY
Qty: 90 TABLET | Refills: 1 | OUTPATIENT
Start: 2025-05-12

## 2025-05-12 RX ORDER — AMLODIPINE BESYLATE 5 MG/1
5 TABLET ORAL DAILY
Qty: 90 TABLET | Refills: 1 | OUTPATIENT
Start: 2025-05-12

## 2025-05-14 NOTE — PROGRESS NOTES
.IR Procedure at [facility]:  5/16/25 @ 1100, arrival 1000          Follow your directions as prescribed by the doctor for your procedure and medications.  3.   Consult your provider as to when to stop blood thinner - no blood thinners  4.   Do not take any pain medication within 6 hours of your procedure  5.   Do not drink any alcoholic beverages or use any street drugs 24 hours before procedure.  6.   Please wear simple, loose fitting clothing to the hospital.  Do not bring valuables (money,             credit cards, checkbooks, etc.)     7.   If you  have a Living Will and Durable Power of  for Healthcare, please bring in a copy.  8.   Please bring picture ID,  insurance card, paperwork from the doctors office            (H & P, Consent,  & card for implantable devices).  9.   Report to the information desk on the ground floor.  10. Take a shower the night before or morning of your procedure, do not apply any lotion, oil or powder.  11. You will need a responsible adult

## 2025-05-15 ENCOUNTER — OFFICE VISIT (OUTPATIENT)
Dept: INTERNAL MEDICINE CLINIC | Age: 75
End: 2025-05-15

## 2025-05-15 VITALS
HEART RATE: 58 BPM | SYSTOLIC BLOOD PRESSURE: 120 MMHG | OXYGEN SATURATION: 96 % | DIASTOLIC BLOOD PRESSURE: 80 MMHG | HEIGHT: 67 IN | WEIGHT: 263.4 LBS | BODY MASS INDEX: 41.34 KG/M2

## 2025-05-15 DIAGNOSIS — G44.1 VASCULAR HEADACHE: Primary | ICD-10-CM

## 2025-05-15 DIAGNOSIS — E04.1 THYROID NODULE: ICD-10-CM

## 2025-05-15 DIAGNOSIS — R79.82 ELEVATED C-REACTIVE PROTEIN (CRP): ICD-10-CM

## 2025-05-15 DIAGNOSIS — E11.21 CONTROLLED TYPE 2 DIABETES MELLITUS WITH DIABETIC NEPHROPATHY, WITHOUT LONG-TERM CURRENT USE OF INSULIN (HCC): ICD-10-CM

## 2025-05-15 NOTE — PROGRESS NOTES
Jaden Ochoa  1950 05/14/25    SUBJECTIVE:    Headache is better L side, sporadic.  Did not start the prednisone yet.  Fluctuates about every other day  Denies L TA tenderness now but CRP was high, esr only minimally so.  No blurred vision.    Thyroid nodules noted and for bx tomorrow at [facility], no fhx of thyroid ca.  OBJECTIVE:    /80   Pulse 58   Ht 1.702 m (5' 7.01\")   Wt 119.5 kg (263 lb 6.4 oz)   SpO2 96%   BMI 41.24 kg/m²     Physical Exam  Constitutional:       Appearance: Normal appearance.   Cardiovascular:      Rate and Rhythm: Normal rate and regular rhythm.      Heart sounds: No murmur heard.     No gallop.   Pulmonary:      Effort: No respiratory distress.      Breath sounds: No wheezing.   Abdominal:      General: Abdomen is flat. Bowel sounds are normal. There is no distension.      Palpations: Abdomen is soft. There is no mass.      Tenderness: There is no abdominal tenderness.      Hernia: No hernia is present.   Musculoskeletal:      Right lower leg: No edema.      Left lower leg: No edema.   Neurological:      Mental Status: He is alert.   Psychiatric:         Mood and Affect: Mood normal.         ASSESSMENT:    1. Vascular headache    2. Elevated C-reactive protein (CRP)    3. Thyroid nodule        PLAN:  Assessment & Plan    Headache is spontaneously improving, did recommend to finish the prednisone taper then we will recheck if inflammatory markers improve    Pending thyroid nodule biopsy, will also proceed with ENT consultation  Jaden was seen today for 2 week follow-up.    Diagnoses and all orders for this visit:    Vascular headache  -     C-Reactive Protein; Future  -     Sedimentation Rate; Future    Elevated C-reactive protein (CRP)  -     C-Reactive Protein; Future  -     Sedimentation Rate; Future    Thyroid nodule  -     Amb External Referral To ENT

## 2025-05-16 ENCOUNTER — HOSPITAL ENCOUNTER (OUTPATIENT)
Dept: INTERVENTIONAL RADIOLOGY/VASCULAR | Age: 75
Discharge: HOME | End: 2025-05-16
Payer: MEDICARE

## 2025-05-16 VITALS — HEART RATE: 70 BPM | DIASTOLIC BLOOD PRESSURE: 83 MMHG | SYSTOLIC BLOOD PRESSURE: 189 MMHG | OXYGEN SATURATION: 94 %

## 2025-05-16 DIAGNOSIS — E04.1 NONTOXIC UNINODULAR GOITER: ICD-10-CM

## 2025-05-16 PROCEDURE — 10005 FNA BX W/US GDN 1ST LES: CPT

## 2025-05-16 PROCEDURE — 2709999900 IR BIOPSY THYROID PERC CORE NEEDLE

## 2025-05-16 RX ORDER — GLIPIZIDE 5 MG/1
TABLET ORAL
Qty: 180 TABLET | Refills: 1 | Status: SHIPPED | OUTPATIENT
Start: 2025-05-16

## 2025-05-16 NOTE — PROGRESS NOTES
1241 pt arrived back to IR holding, dressing wnl with no bleeding noted.  Pt has no complaints at this time.  Dr Jaime aware of the pt blood pressure.  Ok to discharge.  Shirin UNDERWOOD reviewed discharge instructions with pt and spouse.  No further questions at this time.  Pt discharged.

## 2025-05-16 NOTE — BRIEF OP NOTE
Department of Interventional Radiology Post-Procedure Note      Date: 5/16/2025     Interventional Radiologist: Nestor Jaime    Procedure Performed:  Left thyroid nodule biopsy.    H&P Status: H&P was reviewed, the patient was examined and no change has occurred in patient's condition since H&P was completed.    Pre-procedure Diagnosis:  Multiple left thyroid nodules.    Post-procedure Diagnosis:  Same    Sedation:  none    Estimated blood loss:  Minimal    Preliminary Findings:  Large left thyroid nodule biopsy 4 passes.      Second TR-4 Nodule had max dimension of 1.3cm in any orientation not 1.5 as seen on prior study this nodule was not biopsied as it did not meet the 1.5cm minimum size criteria for biopsy of a TR-4 nodule.    Complications:  none    Full Report to Follow.    Electronically signed by Nestor Jaime MD on 5/16/2025 at 6:20 PM

## 2025-05-16 NOTE — PRE SEDATION
Pre-Procedure Note    Patient Name: Jaden Ochoa   YOB: 1950  Room/Bed: Room/bed info not found  Medical Record Number: [id number]  Date: 5/16/2025   Time: 6:18 PM       Indication:  Large left thyroid nodule    Consent: I have discussed with the patient and/or the patient representative the indication, alternatives, and the possible risks and/or complications of the planned procedure and the anesthesia methods. The patient and/or patient representative appear to understand and agree to proceed.    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    Past Surgical History:   has a past surgical history that includes Tonsillectomy (1968); laminectomy (75 and 85); Knee arthroscopy (1994); cervical fusion (2005); Cholecystectomy, laparoscopic (08/2011); knee surgery (Right, 01/16/2013); Total knee arthroplasty (Right, 06/12/2013); Cataract removal with implant (Left, 03/02/2017); Cataract removal with implant (Right, 03/16/2017); Colonoscopy; sinus surgery (06/29/2020); Endoscopy, colon, diagnostic (2018); Cardiac catheterization; Eye surgery; eye surgery (Left); eye surgery (Right, 03/16/2017); Knee arthroscopy

## 2025-05-16 NOTE — PROGRESS NOTES
TRANSFER - OUT REPORT:    Verbal report given to Sussy RN(name) on Jaden Ochoa being transferred to IR [facility](unit) for routine post-op.  Left sided thyroid biopsy performed by Dr. Jaime.  Four samples obtained.  Dressing WNL.       Report consisted of patient's Situation, Background, Assessment and   Recommendations(SBAR).     Information from the following report(s) Nurse Handoff Report was reviewed with the receiving nurse.    Opportunity for questions and clarification was provided.      Patient transported with:   Registered Nurse

## 2025-05-19 ENCOUNTER — RESULTS FOLLOW-UP (OUTPATIENT)
Dept: INTERNAL MEDICINE CLINIC | Age: 75
End: 2025-05-19

## 2025-05-19 LAB — NON-GYN CYTOLOGY REPORT: NORMAL

## 2025-05-19 NOTE — RESULT ENCOUNTER NOTE
Please CALL OR TEXT pt, thyroid bx appears benign, neg for cancer but do rec we still obtain ENT eval for this to be monitored.

## 2025-05-27 ENCOUNTER — HOSPITAL ENCOUNTER (OUTPATIENT)
Age: 75
Discharge: HOME | End: 2025-05-27
Payer: MEDICARE

## 2025-05-27 DIAGNOSIS — G44.1 VASCULAR HEADACHE: ICD-10-CM

## 2025-05-27 DIAGNOSIS — R79.82 ELEVATED C-REACTIVE PROTEIN (CRP): ICD-10-CM

## 2025-05-27 LAB
CRP SERPL HS-MCNC: 27.2 MG/L (ref 0–5)
ERYTHROCYTE [SEDIMENTATION RATE] IN BLOOD BY WESTERGREN METHOD: 32 MM/HR (ref 0–20)

## 2025-05-27 PROCEDURE — 85652 RBC SED RATE AUTOMATED: CPT

## 2025-05-27 PROCEDURE — 86140 C-REACTIVE PROTEIN: CPT

## 2025-05-28 ENCOUNTER — TELEPHONE (OUTPATIENT)
Dept: PHARMACY | Facility: CLINIC | Age: 75
End: 2025-05-28

## 2025-05-28 NOTE — PROGRESS NOTES
Medicare Annual Wellness Visit    Jaden Ochoa is here for No chief complaint on file.  Overall general medical exam appears benign, other assessments as noted and testing is as noted below.    For chronic low back pain continue pain regimen, OARRS reviewed, is on a combination of both hydrocodone and tramadol, this also helps with his chronic neck pain    He is intolerant of statins, will avoid these    For recurring left-sided vascular headache, he has elevated inflammatory markers as well, have advised him to proceed with prednisone taper, we will recheck inflammatory markers also next month, will refer to general surgery for evaluation temporal artery biopsy to rule out temporal arteritis  Assessment & Plan   Medicare annual wellness visit, subsequent  Chronic midline low back pain without sciatica  -     HYDROcodone-acetaminophen (NORCO) 5-325 MG per tablet; Take 1 tablet by mouth 2 times daily as needed for Pain for up to 30 days., Disp-60 tablet, R-0Normal  -     HYDROcodone-acetaminophen (NORCO) 5-325 MG per tablet; Take 1 tablet by mouth 2 times daily as needed for Pain for up to 30 days., Disp-60 tablet, R-0Normal  -     HYDROcodone-acetaminophen (NORCO) 5-325 MG per tablet; Take 1 tablet by mouth 2 times daily as needed for Pain for up to 30 days., Disp-60 tablet, R-0Normal  -     traMADol (ULTRAM) 50 MG tablet; Take 1 tablet by mouth in the morning, at noon, and at bedtime for 90 days. Max Daily Amount: 150 mg, Disp-90 tablet, R-2Normal  -     ESTABLISHED, MOD MDM, 30-39 MIN [48167]  Right cervical radiculopathy  -     HYDROcodone-acetaminophen (NORCO) 5-325 MG per tablet; Take 1 tablet by mouth 2 times daily as needed for Pain for up to 30 days., Disp-60 tablet, R-0Normal  -     HYDROcodone-acetaminophen (NORCO) 5-325 MG per tablet; Take 1 tablet by mouth 2 times daily as needed for Pain for up to 30 days., Disp-60 tablet, R-0Normal  -     HYDROcodone-acetaminophen (NORCO) 5-325 MG per tablet; Take

## 2025-05-29 ENCOUNTER — OFFICE VISIT (OUTPATIENT)
Dept: INTERNAL MEDICINE CLINIC | Age: 75
End: 2025-05-29

## 2025-05-29 VITALS
SYSTOLIC BLOOD PRESSURE: 120 MMHG | OXYGEN SATURATION: 97 % | WEIGHT: 262.8 LBS | HEIGHT: 67 IN | HEART RATE: 61 BPM | DIASTOLIC BLOOD PRESSURE: 80 MMHG | BODY MASS INDEX: 41.25 KG/M2

## 2025-05-29 DIAGNOSIS — Z00.00 MEDICARE ANNUAL WELLNESS VISIT, SUBSEQUENT: Primary | ICD-10-CM

## 2025-05-29 DIAGNOSIS — T46.6X5A MYALGIA DUE TO STATIN: ICD-10-CM

## 2025-05-29 DIAGNOSIS — G89.29 CHRONIC MIDLINE LOW BACK PAIN WITHOUT SCIATICA: ICD-10-CM

## 2025-05-29 DIAGNOSIS — T46.6X5A STATIN MYOPATHY: ICD-10-CM

## 2025-05-29 DIAGNOSIS — G44.1 VASCULAR HEADACHE: ICD-10-CM

## 2025-05-29 DIAGNOSIS — G72.0 STATIN MYOPATHY: ICD-10-CM

## 2025-05-29 DIAGNOSIS — M79.10 MYALGIA DUE TO STATIN: ICD-10-CM

## 2025-05-29 DIAGNOSIS — M54.50 CHRONIC MIDLINE LOW BACK PAIN WITHOUT SCIATICA: ICD-10-CM

## 2025-05-29 DIAGNOSIS — M54.12 RIGHT CERVICAL RADICULOPATHY: ICD-10-CM

## 2025-05-29 DIAGNOSIS — R79.82 ELEVATED C-REACTIVE PROTEIN (CRP): ICD-10-CM

## 2025-05-29 RX ORDER — TRAMADOL HYDROCHLORIDE 50 MG/1
50 TABLET ORAL 3 TIMES DAILY
Qty: 90 TABLET | Refills: 2 | Status: SHIPPED | OUTPATIENT
Start: 2025-05-29 | End: 2025-08-27

## 2025-05-29 RX ORDER — HYDROCODONE BITARTRATE AND ACETAMINOPHEN 5; 325 MG/1; MG/1
1 TABLET ORAL 2 TIMES DAILY PRN
Qty: 60 TABLET | Refills: 0 | Status: SHIPPED | OUTPATIENT
Start: 2025-06-28 | End: 2025-07-28

## 2025-05-29 RX ORDER — HYDROCODONE BITARTRATE AND ACETAMINOPHEN 5; 325 MG/1; MG/1
1 TABLET ORAL 2 TIMES DAILY PRN
Qty: 60 TABLET | Refills: 0 | Status: SHIPPED | OUTPATIENT
Start: 2025-05-29 | End: 2025-06-28

## 2025-05-29 RX ORDER — HYDROCODONE BITARTRATE AND ACETAMINOPHEN 5; 325 MG/1; MG/1
1 TABLET ORAL 2 TIMES DAILY PRN
Qty: 60 TABLET | Refills: 0 | Status: SHIPPED | OUTPATIENT
Start: 2025-07-28 | End: 2025-08-27

## 2025-05-29 ASSESSMENT — PATIENT HEALTH QUESTIONNAIRE - PHQ9
SUM OF ALL RESPONSES TO PHQ QUESTIONS 1-9: 0
2. FEELING DOWN, DEPRESSED OR HOPELESS: NOT AT ALL
1. LITTLE INTEREST OR PLEASURE IN DOING THINGS: NOT AT ALL

## 2025-05-29 NOTE — PATIENT INSTRUCTIONS
Learning About Being Active as an Older Adult  Why is being active important as you get older?     Being active is one of the best things you can do for your health. And it's never too late to start. Being active--or getting active, if you aren't already--has definite benefits. It can:  Give you more energy,  Keep your mind sharp.  Improve balance to reduce your risk of falls.  Help you manage chronic illness with fewer medicines.  No matter how old you are, how fit you are, or what health problems you have, there is a form of activity that will work for you. And the more physical activity you can do, the better your overall health will be.  What kinds of activity can help you stay healthy?  Being more active will make your daily activities easier. Physical activity includes planned exercise and things you do in daily life. There are four types of activity:  Aerobic.  Doing aerobic activity makes your heart and lungs strong.  Includes walking, dancing, and gardening.  Aim for at least 2½ hours spread throughout the week.  It improves your energy and can help you sleep better.  Muscle-strengthening.  This type of activity can help maintain muscle and strengthen bones.  Includes climbing stairs, using resistance bands, and lifting or carrying heavy loads.  Aim for at least twice a week.  It can help protect the knees and other joints.  Stretching.  Stretching gives you better range of motion in joints and muscles.  Includes upper arm stretches, calf stretches, and gentle yoga.  Aim for at least twice a week, preferably after your muscles are warmed up from other activities.  It can help you function better in daily life.  Balancing.  This helps you stay coordinated and have good posture.  Includes heel-to-toe walking, danny chi, and certain types of yoga.  Aim for at least 3 days a week.  It can reduce your risk of falling.  Even if you have a hard time meeting the recommendations, it's better to be more active

## 2025-06-05 ENCOUNTER — TELEPHONE (OUTPATIENT)
Dept: CARDIOLOGY CLINIC | Age: 75
End: 2025-06-05

## 2025-06-05 NOTE — TELEPHONE ENCOUNTER
Spoke to pt advised of abnormal results, advised Dr. Mohan stated he would go over results at f/u, pt states he thought he was suppose to put his origanal symptoms in the phone when those screens popped up while changing out monitor, he stated symptoms may not be correct. I advised I would note that but to tell Dr. Mohan at follow up as well. Pt voiced understanding              Cardiac event monitoring done from 5/5/2025 to 6/3/2025 to evaluate syncope.  10 transmissions are available to review and 7 of them reported patient triggered.  Overall average heart rate was 62 bpm.  Maximum heart rate was 117 bpm on 5/26/2025 at 1:25 PM and the minimum rate was 46 bpm on 5/30/2025 at 4:04 AM.  6 beat run of wide-complex tachycardia noted on 5/19/2025 at 10:50 AM and 4 beat run of nonsustained ventricular tachycardia occurred on 5/13/2025 at 11:44 AM.  Patient did not report any symptoms during these episodes.  Patient reported passing out during normal rate and rhythm rate was 89 bpm on 5/14/2025 at 2:38 PM.  Patient reported lightheadedness and dizziness and fatigue during heart rate of 70 bpm on 5/27/2025 at 11:41 AM and again at 11:46 AM with isolated PVC.  Patient has a follow-up appointment on 6/19/2025 we will discuss the results.

## 2025-06-19 ENCOUNTER — OFFICE VISIT (OUTPATIENT)
Dept: CARDIOLOGY CLINIC | Age: 75
End: 2025-06-19

## 2025-06-19 ENCOUNTER — OFFICE VISIT (OUTPATIENT)
Dept: SURGERY | Age: 75
End: 2025-06-19
Payer: MEDICARE

## 2025-06-19 ENCOUNTER — PREP FOR PROCEDURE (OUTPATIENT)
Dept: SURGERY | Age: 75
End: 2025-06-19

## 2025-06-19 ENCOUNTER — TELEPHONE (OUTPATIENT)
Dept: SURGERY | Age: 75
End: 2025-06-19

## 2025-06-19 VITALS
BODY MASS INDEX: 41.31 KG/M2 | WEIGHT: 263.2 LBS | HEIGHT: 67 IN | HEART RATE: 69 BPM | OXYGEN SATURATION: 95 % | SYSTOLIC BLOOD PRESSURE: 136 MMHG | RESPIRATION RATE: 18 BRPM | DIASTOLIC BLOOD PRESSURE: 78 MMHG

## 2025-06-19 VITALS
DIASTOLIC BLOOD PRESSURE: 84 MMHG | SYSTOLIC BLOOD PRESSURE: 136 MMHG | HEIGHT: 67 IN | BODY MASS INDEX: 41.31 KG/M2 | HEART RATE: 61 BPM | WEIGHT: 263.2 LBS

## 2025-06-19 DIAGNOSIS — E11.21 CONTROLLED TYPE 2 DIABETES MELLITUS WITH DIABETIC NEPHROPATHY, WITHOUT LONG-TERM CURRENT USE OF INSULIN (HCC): ICD-10-CM

## 2025-06-19 DIAGNOSIS — M31.6 TEMPORAL ARTERITIS (HCC): Primary | ICD-10-CM

## 2025-06-19 DIAGNOSIS — I47.29 NON-SUSTAINED VENTRICULAR TACHYCARDIA (HCC): Primary | ICD-10-CM

## 2025-06-19 DIAGNOSIS — R06.09 DOE (DYSPNEA ON EXERTION): ICD-10-CM

## 2025-06-19 DIAGNOSIS — R06.02 SHORTNESS OF BREATH: ICD-10-CM

## 2025-06-19 DIAGNOSIS — I49.3 FREQUENT UNIFOCAL PVCS: ICD-10-CM

## 2025-06-19 DIAGNOSIS — G44.1 VASCULAR HEADACHE: ICD-10-CM

## 2025-06-19 DIAGNOSIS — N18.31 STAGE 3A CHRONIC KIDNEY DISEASE (HCC): ICD-10-CM

## 2025-06-19 DIAGNOSIS — I10 PRIMARY HYPERTENSION: ICD-10-CM

## 2025-06-19 PROCEDURE — 3078F DIAST BP <80 MM HG: CPT | Performed by: SURGERY

## 2025-06-19 PROCEDURE — 1159F MED LIST DOCD IN RCRD: CPT | Performed by: SURGERY

## 2025-06-19 PROCEDURE — 99203 OFFICE O/P NEW LOW 30 MIN: CPT | Performed by: SURGERY

## 2025-06-19 PROCEDURE — 1124F ACP DISCUSS-NO DSCNMKR DOCD: CPT | Performed by: SURGERY

## 2025-06-19 PROCEDURE — 3075F SYST BP GE 130 - 139MM HG: CPT | Performed by: SURGERY

## 2025-06-19 NOTE — ASSESSMENT & PLAN NOTE
PVC burden has decreased significantly.  It was reported 19% in 2021 and 17% in 2022 and now it is down to 3% suggesting successful PVC ablation.

## 2025-06-19 NOTE — ASSESSMENT & PLAN NOTE
Recurrent nonsustained ventricular tachycardia on the monitor with her symptom and ongoing dyspnea on exertion is a concern.  We will repeat noninvasive evaluation with echocardiogram and nuclear stress test and follow-up.  We will also get serum magnesium level done.

## 2025-06-19 NOTE — PROGRESS NOTES
7. Shortness of breath  -     Echo (TTE) complete (PRN contrast/bubble/strain/3D); Future  -     Nuclear stress test with myocardial perfusion; Future  8. Frequent unifocal PVCs  Assessment & Plan:  PVC burden has decreased significantly.  It was reported 19% in 2021 and 17% in 2022 and now it is down to 3% suggesting successful PVC ablation.        Check Magnesium level, repeat Lexiscan and Echocardiogram and follow up for the results. Appropriate prescriptions if needed on this visit are addressed. After visit summery is provided.   Questions answered and patient verbalizes understanding. Follow up in 4 weeks,  sooner if needed.    Joanna Mohan MD, 6/19/2025 12:40 PM     Please note this report has been partially produced using speech recognition software and may contain errors related to that system including errors in grammar, punctuation, and spelling, as well as words and phrases that may be inappropriate. If there are any questions or concerns please feel free to contact the dictating provider for clarification.

## 2025-06-19 NOTE — TELEPHONE ENCOUNTER
[facility] CLINICAL PHARMACY REVIEW: STATIN THERAPY    Appreciate PCP's addition to 05/29/2025 visit to exclude patient from measure.     Anahy Howell, PharmD, BCACP, BCGP  Population Health Pharmacist   [facility] Clinical Pharmacy  Department, toll free: [phone], option 1    =======================================================    For Pharmacy Admin Tracking Only  Program: [facility] [employer]  CPA in place:  No  Recommendation Provided To: Provider: 1 via Note to Provider  Intervention Accepted By: Provider: 1  Gap Closed?: Yes   Time Spent (min): 30    
Dr. Huber East MD,      Patient's insurance has identified statin use in persons with diabetes (SUPD) care gap:   Per chart review, patient has had side effects to more than one statin. Myalgia due to statin and Statin myopathy have been included on problem list and/or patient visits in past.   To close this care gap, adding an eligible diagnosis code to a billable visit would make insurance aware that patient is unable to tolerate statins, if applicable. Options include*:   Statin myopathy: G72.0, T46.6X5A  Myopathy: G72.89 or G72.9  *The condition the code refers to does not necessarily need to occur in the same year the code was billed. The member’s medical chart should reflect ‘history of’.    Last visit: 05/15/2025, Next visit: 05/29/2025     See encounter note(s) below for complete details. Please let me know if you have any questions.      Thank you,  Anahy Howell, PharmD, BCACP, BCGP  Population Health Pharmacist   [facility] Clinical Pharmacy  Department, toll free: [phone], option 1    =======================================================    [facility] CLINICAL PHARMACY: STATIN THERAPY REVIEW  Identified statin use in persons with diabetes (SUPD) care gap per [employer]. Records dated: 5/28/25    Allergies   Allergen Reactions    Aldactone [Spironolactone] Gynecomastia     Bactrim [Sulfamethoxazole-Trimethoprim] Nephrology contraindicates-- can cause hyperkalemia    Crestor [Rosuvastatin] myalgias    Labetalol Marked bradycardia with frequent symptomatic ectopy    Lisinopril Other (Throat swelling- stopped by Dr. Cardozo)    Metformin And Related [Metformin And Related] Diarrhea at 500mg daily    Nsaids W PROTEINURIA    Ozempic (0.25 Or 0.5 Mg-Dose) [Semaglutide(0.25 Or 0.5mg-Dos)] Severe nausea even on 0.25mg dose    Statins myalgias    Tegretol [Carbamazepine] JITTERS, INSOMNIA    Trulicity [Dulaglutide] Severe nausea and actually had wt gain on this     STATIN GAP 
Statement Selected

## 2025-06-19 NOTE — PATIENT INSTRUCTIONS
Check Magnesium level, repeat Lexiscan and Echocardiogram and follow up for the results. Appropriate prescriptions if needed on this visit are addressed. After visit summery is provided.   Questions answered and patient verbalizes understanding. Follow up in 4 weeks,  sooner if needed.

## 2025-06-19 NOTE — TELEPHONE ENCOUNTER
Notification or Prior Authorization is not required for the requested services  You are not required to submit a notification/prior authorization based on the information provided. If you have general questions about the prior authorization requirements, visit [employer] > Clinician Resources > Advance and Admission Notification Requirements. The number above acknowledges your notification. Please write this reference number down for future reference. If you would like to request an organization determination, please call us at [phone]. Decision ID #: [id number]  The number above acknowledges your inquiry and our response. Please write this number down and refer to it for future inquiries. Coverage and payment for an item or service is governed by the member's benefit plan document, and, if applicable, the provider's participation agreement with the Health Plan.  Patient details  keyboard_[email or web address]_up  Patient name  ARI LAWSON  Member number  [id number]  Group number  [id number]  Product  POS  Relationship  Employee  Effective date  01/01/2025  Termination date  12/31/2025  Insurance type  Medicare  Verbal language preference  English  Written language preference  English  info  A future timeline may be available for this member. For future coverage please call the telephone number located on the back of the member's Medical ID card.  Admitting/attending physician details  keyboard_arrow_up  Name  William Reza  Tax ID number  [id number]  [address] [address]  Status  In network  Service details  keyboard_arrow_up  Place of service  [facility]  What is place of service?  Service details  Surgical  Facility details  keyboard_arrow_up  Name  [facility]  Tax ID number  [id number]  Address  [address] [place] [address] [address] [place] [id number] [phone]  Status  In network  Facility service dates details  keyboard_arrow_up  Start

## 2025-06-23 ASSESSMENT — ENCOUNTER SYMPTOMS
EYE REDNESS: 0
SORE THROAT: 0
STRIDOR: 0
COLOR CHANGE: 0
CONSTIPATION: 0
RECTAL PAIN: 0
BACK PAIN: 0
EYE ITCHING: 0
ANAL BLEEDING: 0
CHOKING: 0
PHOTOPHOBIA: 0
APNEA: 0

## 2025-06-23 NOTE — PROGRESS NOTES
Chief Complaint   Patient presents with    New Patient      Vascular headache REF DR CAMPA         SUBJECTIVE:    History of Present Illness  The patient presents for evaluation of a left-sided headache.    He reports the onset of a severe headache on the left side, which was not present prior to the examination. He is not currently on any anticoagulant therapy and is unable to tolerate NSAIDs due to stage III renal disease. He also mentions a previous episode of swelling in the same area.    An appointment with the cardiologist is scheduled for 11:00 AM today. A 30-day event monitor revealed less than 50% carotid blockage, which was not deemed concerning. He has a history of PVCs and underwent ablation. A CT scan identified four nodules in the thyroid, one of which was biopsied and found to be benign. The remaining three nodules were not biopsied. A left hemithyroidectomy is scheduled for 07/11/2025 by Dr. Giles.    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 Retired in 2015 due to vision loss.    I have reviewed the patient's(pertinent information to this visit) medical history, family history(scanned in  the [employer] under \"patient questioner\"), social history and review of systems with the patient today in the office.            Past Surgical History:   Procedure Laterality Date    ABLATION OF DYSRHYTHMIC FOCUS  11/15/2022    PVC Ablation    CARDIAC CATHETERIZATION      \"had 2 caths done since 1995 but not sure of dates\"    CATARACT REMOVAL WITH IMPLANT Left 03/02/2017    Dr Dye    CATARACT REMOVAL WITH IMPLANT Right 03/16/2017    Dr Dye    CERVICAL FUSION  2005    Dr Peterson    CHOLECYSTECTOMY, LAPAROSCOPIC  08/2011    Dr Hernandez    COLONOSCOPY      Multiple colonoscopys, HX: Crohns    ENDOSCOPY, COLON, DIAGNOSTIC  2018    Erosive esophagitis    EYE SURGERY      \"left eye surgery

## 2025-06-24 ENCOUNTER — TELEPHONE (OUTPATIENT)
Dept: INTERNAL MEDICINE CLINIC | Age: 75
End: 2025-06-24

## 2025-06-24 DIAGNOSIS — E11.21 CONTROLLED TYPE 2 DIABETES MELLITUS WITH DIABETIC NEPHROPATHY, WITHOUT LONG-TERM CURRENT USE OF INSULIN (HCC): Primary | ICD-10-CM

## 2025-06-24 DIAGNOSIS — E78.00 PURE HYPERCHOLESTEROLEMIA: ICD-10-CM

## 2025-06-24 RX ORDER — MAGNESIUM OXIDE 400 MG/1
400 TABLET ORAL DAILY
Qty: 90 TABLET | Refills: 3 | Status: SHIPPED | OUTPATIENT
Start: 2025-06-24

## 2025-06-24 NOTE — TELEPHONE ENCOUNTER
Patient asking if Dr. East can order a lipid panel. Patient states that he is going to get other blood work done later this week but wants the lipid panel added to his labs. Please advise.

## 2025-06-27 ENCOUNTER — HOSPITAL ENCOUNTER (OUTPATIENT)
Age: 75
Discharge: HOME | End: 2025-06-27
Payer: MEDICARE

## 2025-06-27 DIAGNOSIS — R79.82 ELEVATED C-REACTIVE PROTEIN (CRP): ICD-10-CM

## 2025-06-27 DIAGNOSIS — I47.29 NON-SUSTAINED VENTRICULAR TACHYCARDIA (HCC): ICD-10-CM

## 2025-06-27 DIAGNOSIS — G44.1 VASCULAR HEADACHE: ICD-10-CM

## 2025-06-27 DIAGNOSIS — E87.6 HYPOKALEMIA: ICD-10-CM

## 2025-06-27 DIAGNOSIS — E78.00 PURE HYPERCHOLESTEROLEMIA: ICD-10-CM

## 2025-06-27 DIAGNOSIS — E83.42 HYPOMAGNESEMIA: ICD-10-CM

## 2025-06-27 DIAGNOSIS — E11.21 CONTROLLED TYPE 2 DIABETES MELLITUS WITH DIABETIC NEPHROPATHY, WITHOUT LONG-TERM CURRENT USE OF INSULIN (HCC): ICD-10-CM

## 2025-06-27 LAB
ANION GAP SERPL CALCULATED.3IONS-SCNC: 14 MMOL/L (ref 9–17)
BUN SERPL-MCNC: 12 MG/DL (ref 7–20)
CALCIUM SERPL-MCNC: 9.4 MG/DL (ref 8.3–10.6)
CHLORIDE SERPL-SCNC: 98 MMOL/L (ref 99–110)
CHOLEST SERPL-MCNC: 102 MG/DL (ref 125–199)
CO2 SERPL-SCNC: 28 MMOL/L (ref 21–32)
CREAT SERPL-MCNC: 1 MG/DL (ref 0.8–1.3)
CRP SERPL HS-MCNC: 22.1 MG/L (ref 0–5)
GFR, ESTIMATED: 72 ML/MIN/1.73M2
GLUCOSE SERPL-MCNC: 118 MG/DL (ref 74–99)
HDLC SERPL-MCNC: 34 MG/DL
LDLC SERPL CALC-MCNC: 30 MG/DL
MAGNESIUM SERPL-MCNC: 1.9 MG/DL (ref 1.8–2.4)
POTASSIUM SERPL-SCNC: 3.7 MMOL/L (ref 3.5–5.1)
SODIUM SERPL-SCNC: 140 MMOL/L (ref 136–145)
TRIGL SERPL-MCNC: 191 MG/DL

## 2025-06-27 PROCEDURE — 83735 ASSAY OF MAGNESIUM: CPT

## 2025-06-27 PROCEDURE — 80061 LIPID PANEL: CPT

## 2025-06-27 PROCEDURE — 80048 BASIC METABOLIC PNL TOTAL CA: CPT

## 2025-06-27 PROCEDURE — 86140 C-REACTIVE PROTEIN: CPT

## 2025-06-29 ENCOUNTER — RESULTS FOLLOW-UP (OUTPATIENT)
Dept: INTERNAL MEDICINE CLINIC | Age: 75
End: 2025-06-29

## 2025-07-01 NOTE — PROGRESS NOTES
Jaden Ochoa  1950 07/01/25    SUBJECTIVE:   Still w recurring headache, is sched for cardiac testing preop before bx w Dr Reza.  Stress and echo are for tomorrow.    If tests ok then for bx.    Crp and esr are elevated but also chronically.    Later will need L partial thyroidectomy too w Dr Giles ENT.    OBJECTIVE:    /74 (BP Site: Left Upper Arm, Patient Position: Sitting, BP Cuff Size: Large Adult)   Pulse 65   Wt 121.5 kg (267 lb 12.8 oz)   SpO2 93%   BMI 41.94 kg/m²     Physical Exam  Constitutional:       Appearance: Normal appearance.   HENT:      Head:      Comments: TENDER L TA REGION  Neck:      Comments: L THYROID ENLARGEMENT NOTED.    Cardiovascular:      Rate and Rhythm: Normal rate and regular rhythm.      Heart sounds: No murmur heard.     No gallop.   Pulmonary:      Effort: No respiratory distress.      Breath sounds: No wheezing.   Abdominal:      General: Abdomen is flat. Bowel sounds are normal. There is no distension.      Palpations: Abdomen is soft. There is no mass.      Tenderness: There is no abdominal tenderness.      Hernia: No hernia is present.   Musculoskeletal:      Right lower leg: No edema.      Left lower leg: No edema.   Neurological:      Mental Status: He is alert.   Psychiatric:         Mood and Affect: Mood normal.         ASSESSMENT:    1. Vascular headache    2. Elevated C-reactive protein (CRP)    3. Controlled type 2 diabetes mellitus with diabetic nephropathy, without long-term current use of insulin (HCC)        PLAN:  Assessment & Plan  1. Recurring headache.  The patient reports persistent headaches. The plan is to increase the daily steroid dosage to 20 mg. If cardiac testing is benign, a temporal artery biopsy will be considered in the near future.    2. Persistent elevation of inflammatory markers.  There is a persistent elevation of inflammatory markers. The patient will start a daily steroid regimen of 20 mg. C-reactive protein and

## 2025-07-02 ENCOUNTER — OFFICE VISIT (OUTPATIENT)
Dept: INTERNAL MEDICINE CLINIC | Age: 75
End: 2025-07-02

## 2025-07-02 VITALS
HEART RATE: 65 BPM | OXYGEN SATURATION: 93 % | SYSTOLIC BLOOD PRESSURE: 126 MMHG | WEIGHT: 267.8 LBS | BODY MASS INDEX: 41.94 KG/M2 | DIASTOLIC BLOOD PRESSURE: 74 MMHG

## 2025-07-02 DIAGNOSIS — E11.21 CONTROLLED TYPE 2 DIABETES MELLITUS WITH DIABETIC NEPHROPATHY, WITHOUT LONG-TERM CURRENT USE OF INSULIN (HCC): ICD-10-CM

## 2025-07-02 DIAGNOSIS — G44.1 VASCULAR HEADACHE: Primary | ICD-10-CM

## 2025-07-02 DIAGNOSIS — R79.82 ELEVATED C-REACTIVE PROTEIN (CRP): ICD-10-CM

## 2025-07-02 RX ORDER — PREDNISONE 20 MG/1
20 TABLET ORAL DAILY
Qty: 30 TABLET | Refills: 0 | Status: SHIPPED | OUTPATIENT
Start: 2025-07-02 | End: 2025-08-01

## 2025-07-11 ENCOUNTER — HOSPITAL ENCOUNTER (OUTPATIENT)
Age: 75
Setting detail: SPECIMEN
Discharge: HOME | End: 2025-07-11

## 2025-07-11 LAB — CALCIUM SERPL-MCNC: 9.2 MG/DL (ref 8.3–10.6)

## 2025-07-11 PROCEDURE — 82310 ASSAY OF CALCIUM: CPT

## 2025-07-12 LAB — CALCIUM SERPL-MCNC: 9.4 MG/DL (ref 8.5–10.5)

## 2025-07-14 LAB — CALCIUM SERPL-MCNC: NORMAL MG/DL (ref 8.5–10.5)

## 2025-07-17 LAB — DIABETIC RETINOPATHY: NEGATIVE

## 2025-07-23 ENCOUNTER — HOSPITAL ENCOUNTER (OUTPATIENT)
Age: 75
Discharge: HOME | End: 2025-07-23
Payer: MEDICARE

## 2025-07-23 ENCOUNTER — OFFICE VISIT (OUTPATIENT)
Dept: CARDIOLOGY CLINIC | Age: 75
End: 2025-07-23
Payer: MEDICARE

## 2025-07-23 VITALS
WEIGHT: 266.8 LBS | DIASTOLIC BLOOD PRESSURE: 76 MMHG | SYSTOLIC BLOOD PRESSURE: 138 MMHG | OXYGEN SATURATION: 97 % | HEIGHT: 67 IN | HEART RATE: 50 BPM | BODY MASS INDEX: 41.88 KG/M2

## 2025-07-23 DIAGNOSIS — R79.82 ELEVATED C-REACTIVE PROTEIN (CRP): ICD-10-CM

## 2025-07-23 DIAGNOSIS — G47.33 OSA ON CPAP: ICD-10-CM

## 2025-07-23 DIAGNOSIS — I10 PRIMARY HYPERTENSION: Primary | ICD-10-CM

## 2025-07-23 DIAGNOSIS — E11.21 CONTROLLED TYPE 2 DIABETES MELLITUS WITH DIABETIC NEPHROPATHY, WITHOUT LONG-TERM CURRENT USE OF INSULIN (HCC): ICD-10-CM

## 2025-07-23 DIAGNOSIS — I49.3 FREQUENT UNIFOCAL PVCS: ICD-10-CM

## 2025-07-23 DIAGNOSIS — G44.1 VASCULAR HEADACHE: ICD-10-CM

## 2025-07-23 DIAGNOSIS — N18.2 STAGE 2 CHRONIC KIDNEY DISEASE: ICD-10-CM

## 2025-07-23 DIAGNOSIS — I10 ESSENTIAL HYPERTENSION: ICD-10-CM

## 2025-07-23 DIAGNOSIS — Z98.890 S/P ABLATION OF VENTRICULAR ARRHYTHMIA: ICD-10-CM

## 2025-07-23 DIAGNOSIS — Z86.79 S/P ABLATION OF VENTRICULAR ARRHYTHMIA: ICD-10-CM

## 2025-07-23 PROBLEM — N18.30 CHRONIC RENAL DISEASE, STAGE III (HCC): Status: RESOLVED | Noted: 2022-05-03 | Resolved: 2025-07-23

## 2025-07-23 PROBLEM — N18.31 STAGE 3A CHRONIC KIDNEY DISEASE (HCC): Status: RESOLVED | Noted: 2019-04-18 | Resolved: 2025-07-23

## 2025-07-23 LAB
CRP SERPL HS-MCNC: 6.3 MG/L (ref 0–5)
ERYTHROCYTE [SEDIMENTATION RATE] IN BLOOD BY WESTERGREN METHOD: 59 MM/HR (ref 0–20)

## 2025-07-23 PROCEDURE — 99214 OFFICE O/P EST MOD 30 MIN: CPT | Performed by: INTERNAL MEDICINE

## 2025-07-23 PROCEDURE — 1124F ACP DISCUSS-NO DSCNMKR DOCD: CPT | Performed by: INTERNAL MEDICINE

## 2025-07-23 PROCEDURE — 3075F SYST BP GE 130 - 139MM HG: CPT | Performed by: INTERNAL MEDICINE

## 2025-07-23 PROCEDURE — 3044F HG A1C LEVEL LT 7.0%: CPT | Performed by: INTERNAL MEDICINE

## 2025-07-23 PROCEDURE — 86140 C-REACTIVE PROTEIN: CPT

## 2025-07-23 PROCEDURE — 85652 RBC SED RATE AUTOMATED: CPT

## 2025-07-23 PROCEDURE — 3078F DIAST BP <80 MM HG: CPT | Performed by: INTERNAL MEDICINE

## 2025-07-23 PROCEDURE — 1159F MED LIST DOCD IN RCRD: CPT | Performed by: INTERNAL MEDICINE

## 2025-07-23 RX ORDER — HYDRALAZINE HYDROCHLORIDE 25 MG/1
25 TABLET, FILM COATED ORAL 3 TIMES DAILY
Qty: 180 TABLET | Refills: 3 | Status: SHIPPED | OUTPATIENT
Start: 2025-07-23

## 2025-07-23 NOTE — PROGRESS NOTES
rate was 46 bpm on 5/30/2025 at 4:04 AM.  6 beat run of wide-complex tachycardia noted on 5/19/2025 at 10:50 AM and 4 beat run of nonsustained ventricular tachycardia occurred on 5/13/2025 at 11:44 AM.  Patient did not report any symptoms during these episodes.  Patient reported passing out during normal rate and rhythm rate was 89 bpm on 5/14/2025 at 2:38 PM.  Patient reported lightheadedness and dizziness and fatigue during heart rate of 70 bpm on 5/27/2025 at 11:41 AM and again at 11:46 AM with isolated PVC.     LAB REVIEW:  CBC:   Lab Results   Component Value Date/Time    WBC 7.4 05/08/2025 10:39 AM    HGB 13.5 05/08/2025 10:39 AM    HCT 43.1 05/08/2025 10:39 AM     05/08/2025 10:39 AM     Lipids:   Component  Ref Range & Units (hover) 6/27/25 0820 8/20/24 0807 8/22/23 0803 2/10/23 0742 11/28/22 0803 8/18/22 0754 3/9/22 0752   Cholesterol, Total 102 Low  121 R 115 R 136 R 104 R 116 R,  R, CM   HDL 34 Low  34 Low  R, CM 34 Low  R, CM 45 R, CM 32 Low  R, CM 38 R, CM 50 Low  R, CM   LDL Cholesterol 30 38 R 39 R 67 R 25 R 35 R, CM 60 R, CM   Triglycerides 191 High  247 High  R 208 High  R 118 R 233 High  R 215 High  R,  High  R, CM   VLDL Cholesterol Calculated  49 R 42 R 24 R 47 R 43 High  R 54 High  R     Renal:   Lab Results   Component Value Date/Time    BUN 12 06/27/2025 08:20 AM    CREATININE 1.0 06/27/2025 08:20 AM     06/27/2025 08:20 AM    K 3.7 06/27/2025 08:20 AM               Component  Ref Range & Units (hover) 3/27/25 0812 8/20/24 0807 11/28/23 0913 8/22/23 0803 5/22/23 0806 2/10/23 0742 11/28/22 0803   Hemoglobin A1C 6.2 High  6.3 R, CM 6.4 R, CM 5.9 R, CM 6.3 R, CM 6.7 R, CM 8.3 R,          IMPRESSION and RECOMMENDATIONS:      1. Primary hypertension  2. Controlled type 2 diabetes mellitus with diabetic nephropathy, without long-term current use of insulin ([facility])  3. S/P ablation of ventricular arrhythmia 11/15/2022  4. VAN on CPAP  5. Frequent unifocal PVCs  6. Stage 2

## 2025-07-23 NOTE — PATIENT INSTRUCTIONS
Thank you for allowing us to care for you today!   We want to ensure we can follow your treatment plan and we strive to give you the best outcomes and experience possible.   If you ever have a life threatening emergency and call 911 - for an ambulance (EMS)  REMEMBER  Our providers can only care for you at:   [facility] or [facility]   Even if you have someone take you or you drive yourself we can only care for you in a [facility] facility. Our providers are not setup at the other healthcare locations!    PLEASE CALL OUR OFFICE DURING NORMAL BUSINESS HOURS  Monday through Friday 8 am to 5 pm  AFTER HOURS the physician on-call cannot help with scheduling, rescheduling, procedure instruction questions or any type of medication need or issue.  [facility] P:[phone] - [facility] P:[phone] - [facility] P:[phone]      If you receive a survey:  We would appreciate you taking the time to share your experience concerning your provider visit in the office.    These surveys are confidential!  We are eager to improve and are counting on you to share your feedback so we can ensure you get the best care possible.  Continue current cardiovascular medications which have been reviewed and discussed individually with you.  Counseled for calorie counting, reduction in serving size and exercise and lifestyle modification for weight loss.  Appropriate prescriptions if needed on this visit are addressed. After visit summery is provided.   Questions answered and patient verbalizes understanding. Follow up in 6 months with ECG,  sooner if needed.

## 2025-07-24 ENCOUNTER — OFFICE VISIT (OUTPATIENT)
Dept: INTERNAL MEDICINE CLINIC | Age: 75
End: 2025-07-24

## 2025-07-24 VITALS
OXYGEN SATURATION: 96 % | HEART RATE: 60 BPM | DIASTOLIC BLOOD PRESSURE: 90 MMHG | BODY MASS INDEX: 41.44 KG/M2 | WEIGHT: 264.6 LBS | SYSTOLIC BLOOD PRESSURE: 140 MMHG

## 2025-07-24 DIAGNOSIS — R79.82 ELEVATED C-REACTIVE PROTEIN (CRP): ICD-10-CM

## 2025-07-24 DIAGNOSIS — E11.21 CONTROLLED TYPE 2 DIABETES MELLITUS WITH DIABETIC NEPHROPATHY, WITHOUT LONG-TERM CURRENT USE OF INSULIN (HCC): ICD-10-CM

## 2025-07-24 DIAGNOSIS — G44.1 VASCULAR HEADACHE: Primary | ICD-10-CM

## 2025-07-24 DIAGNOSIS — E04.1 THYROID NODULE: ICD-10-CM

## 2025-07-24 RX ORDER — PREDNISONE 20 MG/1
20 TABLET ORAL 2 TIMES DAILY
Qty: 60 TABLET | Refills: 0 | Status: SHIPPED | OUTPATIENT
Start: 2025-07-24 | End: 2025-08-23

## 2025-07-24 NOTE — PROGRESS NOTES
Jaden BAZAN Don  1950 07/23/25    SUBJECTIVE:      L THYROID LOBECTOMY 7/11by dr dunaway and states pathology benign.  Swallowing is better, no voice hoarseness.    Not had TA bx yet, has been on the prednisone now 20mg/d since 7/3.  Headache is better.  We'll see if can proceed now w TA bx since has had benign cardiac testing earlier this mo.  States pain down from 10/10 now to 7/10.      DM- sugars stable still 110-120s.    Lab Results   Component Value Date    LABA1C 6.2 (H) 03/27/2025    LABA1C 6.3 08/20/2024    LABA1C 6.4 11/28/2023     Lab Results   Component Value Date    GLUF 195 (H) 03/20/2018    CREATININE 1.0 06/27/2025           OBJECTIVE:    BP (!) 140/90 (BP Site: Left Upper Arm, Patient Position: Sitting, BP Cuff Size: Large Adult)   Pulse 60   Wt 120 kg (264 lb 9.6 oz)   SpO2 96%   BMI 41.44 kg/m²     Physical Exam  Constitutional:       Appearance: Normal appearance.   HENT:      Head: Normocephalic and atraumatic.      Comments: Sl tender, swollen L TA region  Eyes:      Extraocular Movements: Extraocular movements intact.      Conjunctiva/sclera: Conjunctivae normal.      Pupils: Pupils are equal, round, and reactive to light.   Neck:      Comments: S/p L thyroid lobectomy  Cardiovascular:      Rate and Rhythm: Normal rate and regular rhythm.      Heart sounds: Normal heart sounds. No murmur heard.  Pulmonary:      Effort: Pulmonary effort is normal. No respiratory distress.      Breath sounds: Normal breath sounds.   Abdominal:      General: Abdomen is flat. Bowel sounds are normal. There is no distension.      Palpations: Abdomen is soft. There is no mass.      Tenderness: There is no abdominal tenderness.      Hernia: No hernia is present.   Musculoskeletal:      Cervical back: Neck supple.      Right lower leg: No edema.      Left lower leg: No edema.   Neurological:      Mental Status: He is alert.   Psychiatric:         Mood and Affect: Mood normal.         ASSESSMENT:    1. Vascular

## 2025-07-25 DIAGNOSIS — M54.16 LUMBAR RADICULOPATHY: ICD-10-CM

## 2025-07-25 RX ORDER — GABAPENTIN 300 MG/1
300 CAPSULE ORAL 3 TIMES DAILY
Qty: 90 CAPSULE | Refills: 2 | Status: SHIPPED | OUTPATIENT
Start: 2025-07-25 | End: 2025-10-23

## 2025-07-28 DIAGNOSIS — G89.29 CHRONIC MIDLINE LOW BACK PAIN WITHOUT SCIATICA: ICD-10-CM

## 2025-07-28 DIAGNOSIS — M54.50 CHRONIC MIDLINE LOW BACK PAIN WITHOUT SCIATICA: ICD-10-CM

## 2025-07-28 DIAGNOSIS — M54.12 RIGHT CERVICAL RADICULOPATHY: ICD-10-CM

## 2025-07-28 RX ORDER — CYCLOBENZAPRINE HCL 10 MG
TABLET ORAL
Qty: 90 TABLET | Refills: 1 | Status: SHIPPED | OUTPATIENT
Start: 2025-07-28

## 2025-07-30 ENCOUNTER — TELEPHONE (OUTPATIENT)
Dept: SURGERY | Age: 75
End: 2025-07-30

## 2025-07-30 NOTE — TELEPHONE ENCOUNTER
Called patient to make follow up appt with dr. Reza for Temporal artery bx that was previously canceled. Left message

## 2025-08-04 ENCOUNTER — OFFICE VISIT (OUTPATIENT)
Dept: INTERNAL MEDICINE CLINIC | Age: 75
End: 2025-08-04
Payer: MEDICARE

## 2025-08-04 VITALS
DIASTOLIC BLOOD PRESSURE: 80 MMHG | BODY MASS INDEX: 42.26 KG/M2 | SYSTOLIC BLOOD PRESSURE: 142 MMHG | WEIGHT: 269.8 LBS | HEART RATE: 67 BPM | OXYGEN SATURATION: 97 %

## 2025-08-04 DIAGNOSIS — I12.9 HYPERTENSIVE RENAL DISEASE: Primary | ICD-10-CM

## 2025-08-04 DIAGNOSIS — R79.82 ELEVATED C-REACTIVE PROTEIN (CRP): ICD-10-CM

## 2025-08-04 DIAGNOSIS — G44.1 VASCULAR HEADACHE: ICD-10-CM

## 2025-08-04 DIAGNOSIS — B37.0 THRUSH: ICD-10-CM

## 2025-08-04 PROCEDURE — 3079F DIAST BP 80-89 MM HG: CPT | Performed by: INTERNAL MEDICINE

## 2025-08-04 PROCEDURE — 99213 OFFICE O/P EST LOW 20 MIN: CPT | Performed by: INTERNAL MEDICINE

## 2025-08-04 PROCEDURE — 3077F SYST BP >= 140 MM HG: CPT | Performed by: INTERNAL MEDICINE

## 2025-08-04 PROCEDURE — 1160F RVW MEDS BY RX/DR IN RCRD: CPT | Performed by: INTERNAL MEDICINE

## 2025-08-04 PROCEDURE — 1124F ACP DISCUSS-NO DSCNMKR DOCD: CPT | Performed by: INTERNAL MEDICINE

## 2025-08-04 PROCEDURE — 1159F MED LIST DOCD IN RCRD: CPT | Performed by: INTERNAL MEDICINE

## 2025-08-04 RX ORDER — TRAMADOL HYDROCHLORIDE 50 MG/1
50 TABLET ORAL 3 TIMES DAILY
Qty: 90 TABLET | Refills: 2 | Status: CANCELLED | OUTPATIENT
Start: 2025-08-04 | End: 2025-11-02

## 2025-08-04 RX ORDER — HYDROCODONE BITARTRATE AND ACETAMINOPHEN 5; 325 MG/1; MG/1
1 TABLET ORAL 2 TIMES DAILY PRN
Qty: 60 TABLET | Refills: 0 | Status: CANCELLED | OUTPATIENT
Start: 2025-08-04 | End: 2025-09-03

## 2025-08-04 RX ORDER — NYSTATIN 100000 [USP'U]/ML
500000 SUSPENSION ORAL 4 TIMES DAILY
Qty: 200 ML | Refills: 0 | Status: SHIPPED | OUTPATIENT
Start: 2025-08-04 | End: 2025-08-14

## 2025-08-06 ENCOUNTER — HOSPITAL ENCOUNTER (OUTPATIENT)
Dept: LAB | Age: 75
Discharge: HOME | End: 2025-08-06
Payer: MEDICARE

## 2025-08-06 DIAGNOSIS — R79.82 ELEVATED C-REACTIVE PROTEIN (CRP): ICD-10-CM

## 2025-08-06 DIAGNOSIS — I12.9 HYPERTENSIVE RENAL DISEASE: ICD-10-CM

## 2025-08-06 LAB
ANION GAP SERPL CALCULATED.3IONS-SCNC: 10 MMOL/L (ref 9–17)
BUN SERPL-MCNC: 27 MG/DL (ref 7–20)
CALCIUM SERPL-MCNC: 9.3 MG/DL (ref 8.3–10.6)
CHLORIDE SERPL-SCNC: 97 MMOL/L (ref 99–110)
CO2 SERPL-SCNC: 31 MMOL/L (ref 21–32)
CREAT SERPL-MCNC: 1.3 MG/DL (ref 0.8–1.3)
CRP SERPL HS-MCNC: 18.2 MG/L (ref 0–5)
ERYTHROCYTE [SEDIMENTATION RATE] IN BLOOD BY WESTERGREN METHOD: 46 MM/HR (ref 0–20)
GFR, ESTIMATED: 55 ML/MIN/1.73M2
GLUCOSE SERPL-MCNC: 115 MG/DL (ref 74–99)
POTASSIUM SERPL-SCNC: 3.8 MMOL/L (ref 3.5–5.1)
SODIUM SERPL-SCNC: 138 MMOL/L (ref 136–145)

## 2025-08-06 PROCEDURE — 85652 RBC SED RATE AUTOMATED: CPT

## 2025-08-06 PROCEDURE — 80048 BASIC METABOLIC PNL TOTAL CA: CPT

## 2025-08-06 PROCEDURE — 86140 C-REACTIVE PROTEIN: CPT

## 2025-08-11 ENCOUNTER — OFFICE VISIT (OUTPATIENT)
Dept: SURGERY | Age: 75
End: 2025-08-11
Payer: MEDICARE

## 2025-08-11 ENCOUNTER — TELEPHONE (OUTPATIENT)
Dept: SURGERY | Age: 75
End: 2025-08-11

## 2025-08-11 VITALS
OXYGEN SATURATION: 98 % | BODY MASS INDEX: 42 KG/M2 | HEART RATE: 72 BPM | WEIGHT: 267.6 LBS | SYSTOLIC BLOOD PRESSURE: 136 MMHG | HEIGHT: 67 IN | DIASTOLIC BLOOD PRESSURE: 88 MMHG

## 2025-08-11 DIAGNOSIS — G44.1 VASCULAR HEADACHE: Primary | ICD-10-CM

## 2025-08-11 PROBLEM — M31.6 TEMPORAL ARTERITIS (HCC): Status: ACTIVE | Noted: 2025-08-11

## 2025-08-11 PROCEDURE — 1124F ACP DISCUSS-NO DSCNMKR DOCD: CPT | Performed by: SURGERY

## 2025-08-11 PROCEDURE — 3079F DIAST BP 80-89 MM HG: CPT | Performed by: SURGERY

## 2025-08-11 PROCEDURE — 99214 OFFICE O/P EST MOD 30 MIN: CPT | Performed by: SURGERY

## 2025-08-11 PROCEDURE — 3075F SYST BP GE 130 - 139MM HG: CPT | Performed by: SURGERY

## 2025-08-12 ENCOUNTER — ANESTHESIA EVENT (OUTPATIENT)
Dept: OPERATING ROOM | Age: 75
End: 2025-08-12
Payer: MEDICARE

## 2025-08-12 ENCOUNTER — TELEPHONE (OUTPATIENT)
Dept: SURGERY | Age: 75
End: 2025-08-12

## 2025-08-12 RX ORDER — METOCLOPRAMIDE HYDROCHLORIDE 5 MG/ML
10 INJECTION INTRAMUSCULAR; INTRAVENOUS
Status: CANCELLED | OUTPATIENT
Start: 2025-08-12

## 2025-08-12 RX ORDER — SODIUM CHLORIDE, SODIUM LACTATE, POTASSIUM CHLORIDE, CALCIUM CHLORIDE 600; 310; 30; 20 MG/100ML; MG/100ML; MG/100ML; MG/100ML
INJECTION, SOLUTION INTRAVENOUS CONTINUOUS
Status: CANCELLED | OUTPATIENT
Start: 2025-08-12

## 2025-08-12 RX ORDER — DIPHENHYDRAMINE HYDROCHLORIDE 50 MG/ML
12.5 INJECTION, SOLUTION INTRAMUSCULAR; INTRAVENOUS
Status: CANCELLED | OUTPATIENT
Start: 2025-08-12

## 2025-08-12 RX ORDER — LORAZEPAM 2 MG/ML
0.5 INJECTION INTRAMUSCULAR
Status: CANCELLED | OUTPATIENT
Start: 2025-08-12

## 2025-08-12 RX ORDER — SODIUM CHLORIDE 9 MG/ML
INJECTION, SOLUTION INTRAVENOUS PRN
Status: CANCELLED | OUTPATIENT
Start: 2025-08-12

## 2025-08-12 RX ORDER — ONDANSETRON 2 MG/ML
4 INJECTION INTRAMUSCULAR; INTRAVENOUS
Status: CANCELLED | OUTPATIENT
Start: 2025-08-12

## 2025-08-12 RX ORDER — SODIUM CHLORIDE 0.9 % (FLUSH) 0.9 %
5-40 SYRINGE (ML) INJECTION EVERY 12 HOURS SCHEDULED
Status: CANCELLED | OUTPATIENT
Start: 2025-08-12

## 2025-08-12 RX ORDER — OXYCODONE HYDROCHLORIDE 5 MG/1
5 TABLET ORAL
Refills: 0 | Status: CANCELLED | OUTPATIENT
Start: 2025-08-12

## 2025-08-12 RX ORDER — ACETAMINOPHEN 325 MG/1
650 TABLET ORAL
Status: CANCELLED | OUTPATIENT
Start: 2025-08-12

## 2025-08-12 RX ORDER — SODIUM CHLORIDE 0.9 % (FLUSH) 0.9 %
5-40 SYRINGE (ML) INJECTION PRN
Status: CANCELLED | OUTPATIENT
Start: 2025-08-12

## 2025-08-12 ASSESSMENT — ENCOUNTER SYMPTOMS
APNEA: 0
RECTAL PAIN: 0
STRIDOR: 0
CHOKING: 0
COLOR CHANGE: 0
PHOTOPHOBIA: 0
BACK PAIN: 0
CONSTIPATION: 0
EYE ITCHING: 0
ANAL BLEEDING: 0
SORE THROAT: 0
EYE REDNESS: 0

## 2025-08-13 ENCOUNTER — ANESTHESIA (OUTPATIENT)
Dept: OPERATING ROOM | Age: 75
End: 2025-08-13
Payer: MEDICARE

## 2025-08-13 ENCOUNTER — HOSPITAL ENCOUNTER (OUTPATIENT)
Age: 75
Setting detail: OUTPATIENT SURGERY
Discharge: HOME | End: 2025-08-13
Attending: SURGERY | Admitting: SURGERY
Payer: MEDICARE

## 2025-08-13 VITALS
DIASTOLIC BLOOD PRESSURE: 83 MMHG | HEART RATE: 57 BPM | BODY MASS INDEX: 41.91 KG/M2 | OXYGEN SATURATION: 98 % | WEIGHT: 267 LBS | RESPIRATION RATE: 16 BRPM | TEMPERATURE: 98 F | SYSTOLIC BLOOD PRESSURE: 153 MMHG | HEIGHT: 67 IN

## 2025-08-13 DIAGNOSIS — G89.29 CHRONIC MIDLINE LOW BACK PAIN WITHOUT SCIATICA: ICD-10-CM

## 2025-08-13 DIAGNOSIS — M54.50 CHRONIC MIDLINE LOW BACK PAIN WITHOUT SCIATICA: ICD-10-CM

## 2025-08-13 DIAGNOSIS — M54.12 RIGHT CERVICAL RADICULOPATHY: ICD-10-CM

## 2025-08-13 DIAGNOSIS — M31.6 TEMPORAL ARTERITIS (HCC): ICD-10-CM

## 2025-08-13 LAB — GLUCOSE BLD-MCNC: 122 MG/DL (ref 74–99)

## 2025-08-13 PROCEDURE — 3600000002 HC SURGERY LEVEL 2 BASE: Performed by: SURGERY

## 2025-08-13 PROCEDURE — 2709999900 HC NON-CHARGEABLE SUPPLY: Performed by: SURGERY

## 2025-08-13 PROCEDURE — 3700000001 HC ADD 15 MINUTES (ANESTHESIA): Performed by: SURGERY

## 2025-08-13 PROCEDURE — 6360000002 HC RX W HCPCS: Performed by: SURGERY

## 2025-08-13 PROCEDURE — 82962 GLUCOSE BLOOD TEST: CPT

## 2025-08-13 PROCEDURE — 7100000011 HC PHASE II RECOVERY - ADDTL 15 MIN: Performed by: SURGERY

## 2025-08-13 PROCEDURE — 2580000003 HC RX 258: Performed by: ANESTHESIOLOGY

## 2025-08-13 PROCEDURE — 37609 LIGATION/BX TEMPORAL ARTERY: CPT | Performed by: SURGERY

## 2025-08-13 PROCEDURE — 88313 SPECIAL STAINS GROUP 2: CPT | Performed by: PATHOLOGY

## 2025-08-13 PROCEDURE — 88305 TISSUE EXAM BY PATHOLOGIST: CPT | Performed by: PATHOLOGY

## 2025-08-13 PROCEDURE — 3600000012 HC SURGERY LEVEL 2 ADDTL 15MIN: Performed by: SURGERY

## 2025-08-13 PROCEDURE — 6370000000 HC RX 637 (ALT 250 FOR IP): Performed by: SURGERY

## 2025-08-13 PROCEDURE — 6360000002 HC RX W HCPCS: Performed by: NURSE ANESTHETIST, CERTIFIED REGISTERED

## 2025-08-13 PROCEDURE — 3700000000 HC ANESTHESIA ATTENDED CARE: Performed by: SURGERY

## 2025-08-13 PROCEDURE — 7100000010 HC PHASE II RECOVERY - FIRST 15 MIN: Performed by: SURGERY

## 2025-08-13 RX ORDER — SODIUM CHLORIDE 0.9 % (FLUSH) 0.9 %
5-40 SYRINGE (ML) INJECTION EVERY 12 HOURS SCHEDULED
Status: DISCONTINUED | OUTPATIENT
Start: 2025-08-13 | End: 2025-08-13 | Stop reason: HOSPADM

## 2025-08-13 RX ORDER — LIDOCAINE HYDROCHLORIDE 20 MG/ML
INJECTION, SOLUTION EPIDURAL; INFILTRATION; INTRACAUDAL; PERINEURAL
Status: DISCONTINUED | OUTPATIENT
Start: 2025-08-13 | End: 2025-08-13 | Stop reason: SDUPTHER

## 2025-08-13 RX ORDER — FENTANYL CITRATE 50 UG/ML
INJECTION, SOLUTION INTRAMUSCULAR; INTRAVENOUS
Status: DISCONTINUED | OUTPATIENT
Start: 2025-08-13 | End: 2025-08-13 | Stop reason: SDUPTHER

## 2025-08-13 RX ORDER — LIDOCAINE HYDROCHLORIDE 10 MG/ML
INJECTION, SOLUTION INFILTRATION; PERINEURAL
Status: DISCONTINUED | OUTPATIENT
Start: 2025-08-13 | End: 2025-08-13 | Stop reason: ALTCHOICE

## 2025-08-13 RX ORDER — SODIUM CHLORIDE 0.9 % (FLUSH) 0.9 %
5-40 SYRINGE (ML) INJECTION PRN
Status: DISCONTINUED | OUTPATIENT
Start: 2025-08-13 | End: 2025-08-13 | Stop reason: HOSPADM

## 2025-08-13 RX ORDER — GINSENG 100 MG
CAPSULE ORAL
Status: DISCONTINUED | OUTPATIENT
Start: 2025-08-13 | End: 2025-08-13 | Stop reason: ALTCHOICE

## 2025-08-13 RX ORDER — HYDRALAZINE HYDROCHLORIDE 20 MG/ML
INJECTION INTRAMUSCULAR; INTRAVENOUS
Status: DISCONTINUED | OUTPATIENT
Start: 2025-08-13 | End: 2025-08-13 | Stop reason: SDUPTHER

## 2025-08-13 RX ORDER — PROPOFOL 10 MG/ML
INJECTION, EMULSION INTRAVENOUS
Status: DISCONTINUED | OUTPATIENT
Start: 2025-08-13 | End: 2025-08-13 | Stop reason: SDUPTHER

## 2025-08-13 RX ORDER — SODIUM CHLORIDE, SODIUM LACTATE, POTASSIUM CHLORIDE, CALCIUM CHLORIDE 600; 310; 30; 20 MG/100ML; MG/100ML; MG/100ML; MG/100ML
INJECTION, SOLUTION INTRAVENOUS CONTINUOUS
Status: DISCONTINUED | OUTPATIENT
Start: 2025-08-13 | End: 2025-08-13 | Stop reason: HOSPADM

## 2025-08-13 RX ORDER — SODIUM CHLORIDE 9 MG/ML
INJECTION, SOLUTION INTRAVENOUS PRN
Status: DISCONTINUED | OUTPATIENT
Start: 2025-08-13 | End: 2025-08-13 | Stop reason: HOSPADM

## 2025-08-13 RX ADMIN — PROPOFOL 100 MG: 10 INJECTION, EMULSION INTRAVENOUS at 07:52

## 2025-08-13 RX ADMIN — HYDRALAZINE HYDROCHLORIDE 5 MG: 20 INJECTION, SOLUTION INTRAMUSCULAR; INTRAVENOUS at 07:49

## 2025-08-13 RX ADMIN — FENTANYL CITRATE 50 MCG: 50 INJECTION, SOLUTION INTRAMUSCULAR; INTRAVENOUS at 07:52

## 2025-08-13 RX ADMIN — SODIUM CHLORIDE, POTASSIUM CHLORIDE, SODIUM LACTATE AND CALCIUM CHLORIDE: 600; 310; 30; 20 INJECTION, SOLUTION INTRAVENOUS at 06:56

## 2025-08-13 RX ADMIN — LIDOCAINE HYDROCHLORIDE 50 MG: 20 INJECTION, SOLUTION EPIDURAL; INFILTRATION; INTRACAUDAL; PERINEURAL at 07:39

## 2025-08-13 RX ADMIN — FENTANYL CITRATE 50 MCG: 50 INJECTION, SOLUTION INTRAMUSCULAR; INTRAVENOUS at 07:39

## 2025-08-13 RX ADMIN — PROPOFOL 25 MG: 10 INJECTION, EMULSION INTRAVENOUS at 07:39

## 2025-08-13 ASSESSMENT — PAIN - FUNCTIONAL ASSESSMENT
PAIN_FUNCTIONAL_ASSESSMENT: 0-10
PAIN_FUNCTIONAL_ASSESSMENT: 0-10

## 2025-08-13 ASSESSMENT — ENCOUNTER SYMPTOMS
DYSPNEA ACTIVITY LEVEL: AFTER AMBULATING 1 FLIGHT OF STAIRS
SHORTNESS OF BREATH: 1

## 2025-08-13 ASSESSMENT — PAIN SCALES - GENERAL: PAINLEVEL_OUTOF10: 0

## 2025-08-14 ENCOUNTER — TELEPHONE (OUTPATIENT)
Dept: SURGERY | Age: 75
End: 2025-08-14

## 2025-08-14 LAB — SURGICAL PATHOLOGY REPORT: NORMAL

## 2025-08-18 ENCOUNTER — OFFICE VISIT (OUTPATIENT)
Dept: INTERNAL MEDICINE CLINIC | Age: 75
End: 2025-08-18
Payer: MEDICARE

## 2025-08-18 VITALS
WEIGHT: 269 LBS | SYSTOLIC BLOOD PRESSURE: 122 MMHG | BODY MASS INDEX: 42.13 KG/M2 | OXYGEN SATURATION: 97 % | HEART RATE: 63 BPM | DIASTOLIC BLOOD PRESSURE: 76 MMHG

## 2025-08-18 DIAGNOSIS — I10 ESSENTIAL HYPERTENSION: ICD-10-CM

## 2025-08-18 DIAGNOSIS — M54.50 CHRONIC MIDLINE LOW BACK PAIN WITHOUT SCIATICA: ICD-10-CM

## 2025-08-18 DIAGNOSIS — E11.21 CONTROLLED TYPE 2 DIABETES MELLITUS WITH DIABETIC NEPHROPATHY, WITHOUT LONG-TERM CURRENT USE OF INSULIN (HCC): ICD-10-CM

## 2025-08-18 DIAGNOSIS — M54.12 RIGHT CERVICAL RADICULOPATHY: ICD-10-CM

## 2025-08-18 DIAGNOSIS — G44.1 VASCULAR HEADACHE: Primary | ICD-10-CM

## 2025-08-18 DIAGNOSIS — R79.82 ELEVATED C-REACTIVE PROTEIN (CRP): ICD-10-CM

## 2025-08-18 DIAGNOSIS — G89.29 CHRONIC MIDLINE LOW BACK PAIN WITHOUT SCIATICA: ICD-10-CM

## 2025-08-18 PROCEDURE — 1160F RVW MEDS BY RX/DR IN RCRD: CPT | Performed by: INTERNAL MEDICINE

## 2025-08-18 PROCEDURE — 1124F ACP DISCUSS-NO DSCNMKR DOCD: CPT | Performed by: INTERNAL MEDICINE

## 2025-08-18 PROCEDURE — 3078F DIAST BP <80 MM HG: CPT | Performed by: INTERNAL MEDICINE

## 2025-08-18 PROCEDURE — 1159F MED LIST DOCD IN RCRD: CPT | Performed by: INTERNAL MEDICINE

## 2025-08-18 PROCEDURE — 99214 OFFICE O/P EST MOD 30 MIN: CPT | Performed by: INTERNAL MEDICINE

## 2025-08-18 PROCEDURE — 3044F HG A1C LEVEL LT 7.0%: CPT | Performed by: INTERNAL MEDICINE

## 2025-08-18 PROCEDURE — 3074F SYST BP LT 130 MM HG: CPT | Performed by: INTERNAL MEDICINE

## 2025-08-18 RX ORDER — TRAMADOL HYDROCHLORIDE 50 MG/1
50 TABLET ORAL 3 TIMES DAILY
Qty: 90 TABLET | Refills: 2 | Status: SHIPPED | OUTPATIENT
Start: 2025-08-18 | End: 2025-11-16

## 2025-08-18 RX ORDER — HYDROCODONE BITARTRATE AND ACETAMINOPHEN 5; 325 MG/1; MG/1
1 TABLET ORAL 2 TIMES DAILY PRN
Qty: 60 TABLET | Refills: 0 | Status: SHIPPED | OUTPATIENT
Start: 2025-09-27 | End: 2025-10-27

## 2025-08-18 RX ORDER — HYDROCODONE BITARTRATE AND ACETAMINOPHEN 5; 325 MG/1; MG/1
1 TABLET ORAL 2 TIMES DAILY PRN
Qty: 60 TABLET | Refills: 0 | Status: SHIPPED | OUTPATIENT
Start: 2025-08-28 | End: 2025-09-27

## 2025-08-18 RX ORDER — AMLODIPINE BESYLATE 5 MG/1
2.5 TABLET ORAL DAILY
Qty: 90 TABLET | Refills: 1
Start: 2025-08-18 | End: 2025-08-18 | Stop reason: SDUPTHER

## 2025-08-18 RX ORDER — HYDROCODONE BITARTRATE AND ACETAMINOPHEN 5; 325 MG/1; MG/1
1 TABLET ORAL 2 TIMES DAILY PRN
Qty: 60 TABLET | Refills: 0 | Status: SHIPPED | OUTPATIENT
Start: 2025-10-27 | End: 2025-11-26

## 2025-08-18 RX ORDER — AMLODIPINE BESYLATE 2.5 MG/1
2.5 TABLET ORAL DAILY
Qty: 90 TABLET | Refills: 1
Start: 2025-08-18

## 2025-08-22 ENCOUNTER — OFFICE VISIT (OUTPATIENT)
Dept: INTERNAL MEDICINE CLINIC | Age: 75
End: 2025-08-22

## 2025-08-22 VITALS
WEIGHT: 269 LBS | OXYGEN SATURATION: 92 % | HEIGHT: 67 IN | SYSTOLIC BLOOD PRESSURE: 132 MMHG | DIASTOLIC BLOOD PRESSURE: 78 MMHG | BODY MASS INDEX: 42.22 KG/M2 | HEART RATE: 70 BPM

## 2025-08-22 DIAGNOSIS — M79.641 RIGHT HAND PAIN: Primary | ICD-10-CM

## 2025-08-22 DIAGNOSIS — G44.1 VASCULAR HEADACHE: ICD-10-CM

## 2025-08-27 ENCOUNTER — OFFICE VISIT (OUTPATIENT)
Dept: SURGERY | Age: 75
End: 2025-08-27

## 2025-08-27 VITALS
HEIGHT: 67 IN | SYSTOLIC BLOOD PRESSURE: 132 MMHG | WEIGHT: 269 LBS | BODY MASS INDEX: 42.22 KG/M2 | DIASTOLIC BLOOD PRESSURE: 80 MMHG | OXYGEN SATURATION: 99 % | HEART RATE: 80 BPM

## 2025-08-27 DIAGNOSIS — Z48.89 ENCOUNTER FOR POSTOPERATIVE CARE: Primary | ICD-10-CM

## 2025-08-27 PROCEDURE — APPNB15 APP NON BILLABLE TIME 0-15 MINS: Performed by: PHYSICIAN ASSISTANT

## 2025-08-27 PROCEDURE — 99024 POSTOP FOLLOW-UP VISIT: CPT | Performed by: PHYSICIAN ASSISTANT

## (undated) DEVICE — SUTURE ETHLN SZ 3-0 L30IN NONABSORBABLE BLK FS-1 L24MM 3/8 669H

## (undated) DEVICE — APPLICATOR MEDICATED 26 CC SOLUTION HI LT ORNG CHLORAPREP

## (undated) DEVICE — SUTURE ABSORBABLE L 18 IN SZ 4-0 NDL L 19 MM POLYGLACTIN 910 36/BX

## (undated) DEVICE — ADHESIVE SKIN CLSR 0.7ML TOP DERMBND ADV

## (undated) DEVICE — TUBING SUCT L10FT L0.1875IN CONN STR UNIV W/ RIB FEM CONN

## (undated) DEVICE — WEREWOLF FLOW 90 COBLATION WAND: Brand: COBLATION

## (undated) DEVICE — GLOVE SURG SZ 6 THK91MIL LTX FREE SYN POLYISOPRENE ANTI

## (undated) DEVICE — SUTURE PERMAHAND SZ 2-0 L30IN 10X30IN TIE NONABSORBABLE BLK SA85H

## (undated) DEVICE — DRAPE SURG W102XH77XL121IN THYRD SMS POLYPR T SHP SFT ABSRB

## (undated) DEVICE — ZIMMER® STERILE DISPOSABLE TOURNIQUET CUFF WITH PLC, DUAL PORT, SINGLE BLADDER, 30 IN. (76 CM)

## (undated) DEVICE — GOWN,SIRUS,POLYRNF,BRTHSLV,XLN/XL,20/CS: Brand: MEDLINE

## (undated) DEVICE — Device

## (undated) DEVICE — SUTURE PERMA-HAND SZ 3-0 L18IN 17 STRND NONABSORBABLE BLK SA64H

## (undated) DEVICE — BANDAGE,ELASTIC,ESMARK,STERILE,6"X9',LF: Brand: MEDLINE

## (undated) DEVICE — PENCIL ES CRD L10FT HND SWCHING ROCK SWCH W/ EDGE COAT BLDE

## (undated) DEVICE — PADDING CAST W6INXL4YD COT COHESIVE HND TEARABLE SPEC 100

## (undated) DEVICE — ARTHROSCOPY PACK: Brand: MEDLINE INDUSTRIES, INC.

## (undated) DEVICE — MAT FLOOR ULTRA ABS 28X48IN

## (undated) DEVICE — BANDAGE COMPR M W6INXL10YD WHT BGE VELC E MTRX HK AND LOOP

## (undated) DEVICE — GLOVE SURG SZ 8 CRM LTX FREE POLYISOPRENE POLYMER BEAD ANTI

## (undated) DEVICE — Z INACTIVE USE 2660664 SOLUTION IRRIG 3000ML 0.9% SOD CHL USP UROMATIC PLAS CONT

## (undated) DEVICE — DRESSING TRNSPAR W2XL2.75IN FLM SHT SEMIPERMEABLE WIND

## (undated) DEVICE — NEEDLE HYPO 25GA L1.5IN BLU POLYPR HUB S STL REG BVL STR

## (undated) DEVICE — Z INACTIVE USE 2660663 SOLUTION IRRIG 1000ML STRIL H2O USP PLAS POUR BTL

## (undated) DEVICE — GLOVE ORANGE PI 7 1/2   MSG9075

## (undated) DEVICE — TOWEL OR BLUE STERILE PK OF 6

## (undated) DEVICE — SUTURE VICRYL SZ 4-0 L18IN ABSRB UD L19MM PS-2 3/8 CIR PRIM J496H

## (undated) DEVICE — BANDAGE COMPR W6INXL5YD WHT BGE POLY COT M E WRP WV HK AND

## (undated) DEVICE — 3M™ STERI-DRAPE™ U-DRAPE 1015: Brand: STERI-DRAPE™

## (undated) DEVICE — TUBING, SUCTION, 9/32" X 10', STRAIGHT: Brand: MEDLINE

## (undated) DEVICE — TOWEL,OR,DSP,ST,BLUE,STD,6/PK,12PK/CS: Brand: MEDLINE

## (undated) DEVICE — PADDING CAST W6INXL4YD COT LO LINTING WYTEX

## (undated) DEVICE — Z INACTIVE USE 2863041 SPONGE GZ W4XL4IN 100% COT 16 PLY RADPQ HIGHLY ABSRB

## (undated) DEVICE — PAD,ABDOMINAL,5"X9",ST,LF,25/BX: Brand: MEDLINE INDUSTRIES, INC.

## (undated) DEVICE — COUNTER NDL 30 COUNT FOAM STRP SGL MAG

## (undated) DEVICE — Z INACTIVE USE 2854096 SPONGE GZ W2XL2IN COT 8 PLY TYP VII WVN C FLD DSGN

## (undated) DEVICE — STRIP SKIN CLSR W0.25XL4IN WHT SPUNBOUND FBR NYL HI ADH

## (undated) DEVICE — TUBING SUCT 12FR MAL ALUM SHFT FN CAP VENT UNIV CONN W/ OBT

## (undated) DEVICE — MARKER SURG SKIN UTIL REGULAR/FINE 2 TIP W/ RUL AND 9 LBL

## (undated) DEVICE — PACK BASIC SET UP IX ECLIPSE W/ TWO GWN HALF SHT

## (undated) DEVICE — WEREWOLF FLOW 50 COBLATION WAND: Brand: COBLATION

## (undated) DEVICE — YANKAUER,FLEXIBLE HANDLE,REGLR CAPACITY: Brand: MEDLINE INDUSTRIES, INC.

## (undated) DEVICE — GLOVE SURG SZ 7 CRM LTX FREE POLYISOPRENE POLYMER BEAD ANTI

## (undated) DEVICE — BLADE SURG NO15 12MM S STL REUSE HNDL CONVENTIONAL SHRP TIP

## (undated) DEVICE — TUBING PMP L16FT MAIN DISP FOR AR-6400 AR-6475

## (undated) DEVICE — SYRINGE MED 10ML LUERLOCK TIP W/O SFTY DISP

## (undated) DEVICE — DRAPE SHEET ULTRAGARD: Brand: MEDLINE

## (undated) DEVICE — AGGRESSIVE PLUS, ANGLED CUTTER: Brand: FORMULA